# Patient Record
Sex: MALE | URBAN - NONMETROPOLITAN AREA
[De-identification: names, ages, dates, MRNs, and addresses within clinical notes are randomized per-mention and may not be internally consistent; named-entity substitution may affect disease eponyms.]

---

## 2018-06-20 ENCOUNTER — HISTORICAL (OUTPATIENT)
Dept: ADMINISTRATIVE | Facility: HOSPITAL | Age: 63
End: 2018-06-20

## 2018-08-20 ENCOUNTER — HISTORICAL (OUTPATIENT)
Dept: RADIOLOGY | Facility: HOSPITAL | Age: 63
End: 2018-08-20

## 2020-07-27 ENCOUNTER — HISTORICAL (OUTPATIENT)
Dept: ADMINISTRATIVE | Facility: HOSPITAL | Age: 65
End: 2020-07-27

## 2021-09-16 ENCOUNTER — HISTORICAL (OUTPATIENT)
Dept: ADMINISTRATIVE | Facility: HOSPITAL | Age: 66
End: 2021-09-16

## 2021-09-26 ENCOUNTER — HISTORICAL (OUTPATIENT)
Dept: ADMINISTRATIVE | Facility: HOSPITAL | Age: 66
End: 2021-09-26

## 2023-03-26 ENCOUNTER — HOSPITAL ENCOUNTER (EMERGENCY)
Facility: HOSPITAL | Age: 68
Discharge: HOME | End: 2023-03-26
Payer: MEDICARE

## 2023-03-26 VITALS
HEIGHT: 68 IN | DIASTOLIC BLOOD PRESSURE: 91 MMHG | BODY MASS INDEX: 24.4 KG/M2 | SYSTOLIC BLOOD PRESSURE: 155 MMHG | OXYGEN SATURATION: 94 % | WEIGHT: 161 LBS | RESPIRATION RATE: 19 BRPM | HEART RATE: 77 BPM | TEMPERATURE: 99 F

## 2023-03-26 DIAGNOSIS — S51.812A SKIN TEAR OF FOREARM WITHOUT COMPLICATION, LEFT, INITIAL ENCOUNTER: ICD-10-CM

## 2023-03-26 DIAGNOSIS — S51.012A SKIN TEAR OF ELBOW WITHOUT COMPLICATION, LEFT, INITIAL ENCOUNTER: Primary | ICD-10-CM

## 2023-03-26 PROCEDURE — 99282 EMERGENCY DEPT VISIT SF MDM: CPT

## 2023-03-26 PROCEDURE — 25000003 PHARM REV CODE 250: Performed by: NURSE PRACTITIONER

## 2023-03-26 RX ADMIN — BACITRACIN ZINC, NEOMYCIN SULFATE, POLYMYXIN B SULFATE 1 EACH: 3.5; 5000; 4 OINTMENT TOPICAL at 06:03

## 2023-03-26 RX ADMIN — BACITRACIN ZINC, NEOMYCIN SULFATE, POLYMYXIN B SULFATE 2 EACH: 3.5; 5000; 4 OINTMENT TOPICAL at 06:03
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
## 2023-11-15 DIAGNOSIS — R06.02 SHORTNESS OF BREATH: Primary | ICD-10-CM

## 2023-11-21 PROBLEM — R06.02 SHORTNESS OF BREATH: Status: ACTIVE | Noted: 2023-11-21

## 2024-02-12 ENCOUNTER — HOSPITAL ENCOUNTER (OUTPATIENT)
Dept: RADIOLOGY | Facility: HOSPITAL | Age: 69
Discharge: HOME | End: 2024-02-12
Attending: INTERNAL MEDICINE
Payer: MEDICARE

## 2024-02-12 DIAGNOSIS — S00.83XA CONTUSION OF CHEEK: ICD-10-CM

## 2024-02-12 PROCEDURE — 70450 CT HEAD/BRAIN W/O DYE: CPT | Mod: TC

## 2024-04-13 ENCOUNTER — HOSPITAL ENCOUNTER (EMERGENCY)
Facility: HOSPITAL | Age: 69
Discharge: LEFT BEFORE DISCHARGE | End: 2024-04-13
Attending: FAMILY MEDICINE | Admitting: INTERNAL MEDICINE
Payer: COMMERCIAL

## 2024-04-13 VITALS
HEART RATE: 61 BPM | WEIGHT: 155 LBS | HEIGHT: 68 IN | BODY MASS INDEX: 23.49 KG/M2 | TEMPERATURE: 98 F | OXYGEN SATURATION: 98 % | RESPIRATION RATE: 20 BRPM | DIASTOLIC BLOOD PRESSURE: 58 MMHG | SYSTOLIC BLOOD PRESSURE: 162 MMHG
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
## 2024-04-13 DIAGNOSIS — S51.012A SKIN TEAR OF LEFT ELBOW WITHOUT COMPLICATION, INITIAL ENCOUNTER: ICD-10-CM

## 2024-04-13 DIAGNOSIS — S61.511A TEAR OF SKIN OF RIGHT WRIST, INITIAL ENCOUNTER: ICD-10-CM
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
## 2024-04-13 PROCEDURE — 99283 EMERGENCY DEPT VISIT LOW MDM: CPT

## 2024-04-13 RX ORDER — MUPIROCIN 20 MG/G
OINTMENT TOPICAL 3 TIMES DAILY
Qty: 22 G | Refills: 0 | Status: SHIPPED | OUTPATIENT
Start: 2024-04-13 | End: 2024-04-23
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
## 2024-04-26 ENCOUNTER — HOSPITAL ENCOUNTER (INPATIENT)
Facility: HOSPITAL | Age: 69
LOS: 1 days | Discharge: LEFT BEFORE DISCHARGE | DRG: 641 | End: 2024-04-27
Attending: SURGERY | Admitting: FAMILY MEDICINE
Payer: MEDICARE

## 2024-04-26 DIAGNOSIS — N17.9 ACUTE RENAL FAILURE, UNSPECIFIED ACUTE RENAL FAILURE TYPE: Primary | ICD-10-CM

## 2024-04-26 DIAGNOSIS — R53.81 MALAISE: ICD-10-CM

## 2024-04-26 DIAGNOSIS — E87.5 HYPERKALEMIA: ICD-10-CM

## 2024-04-26 DIAGNOSIS — S41.111A LACERATION OF RIGHT UPPER ARM WITHOUT COMPLICATION, INITIAL ENCOUNTER: ICD-10-CM

## 2024-04-26 PROBLEM — N18.9 ACUTE ON CHRONIC RENAL FAILURE: Status: ACTIVE | Noted: 2024-04-26

## 2024-04-26 PROBLEM — D62 ACUTE BLOOD LOSS ANEMIA: Status: ACTIVE | Noted: 2024-04-26

## 2024-04-26 LAB
ALBUMIN SERPL-MCNC: 3.4 G/DL (ref 3.4–5)
ALBUMIN SERPL-MCNC: 4.4 G/DL (ref 3.4–5)
ALBUMIN/GLOB SERPL: 1.4 RATIO
ALBUMIN/GLOB SERPL: 1.4 RATIO
ALP SERPL-CCNC: 67 UNIT/L (ref 50–144)
ALP SERPL-CCNC: 83 UNIT/L (ref 50–144)
ALT SERPL-CCNC: 21 UNIT/L (ref 1–45)
ALT SERPL-CCNC: 48 UNIT/L (ref 1–45)
ANION GAP SERPL CALC-SCNC: 12 MEQ/L (ref 2–13)
ANION GAP SERPL CALC-SCNC: 4 MEQ/L (ref 2–13)
APPEARANCE UR: CLEAR
AST SERPL-CCNC: 27 UNIT/L (ref 17–59)
AST SERPL-CCNC: 45 UNIT/L (ref 17–59)
BACTERIA #/AREA URNS AUTO: ABNORMAL /HPF
BASOPHILS # BLD AUTO: 0.18 X10(3)/MCL (ref 0.01–0.08)
BASOPHILS NFR BLD AUTO: 1.3 % (ref 0.1–1.2)
BILIRUB SERPL-MCNC: 1 MG/DL (ref 0–1)
BILIRUB SERPL-MCNC: 1.5 MG/DL (ref 0–1)
BILIRUB UR QL STRIP.AUTO: NEGATIVE
BUN SERPL-MCNC: 32 MG/DL (ref 7–20)
BUN SERPL-MCNC: 35 MG/DL (ref 7–20)
CALCIUM SERPL-MCNC: 8.3 MG/DL (ref 8.4–10.2)
CALCIUM SERPL-MCNC: 8.8 MG/DL (ref 8.4–10.2)
CHLORIDE SERPL-SCNC: 105 MMOL/L (ref 98–110)
CHLORIDE SERPL-SCNC: 109 MMOL/L (ref 98–110)
CO2 SERPL-SCNC: 18 MMOL/L (ref 21–32)
CO2 SERPL-SCNC: 23 MMOL/L (ref 21–32)
COLOR UR AUTO: YELLOW
CREAT SERPL-MCNC: 2.69 MG/DL (ref 0.66–1.25)
CREAT SERPL-MCNC: 3.54 MG/DL (ref 0.66–1.25)
CREAT/UREA NIT SERPL: 10 (ref 12–20)
CREAT/UREA NIT SERPL: 12 (ref 12–20)
EOSINOPHIL # BLD AUTO: 0.02 X10(3)/MCL (ref 0.04–0.54)
EOSINOPHIL NFR BLD AUTO: 0.1 % (ref 0.7–7)
ERYTHROCYTE [DISTWIDTH] IN BLOOD BY AUTOMATED COUNT: 12.9 %
ETHANOL BLD-MCNC: <0.01 G/DL
ETHANOL SERPL-MCNC: <10 MG/DL
GFR SERPLBLD CREATININE-BSD FMLA CKD-EPI: 18 MLS/MIN/1.73/M2
GFR SERPLBLD CREATININE-BSD FMLA CKD-EPI: 25 MLS/MIN/1.73/M2
GLOBULIN SER-MCNC: 2.4 GM/DL (ref 2–3.9)
GLOBULIN SER-MCNC: 3.1 GM/DL (ref 2–3.9)
GLUCOSE SERPL-MCNC: 112 MG/DL (ref 70–115)
GLUCOSE SERPL-MCNC: 172 MG/DL (ref 70–115)
GLUCOSE UR QL STRIP.AUTO: 250
HCT VFR BLD AUTO: 32.3 % (ref 36–52)
HGB BLD-MCNC: 10.9 G/DL (ref 13–18)
IMM GRANULOCYTES # BLD AUTO: 0.11 X10(3)/MCL (ref 0–0.03)
IMM GRANULOCYTES NFR BLD AUTO: 0.8 % (ref 0–0.5)
KETONES UR QL STRIP.AUTO: NEGATIVE
LEUKOCYTE ESTERASE UR QL STRIP.AUTO: NEGATIVE
LYMPHOCYTES # BLD AUTO: 1.54 X10(3)/MCL (ref 1.32–3.57)
LYMPHOCYTES NFR BLD AUTO: 11 % (ref 20–55)
MAGNESIUM SERPL-MCNC: 2.3 MG/DL (ref 1.8–2.4)
MCH RBC QN AUTO: 31.2 PG (ref 27–34)
MCHC RBC AUTO-ENTMCNC: 33.7 G/DL (ref 31–37)
MCV RBC AUTO: 92.6 FL (ref 79–99)
MONOCYTES # BLD AUTO: 0.56 X10(3)/MCL (ref 0.3–0.82)
MONOCYTES NFR BLD AUTO: 4 % (ref 4.7–12.5)
NEUTROPHILS # BLD AUTO: 11.6 X10(3)/MCL (ref 1.78–5.38)
NEUTROPHILS NFR BLD AUTO: 82.8 % (ref 37–73)
NITRITE UR QL STRIP.AUTO: NEGATIVE
NRBC BLD AUTO-RTO: 0 %
OHS QRS DURATION: 108 MS
OHS QTC CALCULATION: 435 MS
PH UR STRIP.AUTO: 5.5 [PH]
PHOSPHATE SERPL-MCNC: 6.5 MG/DL (ref 2.5–4.9)
PLATELET # BLD AUTO: 174 X10(3)/MCL (ref 140–371)
PMV BLD AUTO: 10 FL (ref 9.4–12.4)
POCT GLUCOSE: 104 MG/DL (ref 70–110)
POCT GLUCOSE: 119 MG/DL (ref 70–110)
POTASSIUM SERPL-SCNC: 4.3 MMOL/L (ref 3.5–5.1)
POTASSIUM SERPL-SCNC: 4.9 MMOL/L (ref 3.5–5.1)
POTASSIUM SERPL-SCNC: 5.8 MMOL/L (ref 3.5–5.1)
POTASSIUM SERPL-SCNC: 7.2 MMOL/L (ref 3.5–5.1)
POTASSIUM SERPL-SCNC: 7.5 MMOL/L (ref 3.5–5.1)
PROT SERPL-MCNC: 5.8 GM/DL (ref 6.3–8.2)
PROT SERPL-MCNC: 7.5 GM/DL (ref 6.3–8.2)
PROT UR QL STRIP.AUTO: 30
RBC # BLD AUTO: 3.49 X10(6)/MCL (ref 4–6)
RBC #/AREA URNS AUTO: ABNORMAL /HPF
RBC UR QL AUTO: ABNORMAL
SODIUM SERPL-SCNC: 135 MMOL/L (ref 135–145)
SODIUM SERPL-SCNC: 136 MMOL/L (ref 135–145)
SP GR UR STRIP.AUTO: >=1.03 (ref 1–1.03)
SPERM URNS QL MICRO: ABNORMAL /HPF
SQUAMOUS #/AREA URNS AUTO: ABNORMAL /HPF
TROPONIN I SERPL-MCNC: 0.05 NG/ML (ref 0–0.03)
UROBILINOGEN UR STRIP-ACNC: 0.2
WBC # SPEC AUTO: 14.01 X10(3)/MCL (ref 4–11.5)
WBC #/AREA URNS AUTO: ABNORMAL /HPF

## 2024-04-26 PROCEDURE — 99285 EMERGENCY DEPT VISIT HI MDM: CPT | Mod: 25

## 2024-04-26 PROCEDURE — 51702 INSERT TEMP BLADDER CATH: CPT

## 2024-04-26 PROCEDURE — 84100 ASSAY OF PHOSPHORUS: CPT | Performed by: SURGERY

## 2024-04-26 PROCEDURE — 3E0234Z INTRODUCTION OF SERUM, TOXOID AND VACCINE INTO MUSCLE, PERCUTANEOUS APPROACH: ICD-10-PCS | Performed by: SURGERY

## 2024-04-26 PROCEDURE — 96361 HYDRATE IV INFUSION ADD-ON: CPT

## 2024-04-26 PROCEDURE — 0HQBXZZ REPAIR RIGHT UPPER ARM SKIN, EXTERNAL APPROACH: ICD-10-PCS | Performed by: SURGERY

## 2024-04-26 PROCEDURE — 94761 N-INVAS EAR/PLS OXIMETRY MLT: CPT

## 2024-04-26 PROCEDURE — 90471 IMMUNIZATION ADMIN: CPT | Performed by: SURGERY

## 2024-04-26 PROCEDURE — 93010 ELECTROCARDIOGRAM REPORT: CPT | Mod: ,,, | Performed by: INTERNAL MEDICINE

## 2024-04-26 PROCEDURE — 63600175 PHARM REV CODE 636 W HCPCS: Performed by: FAMILY MEDICINE

## 2024-04-26 PROCEDURE — 80053 COMPREHEN METABOLIC PANEL: CPT | Performed by: INTERNAL MEDICINE

## 2024-04-26 PROCEDURE — 90714 TD VACC NO PRESV 7 YRS+ IM: CPT | Performed by: SURGERY

## 2024-04-26 PROCEDURE — 84484 ASSAY OF TROPONIN QUANT: CPT | Performed by: SURGERY

## 2024-04-26 PROCEDURE — 82077 ASSAY SPEC XCP UR&BREATH IA: CPT | Performed by: SURGERY

## 2024-04-26 PROCEDURE — 25000003 PHARM REV CODE 250: Performed by: FAMILY MEDICINE

## 2024-04-26 PROCEDURE — 80053 COMPREHEN METABOLIC PANEL: CPT | Performed by: SURGERY

## 2024-04-26 PROCEDURE — 84132 ASSAY OF SERUM POTASSIUM: CPT | Performed by: SURGERY

## 2024-04-26 PROCEDURE — 96374 THER/PROPH/DIAG INJ IV PUSH: CPT

## 2024-04-26 PROCEDURE — 82962 GLUCOSE BLOOD TEST: CPT

## 2024-04-26 PROCEDURE — 12002 RPR S/N/AX/GEN/TRNK2.6-7.5CM: CPT

## 2024-04-26 PROCEDURE — 83735 ASSAY OF MAGNESIUM: CPT | Performed by: SURGERY

## 2024-04-26 PROCEDURE — 85025 COMPLETE CBC W/AUTO DIFF WBC: CPT | Performed by: SURGERY

## 2024-04-26 PROCEDURE — 87040 BLOOD CULTURE FOR BACTERIA: CPT | Performed by: FAMILY MEDICINE

## 2024-04-26 PROCEDURE — 20000000 HC ICU ROOM

## 2024-04-26 PROCEDURE — 25000003 PHARM REV CODE 250: Performed by: INTERNAL MEDICINE

## 2024-04-26 PROCEDURE — 87086 URINE CULTURE/COLONY COUNT: CPT | Performed by: SURGERY

## 2024-04-26 PROCEDURE — 25000003 PHARM REV CODE 250: Performed by: SURGERY

## 2024-04-26 PROCEDURE — 96375 TX/PRO/DX INJ NEW DRUG ADDON: CPT

## 2024-04-26 PROCEDURE — 63600175 PHARM REV CODE 636 W HCPCS: Performed by: SURGERY

## 2024-04-26 PROCEDURE — 93005 ELECTROCARDIOGRAM TRACING: CPT

## 2024-04-26 PROCEDURE — 81001 URINALYSIS AUTO W/SCOPE: CPT | Performed by: SURGERY

## 2024-04-26 RX ORDER — OXYCODONE AND ACETAMINOPHEN 10; 325 MG/1; MG/1
1 TABLET ORAL EVERY 4 HOURS PRN
COMMUNITY
End: 2024-04-26 | Stop reason: CLARIF

## 2024-04-26 RX ORDER — POTASSIUM CHLORIDE 750 MG/1
10 TABLET, EXTENDED RELEASE ORAL
COMMUNITY
End: 2024-04-26 | Stop reason: CLARIF

## 2024-04-26 RX ORDER — BUPROPION HYDROCHLORIDE 100 MG/1
100 TABLET, EXTENDED RELEASE ORAL 2 TIMES DAILY
COMMUNITY
End: 2024-04-26 | Stop reason: CLARIF

## 2024-04-26 RX ORDER — LIDOCAINE HYDROCHLORIDE 20 MG/ML
20 INJECTION, SOLUTION INFILTRATION; PERINEURAL ONCE
Status: COMPLETED | OUTPATIENT
Start: 2024-04-26 | End: 2024-04-26

## 2024-04-26 RX ORDER — SODIUM CHLORIDE 450 MG/100ML
INJECTION, SOLUTION INTRAVENOUS CONTINUOUS
Status: DISCONTINUED | OUTPATIENT
Start: 2024-04-26 | End: 2024-04-26

## 2024-04-26 RX ORDER — ZOLPIDEM TARTRATE 10 MG/1
5 TABLET ORAL NIGHTLY PRN
COMMUNITY
End: 2024-04-26 | Stop reason: CLARIF

## 2024-04-26 RX ORDER — CALCIUM GLUCONATE IN 0.9% NACL 1 G/100 ML
1 PLASTIC BAG, INJECTION (ML) INTRAVENOUS ONCE
Status: COMPLETED | OUTPATIENT
Start: 2024-04-26 | End: 2024-04-26

## 2024-04-26 RX ORDER — FUROSEMIDE 20 MG/1
40 TABLET ORAL DAILY
COMMUNITY
Start: 2024-04-10 | End: 2024-04-26 | Stop reason: CLARIF

## 2024-04-26 RX ORDER — DONEPEZIL HYDROCHLORIDE 5 MG/1
5 TABLET, FILM COATED ORAL NIGHTLY
COMMUNITY
End: 2024-04-26 | Stop reason: CLARIF

## 2024-04-26 RX ORDER — SODIUM CHLORIDE 9 MG/ML
INJECTION, SOLUTION INTRAVENOUS CONTINUOUS
Status: DISCONTINUED | OUTPATIENT
Start: 2024-04-26 | End: 2024-04-27

## 2024-04-26 RX ORDER — PAROXETINE HYDROCHLORIDE 40 MG/1
40 TABLET, FILM COATED ORAL EVERY MORNING
COMMUNITY
End: 2024-04-26 | Stop reason: CLARIF

## 2024-04-26 RX ORDER — ASPIRIN 81 MG/1
81 TABLET ORAL DAILY
COMMUNITY
End: 2024-04-26 | Stop reason: CLARIF

## 2024-04-26 RX ORDER — SIMVASTATIN 80 MG/1
80 TABLET, FILM COATED ORAL NIGHTLY
COMMUNITY
End: 2024-04-26 | Stop reason: CLARIF

## 2024-04-26 RX ORDER — AMIODARONE HYDROCHLORIDE 200 MG/1
200 TABLET ORAL 2 TIMES DAILY
COMMUNITY
End: 2024-04-26 | Stop reason: CLARIF

## 2024-04-26 RX ORDER — METOPROLOL TARTRATE 25 MG/1
25 TABLET, FILM COATED ORAL 2 TIMES DAILY
COMMUNITY
End: 2024-04-26 | Stop reason: CLARIF

## 2024-04-26 RX ORDER — PANTOPRAZOLE SODIUM 40 MG/1
40 TABLET, DELAYED RELEASE ORAL DAILY
COMMUNITY
End: 2024-04-26 | Stop reason: CLARIF

## 2024-04-26 RX ORDER — FERROUS GLUCONATE 324(38)MG
324 TABLET ORAL
COMMUNITY
End: 2024-04-26 | Stop reason: CLARIF

## 2024-04-26 RX ORDER — TRAMADOL HYDROCHLORIDE 50 MG/1
50 TABLET ORAL EVERY 6 HOURS PRN
COMMUNITY
End: 2024-04-26 | Stop reason: CLARIF

## 2024-04-26 RX ORDER — MUPIROCIN 20 MG/G
OINTMENT TOPICAL 2 TIMES DAILY
Status: DISCONTINUED | OUTPATIENT
Start: 2024-04-26 | End: 2024-04-27 | Stop reason: HOSPADM

## 2024-04-26 RX ADMIN — MUPIROCIN: 20 OINTMENT TOPICAL at 08:04

## 2024-04-26 RX ADMIN — SODIUM CHLORIDE: 4.5 INJECTION, SOLUTION INTRAVENOUS at 02:04

## 2024-04-26 RX ADMIN — INSULIN HUMAN 5 UNITS: 100 INJECTION, SOLUTION PARENTERAL at 07:04

## 2024-04-26 RX ADMIN — CEFTRIAXONE SODIUM 1 G: 1 INJECTION, POWDER, FOR SOLUTION INTRAMUSCULAR; INTRAVENOUS at 05:04

## 2024-04-26 RX ADMIN — SODIUM CHLORIDE 1000 ML: 9 INJECTION, SOLUTION INTRAVENOUS at 08:04

## 2024-04-26 RX ADMIN — LIDOCAINE HYDROCHLORIDE 20 ML: 20 INJECTION, SOLUTION INFILTRATION; PERINEURAL at 07:04

## 2024-04-26 RX ADMIN — SODIUM CHLORIDE: 9 INJECTION, SOLUTION INTRAVENOUS at 06:04

## 2024-04-26 RX ADMIN — TETANUS AND DIPHTHERIA TOXOIDS ADSORBED 0.5 ML: 2; 2 INJECTION INTRAMUSCULAR at 06:04

## 2024-04-26 RX ADMIN — CALCIUM GLUCONATE 1 G: 10 INJECTION, SOLUTION INTRAVENOUS at 07:04

## 2024-04-26 RX ADMIN — SODIUM BICARBONATE: 84 INJECTION, SOLUTION INTRAVENOUS at 05:04

## 2024-04-26 NOTE — ASSESSMENT & PLAN NOTE
Patient with acute kidney injury/acute renal failure likely due to pre-renal azotemia due to dehydration ALEXANDRA is currently improving. Baseline creatinine  1.9  - Labs reviewed- Renal function/electrolytes with Estimated Creatinine Clearance: 19.9 mL/min (A) (based on SCr of 3.54 mg/dL (H)). according to latest data. Monitor urine output and serial BMP and adjust therapy as needed. Avoid nephrotoxins and renally dose meds for GFR listed above.

## 2024-04-26 NOTE — PLAN OF CARE
REVIEWED.     Problem: Infection  Goal: Absence of Infection Signs and Symptoms  Outcome: Progressing     Problem: Adult Inpatient Plan of Care  Goal: Plan of Care Review  Outcome: Progressing  Goal: Patient-Specific Goal (Individualized)  Outcome: Progressing  Goal: Absence of Hospital-Acquired Illness or Injury  Outcome: Progressing  Goal: Optimal Comfort and Wellbeing  Outcome: Progressing  Goal: Readiness for Transition of Care  Outcome: Progressing     Problem: Acute Kidney Injury/Impairment  Goal: Fluid and Electrolyte Balance  Outcome: Progressing  Goal: Improved Oral Intake  Outcome: Progressing  Goal: Effective Renal Function  Outcome: Progressing     Problem: Skin Injury Risk Increased  Goal: Skin Health and Integrity  Outcome: Progressing     Problem: Fall Injury Risk  Goal: Absence of Fall and Fall-Related Injury  Outcome: Progressing     Problem: Wound  Goal: Optimal Coping  Outcome: Progressing  Goal: Optimal Functional Ability  Outcome: Progressing  Goal: Absence of Infection Signs and Symptoms  Outcome: Progressing  Goal: Improved Oral Intake  Outcome: Progressing  Goal: Optimal Pain Control and Function  Outcome: Progressing  Goal: Skin Health and Integrity  Outcome: Progressing  Goal: Optimal Wound Healing  Outcome: Progressing

## 2024-04-26 NOTE — Clinical Note
Diagnosis: Acute renal failure, unspecified acute renal failure type [[id number]]   Future Attending Provider: JASIEL BUCKNER [[id number]]   Reason for IP Medical Treatment  (Clinical interventions that can only be accomplished in the IP setting? ) :: Treatment for inpatient acute life threatening illness   Estimated Length of Stay:: 3-4 midnights   I certify that Inpatient services for greater than or equal to 2 midnights are medically necessary:: Yes   Plans for Post-Acute care--if anticipated (pick the single best option):: A. No post acute care anticipated at this time   Special Needs:: No Special Needs [1]

## 2024-04-26 NOTE — CONSULTS
[facility]  Consult Note    Patient Name: Leno Thompson Jr.  MRN: [id number]  Admission Date: 4/26/2024  Hospital Length of Stay: 0 days  Attending Provider: Fiorella Madison MD   Primary Care Physician: David Carter MD  Principal Problem:<principal problem not specified>    Consults  Subjective:     HPI: Patient is a 68-year-old male who presented to the emergency department after calling EMS when he was too weak to get up off the toilet this morning.  Patient does have significant baseline dysarthria and is an extremely difficult historian.  Apparently there was evidence for significant bleeding in the house, which may have been as a result of lacerations from falls and he is apparently on Plavix although we do not have any other medication history available at this time.  In the emergency department he was noted to be severely hyperkalemic at 7.5 with an elevated creatinine of 3.54 as compared to 1.81 back in mid February.  He was treated medically for his hyperkalemia including dextrose/insulin/calcium gluconate, and given 1 L of normal saline as a bolus presumably for evidence of volume depletion.      Patient's chest x-ray was clear, and renal ultrasound did show kidneys that were slightly diminutive with some cortical thinning and increased echogenicity but no evidence for obstruction.  His urine specific gravity was greater than 1.030 suggesting significant prerenal physiology.  He has been hemodynamically stable and afebrile.  Repeat potassium level 3 hours after institution of medical therapy showed level down to 4.8, and repeat several hours later shows potassium level trending upward at 5.8.  Patient does have a Richardson catheter in place with 300 cc which presumably represent an 8 hour collection.  The    Patient's past medical history is somewhat unclear although appears to include hypertension, coronary artery disease and dysarthria.  At time of my evaluation  he is resting comfortably in ICU and is easily arousable but minimally interactive.  He does have markedly delayed speech, and does not offer any significant complaints on review.  He is awaiting an ICU bed.  Reportedly surgery had been contacted about placing a dialysis catheter earlier today.      Past Medical History:   Diagnosis Date    Arthritis     Heart attack     Hypertension     Muscle spasms of both lower extremities        Past Surgical History:   Procedure Laterality Date    LEG AMPUTATION Left        Review of patient's allergies indicates:  No Known Allergies  Current Facility-Administered Medications   Medication Dose Route Frequency Provider Last Rate Last Admin    0.9%  NaCl infusion   Intravenous Continuous Perry Aparicio MD   Stopped at 04/26/24 0759    dextrose 10% bolus 125 mL 125 mL  12.5 g Intravenous PRN Perry Aparicio MD        dextrose 10% bolus 250 mL 250 mL  25 g Intravenous PRN Perry Aparicio MD         No current outpatient medications on file.     Family History    None       Tobacco Use    Smoking status: Every Day     Current packs/day: 1.00     Types: Cigarettes    Smokeless tobacco: Never   Substance and Sexual Activity    Alcohol use: Never    Drug use: Never    Sexual activity: Not on file     Review of Systems   Constitutional:         As per HPI, patient is minimally interactive and does not really offer any complaints.  On questioning he describes some shortness of breath but appears to be breathing with a normal respiratory rate and effort with oxygen saturation at 100% on 3 L. he denies any other focal pain complaints.     Objective:     Vital Signs (Most Recent):  Temp: 98.1 °F (36.7 °C) (04/26/24 1245)  Pulse: (!) 54 (04/26/24 1245)  Resp: 12 (04/26/24 1245)  BP: (!) 187/66 (04/26/24 1245)  SpO2: 100 % (04/26/24 1245) Vital Signs (24h Range):  Temp:  [97.8 °F (36.6 °C)-98.1 °F (36.7 °C)] 98.1 °F (36.7 °C)  Pulse:  [49-66] 54  Resp:  [11-22] 12  SpO2:   [93 %-100 %] 100 %  BP: (144-190)/(50-91) 187/66     Weight: 68.5 kg (151 lb) (04/26/24 0559)  Body mass index is 22.96 kg/m².  Body surface area is 1.81 meters squared.    No intake/output data recorded.    Physical Exam  Vitals reviewed. Nursing note reviewed: Patient was seen on telemedicine rounds in ICU, he gave consent for evaluation and management.  Patient is easily arousable but minimally interactive, in no acute distress.  He is a very poor historian.    Significant Labs:  CBC:   Recent Labs   Lab 04/26/24  0629   WBC 14.01*   RBC 3.49*   HGB 10.9*   HCT 32.3*      MCV 92.6   MCH 31.2   MCHC 33.7     CMP:   Recent Labs   Lab 04/26/24  0629 04/26/24  0702 04/26/24  1301   CALCIUM 8.8  --   --    ALBUMIN 4.4  --   --      --   --    K 7.2*   < > 5.8*   CO2 18*  --   --    BUN 35.0*  --   --    CREATININE 3.54*  --   --    ALKPHOS 83  --   --    ALT 48*  --   --    AST 45  --   --    BILITOT 1.5*  --   --     < > = values in this interval not displayed.     Assessment/Plan:     1. Hyperkalemia:  Patient presented to the emergency department with severe hyperkalemia and a potassium of 7.5, although it is not clear that he had particularly concerning EKG changes.  He was given 1 round of medical therapy and repeat potassium 3 hours later it dropped to 4.8, but has subsequently trended upward modestly to 5.8.  Patient does appear to be volume depleted, although this is a more difficult assessment by telemedicine evaluation.  I will start him on some maintenance fluids including sodium bicarbonate and plan repeat chemistry panel in 4 hours.  I would hold off on placement of dialysis catheter at this time as I suspect we can manage this medically.  He will need to be on a low potassium diet    2. Renal:  This is a gentleman who appears to have at least some element of LAEXANDRA and may have an underlying component of CKD.  We will need to get a better history on him, but his ultrasound is suggestive of  chronic renal impairment.  He appears to have a modest degree of albumin in his urine and only a small amount of blood.  We will review his medications when able and monitor response to hydration with further laboratory evaluation and urine studies in the morning.      3. Anemia:  Modest anemia with hemoglobin at 10.9, normal cell indices.  I will check iron stores.      4. Hypertension:  Blood pressure has been intermittently elevated with a pattern of isolated systolic hypertension.  We will attempt to get a list of his chronic outpatient medications and can restart therapy as appropriate, we will defer present management to on-site medical team.    Edwin Dunaway MD  Nephrology  [facility]

## 2024-04-26 NOTE — H&P
[facility]  [facility] & Physical    Patient Name: Leno Thompson Jr.  MRN: [id number]  Patient Class: IP- Inpatient  Admission Date: 4/26/2024  Attending Physician: Fiorella Madison MD   Primary Care Provider: David Carter MD         Patient information was obtained from patient and ER records.     Subjective:     Principal Problem:Hyperkalemia    Chief Complaint:   Chief Complaint   Patient presents with    Weakness     Pt to er via [facility], medic states pt called due to not being able to get off toilet, medic states pt was covered in blood, blood all over walls in house and on floor in house/bedroom. Pt denies pain and does not know where blood is coming from.         HPI: Patient is a 68-year-old male who presented to the emergency department after calling EMS when he was too weak to get up off the toilet this morning.  Patient does have significant baseline dysarthria and is an extremely difficult historian.  Apparently there was evidence for significant bleeding in the house, which may have been as a result of lacerations from falls and he is apparently on Plavix although we do not have any other medication history available at this time.  In the emergency department he was noted to be severely hyperkalemic at 7.5 with an elevated creatinine of 3.54 as compared to 1.81 back in mid February.  He was treated medically for his hyperkalemia including dextrose/insulin/calcium gluconate, and given 1 L of normal saline as a bolus presumably for evidence of volume depletion.       Patient's chest x-ray was clear, and renal ultrasound did show kidneys that were slightly diminutive with some cortical thinning and increased echogenicity but no evidence for obstruction.  His urine specific gravity was greater than 1.030 suggesting significant prerenal physiology.  He has been hemodynamically stable and afebrile.     Repeat potassium level 3 hours after institution of medical  therapy showed level down to 4.8, and repeat several hours later shows potassium level trending upward at 5.8.  Richardson was placed per ERMD.Patient does have a Richardson catheter in place with 300 cc which presumably represent an 8 hour collection.        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     He has markedly delayed speech, and does not offer any significant complaints on review.   Reportedly surgery had been contacted about placing a dialysis catheter earlier today.         Past Medical History:   Diagnosis Date    Arthritis     Heart attack     Hypertension     Muscle spasms of both lower extremities        Past Surgical History:   Procedure Laterality Date    LEG AMPUTATION Left        Review of patient's allergies indicates:  No Known Allergies    Current Facility-Administered Medications   Medication Dose Route Frequency Provider Last Rate Last Admin    0.45% NaCl infusion   Intravenous Continuous Edwin Dunaway  mL/hr at 04/26/24 1443 New Bag at 04/26/24 1443    0.9%  NaCl infusion   Intravenous Continuous Perry Aparicio MD   Stopped at 04/26/24 0759    dextrose 10% bolus 125 mL 125 mL  12.5 g Intravenous PRN Perry Aparicio MD        dextrose 10% bolus 250 mL 250 mL  25 g Intravenous PRN Perry Aparicio MD         No current outpatient medications on file.     Family History    None       Tobacco Use    Smoking status: Every Day     Current packs/day: 1.00     Types: Cigarettes    Smokeless tobacco: Never   Substance and Sexual Activity    Alcohol use: Never    Drug use: Never    Sexual activity: Not on file     Review of Systems   Constitutional:  Negative for appetite change, fatigue and fever.   Respiratory:  Negative for cough, shortness of breath and wheezing.    Cardiovascular:  Negative for chest pain and leg swelling.   Gastrointestinal:  Negative  for abdominal distention, abdominal pain, constipation, diarrhea, nausea and vomiting.   Skin:  Negative for color change, pallor, rash and wound.   Neurological:  Negative for tremors, syncope and headaches.   Psychiatric/Behavioral:  Negative for agitation and behavioral problems.      Objective:     Vital Signs (Most Recent):  Temp: 98 °F (36.7 °C) (04/26/24 1445)  Pulse: (!) 53 (04/26/24 1445)  Resp: 15 (04/26/24 1445)  BP: (!) 171/58 (04/26/24 1445)  SpO2: 100 % (04/26/24 1445) Vital Signs (24h Range):  Temp:  [97.8 °F (36.6 °C)-98.1 °F (36.7 °C)] 98 °F (36.7 °C)  Pulse:  [49-66] 53  Resp:  [11-22] 15  SpO2:  [93 %-100 %] 100 %  BP: (144-190)/(50-91) 171/58     Weight: 68.5 kg (151 lb)  Body mass index is 22.96 kg/m².     Physical Exam  Constitutional:       General: He is not in acute distress.     Appearance: Normal appearance. He is normal weight. He is not ill-appearing.   HENT:      Head: Normocephalic and atraumatic.      Nose: Nose normal.   Eyes:      General: No scleral icterus.     Conjunctiva/sclera: Conjunctivae normal.   Cardiovascular:      Rate and Rhythm: Normal rate and regular rhythm.      Pulses: Normal pulses.      Heart sounds: Normal heart sounds.   Pulmonary:      Effort: Pulmonary effort is normal.      Breath sounds: Normal breath sounds.   Abdominal:      General: Abdomen is flat. Bowel sounds are normal.      Palpations: Abdomen is soft.   Musculoskeletal:      Right lower leg: No edema.      Left lower leg: No edema.   Skin:     General: Skin is warm and dry.      Findings: No erythema or rash.   Neurological:      General: No focal deficit present.      Mental Status: He is alert and oriented to person, place, and time.   Psychiatric:         Mood and Affect: Mood normal.         Behavior: Behavior normal.         Thought Content: Thought content normal.                Significant Labs: All pertinent labs within the past 24 hours have been reviewed.  CBC:   Recent Labs   Lab  04/26/24  0629   WBC 14.01*   HGB 10.9*   HCT 32.3*        CMP:   Recent Labs   Lab 04/26/24  0629 04/26/24  0702 04/26/24  1002 04/26/24  1301     --   --   --    K 7.2* 7.5* 4.3 5.8*   CO2 18*  --   --   --    BUN 35.0*  --   --   --    CREATININE 3.54*  --   --   --    CALCIUM 8.8  --   --   --    ALBUMIN 4.4  --   --   --    BILITOT 1.5*  --   --   --    ALKPHOS 83  --   --   --    AST 45  --   --   --    ALT 48*  --   --   --        Significant Imaging: I have reviewed all pertinent imaging results/findings within the past 24 hours.  Assessment/Plan:     * Hyperkalemia  This patient has hyperkalemia which is uncontrolled. We will monitor for arrhythmias with EKG or continuous telemetry. We will treat the hyperkalemia with Potassium Binders, Calcium gluconate, and IV insulin and dextrose. The likely etiology of the hyperkalemia is ALEXANDRA.  The patients latest potassium has been reviewed and the results are listed below  Recent Labs   Lab 04/26/24  1301   K 5.8*     CR was down to 4.8 now back up, repeat is pending, admit to ICU for monitoring.  Nephrology consulted        Acute on chronic renal failure  Patient with acute kidney injury/acute renal failure likely due to pre-renal azotemia due to dehydration ALEXANDRA is currently improving. Baseline creatinine  1.9  - Labs reviewed- Renal function/electrolytes with Estimated Creatinine Clearance: 19.9 mL/min (A) (based on SCr of 3.54 mg/dL (H)). according to latest data. Monitor urine output and serial BMP and adjust therapy as needed. Avoid nephrotoxins and renally dose meds for GFR listed above.      VTE Risk Mitigation (From admission, onward)      None                 Pt to be placed in inpatient status anticipate more than 2 midnights care  Admit to ICU due to hyperkalemia  Critical care time spent on the evaluation and treatment of severe organ dysfunction, review of pertinent labs and imaging studies, discussions with consulting providers and  discussions with patient/family 37 minutes             Fiorella Madison MD  Department of Hospital Medicine  [facility]

## 2024-04-26 NOTE — ASSESSMENT & PLAN NOTE
This patient has hyperkalemia which is uncontrolled. We will monitor for arrhythmias with EKG or continuous telemetry. We will treat the hyperkalemia with Potassium Binders, Calcium gluconate, and IV insulin and dextrose. The likely etiology of the hyperkalemia is ALEXANDRA.  The patients latest potassium has been reviewed and the results are listed below  Recent Labs   Lab 04/26/24  1301   K 5.8*     CR was down to 4.8 now back up, repeat is pending, admit to ICU for monitoring.  Nephrology consulted

## 2024-04-26 NOTE — ED PROVIDER NOTES
Encounter Date: 4/26/2024       History     Chief Complaint   Patient presents with    Weakness     Pt to er via [facility], medic states pt called due to not being able to get off toilet, medic states pt was covered in blood, blood all over walls in house and on floor in house/bedroom. Pt denies pain and does not know where blood is coming from.      67 YO WM W/ KNOWN Hx/o MI, HTN, LEFT BKA PRESENTING VIA EMS W/ PRIMARY C/O INABILITY TO RISE FROM COMMODE DUE TO GENERALIZED WEAKNESS.  EMS REPORTS SIGNS OF HEMORRHAGE IN RESIDENCE.  +LACERATIONS RIGHT BRACHIUM/ANTEBRACHIUM.  LAST TETANUS UNKNOWN.  +BLEEDING CONTROLLED PTA.  +DAILY PLAVIX.  +CONTINUING TOB ABUSE.  +DEEP BASELINE DYSARTHRIA.  HISTORY LIMITED BY CONDITION.      Review of patient's allergies indicates:  No Known Allergies  Past Medical History:   Diagnosis Date    Arthritis     Heart attack     Hypertension     Muscle spasms of both lower extremities      Past Surgical History:   Procedure Laterality Date    LEG AMPUTATION Left      No family history on file.  Social History     Tobacco Use    Smoking status: Every Day     Current packs/day: 1.00     Types: Cigarettes    Smokeless tobacco: Never   Substance Use Topics    Alcohol use: Never    Drug use: Never     Review of Systems   Reason unable to perform ROS: CLINICAL CONDITION.   All other systems reviewed and are negative.      Physical Exam     Initial Vitals [04/26/24 0559]   BP Pulse Resp Temp SpO2   (!) 161/65 66 (!) 22 97.9 °F (36.6 °C) (!) 93 %      MAP       --         Physical Exam    Constitutional:   DISHEVELED     HENT:   Head: Normocephalic and atraumatic.   Right Ear: External ear normal.   Left Ear: External ear normal.   Nose: Nose normal.   Mouth/Throat: Oropharynx is clear and moist.   Eyes: Conjunctivae and EOM are normal. Pupils are equal, round, and reactive to light.   Neck: Neck supple.   Normal range of motion.  Cardiovascular:  Regular rhythm and normal heart sounds.     Exam reveals  no gallop.       No murmur heard.  BRADYCARDIA     Pulmonary/Chest: Breath sounds normal. No respiratory distress. He has no wheezes. He has no rhonchi. He has no rales.   Abdominal: Abdomen is soft. Bowel sounds are normal.   Musculoskeletal:         General: Normal range of motion.      Cervical back: Normal range of motion and neck supple.      Comments: S/P LEFT BKA       Neurological: He is alert and oriented to person, place, and time. No cranial nerve deficit or sensory deficit.   STRENGTH 4/5 GLOBALLY  DEEP DYSARTHRIA WHICH PATIENT & NURSING STAFF REPORT AS BASELINE  NO TREMOR/NO NYSTAGMUS  HEATHER     Psychiatric:   DEPRESSED MOOD  BLUNTED AFFECT       LACERATION REPAIR RIGHT UPPER EXTREMITY - 2 LACERATIONS  ONE @ 2.5 cm  ONE @ 2.0 cm  WOUND EXPLORED W/OUT EVIDENCE OF FOREIGN BODIES  NO JOINT INVOLVEMENT  NO HEMORRHAGE/BLEEDING CONTROLLED  FULL THICKNESS SKIN  10 cc 2% XYLOCAINE LOCAL ANESTHESIA  CLOSURE W/ STAPLES  WOUND DRESSED W/ NEOSPORIN/GAUZE  TOLERATED PROCEDURE WELL    ED Course   Procedures  Labs Reviewed   COMPREHENSIVE METABOLIC PANEL - Abnormal; Notable for the following components:       Result Value    Potassium Level 7.2 (*)     Carbon Dioxide 18 (*)     Glucose Level 172 (*)     Blood Urea Nitrogen 35.0 (*)     Creatinine 3.54 (*)     Bilirubin Total 1.5 (*)     Alanine Aminotransferase 48 (*)     BUN/Creatinine Ratio 10 (*)     All other components within normal limits   URINALYSIS, REFLEX TO URINE CULTURE - Abnormal; Notable for the following components:    Protein, UA 30 (*)     Glucose,  (*)     Blood, UA Small (*)     All other components within normal limits    Narrative:      URINE STABILITY IS 2 HOURS AT ROOM TEMP OR    SIX HOURS REFRIGERATED. PERFORMING TESTING ON    SPECIMENS GREATER THAN THIS AGE MAY AFFECT THE    FOLLOWING TESTS:    PH          SPECIFIC GRAVITY           BLOOD    CLARITY     BILIRUBIN               UROBILINOGEN   PHOSPHORUS - Abnormal; Notable for the  following components:    Phosphorus Level 6.5 (*)     All other components within normal limits   CBC WITH DIFFERENTIAL - Abnormal; Notable for the following components:    WBC 14.01 (*)     RBC 3.49 (*)     Hgb 10.9 (*)     Hct 32.3 (*)     Neut % 82.8 (*)     Lymph % 11.0 (*)     Mono % 4.0 (*)     Eos % 0.1 (*)     Basophil % 1.3 (*)     Neut # 11.60 (*)     Eos # 0.02 (*)     Baso # 0.18 (*)     IG# 0.11 (*)     IG% 0.8 (*)     All other components within normal limits   URINALYSIS, MICROSCOPIC - Abnormal; Notable for the following components:    Bacteria, UA Moderate (*)     Sperm, UA Few (*)     All other components within normal limits   POTASSIUM - Abnormal; Notable for the following components:    Potassium Level 7.5 (*)     All other components within normal limits   TROPONIN I - Abnormal; Notable for the following components:    Troponin-I 0.049 (*)     All other components within normal limits   POTASSIUM - Abnormal; Notable for the following components:    Potassium Level 5.8 (*)     All other components within normal limits   POCT GLUCOSE - Abnormal; Notable for the following components:    POCT Glucose 119 (*)     All other components within normal limits   MAGNESIUM - Normal   ALCOHOL,MEDICAL (ETHANOL) - Normal   POTASSIUM - Normal   CULTURE, URINE   BLOOD CULTURE [facility]   BLOOD CULTURE [facility]   CBC W/ AUTO DIFFERENTIAL    Narrative:     The following orders were created for panel order CBC Auto Differential.  Procedure                               Abnormality         Status                     ---------                               -----------         ------                     CBC with Differential[[id number]]       Abnormal            Final result                 Please view results for these tests on the individual orders.   POCT GLUCOSE MONITORING CONTINUOUS     EKG Readings: (Independently Interpreted)   Initial Reading: No STEMI. Rhythm: Sinus Bradycardia. Heart Rate: 52. Ectopy: No Ectopy.  Conduction: Normal. ST Segments: Non-Specific ST Segment Depression. Axis: Normal.   SINUS FOCUS/NO ECTOPY     ECG Results              EKG 12-lead (Final result)        Collection Time Result Time QRS Duration [facility] QTC Calculation    04/26/24 06:09:36 04/26/24 09:33:56 108 435                     Final result by Interface, Lab [facility] (04/26/24 09:34:02)                   Narrative:    Test Reason : R53.81,    Vent. Rate : 052 BPM     Atrial Rate : 052 BPM     P-R Int : 146 ms          QRS Dur : 108 ms      QT Int : 468 ms       P-R-T Axes : 082 058 006 degrees     QTc Int : 435 ms    Sinus bradycardia  Minimal voltage criteria for LVH, may be normal variant ( Sokolow-Castelan )  Nonspecific ST abnormality  Abnormal ECG  No previous ECGs available  Confirmed by Mau Khan MD ([id number]) on 4/26/2024 9:33:52 AM    Referred By: [email or web address]   SELF           Confirmed By:Mau Khan MD                      Wet Read by Perry Aparicio MD (04/26/24 06:37:40, [facility], Administration)    SEE CHART                                    Imaging Results              US Retroperitoneal Complete (Final result)  Result time 04/26/24 10:09:31      Final result by Rigoberto Brandon III, MD (04/26/24 10:09:31)                   Impression:      1. The kidneys appear mildly to moderately atrophic with increased parenchymal echogenicity and elevated arterial resistive indices.  These findings are nonspecific but commonly seen with chronic renal parenchymal disease/chronic renal failure and correlation with the patient's BUN and creatinine is recommended.  2. A 2-2.5 cm, benign-appearing cortical cyst is noted originating from the right kidney.      Electronically signed by: Rigoberto Brandon  Date:    04/26/2024  Time:    10:09               Narrative:    EXAMINATION:  STUDY: US RETROPERITONEAL COMPLETE    CLINICAL HISTORY AND TECHNIQUE:    * Leslie Mccauley RT on 4/26/2024 10:01 AM CDT: ER  CLINICAL HX:ALEXANDRA, HX OF  HTN      COMPARISON:  None    FINDINGS:  The right kidney measures 8.5 cm in length and the left kidney measures 8.2 cm in length. Both kidneys demonstrate mild cortical thinning/atrophy and what appears to be at least mildly increased parenchymal echogenicity with a 2-2.5 cm, benign-appearing, simple cortical cyst noted originating from the right kidney.  No worrisome, renal parenchymal masses, intrarenal calcifications or significant hydronephrosis are appreciated.  Duplex imaging demonstrates increased resistive arterial indices within both renal arteries (0.85 on the left and 0.81 on the right).  The urinary bladder is collapsed around a Richardson catheter and unable to be adequately evaluated.                                       X-Ray Forearm Right (Final result)  Result time 04/26/24 07:35:28      Final result by Rigoberto Brandon III, MD (04/26/24 07:35:28)                   Impression:      1. Chronic appearing changes are present as described above.  See above comments.      Electronically signed by: Rigoberto Brandon  Date:    04/26/2024  Time:    07:35               Narrative:    EXAMINATION:  STUDY: XR FOREARM RIGHT    CLINICAL HISTORY AND TECHNIQUE:  Trauma    COMPARISON:  None    FINDINGS:  Moderate degenerative changes are noted involving the right elbow, right radiocarpal joint and 1st carpometacarpal joint.  A 2-3 mm, a curvilinear, metallic foreign body is noted within the shallow subcutaneous soft tissue of the dorsal aspect of the right wrist and is likely related to old trauma.  I see no acute fractures, dislocations, or other significant abnormalities.                                       X-Ray Chest AP Portable (Final result)  Result time 04/26/24 07:34:24      Final result by Rigoberto Brandon III, MD (04/26/24 07:34:24)                   Impression:      1. Final lateral detail is obscured by overlying cardiac leads.  There appears to be at least mild parenchymal scarring peripherally within the right lower  lung field.  This may be better delineated with repeat imaging with repositioning of the patient's cardiac leads if felt be clinically necessary.  2. I see no lobar or segmental infiltrates or other significant abnormalities.      Electronically signed by: Rigoberto Brandon  Date:    04/26/2024  Time:    07:34               Narrative:    EXAMINATION:  STUDY: XR CHEST AP PORTABLE    CLINICAL HISTORY AND TECHNIQUE:  Malaise    COMPARISON:  07/03/2018    FINDINGS:  Cardiac leads overlie the right lateral chest wall and hemithorax obscuring fine detail.  There appears to be at least mild parenchymal scarring peripherally within the right lower lung field.The cardiac, hilar, and mediastinal contours appear unremarkable.I see no lobar or segmental infiltrates.No significant pleural effusions are noted.There is moderate demineralization of the skeletal structures with mild to moderate degenerative changes noted throughout the thoracic spine.                                       Medications   0.9%  NaCl infusion (0 mL/hr Intravenous Stopped 4/26/24 0759)   dextrose 10% bolus 125 mL 125 mL (has no administration in time range)   dextrose 10% bolus 250 mL 250 mL (has no administration in time range)   cefTRIAXone (Rocephin) 1 g in dextrose 5 % in water (D5W) 100 mL IVPB (MB+) (0 g Intravenous Stopped 4/26/24 1821)   sodium bicarbonate 50 mEq in sodium chloride 0.45% 1,000 mL infusion ( Intravenous New Bag 4/27/24 0505)   mupirocin 2 % ointment ( Nasal Given 4/26/24 2053)   diptheria-tetanus toxoids 2-2 Lf unit/0.5 mL injection 0.5 mL (0.5 mLs Intramuscular Given 4/26/24 0631)   sodium chloride 0.9% bolus 1,000 mL 1,000 mL (0 mLs Intravenous Stopped 4/26/24 0837)   LIDOcaine HCL 20 mg/ml (2%) injection 20 mL (20 mLs Other Given 4/26/24 0738)   insulin regular injection 5 Units 0.05 mL (5 Units Intravenous Given 4/26/24 0754)   calcium gluconate in NS 1 G IVPB (premixed) (1 g Intravenous Given 4/26/24 0754)     Medical Decision  Making             ED Course as of 04/27/24 0522   Fri Apr 26, 2024   0906  DR BUCKNER AGREES WITH ADMISSION - ASKS FOR CONSULT DR MATHUR, NEPHROLOGY, AND DR CAMPBELL, GEN SURG, FOR DIALYSIS ACCESS [[place]]   0919 SPOKE W/ DR PRUITT WHO WILL CONSULT FOR ACCESS [WV]   1400 Creatinine(!): 3.54 [CJ]      ED Course User Index  [CJ] Adela Berger, PharmD  [WV] Perry Aparicio MD                         TOTAL CRITICAL CARE TIME:  105 MINUTES    Clinical Impression:  Final diagnoses:  [R53.81] Malaise  [N17.9] Acute renal failure, unspecified acute renal failure type (Primary)  [E87.5] Hyperkalemia  [S41.111A] Laceration of right upper arm without complication, initial encounter          ED Disposition Condition    Admit Stable                Perry Aparicio MD  04/26/24 0932       Perry Aparicio MD  04/27/24 0522

## 2024-04-26 NOTE — SUBJECTIVE & OBJECTIVE
Past Medical History:   Diagnosis Date    Arthritis     Heart attack     Hypertension     Muscle spasms of both lower extremities        Past Surgical History:   Procedure Laterality Date    LEG AMPUTATION Left        Review of patient's allergies indicates:  No Known Allergies    Current Facility-Administered Medications   Medication Dose Route Frequency Provider Last Rate Last Admin    0.45% NaCl infusion   Intravenous Continuous Edwin Dunaway  mL/hr at 04/26/24 1443 New Bag at 04/26/24 1443    0.9%  NaCl infusion   Intravenous Continuous Perry Aparicio MD   Stopped at 04/26/24 0759    dextrose 10% bolus 125 mL 125 mL  12.5 g Intravenous PRN Perry Aparicio MD        dextrose 10% bolus 250 mL 250 mL  25 g Intravenous PRN Perry Aparicio MD         No current outpatient medications on file.     Family History    None       Tobacco Use    Smoking status: Every Day     Current packs/day: 1.00     Types: Cigarettes    Smokeless tobacco: Never   Substance and Sexual Activity    Alcohol use: Never    Drug use: Never    Sexual activity: Not on file     Review of Systems   Constitutional:  Negative for appetite change, fatigue and fever.   Respiratory:  Negative for cough, shortness of breath and wheezing.    Cardiovascular:  Negative for chest pain and leg swelling.   Gastrointestinal:  Negative for abdominal distention, abdominal pain, constipation, diarrhea, nausea and vomiting.   Skin:  Negative for color change, pallor, rash and wound.   Neurological:  Negative for tremors, syncope and headaches.   Psychiatric/Behavioral:  Negative for agitation and behavioral problems.      Objective:     Vital Signs (Most Recent):  Temp: 98 °F (36.7 °C) (04/26/24 1445)  Pulse: (!) 53 (04/26/24 1445)  Resp: 15 (04/26/24 1445)  BP: (!) 171/58 (04/26/24 1445)  SpO2: 100 % (04/26/24 1445) Vital Signs (24h Range):  Temp:  [97.8 °F (36.6 °C)-98.1 °F (36.7 °C)] 98 °F (36.7 °C)  Pulse:  [49-66] 53  Resp:  [11-22]  15  SpO2:  [93 %-100 %] 100 %  BP: (144-190)/(50-91) 171/58     Weight: 68.5 kg (151 lb)  Body mass index is 22.96 kg/m².     Physical Exam  Constitutional:       General: He is not in acute distress.     Appearance: Normal appearance. He is normal weight. He is not ill-appearing.   HENT:      Head: Normocephalic and atraumatic.      Nose: Nose normal.   Eyes:      General: No scleral icterus.     Conjunctiva/sclera: Conjunctivae normal.   Cardiovascular:      Rate and Rhythm: Normal rate and regular rhythm.      Pulses: Normal pulses.      Heart sounds: Normal heart sounds.   Pulmonary:      Effort: Pulmonary effort is normal.      Breath sounds: Normal breath sounds.   Abdominal:      General: Abdomen is flat. Bowel sounds are normal.      Palpations: Abdomen is soft.   Musculoskeletal:      Right lower leg: No edema.      Left lower leg: No edema.   Skin:     General: Skin is warm and dry.      Findings: No erythema or rash.   Neurological:      General: No focal deficit present.      Mental Status: He is alert and oriented to person, place, and time.   Psychiatric:         Mood and Affect: Mood normal.         Behavior: Behavior normal.         Thought Content: Thought content normal.                Significant Labs: All pertinent labs within the past 24 hours have been reviewed.  CBC:   Recent Labs   Lab 04/26/24  0629   WBC 14.01*   HGB 10.9*   HCT 32.3*        CMP:   Recent Labs   Lab 04/26/24  0629 04/26/24  0702 04/26/24  1002 04/26/24  1301     --   --   --    K 7.2* 7.5* 4.3 5.8*   CO2 18*  --   --   --    BUN 35.0*  --   --   --    CREATININE 3.54*  --   --   --    CALCIUM 8.8  --   --   --    ALBUMIN 4.4  --   --   --    BILITOT 1.5*  --   --   --    ALKPHOS 83  --   --   --    AST 45  --   --   --    ALT 48*  --   --   --        Significant Imaging: I have reviewed all pertinent imaging results/findings within the past 24 hours.

## 2024-04-26 NOTE — HPI
Patient is a 68-year-old male who presented to the emergency department after calling EMS when he was too weak to get up off the toilet this morning.  Patient does have significant baseline dysarthria and is an extremely difficult historian.  Apparently there was evidence for significant bleeding in the house, which may have been as a result of lacerations from falls and he is apparently on Plavix although we do not have any other medication history available at this time.  In the emergency department he was noted to be severely hyperkalemic at 7.5 with an elevated creatinine of 3.54 as compared to 1.81 back in mid February.  He was treated medically for his hyperkalemia including dextrose/insulin/calcium gluconate, and given 1 L of normal saline as a bolus presumably for evidence of volume depletion.       Patient's chest x-ray was clear, and renal ultrasound did show kidneys that were slightly diminutive with some cortical thinning and increased echogenicity but no evidence for obstruction.  His urine specific gravity was greater than 1.030 suggesting significant prerenal physiology.  He has been hemodynamically stable and afebrile.     Repeat potassium level 3 hours after institution of medical therapy showed level down to 4.8, and repeat several hours later shows potassium level trending upward at 5.8.  Richardson was placed per ERMD.Patient does have a Richardson catheter in place with 300 cc which presumably represent an 8 hour collection.        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     He has markedly delayed speech, and does not offer any significant complaints on review.   Reportedly surgery had been contacted about placing a dialysis catheter earlier today.

## 2024-04-27 VITALS
HEART RATE: 61 BPM | BODY MASS INDEX: 23.57 KG/M2 | WEIGHT: 155.5 LBS | RESPIRATION RATE: 16 BRPM | OXYGEN SATURATION: 94 % | HEIGHT: 68 IN | DIASTOLIC BLOOD PRESSURE: 67 MMHG | TEMPERATURE: 98 F | SYSTOLIC BLOOD PRESSURE: 179 MMHG

## 2024-04-27 PROBLEM — D69.6 THROMBOCYTOPENIA: Status: ACTIVE | Noted: 2024-04-27

## 2024-04-27 PROBLEM — R53.1 WEAKNESS: Status: ACTIVE | Noted: 2024-04-27

## 2024-04-27 LAB
ABO + RH BLD: NORMAL
ABO AND RH: NORMAL
ABORH RETYPE: NORMAL
ALBUMIN SERPL-MCNC: 3 G/DL (ref 3.4–5)
ALBUMIN/GLOB SERPL: 1.4 RATIO
ALP SERPL-CCNC: 56 UNIT/L (ref 50–144)
ALT SERPL-CCNC: 17 UNIT/L (ref 1–45)
ANION GAP SERPL CALC-SCNC: 5 MEQ/L (ref 2–13)
ANTIBODY SCREEN: NORMAL
AST SERPL-CCNC: 25 UNIT/L (ref 17–59)
BASOPHILS # BLD AUTO: 0.08 X10(3)/MCL (ref 0.01–0.08)
BASOPHILS NFR BLD AUTO: 1.3 % (ref 0.1–1.2)
BILIRUB SERPL-MCNC: 0.7 MG/DL (ref 0–1)
BLD PROD TYP BPU: NORMAL
BLOOD UNIT EXPIRATION DATE: NORMAL
BLOOD UNIT TYPE CODE: 5100
BUN SERPL-MCNC: 30 MG/DL (ref 7–20)
CALCIUM SERPL-MCNC: 7.9 MG/DL (ref 8.4–10.2)
CHLORIDE SERPL-SCNC: 107 MMOL/L (ref 98–110)
CO2 SERPL-SCNC: 22 MMOL/L (ref 21–32)
CREAT SERPL-MCNC: 2.24 MG/DL (ref 0.66–1.25)
CREAT/UREA NIT SERPL: 13 (ref 12–20)
CROSSMATCH INTERPRETATION: NORMAL
DISPENSE STATUS: NORMAL
EOSINOPHIL # BLD AUTO: 0.08 X10(3)/MCL (ref 0.04–0.54)
EOSINOPHIL NFR BLD AUTO: 1.3 % (ref 0.7–7)
ERYTHROCYTE [DISTWIDTH] IN BLOOD BY AUTOMATED COUNT: 12.8 %
FERRITIN SERPL-MCNC: 39.1 NG/ML (ref 17.9–464)
FOLATE SERPL-MCNC: 3 NG/ML (ref 2.8–20)
GFR SERPLBLD CREATININE-BSD FMLA CKD-EPI: 32 MLS/MIN/1.73/M2
GLOBULIN SER-MCNC: 2.2 GM/DL (ref 2–3.9)
GLUCOSE SERPL-MCNC: 91 MG/DL (ref 70–115)
HCT VFR BLD AUTO: 22 % (ref 36–52)
HGB BLD-MCNC: 7.6 G/DL (ref 13–18)
IMM GRANULOCYTES # BLD AUTO: 0.05 X10(3)/MCL (ref 0–0.03)
IMM GRANULOCYTES NFR BLD AUTO: 0.8 % (ref 0–0.5)
LYMPHOCYTES # BLD AUTO: 1.6 X10(3)/MCL (ref 1.32–3.57)
LYMPHOCYTES NFR BLD AUTO: 25.5 % (ref 20–55)
MAGNESIUM SERPL-MCNC: 2.1 MG/DL (ref 1.8–2.4)
MCH RBC QN AUTO: 31 PG (ref 27–34)
MCHC RBC AUTO-ENTMCNC: 34.5 G/DL (ref 31–37)
MCV RBC AUTO: 89.8 FL (ref 79–99)
MONOCYTES # BLD AUTO: 0.47 X10(3)/MCL (ref 0.3–0.82)
MONOCYTES NFR BLD AUTO: 7.5 % (ref 4.7–12.5)
NEUTROPHILS # BLD AUTO: 4 X10(3)/MCL (ref 1.78–5.38)
NEUTROPHILS NFR BLD AUTO: 63.6 % (ref 37–73)
NRBC BLD AUTO-RTO: 0 %
PHOSPHATE SERPL-MCNC: 2.8 MG/DL (ref 2.5–4.9)
PLATELET # BLD AUTO: 106 X10(3)/MCL (ref 140–371)
PMV BLD AUTO: 9.9 FL (ref 9.4–12.4)
POTASSIUM SERPL-SCNC: 4.1 MMOL/L (ref 3.5–5.1)
PROT SERPL-MCNC: 5.2 GM/DL (ref 6.3–8.2)
RBC # BLD AUTO: 2.45 X10(6)/MCL (ref 4–6)
SODIUM SERPL-SCNC: 134 MMOL/L (ref 135–145)
SPECIMEN OUTDATE: NORMAL
UNIT NUMBER: NORMAL
VIT B12 SERPL-MCNC: 330 PG/ML (ref 211–946)
WBC # SPEC AUTO: 6.28 X10(3)/MCL (ref 4–11.5)

## 2024-04-27 PROCEDURE — 97161 PT EVAL LOW COMPLEX 20 MIN: CPT

## 2024-04-27 PROCEDURE — 25000003 PHARM REV CODE 250: Performed by: INTERNAL MEDICINE

## 2024-04-27 PROCEDURE — P9016 RBC LEUKOCYTES REDUCED: HCPCS | Performed by: FAMILY MEDICINE

## 2024-04-27 PROCEDURE — 84100 ASSAY OF PHOSPHORUS: CPT | Performed by: FAMILY MEDICINE

## 2024-04-27 PROCEDURE — 86850 RBC ANTIBODY SCREEN: CPT | Performed by: FAMILY MEDICINE

## 2024-04-27 PROCEDURE — 30233N1 TRANSFUSION OF NONAUTOLOGOUS RED BLOOD CELLS INTO PERIPHERAL VEIN, PERCUTANEOUS APPROACH: ICD-10-PCS | Performed by: FAMILY MEDICINE

## 2024-04-27 PROCEDURE — 86923 COMPATIBILITY TEST ELECTRIC: CPT | Performed by: FAMILY MEDICINE

## 2024-04-27 PROCEDURE — 82728 ASSAY OF FERRITIN: CPT | Performed by: FAMILY MEDICINE

## 2024-04-27 PROCEDURE — 97530 THERAPEUTIC ACTIVITIES: CPT

## 2024-04-27 PROCEDURE — 25000003 PHARM REV CODE 250: Performed by: FAMILY MEDICINE

## 2024-04-27 PROCEDURE — 82746 ASSAY OF FOLIC ACID SERUM: CPT | Performed by: FAMILY MEDICINE

## 2024-04-27 PROCEDURE — 82607 VITAMIN B-12: CPT | Performed by: FAMILY MEDICINE

## 2024-04-27 PROCEDURE — 85025 COMPLETE CBC W/AUTO DIFF WBC: CPT | Performed by: FAMILY MEDICINE

## 2024-04-27 PROCEDURE — 36415 COLL VENOUS BLD VENIPUNCTURE: CPT | Performed by: FAMILY MEDICINE

## 2024-04-27 PROCEDURE — 80053 COMPREHEN METABOLIC PANEL: CPT | Performed by: FAMILY MEDICINE

## 2024-04-27 PROCEDURE — 94761 N-INVAS EAR/PLS OXIMETRY MLT: CPT

## 2024-04-27 PROCEDURE — 83540 ASSAY OF IRON: CPT | Performed by: INTERNAL MEDICINE

## 2024-04-27 PROCEDURE — 36430 TRANSFUSION BLD/BLD COMPNT: CPT

## 2024-04-27 PROCEDURE — 83735 ASSAY OF MAGNESIUM: CPT | Performed by: FAMILY MEDICINE

## 2024-04-27 PROCEDURE — 63600175 PHARM REV CODE 636 W HCPCS: Performed by: FAMILY MEDICINE

## 2024-04-27 RX ORDER — LISINOPRIL 10 MG/1
10 TABLET ORAL DAILY
COMMUNITY

## 2024-04-27 RX ORDER — CITALOPRAM 10 MG/1
20 TABLET ORAL DAILY
COMMUNITY
End: 2024-06-12 | Stop reason: SDUPTHER

## 2024-04-27 RX ORDER — BACLOFEN 10 MG/1
10 TABLET ORAL 3 TIMES DAILY
COMMUNITY

## 2024-04-27 RX ORDER — DIAZEPAM 2 MG/1
2 TABLET ORAL 3 TIMES DAILY
COMMUNITY

## 2024-04-27 RX ORDER — ATORVASTATIN CALCIUM 20 MG/1
20 TABLET, FILM COATED ORAL DAILY
COMMUNITY

## 2024-04-27 RX ORDER — CLOPIDOGREL BISULFATE 75 MG/1
75 TABLET ORAL DAILY
COMMUNITY

## 2024-04-27 RX ORDER — HYDROCODONE BITARTRATE AND ACETAMINOPHEN 500; 5 MG/1; MG/1
TABLET ORAL
Status: DISCONTINUED | OUTPATIENT
Start: 2024-04-27 | End: 2024-04-27 | Stop reason: HOSPADM

## 2024-04-27 RX ORDER — FUROSEMIDE 20 MG/1
20 TABLET ORAL DAILY
COMMUNITY

## 2024-04-27 RX ORDER — ISOSORBIDE MONONITRATE 30 MG/1
30 TABLET, EXTENDED RELEASE ORAL DAILY
COMMUNITY
End: 2024-06-12 | Stop reason: SDUPTHER

## 2024-04-27 RX ORDER — EZETIMIBE 10 MG/1
10 TABLET ORAL DAILY
COMMUNITY

## 2024-04-27 RX ORDER — MELOXICAM 15 MG/1
15 TABLET ORAL DAILY
COMMUNITY

## 2024-04-27 RX ORDER — METOPROLOL SUCCINATE 50 MG/1
25 TABLET, EXTENDED RELEASE ORAL DAILY
COMMUNITY

## 2024-04-27 RX ADMIN — CEFTRIAXONE SODIUM 1 G: 1 INJECTION, POWDER, FOR SOLUTION INTRAMUSCULAR; INTRAVENOUS at 03:04

## 2024-04-27 RX ADMIN — MUPIROCIN: 20 OINTMENT TOPICAL at 09:04

## 2024-04-27 RX ADMIN — SODIUM BICARBONATE: 84 INJECTION, SOLUTION INTRAVENOUS at 05:04

## 2024-04-27 NOTE — PLAN OF CARE
Problem: Infection  Goal: Absence of Infection Signs and Symptoms  4/27/2024 1809 by Lejeune, Alicia, RN  Outcome: Unable to Meet  4/27/2024 1620 by Lejeune, Alicia, RN  Outcome: Progressing     Problem: Adult Inpatient Plan of Care  Goal: Plan of Care Review  4/27/2024 1809 by Lejeune, Alicia, RN  Outcome: Unable to Meet  4/27/2024 1620 by Lejeune, Alicia, RN  Outcome: Progressing  Goal: Patient-Specific Goal (Individualized)  4/27/2024 1809 by Lejeune, Alicia, RN  Outcome: Unable to Meet  4/27/2024 1620 by Lejeune, Alicia, RN  Outcome: Progressing  Goal: Absence of Hospital-Acquired Illness or Injury  4/27/2024 1809 by Lejeune, Alicia, RN  Outcome: Unable to Meet  4/27/2024 1620 by Lejeune, Alicia, RN  Outcome: Progressing  Goal: Optimal Comfort and Wellbeing  4/27/2024 1809 by Lejeune, Alicia, RN  Outcome: Unable to Meet  4/27/2024 1620 by Lejeune, Alicia, RN  Outcome: Progressing  Goal: Readiness for Transition of Care  4/27/2024 1809 by Lejeune, Alicia, RN  Outcome: Unable to Meet  4/27/2024 1620 by Lejeune, Alicia, RN  Outcome: Progressing     Problem: Acute Kidney Injury/Impairment  Goal: Fluid and Electrolyte Balance  4/27/2024 1809 by Lejeune, Alicia, RN  Outcome: Unable to Meet  4/27/2024 1620 by Lejeune, Alicia, RN  Outcome: Progressing  Goal: Improved Oral Intake  4/27/2024 1809 by Lejeune, Alicia, RN  Outcome: Unable to Meet  4/27/2024 1620 by Lejeune, Alicia, RN  Outcome: Progressing  Goal: Effective Renal Function  4/27/2024 1809 by Lejeune, Alicia, RN  Outcome: Unable to Meet  4/27/2024 1620 by Lejeune, Alicia, RN  Outcome: Progressing     Problem: Skin Injury Risk Increased  Goal: Skin Health and Integrity  4/27/2024 1809 by Lejeune, Alicia, RN  Outcome: Unable to Meet  4/27/2024 1620 by Lejeune, Alicia, RN  Outcome: Progressing     Problem: Fall Injury Risk  Goal: Absence of Fall and Fall-Related Injury  4/27/2024 1809 by Lejeune, Alicia, RN  Outcome: Unable to Meet  4/27/2024 1620 by Lejeune,  LATANYA Medina  Outcome: Progressing     Problem: Wound  Goal: Optimal Coping  4/27/2024 1809 by Lejeune, Alicia, RN  Outcome: Unable to Meet  4/27/2024 1620 by Lejeune, Alicia, RN  Outcome: Progressing  Goal: Optimal Functional Ability  4/27/2024 1809 by Lejeune, Alicia, RN  Outcome: Unable to Meet  4/27/2024 1620 by Lejeune, Alicia, RN  Outcome: Progressing  Goal: Absence of Infection Signs and Symptoms  4/27/2024 1809 by Lejeune, Alicia, RN  Outcome: Unable to Meet  4/27/2024 1620 by Lejeune, Alicia, RN  Outcome: Progressing  Goal: Improved Oral Intake  4/27/2024 1809 by Lejeune, Alicia, RN  Outcome: Unable to Meet  4/27/2024 1620 by Lejeune, Alicia, RN  Outcome: Progressing  Goal: Optimal Pain Control and Function  4/27/2024 1809 by Lejeune, Alicia, RN  Outcome: Unable to Meet  4/27/2024 1620 by Lejeune, Alicia, RN  Outcome: Progressing  Goal: Skin Health and Integrity  4/27/2024 1809 by Lejeune, Alicia, RN  Outcome: Unable to Meet  4/27/2024 1620 by Lejeune, Alicia, RN  Outcome: Progressing  Goal: Optimal Wound Healing  4/27/2024 1809 by Lejeune, Alicia, RN  Outcome: Unable to Meet  4/27/2024 1620 by Lejeune, Alicia, RN  Outcome: Progressing

## 2024-04-27 NOTE — NURSING
Pt's 'roommate' arrived and pt signed AMA form.  PT's roommate wheeled him down in personal wheelchair.   and supervisor aware.

## 2024-04-27 NOTE — NURSING
1901  V/S STABLE, PT. MILDLY BRADYCARDIC AT THIS TIME, NO DISTRESS NOTED, ASYMPTOMATIC, IVF INFUSING, MONITORING CLOSELY      2301  V/S STABLE, NO DISTRESS NOTED, SLEEPING SOUNDLY, MONITORING CLOSELY      0301  V/s stable, pt. Sleeping soundly, monitoring closely

## 2024-04-27 NOTE — ASSESSMENT & PLAN NOTE
Patient's anemia is currently uncontrolled. Has not received any PRBCs to date. Etiology likely d/t chronic blood loss, Iron deficiency, and chronic disease due to Chronic Kidney Disease  Current CBC reviewed-   Lab Results   Component Value Date    HGB 7.6 (L) 04/27/2024    HCT 22.0 (L) 04/27/2024     Monitor serial CBC and transfuse if patient becomes hemodynamically unstable, symptomatic or H/H drops below 7/21.    Pt drop in H&H will give 1 U of PRBCs due to underlying CAD and h/o MI  Eduardo multifactorial, check iron and anemia studies and occult blood of stool

## 2024-04-27 NOTE — PT/OT/SLP EVAL
"Physical Therapy Evaluation    Patient Name:  Leno Thompson Jr.   MRN:  [id number]    Recommendations:     Discharge Recommendations: Moderate Intensity Therapy   Discharge Equipment Recommendations: to be determined by next level of care   Barriers to discharge:  current medical status    Assessment:     Leno Thompson Jr. is a 68 y.o. male admitted with a medical diagnosis of Hyperkalemia.  He presents with the following impairments/functional limitations: weakness, impaired endurance, impaired functional mobility, gait instability, impaired balance, decreased lower extremity function, decreased safety awareness     Physical therapy evaluation completed. Patient agreeable to therapy today. Patient performed supine to sit with mod A to sit EOB. 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 Patient able to sit EOB x 15 mins, but required min/mod A at times due to poor balance and unable to hold self up for long periods of time. Patient states "I have bad balance". Patient states he gets around house in w/c and has no one to take care of him. Patient requested to lay back down due to fatigue. .    Rehab Prognosis: Fair; patient would benefit from acute skilled PT services to address these deficits and reach maximum level of function.    Recent Surgery: * No surgery found *      Plan:     During this hospitalization, patient to be seen 5 x/week (5-6x weekly/1-2x daily) to address the identified rehab impairments via therapeutic activities, therapeutic exercises and progress toward the following goals:    Plan of Care Expires:  05/27/24    Subjective     Chief Complaint: weakness, balance  Patient/Family Comments/goals: to get stronger  Pain/Comfort:       Patients cultural, spiritual, Jewish conflicts given the current situation:      Living Environment:  Lives home alone, landlord lives near?  Prior to admission, patients level of function was "I got around in my wheelchair".  Equipment used at home: " wheelchair, walker, rolling.  DME owned (not currently used):  none .  Upon discharge, patient will have assistance from unknown at this time.    Objective:     Communicated with nursing prior to session.  Patient found HOB elevated with peripheral IV, telemetry, blood pressure cuff, pulse ox (continuous)  upon PT entry to room.    General Precautions: Standard, fall  Orthopedic Precautions:N/A   Braces: N/A  Respiratory Status: Room air    Exams:  RUE Strength: grossly 3-/5  LUE Strength: grossly 3-/5  RLE Strength: grossly 2+/5  LLE Strength: L BKA, but grossly 2+/5    Functional Mobility:  Bed Mobility:     Supine to Sit: moderate assistance  Sit to Supine: moderate assistance  Balance: sat EOB x 15 mins but required min/mod A for LOB balance backwards      AM-PAC 6 CLICK MOBILITY  Total Score:        Treatment & Education:  See above    Patient left HOB elevated with all lines intact, call button in reach, and nurse notified.    GOALS:   Multidisciplinary Problems       Physical Therapy Goals          Problem: Physical Therapy    Goal Priority Disciplines Outcome Goal Variances Interventions   Physical Therapy Goal     PT, PT/OT Progressing     Description: Goals to be met by: discharge     Patient will increase functional independence with mobility by performin. Supine to sit with Stand-by Assistance  2. Sit to stand transfer with Contact Guard Assistance  3. Bed to chair transfer with Contact Guard Assistance using Rolling Walker                         History:     Past Medical History:   Diagnosis Date    Arthritis     Heart attack     Hypertension     Muscle spasms of both lower extremities        Past Surgical History:   Procedure Laterality Date    LEG AMPUTATION Left        Time Tracking:     PT Received On: 24  PT Start Time: 1105     PT Stop Time: 1130  PT Total Time (min): 25 min     Billable Minutes: Evaluation 15 and Therapeutic Activity 10      2024

## 2024-04-27 NOTE — ASSESSMENT & PLAN NOTE
Patient with acute kidney injury/acute renal failure likely due to pre-renal azotemia due to dehydration ALEXANDRA is currently improving. Baseline creatinine  1.9  - Labs reviewed- Renal function/electrolytes with Estimated Creatinine Clearance: 30.7 mL/min (A) (based on SCr of 2.24 mg/dL (H)). according to latest data. Monitor urine output and serial BMP and adjust therapy as needed. Avoid nephrotoxins and renally dose meds for GFR listed above.    improved

## 2024-04-27 NOTE — HOSPITAL COURSE
04/27/2024 pt admitted with ALEXANDRA and hyperkalemia, K is down this am, drop in H&H and CR down also.  Much improved.  He is very frail.  C/o right rib pain on lower chostral edge, he does not remember if he fell yesterday while stuck in his bathroom  DISCHARGE SUMMARY: pt left ama, had a family come and pick him up and bring him home despite mutliple discussions and informing him of need for continue care.

## 2024-04-27 NOTE — ASSESSMENT & PLAN NOTE
This patient has hyperkalemia which is uncontrolled. We will monitor for arrhythmias with EKG or continuous telemetry. We will treat the hyperkalemia with Potassium Binders, Calcium gluconate, and IV insulin and dextrose. The likely etiology of the hyperkalemia is ALEXANDRA.  The patients latest potassium has been reviewed and the results are listed below  Recent Labs   Lab 04/27/24  0343   K 4.1       CR was down to 4.8 now back up, repeat is pending, admit to ICU for monitoring.  Nephrology consulted  improved

## 2024-04-27 NOTE — ASSESSMENT & PLAN NOTE
Patient was found to have thrombocytopenia, the likely etiology is spurious due to hemodiltion, post-transfusion, will monitor the platelets Daily. Will transfuse if platelet count is <50k (if undergoing surgical procedure or have active bleeding). Hold DVT prophylaxis if platelets are <50k. The patient's platelet results have been reviewed and are listed below.  Recent Labs   Lab 04/27/24  0343   *     Platelets were 176 yesterday, no active bleeding, no lovenox  Get iron and anemia studies

## 2024-04-27 NOTE — PROGRESS NOTES
[facility]  Nephrology  Progress Note    Patient Name: Leno Thompson Jr.  MRN: [id number]  Admission Date: 4/26/2024  Hospital Length of Stay: 1 days  Attending Provider: Fiorella Madison MD   Primary Care Physician: David Carter MD  Principal Problem:Hyperkalemia    Consults  Subjective:     Interval History: Patient is more alert and interactive this morning, resting comfortably supine in ICU bed.  He is taking oral fluids well, but has otherwise been NPO.  He was admitted yesterday with severe hyperkalemia, but that rapidly corrected with medical management and serial measurements have demonstrated good control.  With rehydration patient's urine output has increased significantly and function continues to improve.  He denies issues with chest pain, palpitations, or significant shortness of breath.  Patient has been hemodynamically stable and afebrile, no other acute complaints.      Review of patient's allergies indicates:  No Known Allergies  Current Facility-Administered Medications   Medication Dose Route Frequency Provider Last Rate Last Admin    0.9%  NaCl infusion (for blood administration)   Intravenous Q24H PRN Fiorella Madison MD        0.9%  NaCl infusion   Intravenous Continuous Perry Aparicio MD   Stopped at 04/26/24 0759    cefTRIAXone (Rocephin) 1 g in dextrose 5 % in water (D5W) 100 mL IVPB (MB+)  1 g Intravenous Q24H Fiorella Madison MD   Stopped at 04/26/24 1821    dextrose 10% bolus 125 mL 125 mL  12.5 g Intravenous PRN Perry Aparicio MD        dextrose 10% bolus 250 mL 250 mL  25 g Intravenous PRN Perry Aparicio MD        mupirocin 2 % ointment   Nasal BID Fiorella Madison MD   Given at 04/27/24 0908    sodium bicarbonate 50 mEq in sodium chloride 0.45% 1,000 mL infusion   Intravenous Continuous Edwin Dunaway  mL/hr at 04/27/24 0505 New Bag at 04/27/24 0505       Objective:     Vital Signs (Most Recent):  Temp: 97.6 °F (36.4 °C) (04/27/24  0701)  Pulse: (!) 47 (04/27/24 0810)  Resp: (!) 0 (04/27/24 0810)  BP: (!) 145/52 (04/27/24 0800)  SpO2: 100 % (04/27/24 0810) Vital Signs (24h Range):  Temp:  [97.6 °F (36.4 °C)-98.6 °F (37 °C)] 97.6 °F (36.4 °C)  Pulse:  [45-56] 47  Resp:  [0-21] 0  SpO2:  [95 %-100 %] 100 %  BP: (120-190)/(39-76) 145/52     Weight: 67 kg (147 lb 11.3 oz) (04/26/24 1700)  Body mass index is 22.46 kg/m².  Body surface area is 1.79 meters squared.    I/O last 3 completed shifts:  In: 1531.7 [P.O.:500; I.V.:1031.7]  Out: 1000 [Urine:1000]    Physical Exam  Vitals reviewed. Nursing note reviewed: Patient was seen on telemedicine rounds and gave consent for evaluation and management.  He is resting comfortably and in no acute distress.    Significant Labs:sureCBC:   Recent Labs   Lab 04/27/24  0343   WBC 6.28   RBC 2.45*   HGB 7.6*   HCT 22.0*   *   MCV 89.8   MCH 31.0   MCHC 34.5     CMP:   Recent Labs   Lab 04/27/24  0343   CALCIUM 7.9*   ALBUMIN 3.0*   *   K 4.1   CO2 22   BUN 30.0*   CREATININE 2.24*   ALKPHOS 56   ALT 17   AST 25   BILITOT 0.7     Assessment/Plan:     Active Diagnoses:    Diagnosis Date Noted POA    PRINCIPAL PROBLEM:  Hyperkalemia [E87.5] 04/26/2024 Yes    Acute on chronic renal failure [N17.9, N18.9] 04/26/2024 Yes    Acute blood loss anemia [D62] 04/26/2024 Yes      Problems Resolved During this Admission:      1. Hyperkalemia:  Patient's potassium level has rapidly corrected with rehydration and was likely secondary to ALEXANDRA and acidosis.  It is also possible that patient was taking outpatient medications and/or potassium supplements that may have aggravated hyperkalemia as well.  With supportive management his potassium is down to 4.1 this morning.  I would maintain renal diet initially, but suspect patient's overall nutritional status is compromised and with recovery of renal function he may be at risk for hypokalemia over the next few days.  He is presently on low-dose IV bicarbonate replacement,  but that can be discontinued later today if oral fluid intake is adequate.      2. Renal:  This is a gentleman who had significant ALEXANDRA on admit presumably secondary to prerenal azotemia from volume depletion, and with rehydration and supportive management creatinine has dropped from 3.54 down to 2.24 overnight.  I will quantify urinary protein excretion, and based on renal ultrasound appearance it appears that he likely has some elements of CKD perhaps related to history of hypertension and coronary artery disease.  Patient would likely benefit from outpatient follow-up.    3. Hypertension:  Blood pressure appears to be under borderline but adequate control at present.  Again we need to inquire about outpatient medication regimen.      4. Genitourinary:  Would consider removal of Richardson catheter in next 24 hours.    5. Anemia:  Hemoglobin down significantly to 7.6, likely multifactorial.  I have sent iron stores and that should be replaced if low.  ALEXANDRA and perhaps CKD may be playing a contributory role in addition to chronic inflammation.    Patient appears to be doing well with supportive management.  I will sign off case today but please contact us if we can be of further assistance.  As noted patient would likely benefit from outpatient nephrology follow-up with probable significant CKD.    Edwin Dunaway MD  Nephrology  [facility]   attending Bina: discussed with sending MD Castellon. Given prior episode of confusion will obtain CTH. Also discussed possibly changing abx from macrobid to cephalosporin given pt is elderly with poor renal function. Pt feeling well. No acute findings on CTH. Called [facility] they know to expect her and know that she needs to continue a course of Cefpodoxime.

## 2024-04-27 NOTE — NURSING TRANSFER
Nursing Transfer Note      4/27/2024   1:16 PM    Nurse giving handoff:BARON ESTRELLA RN  Nurse receiving handoff:ZBIGNIEW BRICEÑO RN    Reason patient is being transferred: CHANGE IN STATUS    Transfer To: 204    Transfer via bed    Transfer with cardiac monitoring    Transported by BARON ESTRELLA RN    Transfer Vital Signs:  Blood Pressure:156/55  Heart Rate:50  O2:99%  Temperature:97.8  Respirations:17    Telemetry: Box Number 4  Order for Tele Monitor? Yes    Additional Lines: Richardson Catheter    4eyes on Skin: yes    Medicines sent: YES    Any special needs or follow-up needed: CASE MANAGEMENT FOR LIVING SITUATION    Patient belongings transferred with patient: NA    Chart send with patient: Yes    Notified: friend    Patient reassessed at: UPON UNIT ARRIVAL     Upon arrival to floor: cardiac monitor applied, patient oriented to room, call bell in reach, and bed in lowest position.

## 2024-04-27 NOTE — PLAN OF CARE
Problem: Physical Therapy  Goal: Physical Therapy Goal  Description: Goals to be met by: discharge     Patient will increase functional independence with mobility by performin. Supine to sit with Stand-by Assistance  2. Sit to stand transfer with Contact Guard Assistance  3. Bed to chair transfer with Contact Guard Assistance using Rolling Walker    Outcome: Progressing

## 2024-04-27 NOTE — PLAN OF CARE
Problem: Infection  Goal: Absence of Infection Signs and Symptoms  Outcome: Progressing     Problem: Adult Inpatient Plan of Care  Goal: Plan of Care Review  Outcome: Progressing  Goal: Patient-Specific Goal (Individualized)  Outcome: Progressing  Goal: Absence of Hospital-Acquired Illness or Injury  Outcome: Progressing  Goal: Optimal Comfort and Wellbeing  Outcome: Progressing  Goal: Readiness for Transition of Care  Outcome: Progressing     Problem: Acute Kidney Injury/Impairment  Goal: Fluid and Electrolyte Balance  Outcome: Progressing  Goal: Improved Oral Intake  Outcome: Progressing  Goal: Effective Renal Function  Outcome: Progressing     Problem: Skin Injury Risk Increased  Goal: Skin Health and Integrity  Outcome: Progressing     Problem: Fall Injury Risk  Goal: Absence of Fall and Fall-Related Injury  Outcome: Progressing     Problem: Wound  Goal: Optimal Coping  Outcome: Progressing  Goal: Optimal Functional Ability  Outcome: Progressing  Goal: Absence of Infection Signs and Symptoms  Outcome: Progressing  Goal: Improved Oral Intake  Outcome: Progressing  Goal: Optimal Pain Control and Function  Outcome: Progressing  Goal: Skin Health and Integrity  Outcome: Progressing  Goal: Optimal Wound Healing  Outcome: Progressing

## 2024-04-27 NOTE — PROGRESS NOTES
[facility] Medicine  Progress Note    Patient Name: Leno Thompson Jr.  MRN: [id number]  Patient Class: IP- Inpatient   Admission Date: 4/26/2024  Length of Stay: 1 days  Attending Physician: Fiorella Madison MD  Primary Care Provider: David Carter MD        Subjective:     Principal Problem:Hyperkalemia        HPI:  Patient is a 68-year-old male who presented to the emergency department after calling EMS when he was too weak to get up off the toilet this morning.  Patient does have significant baseline dysarthria and is an extremely difficult historian.  Apparently there was evidence for significant bleeding in the house, which may have been as a result of lacerations from falls and he is apparently on Plavix although we do not have any other medication history available at this time.  In the emergency department he was noted to be severely hyperkalemic at 7.5 with an elevated creatinine of 3.54 as compared to 1.81 back in mid February.  He was treated medically for his hyperkalemia including dextrose/insulin/calcium gluconate, and given 1 L of normal saline as a bolus presumably for evidence of volume depletion.       Patient's chest x-ray was clear, and renal ultrasound did show kidneys that were slightly diminutive with some cortical thinning and increased echogenicity but no evidence for obstruction.  His urine specific gravity was greater than 1.030 suggesting significant prerenal physiology.  He has been hemodynamically stable and afebrile.     Repeat potassium level 3 hours after institution of medical therapy showed level down to 4.8, and repeat several hours later shows potassium level trending upward at 5.8.  Richardson was placed per ERMD.Patient does have a Richardson catheter in place with 300 cc which presumably represent an 8 hour collection.        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     He has markedly delayed speech, and does not offer any significant complaints on review.   Reportedly surgery had been contacted about placing a dialysis catheter earlier today.         Overview/Hospital Course:  04/27/2024 pt admitted with ALEXANDRA and hyperkalemia, K is down this am, drop in H&H and CR down also.  Much improved.  He is very frail.  C/o right rib pain on lower chostral edge, he does not remember if he fell yesterday while stuck in his bathroom    Past Medical History:   Diagnosis Date    Arthritis     Heart attack     Hypertension     Muscle spasms of both lower extremities        Past Surgical History:   Procedure Laterality Date    LEG AMPUTATION Left        Review of patient's allergies indicates:  No Known Allergies    Current Facility-Administered Medications   Medication Dose Route Frequency Provider Last Rate Last Admin    0.9%  NaCl infusion (for blood administration)   Intravenous Q24H PRN Fiorella Madison MD        0.9%  NaCl infusion   Intravenous Continuous Perry Aparicio MD   Stopped at 04/26/24 0759    cefTRIAXone (Rocephin) 1 g in dextrose 5 % in water (D5W) 100 mL IVPB (MB+)  1 g Intravenous Q24H Fiorella Madison MD   Stopped at 04/26/24 1821    dextrose 10% bolus 125 mL 125 mL  12.5 g Intravenous PRN Perry Aparicio MD        dextrose 10% bolus 250 mL 250 mL  25 g Intravenous PRN Prery Aparicio MD        mupirocin 2 % ointment   Nasal BID Fiorella Madison MD   Given at 04/27/24 0908    sodium bicarbonate 50 mEq in sodium chloride 0.45% 1,000 mL infusion   Intravenous Continuous Edwin Dunaway  mL/hr at 04/27/24 0505 New Bag at 04/27/24 0505     Family History    None       Tobacco Use    Smoking status: Every Day     Current packs/day: 1.00     Types: Cigarettes    Smokeless tobacco: Never   Substance and Sexual Activity    Alcohol use: Never    Drug use: Never    Sexual activity: Not on file     Review of  Systems   Constitutional:  Negative for appetite change, fatigue and fever.   Respiratory:  Negative for cough, shortness of breath and wheezing.    Cardiovascular:  Negative for chest pain and leg swelling.   Gastrointestinal:  Negative for abdominal distention, abdominal pain, constipation, diarrhea, nausea and vomiting.   Musculoskeletal:  Positive for arthralgias (right lower chostral margin), gait problem (right leg chornic weakness) and myalgias.   Skin:  Negative for color change, pallor, rash and wound.   Neurological:  Negative for tremors, syncope and headaches.   Psychiatric/Behavioral:  Negative for agitation and behavioral problems.      Objective:     Vital Signs (Most Recent):  Temp: 97.6 °F (36.4 °C) (04/27/24 0701)  Pulse: (!) 47 (04/27/24 0810)  Resp: (!) 0 (04/27/24 0810)  BP: (!) 145/52 (04/27/24 0800)  SpO2: 100 % (04/27/24 0810) Vital Signs (24h Range):  Temp:  [97.6 °F (36.4 °C)-98.6 °F (37 °C)] 97.6 °F (36.4 °C)  Pulse:  [45-56] 47  Resp:  [0-21] 0  SpO2:  [95 %-100 %] 100 %  BP: (120-190)/(39-76) 145/52     Weight: 67 kg (147 lb 11.3 oz)  Body mass index is 22.46 kg/m².     Physical Exam  Constitutional:       General: He is not in acute distress.     Appearance: Normal appearance. He is normal weight. He is not ill-appearing.   HENT:      Head: Normocephalic and atraumatic.      Nose: Nose normal.   Eyes:      General: No scleral icterus.     Conjunctiva/sclera: Conjunctivae normal.   Cardiovascular:      Rate and Rhythm: Normal rate and regular rhythm.      Pulses: Normal pulses.      Heart sounds: Normal heart sounds.   Pulmonary:      Effort: Pulmonary effort is normal.      Breath sounds: Normal breath sounds.   Abdominal:      General: Abdomen is flat. Bowel sounds are normal.      Palpations: Abdomen is soft.   Musculoskeletal:      Right lower leg: No edema.      Left lower leg: No edema.   Skin:     General: Skin is warm and dry.      Findings: No erythema or rash.   Neurological:       General: No focal deficit present.      Mental Status: He is alert and oriented to person, place, and time.   Psychiatric:         Mood and Affect: Mood normal.         Behavior: Behavior normal.         Thought Content: Thought content normal.                Significant Labs: All pertinent labs within the past 24 hours have been reviewed.  CBC:   Recent Labs   Lab 04/26/24  0629 04/27/24  0343   WBC 14.01* 6.28   HGB 10.9* 7.6*   HCT 32.3* 22.0*    106*     CMP:   Recent Labs   Lab 04/26/24  0629 04/26/24  0702 04/26/24  1301 04/26/24  1753 04/27/24  0343     --   --  136 134*   K 7.2*   < > 5.8* 4.9 4.1   CO2 18*  --   --  23 22   BUN 35.0*  --   --  32.0* 30.0*   CREATININE 3.54*  --   --  2.69* 2.24*   CALCIUM 8.8  --   --  8.3* 7.9*   ALBUMIN 4.4  --   --  3.4 3.0*   BILITOT 1.5*  --   --  1.0 0.7   ALKPHOS 83  --   --  67 56   AST 45  --   --  27 25   ALT 48*  --   --  21 17    < > = values in this interval not displayed.       Significant Imaging: I have reviewed all pertinent imaging results/findings within the past 24 hours.    Assessment/Plan:      * Hyperkalemia  This patient has hyperkalemia which is uncontrolled. We will monitor for arrhythmias with EKG or continuous telemetry. We will treat the hyperkalemia with Potassium Binders, Calcium gluconate, and IV insulin and dextrose. The likely etiology of the hyperkalemia is ALEXANDRA.  The patients latest potassium has been reviewed and the results are listed below  Recent Labs   Lab 04/27/24  0343   K 4.1       CR was down to 4.8 now back up, repeat is pending, admit to ICU for monitoring.  Nephrology consulted  improved        Acute on chronic renal failure  Patient with acute kidney injury/acute renal failure likely due to pre-renal azotemia due to dehydration ALEXANDRA is currently improving. Baseline creatinine  1.9  - Labs reviewed- Renal function/electrolytes with Estimated Creatinine Clearance: 30.7 mL/min (A) (based on SCr of 2.24 mg/dL  (H)). according to latest data. Monitor urine output and serial BMP and adjust therapy as needed. Avoid nephrotoxins and renally dose meds for GFR listed above.    improved    Thrombocytopenia  Patient was found to have thrombocytopenia, the likely etiology is spurious due to hemodiltion, post-transfusion, will monitor the platelets Daily. Will transfuse if platelet count is <50k (if undergoing surgical procedure or have active bleeding). Hold DVT prophylaxis if platelets are <50k. The patient's platelet results have been reviewed and are listed below.  Recent Labs   Lab 04/27/24  0343   *     Platelets were 176 yesterday, no active bleeding, no lovenox  Get iron and anemia studies      Weakness  Consult PT      Acute blood loss anemia  Patient's anemia is currently uncontrolled. Has not received any PRBCs to date. Etiology likely d/t chronic blood loss, Iron deficiency, and chronic disease due to Chronic Kidney Disease  Current CBC reviewed-   Lab Results   Component Value Date    HGB 7.6 (L) 04/27/2024    HCT 22.0 (L) 04/27/2024     Monitor serial CBC and transfuse if patient becomes hemodynamically unstable, symptomatic or H/H drops below 7/21.    Pt drop in H&H will give 1 U of PRBCs due to underlying CAD and h/o MI  Eduardo multifactorial, check iron and anemia studies and occult blood of stool      VTE Risk Mitigation (From admission, onward)      None            Discharge Planning   LELIA:      Code Status: Full Code   Is the patient medically ready for discharge?:     Reason for patient still in hospital (select all that apply): Patient trending condition, Laboratory test, and Treatment           Transfer to floor when bed avaialble          Fiorella Madison MD  Department of Hospital Medicine   [facility]

## 2024-04-27 NOTE — SUBJECTIVE & OBJECTIVE
Past Medical History:   Diagnosis Date    Arthritis     Heart attack     Hypertension     Muscle spasms of both lower extremities        Past Surgical History:   Procedure Laterality Date    LEG AMPUTATION Left        Review of patient's allergies indicates:  No Known Allergies    Current Facility-Administered Medications   Medication Dose Route Frequency Provider Last Rate Last Admin    0.9%  NaCl infusion (for blood administration)   Intravenous Q24H PRN Fiorella Madison MD        0.9%  NaCl infusion   Intravenous Continuous Perry Aparicio MD   Stopped at 04/26/24 0759    cefTRIAXone (Rocephin) 1 g in dextrose 5 % in water (D5W) 100 mL IVPB (MB+)  1 g Intravenous Q24H Fiorella Madison MD   Stopped at 04/26/24 1821    dextrose 10% bolus 125 mL 125 mL  12.5 g Intravenous PRN Perry Aparicio MD        dextrose 10% bolus 250 mL 250 mL  25 g Intravenous PRN Perry Aparicio MD        mupirocin 2 % ointment   Nasal BID Fiorella Madison MD   Given at 04/27/24 0908    sodium bicarbonate 50 mEq in sodium chloride 0.45% 1,000 mL infusion   Intravenous Continuous Edwin Dunaway  mL/hr at 04/27/24 0505 New Bag at 04/27/24 0505     Family History    None       Tobacco Use    Smoking status: Every Day     Current packs/day: 1.00     Types: Cigarettes    Smokeless tobacco: Never   Substance and Sexual Activity    Alcohol use: Never    Drug use: Never    Sexual activity: Not on file     Review of Systems   Constitutional:  Negative for appetite change, fatigue and fever.   Respiratory:  Negative for cough, shortness of breath and wheezing.    Cardiovascular:  Negative for chest pain and leg swelling.   Gastrointestinal:  Negative for abdominal distention, abdominal pain, constipation, diarrhea, nausea and vomiting.   Musculoskeletal:  Positive for arthralgias (right lower chostral margin), gait problem (right leg chornic weakness) and myalgias.   Skin:  Negative for color change, pallor, rash and wound.    Neurological:  Negative for tremors, syncope and headaches.   Psychiatric/Behavioral:  Negative for agitation and behavioral problems.      Objective:     Vital Signs (Most Recent):  Temp: 97.6 °F (36.4 °C) (04/27/24 0701)  Pulse: (!) 47 (04/27/24 0810)  Resp: (!) 0 (04/27/24 0810)  BP: (!) 145/52 (04/27/24 0800)  SpO2: 100 % (04/27/24 0810) Vital Signs (24h Range):  Temp:  [97.6 °F (36.4 °C)-98.6 °F (37 °C)] 97.6 °F (36.4 °C)  Pulse:  [45-56] 47  Resp:  [0-21] 0  SpO2:  [95 %-100 %] 100 %  BP: (120-190)/(39-76) 145/52     Weight: 67 kg (147 lb 11.3 oz)  Body mass index is 22.46 kg/m².     Physical Exam  Constitutional:       General: He is not in acute distress.     Appearance: Normal appearance. He is normal weight. He is not ill-appearing.   HENT:      Head: Normocephalic and atraumatic.      Nose: Nose normal.   Eyes:      General: No scleral icterus.     Conjunctiva/sclera: Conjunctivae normal.   Cardiovascular:      Rate and Rhythm: Normal rate and regular rhythm.      Pulses: Normal pulses.      Heart sounds: Normal heart sounds.   Pulmonary:      Effort: Pulmonary effort is normal.      Breath sounds: Normal breath sounds.   Abdominal:      General: Abdomen is flat. Bowel sounds are normal.      Palpations: Abdomen is soft.   Musculoskeletal:      Right lower leg: No edema.      Left lower leg: No edema.   Skin:     General: Skin is warm and dry.      Findings: No erythema or rash.   Neurological:      General: No focal deficit present.      Mental Status: He is alert and oriented to person, place, and time.   Psychiatric:         Mood and Affect: Mood normal.         Behavior: Behavior normal.         Thought Content: Thought content normal.                Significant Labs: All pertinent labs within the past 24 hours have been reviewed.  CBC:   Recent Labs   Lab 04/26/24  0629 04/27/24  0343   WBC 14.01* 6.28   HGB 10.9* 7.6*   HCT 32.3* 22.0*    106*     CMP:   Recent Labs   Lab 04/26/24  0629  04/26/24  0702 04/26/24  1301 04/26/24  1753 04/27/24  0343     --   --  136 134*   K 7.2*   < > 5.8* 4.9 4.1   CO2 18*  --   --  23 22   BUN 35.0*  --   --  32.0* 30.0*   CREATININE 3.54*  --   --  2.69* 2.24*   CALCIUM 8.8  --   --  8.3* 7.9*   ALBUMIN 4.4  --   --  3.4 3.0*   BILITOT 1.5*  --   --  1.0 0.7   ALKPHOS 83  --   --  67 56   AST 45  --   --  27 25   ALT 48*  --   --  21 17    < > = values in this interval not displayed.       Significant Imaging: I have reviewed all pertinent imaging results/findings within the past 24 hours.

## 2024-04-28 LAB
BACTERIA UR CULT: NO GROWTH
IRON SATN MFR SERPL: 15 % (ref 20–50)
IRON SERPL-MCNC: 32 UG/DL (ref 65–175)
TIBC SERPL-MCNC: 187 UG/DL (ref 69–240)
TIBC SERPL-MCNC: 219 UG/DL (ref 250–450)
TRANSFERRIN SERPL-MCNC: 197 MG/DL (ref 163–344)

## 2024-05-01 LAB
BACTERIA BLD CULT: NORMAL
BACTERIA BLD CULT: NORMAL

## 2024-05-02 ENCOUNTER — HOSPITAL ENCOUNTER (EMERGENCY)
Facility: HOSPITAL | Age: 69
Discharge: HOME | End: 2024-05-02
Payer: MEDICARE

## 2024-05-02 VITALS
BODY MASS INDEX: 29.86 KG/M2 | WEIGHT: 152.13 LBS | HEART RATE: 51 BPM | HEIGHT: 60 IN | DIASTOLIC BLOOD PRESSURE: 55 MMHG | RESPIRATION RATE: 18 BRPM | SYSTOLIC BLOOD PRESSURE: 135 MMHG | TEMPERATURE: 98 F | OXYGEN SATURATION: 96 %

## 2024-05-02 DIAGNOSIS — S09.90XA CLOSED HEAD INJURY, INITIAL ENCOUNTER: Primary | ICD-10-CM

## 2024-05-02 DIAGNOSIS — T42.4X1A BENZODIAZEPINE OVERDOSE, ACCIDENTAL OR UNINTENTIONAL, INITIAL ENCOUNTER: ICD-10-CM

## 2024-05-02 LAB
ALBUMIN SERPL-MCNC: 3.9 G/DL (ref 3.4–5)
ALBUMIN/GLOB SERPL: 1.4 RATIO
ALP SERPL-CCNC: 72 UNIT/L (ref 50–144)
ALT SERPL-CCNC: 17 UNIT/L (ref 1–45)
AMPHET UR QL SCN: NEGATIVE
ANION GAP SERPL CALC-SCNC: 7 MEQ/L (ref 2–13)
APPEARANCE UR: CLEAR
AST SERPL-CCNC: 28 UNIT/L (ref 17–59)
BARBITURATE SCN PRESENT UR: NEGATIVE
BASOPHILS # BLD AUTO: 0.09 X10(3)/MCL (ref 0.01–0.08)
BASOPHILS NFR BLD AUTO: 1.7 % (ref 0.1–1.2)
BENZODIAZ UR QL SCN: POSITIVE
BILIRUB SERPL-MCNC: 0.8 MG/DL (ref 0–1)
BILIRUB UR QL STRIP.AUTO: ABNORMAL
BUN SERPL-MCNC: 19 MG/DL (ref 7–20)
CALCIUM SERPL-MCNC: 8.6 MG/DL (ref 8.4–10.2)
CANNABINOIDS UR QL SCN: POSITIVE
CHLORIDE SERPL-SCNC: 106 MMOL/L (ref 98–110)
CO2 SERPL-SCNC: 24 MMOL/L (ref 21–32)
COCAINE UR QL SCN: NEGATIVE
COLOR UR AUTO: YELLOW
CREAT SERPL-MCNC: 2.81 MG/DL (ref 0.66–1.25)
CREAT/UREA NIT SERPL: 7 (ref 12–20)
EOSINOPHIL # BLD AUTO: 0.19 X10(3)/MCL (ref 0.04–0.54)
EOSINOPHIL NFR BLD AUTO: 3.6 % (ref 0.7–7)
ERYTHROCYTE [DISTWIDTH] IN BLOOD BY AUTOMATED COUNT: 13.5 %
GFR SERPLBLD CREATININE-BSD FMLA CKD-EPI: 24 MLS/MIN/1.73/M2
GLOBULIN SER-MCNC: 2.8 GM/DL (ref 2–3.9)
GLUCOSE SERPL-MCNC: 109 MG/DL (ref 70–115)
GLUCOSE UR QL STRIP.AUTO: 500
HCT VFR BLD AUTO: 27.3 % (ref 36–52)
HGB BLD-MCNC: 9.3 G/DL (ref 13–18)
IMM GRANULOCYTES # BLD AUTO: 0.01 X10(3)/MCL (ref 0–0.03)
IMM GRANULOCYTES NFR BLD AUTO: 0.2 % (ref 0–0.5)
KETONES UR QL STRIP.AUTO: NEGATIVE
LEUKOCYTE ESTERASE UR QL STRIP.AUTO: NEGATIVE
LYMPHOCYTES # BLD AUTO: 1.35 X10(3)/MCL (ref 1.32–3.57)
LYMPHOCYTES NFR BLD AUTO: 25.9 % (ref 20–55)
MCH RBC QN AUTO: 30.9 PG (ref 27–34)
MCHC RBC AUTO-ENTMCNC: 34.1 G/DL (ref 31–37)
MCV RBC AUTO: 90.7 FL (ref 79–99)
METHADONE UR QL SCN: NEGATIVE
MONOCYTES # BLD AUTO: 0.5 X10(3)/MCL (ref 0.3–0.82)
MONOCYTES NFR BLD AUTO: 9.6 % (ref 4.7–12.5)
NEUTROPHILS # BLD AUTO: 3.07 X10(3)/MCL (ref 1.78–5.38)
NEUTROPHILS NFR BLD AUTO: 59 % (ref 37–73)
NITRITE UR QL STRIP.AUTO: NEGATIVE
NRBC BLD AUTO-RTO: 0 %
OPIATES UR QL SCN: NEGATIVE
PCP UR QL: NEGATIVE
PH UR STRIP.AUTO: 5.5 [PH]
PH UR: 5.5 [PH] (ref 3–11)
PLATELET # BLD AUTO: 195 X10(3)/MCL (ref 140–371)
PMV BLD AUTO: 9.6 FL (ref 9.4–12.4)
POTASSIUM SERPL-SCNC: 3.6 MMOL/L (ref 3.5–5.1)
PROT SERPL-MCNC: 6.7 GM/DL (ref 6.3–8.2)
PROT UR QL STRIP.AUTO: ABNORMAL
RBC # BLD AUTO: 3.01 X10(6)/MCL (ref 4–6)
RBC UR QL AUTO: NEGATIVE
SODIUM SERPL-SCNC: 137 MMOL/L (ref 135–145)
SP GR UR STRIP.AUTO: >=1.03 (ref 1–1.03)
UROBILINOGEN UR STRIP-ACNC: 0.2
WBC # SPEC AUTO: 5.21 X10(3)/MCL (ref 4–11.5)

## 2024-05-02 PROCEDURE — 80307 DRUG TEST PRSMV CHEM ANLYZR: CPT

## 2024-05-02 PROCEDURE — 80053 COMPREHEN METABOLIC PANEL: CPT

## 2024-05-02 PROCEDURE — 81003 URINALYSIS AUTO W/O SCOPE: CPT | Mod: 59

## 2024-05-02 PROCEDURE — 85025 COMPLETE CBC W/AUTO DIFF WBC: CPT

## 2024-05-02 PROCEDURE — 99284 EMERGENCY DEPT VISIT MOD MDM: CPT | Mod: 25

## 2024-05-02 NOTE — ED PROVIDER NOTES
"Encounter Date: 5/2/2024       History     Chief Complaint   Patient presents with    Fall     Arrived via EMS [facility] with c/o unwitnessed fall at home while attempting to get out of w/c and bumped (R) side of head on wooden desk, denies LOC, takes Plavix.     68-year-old male presents via EMS with complaints of minor head injury.  He accidentally rolled out of bed, striking his head on the nightstand.  He is on Plavix.  He denies neck pain or any other injury.  He has a previous history of "brain injury".        Review of patient's allergies indicates:  No Known Allergies  No past medical history on file.  No past surgical history on file.  No family history on file.     Review of Systems   Constitutional:  Negative for fever.   HENT:  Negative for sore throat.    Respiratory:  Negative for shortness of breath.    Cardiovascular:  Negative for chest pain.   Gastrointestinal:  Negative for nausea.   Genitourinary:  Negative for dysuria.   Musculoskeletal:  Negative for back pain.   Skin:  Negative for rash.   Neurological:  Positive for headaches. Negative for weakness.   Hematological:  Does not bruise/bleed easily.   All other systems reviewed and are negative.      Physical Exam     Initial Vitals [05/02/24 0327]   BP Pulse Resp Temp SpO2   133/61 (!) 49 18 98 °F (36.7 °C) 99 %      MAP       --         Physical Exam    Nursing note and vitals reviewed.  Constitutional: Vital signs are normal. He appears well-developed and well-nourished. He is cooperative.   HENT:   Head: Normocephalic and atraumatic.   Mouth/Throat: Oropharynx is clear and moist.   I can not palpate any scalp injury.   Eyes: Conjunctivae, EOM and lids are normal. Pupils are equal, round, and reactive to light.   Neck: Trachea normal. Neck supple.   Normal range of motion.  Cardiovascular:  Normal rate, regular rhythm, normal heart sounds and intact distal pulses.           Pulmonary/Chest: Breath sounds normal.   Abdominal: Abdomen is soft. Bowel " sounds are normal.   Musculoskeletal:         General: Normal range of motion.      Cervical back: Normal, normal range of motion and neck supple.      Lumbar back: Normal.      Comments: Left BKA noted     Neurological: He is alert and oriented to person, place, and time. He has normal strength. Coordination normal. GCS score is 15. GCS eye subscore is 4. GCS verbal subscore is 5. GCS motor subscore is 6.   Skin: Skin is warm, dry and intact. Capillary refill takes less than 2 seconds.   Psychiatric: His speech is normal. Judgment and thought content normal. Cognition and memory are normal.   He seems a bit sleepy, but answers questions appropriately.  Speech is a bit slurred.         ED Course   Procedures  Labs Reviewed   DRUG SCREEN, URINE (BEAKER) - Abnormal; Notable for the following components:       Result Value    Benzodiazepine, Urine Positive (*)     Cannabinoids, Urine Positive (*)     All other components within normal limits    Narrative:     Cut off concentrations:    Amphetamines - 1000 ng/ml  Barbiturates - 200 ng/ml  Benzodiazepine - 200 ng/ml  Cannabinoids (THC) - 50 ng/ml  Cocaine - 300 ng/ml  Fentanyl - 1.0 ng/ml  MDMA - 500 ng/ml  Opiates - 300 ng/ml   Phencyclidine (PCP) - 25 ng/ml  Methadone - 300 ng/ml      False negatives may result form substances such as bleach added to urine.  False positives may result for the presence of a substance with similar chemical structure to the drug or its metabolite.    This test provides only a PRELIMINARY analytical test result. A more specific alternate chemical method must be used in order to obtain a confirmed analytical result. Gas chromatography/mass spectrometry (GC/MS) is the preferred confirmatory method. Other chemical confirmation methods are available. Clinical consideration and professional judgement should be applied to any drug of abuse test result, particularly when preliminary positive results are used.    Positive results will be confirmed  only at the physicians request. Unconfirmed screening results are to be used only for medical purposes (treatment).          URINALYSIS, REFLEX TO URINE CULTURE - Abnormal; Notable for the following components:    Protein, UA Trace (*)     Glucose,  (*)     Bilirubin, UA Small (*)     All other components within normal limits    Narrative:      URINE STABILITY IS 2 HOURS AT ROOM TEMP OR    SIX HOURS REFRIGERATED. PERFORMING TESTING ON    SPECIMENS GREATER THAN THIS AGE MAY AFFECT THE    FOLLOWING TESTS:    PH          SPECIFIC GRAVITY           BLOOD    CLARITY     BILIRUBIN               UROBILINOGEN   CBC WITH DIFFERENTIAL - Abnormal; Notable for the following components:    RBC 3.01 (*)     Hgb 9.3 (*)     Hct 27.3 (*)     Basophil % 1.7 (*)     Baso # 0.09 (*)     All other components within normal limits   CBC W/ AUTO DIFFERENTIAL    Narrative:     The following orders were created for panel order CBC auto differential.  Procedure                               Abnormality         Status                     ---------                               -----------         ------                     CBC with Differential[[id number]]       Abnormal            Final result                 Please view results for these tests on the individual orders.   COMPREHENSIVE METABOLIC PANEL          Imaging Results              CT Head Without Contrast (Preliminary result)  Result time 05/02/24 03:56:44      Preliminary result by Aristeo Daley Jr., MD (05/02/24 03:56:44)                   Narrative:    START OF REPORT:  Technique: CT of the head was performed without intravenous contrast with axial as well as coronal and sagittal images.    Comparison: None.    Dosage Information: Automated exposure control was utilized.    Clinical history: X PTA- unwitnessed fall at home while attempting to get out of w/c and bumped (R) side of head on wooden desk.    Findings:  Hemorrhage: No acute intracranial hemorrhage is  seen.  CSF spaces: The ventricles, sulci and basal cisterns all appear moderately prominent consistent with global cerebral atrophy.  Brain parenchyma: There is preservation of the grey white junction throughout. No acute infarct. Mild to moderate microvascular change is seen in portions of the periventricular and deep white matter tracts.  Cerebellum: Unremarkable.  Sella and skull base: The sella appears to be within normal limits for age.  Cerebellopontine angles: Within normal limits.  Herniation: None.  Intracranial calcifications: Incidental note is made of bilateral choroid plexus calcification. Incidental note is made of some pineal region calcification. Incidental note is made of bilateral basal ganglia calcifcation.  Calvarium: No acute linear or depressed skull fracture is seen.  Scalp: No significant scalp soft tissue swelling is seen.    Maxillofacial Structures:  Paranasal sinuses: The visualized paranasal sinuses appear clear with no significant mucoperiosteal thickening or air fluid levels identified.  Orbits: The orbits appear unremarkable.  Zygomatic arches: The zygomatic arches are intact and unremarkable.  Temporal bones and mastoids: The temporal bones and mastoids appear unremarkable.  TMJ: The mandibular condyles appear normally placed with respect to the mandibular fossa.      Impression:  1. No acute intracranial traumatic injury identified. Details and other findings as noted above.                                         Medications - No data to display  Medical Decision Making  Minor head injury, no palpable trauma, history of brain injury, sleepy/altered  Differential diagnosis:  Intracranial hemorrhage, concussion, contusion, substance abuse/intoxication  CT head, basic labs, UDS    Amount and/or Complexity of Data Reviewed  Labs: ordered.  Radiology: ordered.                                      Clinical Impression:  Final diagnoses:  [S09.90XA] Closed head injury, initial encounter  (Primary)  [T42.4X1A] Benzodiazepine overdose, accidental or unintentional, initial encounter                     Elia Tony MD  05/02/24 0410       Elia Tony MD  05/02/24 0518

## 2024-05-08 NOTE — DISCHARGE SUMMARY
[facility]/Surg  [facility] Medicine  Discharge Summary      Patient Name: Leno Thompson Jr.  MRN: [id number]  [facility]: [id number]  Patient Class: IP- Inpatient  Admission Date: 4/26/2024  Hospital Length of Stay: 1 days  Discharge Date and Time: 4/27/2024  6:06 PM  Attending Physician: No att. providers found   Discharging Provider: Fiorella [place] MD  Primary Care Provider: David Carter MD    Primary Care Team: Networked reference to record PCT     HPI:   Patient is a 68-year-old male who presented to the emergency department after calling EMS when he was too weak to get up off the toilet this morning.  Patient does have significant baseline dysarthria and is an extremely difficult historian.  Apparently there was evidence for significant bleeding in the house, which may have been as a result of lacerations from falls and he is apparently on Plavix although we do not have any other medication history available at this time.  In the emergency department he was noted to be severely hyperkalemic at 7.5 with an elevated creatinine of 3.54 as compared to 1.81 back in mid February.  He was treated medically for his hyperkalemia including dextrose/insulin/calcium gluconate, and given 1 L of normal saline as a bolus presumably for evidence of volume depletion.       Patient's chest x-ray was clear, and renal ultrasound did show kidneys that were slightly diminutive with some cortical thinning and increased echogenicity but no evidence for obstruction.  His urine specific gravity was greater than 1.030 suggesting significant prerenal physiology.  He has been hemodynamically stable and afebrile.     Repeat potassium level 3 hours after institution of medical therapy showed level down to 4.8, and repeat several hours later shows potassium level trending upward at 5.8.  Richardson was placed per ERMD.Patient does have a Richardson catheter in place with 300 cc which presumably represent an 8 hour collection.       "  Patient's past medical history  hypertension, coronary artery disease MI 2018  and dysarthria, he has dense right sided paresis which is old, but pt denies having recent CVA.     He has markedly delayed speech, and does not offer any significant complaints on review.   Reportedly surgery had been contacted about placing a dialysis catheter earlier today.         * No surgery found *      Hospital Course:   04/27/2024 pt admitted with ALEXANDRA and hyperkalemia, K is down this am, drop in H&H and CR down also.  Much improved.  He is very frail.  C/o right rib pain on lower chostral edge, he does not remember if he fell yesterday while stuck in his bathroom  DISCHARGE SUMMARY: pt left ama, had a family come and pick him up and bring him home despite mutliple discussions and informing him of need for continue care.     Goals of Care Treatment Preferences:  Code Status: Full Code      Consults:     No new Assessment & Plan notes have been filed under this hospital service since the last note was generated.  Service: Hospital Medicine    Final Active Diagnoses:    Diagnosis Date Noted POA    PRINCIPAL PROBLEM:  Hyperkalemia [E87.5] 04/26/2024 Yes    Acute on chronic renal failure [N17.9, N18.9] 04/26/2024 Yes    Weakness [R53.1] 04/27/2024 Yes    Thrombocytopenia [D69.6] 04/27/2024 Yes    Acute blood loss anemia [D62] 04/26/2024 Yes      Problems Resolved During this Admission:       Discharged Condition: against medical advice    Disposition: Left Against Medical Adv*    Follow Up:    Patient Instructions:   No discharge procedures on file.    Significant Diagnostic Studies: Labs: CMP No results for input(s): "NA", "K", "CL", "CO2", "GLU", "BUN", "CREATININE", "CALCIUM", "PROT", "ALBUMIN", "BILITOT", "ALKPHOS", "AST", "ALT", "ANIONGAP", "ESTGFRAFRICA", "EGFRNONAA" in the last 48 hours. and CBC No results for input(s): "WBC", "HGB", "HCT", "PLT" in the last 48 hours.    Pending Diagnostic Studies:       None         "   Medications:  None    Indwelling Lines/Drains at time of discharge:   Lines/Drains/Airways       None                   Time spent on the discharge of patient: 0 minutes     Pt left ama prior to medically preparred for d/c  Patient Screened for food insecurity, housing instability, transportation needs, utility difficulties, and interpersonal safety.  Social Consult for: pt is not clean and does not take care of himself does have a caregiver and is content with his current living situation  has met with him.      Fiorella [place] MD  Department of Hospital Medicine  [facility]/Surg

## 2024-05-24 ENCOUNTER — HOSPITAL ENCOUNTER (EMERGENCY)
Facility: HOSPITAL | Age: 69
Discharge: HOME | End: 2024-05-24
Attending: FAMILY MEDICINE
Payer: MEDICARE

## 2024-05-24 VITALS
DIASTOLIC BLOOD PRESSURE: 65 MMHG | WEIGHT: 155 LBS | HEART RATE: 43 BPM | RESPIRATION RATE: 18 BRPM | HEIGHT: 60 IN | SYSTOLIC BLOOD PRESSURE: 153 MMHG | BODY MASS INDEX: 30.43 KG/M2 | TEMPERATURE: 98 F | OXYGEN SATURATION: 98 %

## 2024-05-24 DIAGNOSIS — R53.1 WEAKNESS: ICD-10-CM

## 2024-05-24 DIAGNOSIS — R05.9 COUGH IN ADULT: ICD-10-CM

## 2024-05-24 LAB
ALBUMIN SERPL-MCNC: 4.1 G/DL (ref 3.4–5)
ALBUMIN/GLOB SERPL: 1.3 RATIO
ALP SERPL-CCNC: 97 UNIT/L (ref 50–144)
ALT SERPL-CCNC: 19 UNIT/L (ref 1–45)
ANION GAP SERPL CALC-SCNC: 8 MEQ/L (ref 2–13)
AST SERPL-CCNC: 26 UNIT/L (ref 17–59)
BASOPHILS # BLD AUTO: 0.13 X10(3)/MCL (ref 0.01–0.08)
BASOPHILS NFR BLD AUTO: 2.9 % (ref 0.1–1.2)
BILIRUB SERPL-MCNC: 0.6 MG/DL (ref 0–1)
BILIRUB UR QL STRIP.AUTO: NEGATIVE
BUN SERPL-MCNC: 17 MG/DL (ref 7–20)
CALCIUM SERPL-MCNC: 9.2 MG/DL (ref 8.4–10.2)
CHLORIDE SERPL-SCNC: 105 MMOL/L (ref 98–110)
CK SERPL-CCNC: 67 U/L (ref 55–170)
CLARITY UR: CLEAR
CO2 SERPL-SCNC: 25 MMOL/L (ref 21–32)
COLOR UR AUTO: YELLOW
CREAT SERPL-MCNC: 1.83 MG/DL (ref 0.66–1.25)
CREAT/UREA NIT SERPL: 9 (ref 12–20)
EOSINOPHIL # BLD AUTO: 0.16 X10(3)/MCL (ref 0.04–0.54)
EOSINOPHIL NFR BLD AUTO: 3.6 % (ref 0.7–7)
ERYTHROCYTE [DISTWIDTH] IN BLOOD BY AUTOMATED COUNT: 13.1 %
GFR SERPLBLD CREATININE-BSD FMLA CKD-EPI: 40 ML/MIN/1.73/M2
GLOBULIN SER-MCNC: 3.1 GM/DL (ref 2–3.9)
GLUCOSE SERPL-MCNC: 79 MG/DL (ref 70–115)
GLUCOSE UR QL STRIP: >=1000
HCT VFR BLD AUTO: 32.2 % (ref 36–52)
HGB BLD-MCNC: 10.9 G/DL (ref 13–18)
HGB UR QL STRIP: NEGATIVE
IMM GRANULOCYTES # BLD AUTO: 0.01 X10(3)/MCL (ref 0–0.03)
IMM GRANULOCYTES NFR BLD AUTO: 0.2 % (ref 0–0.5)
KETONES UR QL STRIP: NEGATIVE
LEUKOCYTE ESTERASE UR QL STRIP: NEGATIVE
LYMPHOCYTES # BLD AUTO: 1.55 X10(3)/MCL (ref 1.32–3.57)
LYMPHOCYTES NFR BLD AUTO: 34.9 % (ref 20–55)
MAGNESIUM SERPL-MCNC: 2.5 MG/DL (ref 1.8–2.4)
MCH RBC QN AUTO: 29.9 PG (ref 27–34)
MCHC RBC AUTO-ENTMCNC: 33.9 G/DL (ref 31–37)
MCV RBC AUTO: 88.5 FL (ref 79–99)
MONOCYTES # BLD AUTO: 0.48 X10(3)/MCL (ref 0.3–0.82)
MONOCYTES NFR BLD AUTO: 10.8 % (ref 4.7–12.5)
NEUTROPHILS # BLD AUTO: 2.11 X10(3)/MCL (ref 1.78–5.38)
NEUTROPHILS NFR BLD AUTO: 47.6 % (ref 37–73)
NITRITE UR QL STRIP: NEGATIVE
NRBC BLD AUTO-RTO: 0 %
PH UR STRIP: 6 [PH]
PLATELET # BLD AUTO: 175 X10(3)/MCL (ref 140–371)
PMV BLD AUTO: 9.4 FL (ref 9.4–12.4)
POTASSIUM SERPL-SCNC: 4 MMOL/L (ref 3.5–5.1)
PROT SERPL-MCNC: 7.2 GM/DL (ref 6.3–8.2)
PROT UR QL STRIP: NEGATIVE
RBC # BLD AUTO: 3.64 X10(6)/MCL (ref 4–6)
SODIUM SERPL-SCNC: 138 MMOL/L (ref 136–145)
SP GR UR STRIP.AUTO: 1.02 (ref 1–1.03)
UROBILINOGEN UR STRIP-ACNC: 0.2
WBC # SPEC AUTO: 4.44 X10(3)/MCL (ref 4–11.5)

## 2024-05-24 PROCEDURE — 93005 ELECTROCARDIOGRAM TRACING: CPT

## 2024-05-24 PROCEDURE — 99285 EMERGENCY DEPT VISIT HI MDM: CPT | Mod: 25

## 2024-05-24 PROCEDURE — 93010 ELECTROCARDIOGRAM REPORT: CPT | Mod: ,,, | Performed by: INTERNAL MEDICINE

## 2024-05-24 PROCEDURE — 85025 COMPLETE CBC W/AUTO DIFF WBC: CPT | Performed by: FAMILY MEDICINE

## 2024-05-24 PROCEDURE — 81003 URINALYSIS AUTO W/O SCOPE: CPT | Performed by: FAMILY MEDICINE

## 2024-05-24 PROCEDURE — 83735 ASSAY OF MAGNESIUM: CPT | Performed by: FAMILY MEDICINE

## 2024-05-24 PROCEDURE — 80053 COMPREHEN METABOLIC PANEL: CPT | Performed by: FAMILY MEDICINE

## 2024-05-24 PROCEDURE — 82550 ASSAY OF CK (CPK): CPT | Performed by: FAMILY MEDICINE

## 2024-05-24 RX ORDER — AMOXICILLIN 250 MG
1 CAPSULE ORAL DAILY
COMMUNITY

## 2024-05-24 NOTE — ED PROVIDER NOTES
"Encounter Date: 5/24/2024       History     Chief Complaint   Patient presents with    Weakness     Pt reports that he has been weak for a couple weeks and is "low on blood" pt reprints he has not had bloodwork done but he just feels like he needs blood. Pt is very weak in triage.     Patient presents with a general weakness over the past couple of weeks.  It is worsening.  He denies fevers chills sweats nausea or vomiting.  He denies chest or abdominal.  He denies shortness breath.  His history significant for hypertension, active smoker, dyslipidemia.  He does take Valium multiple times during the day.  He is history of anoxic brain injury after having sustained myocardial infarction.        Review of patient's allergies indicates:  No Known Allergies  Past Medical History:   Diagnosis Date    Anoxic brain injury     Arthritis     CAD (coronary artery disease)     Cardiac arrest     Heart attack     HLD (hyperlipidemia)     Hypertension     Mitral valve regurgitation     Muscle spasms of both lower extremities     Muscle spasms of both lower extremities     PEG (percutaneous endoscopic gastrostomy) adjustment/replacement/removal     Status post placement of stent in right coronary artery      Past Surgical History:   Procedure Laterality Date    LEG AMPUTATION Left      No family history on file.  Social History     Tobacco Use    Smoking status: Every Day     Current packs/day: 1.00     Types: Cigarettes    Smokeless tobacco: Never   Substance Use Topics    Alcohol use: Never    Drug use: Never     Review of Systems   Constitutional:  Positive for fatigue. Negative for fever.   HENT: Negative.     Respiratory:  Positive for cough.    Cardiovascular: Negative.    Gastrointestinal: Negative.    Neurological:  Positive for weakness.   Hematological: Negative.    Psychiatric/Behavioral: Negative.         Physical Exam     Initial Vitals [05/24/24 1508]   BP Pulse Resp Temp SpO2   (!) 149/71 (!) 52 18 97.8 °F (36.6 " °C) 97 %      MAP       --         Physical Exam    Constitutional:   Disheveled   HENT:   Head: Normocephalic and atraumatic.   Eyes: EOM are normal. Pupils are equal, round, and reactive to light.   Cardiovascular:            Bradycardic without murmurs gallops or rubs   Pulmonary/Chest: Breath sounds normal.   Abdominal: Abdomen is soft. Bowel sounds are normal.   Musculoskeletal:      Comments: Left lower extremity amputation     Neurological: He is alert and oriented to person, place, and time.   Dysarthric   Skin: Skin is warm.         ED Course   Procedures  Labs Reviewed   COMPREHENSIVE METABOLIC PANEL - Abnormal; Notable for the following components:       Result Value    Creatinine 1.83 (*)     BUN/Creatinine Ratio 9 (*)     All other components within normal limits   MAGNESIUM - Abnormal; Notable for the following components:    Magnesium Level 2.50 (*)     All other components within normal limits   URINALYSIS - Abnormal; Notable for the following components:    Glucose, UA >=1000 (*)     All other components within normal limits    Narrative:      URINE STABILITY IS 2 HOURS AT ROOM TEMP OR    SIX HOURS REFRIGERATED. PERFORMING TESTING ON    SPECIMENS GREATER THAN THIS AGE MAY AFFECT THE    FOLLOWING TESTS:    PH          SPECIFIC GRAVITY           BLOOD    CLARITY     BILIRUBIN               UROBILINOGEN   CBC WITH DIFFERENTIAL - Abnormal; Notable for the following components:    RBC 3.64 (*)     Hgb 10.9 (*)     Hct 32.2 (*)     Basophil % 2.9 (*)     Baso # 0.13 (*)     All other components within normal limits   CK - Normal   CBC W/ AUTO DIFFERENTIAL    Narrative:     The following orders were created for panel order CBC auto differential.  Procedure                               Abnormality         Status                     ---------                               -----------         ------                     CBC with Differential[[id number]]       Abnormal            Final result                  Please view results for these tests on the individual orders.          Imaging Results              X-Ray Chest AP Portable (Final result)  Result time 05/24/24 16:36:29      Final result by Lito Medina MD (05/24/24 16:36:29)                   Impression:      No consolidation or evidence of acute cardiac decompensation.      Electronically signed by: Lito Medina  Date:    05/24/2024  Time:    16:36               Narrative:    EXAMINATION:  XR CHEST AP PORTABLE    CLINICAL HISTORY:  Cough, unspecified    TECHNIQUE:  Single frontal view of the chest was performed.    COMPARISON:  Radiograph 04/26/2024, 09/26/2021    FINDINGS:  The cardiomediastinal silhouette and pulmonary vasculature within normal limits.  Right lower lung zone opacity is stable compared to radiograph 09/26/2021.  No consolidation, pneumothorax or significant pleural effusion.  Small metallic focus in the right thoracic soft tissues is unchanged a no acute osseous abnormality identified.                                       Medications - No data to display  Medical Decision Making  Amount and/or Complexity of Data Reviewed  Labs: ordered.  Radiology: ordered.  Discussion of management or test interpretation with external provider(s): Patient is refusing admission or nursing home placement.  He is in his right mind.  He will be discharged.                                      Clinical Impression:  Final diagnoses:  [R53.1] Weakness  [R05.9] Cough in adult          ED Disposition Condition    Discharge Stable          ED Prescriptions    None       Follow-up Information       Follow up With Specialties Details Why Contact Info    PCP  Call in 3 days               Elia Tony MD  05/24/24 [id number]

## 2024-05-27 LAB
OHS QRS DURATION: 114 MS
OHS QTC CALCULATION: 435 MS

## 2024-06-12 ENCOUNTER — OFFICE VISIT (OUTPATIENT)
Dept: FAMILY MEDICINE | Facility: CLINIC | Age: 69
End: 2024-06-12
Payer: MEDICARE

## 2024-06-12 VITALS
OXYGEN SATURATION: 96 % | DIASTOLIC BLOOD PRESSURE: 72 MMHG | HEART RATE: 72 BPM | HEIGHT: 60 IN | SYSTOLIC BLOOD PRESSURE: 138 MMHG | BODY MASS INDEX: 30.27 KG/M2 | TEMPERATURE: 98 F

## 2024-06-12 DIAGNOSIS — N18.32 STAGE 3B CHRONIC KIDNEY DISEASE: ICD-10-CM

## 2024-06-12 DIAGNOSIS — E11.22 TYPE 2 DIABETES MELLITUS WITH STAGE 3 CHRONIC KIDNEY DISEASE, WITHOUT LONG-TERM CURRENT USE OF INSULIN, UNSPECIFIED WHETHER STAGE 3A OR 3B CKD: ICD-10-CM

## 2024-06-12 DIAGNOSIS — N18.30 TYPE 2 DIABETES MELLITUS WITH STAGE 3 CHRONIC KIDNEY DISEASE, WITHOUT LONG-TERM CURRENT USE OF INSULIN, UNSPECIFIED WHETHER STAGE 3A OR 3B CKD: ICD-10-CM

## 2024-06-12 DIAGNOSIS — F12.90 MARIJUANA USE: ICD-10-CM

## 2024-06-12 DIAGNOSIS — I10 PRIMARY HYPERTENSION: ICD-10-CM

## 2024-06-12 DIAGNOSIS — F41.1 GAD (GENERALIZED ANXIETY DISORDER): ICD-10-CM

## 2024-06-12 DIAGNOSIS — F33.42 RECURRENT MAJOR DEPRESSIVE DISORDER, IN FULL REMISSION: ICD-10-CM

## 2024-06-12 DIAGNOSIS — I71.40 ABDOMINAL AORTIC ANEURYSM (AAA) WITHOUT RUPTURE, UNSPECIFIED PART: ICD-10-CM

## 2024-06-12 DIAGNOSIS — Z76.89 ENCOUNTER TO ESTABLISH CARE: Primary | ICD-10-CM

## 2024-06-12 DIAGNOSIS — Z79.899 MEDICATION MANAGEMENT: ICD-10-CM

## 2024-06-12 DIAGNOSIS — E78.2 MIXED HYPERLIPIDEMIA: ICD-10-CM

## 2024-06-12 PROBLEM — R05.9 COUGH IN ADULT: Status: RESOLVED | Noted: 2024-05-24 | Resolved: 2024-06-12

## 2024-06-12 PROBLEM — Z98.890 HISTORY OF ESOPHAGOGASTRODUODENOSCOPY (EGD): Status: ACTIVE | Noted: 2018-09-27

## 2024-06-12 PROBLEM — I34.0 NONRHEUMATIC MITRAL VALVE REGURGITATION: Status: ACTIVE | Noted: 2021-12-16

## 2024-06-12 PROBLEM — I21.3 ST ELEVATION (STEMI) MYOCARDIAL INFARCTION OF UNSPECIFIED SITE: Status: ACTIVE | Noted: 2018-06-20

## 2024-06-12 PROBLEM — S09.90XA CLOSED HEAD INJURY: Status: RESOLVED | Noted: 2024-05-02 | Resolved: 2024-06-12

## 2024-06-12 LAB
ALBUMIN SERPL-MCNC: 4.2 G/DL (ref 3.4–5)
ALBUMIN/GLOB SERPL: 1.4 RATIO
ALP SERPL-CCNC: 120 UNIT/L (ref 50–144)
ALT SERPL-CCNC: 19 UNIT/L (ref 1–45)
ANION GAP SERPL CALC-SCNC: 7 MEQ/L (ref 2–13)
AST SERPL-CCNC: 20 UNIT/L (ref 17–59)
BASOPHILS # BLD AUTO: 0.12 X10(3)/MCL (ref 0.01–0.08)
BASOPHILS NFR BLD AUTO: 1.8 % (ref 0.1–1.2)
BILIRUB SERPL-MCNC: 0.5 MG/DL (ref 0–1)
BUN SERPL-MCNC: 16 MG/DL (ref 7–20)
CALCIUM SERPL-MCNC: 8.3 MG/DL (ref 8.4–10.2)
CHLORIDE SERPL-SCNC: 104 MMOL/L (ref 98–110)
CHOLEST SERPL-MCNC: 119 MG/DL (ref 0–200)
CO2 SERPL-SCNC: 26 MMOL/L (ref 21–32)
CREAT SERPL-MCNC: 1.73 MG/DL (ref 0.66–1.25)
CREAT/UREA NIT SERPL: 9 (ref 12–20)
EOSINOPHIL # BLD AUTO: 0.2 X10(3)/MCL (ref 0.04–0.54)
EOSINOPHIL NFR BLD AUTO: 3.1 % (ref 0.7–7)
ERYTHROCYTE [DISTWIDTH] IN BLOOD BY AUTOMATED COUNT: 13.1 %
EST. AVERAGE GLUCOSE BLD GHB EST-MCNC: 102.5 MG/DL (ref 70–115)
GFR SERPLBLD CREATININE-BSD FMLA CKD-EPI: 43 ML/MIN/1.73/M2
GLOBULIN SER-MCNC: 3 GM/DL (ref 2–3.9)
GLUCOSE SERPL-MCNC: 96 MG/DL (ref 70–115)
HBA1C MFR BLD: 5.2 % (ref 4–6)
HCT VFR BLD AUTO: 36.1 % (ref 36–52)
HDLC SERPL-MCNC: 36 MG/DL (ref 40–60)
HGB BLD-MCNC: 12.1 G/DL (ref 13–18)
IMM GRANULOCYTES # BLD AUTO: 0.02 X10(3)/MCL (ref 0–0.03)
IMM GRANULOCYTES NFR BLD AUTO: 0.3 % (ref 0–0.5)
LDLC SERPL DIRECT ASSAY-SCNC: 74.8 MG/DL (ref 30–100)
LYMPHOCYTES # BLD AUTO: 1.45 X10(3)/MCL (ref 1.32–3.57)
LYMPHOCYTES NFR BLD AUTO: 22.3 % (ref 20–55)
MCH RBC QN AUTO: 28.5 PG (ref 27–34)
MCHC RBC AUTO-ENTMCNC: 33.5 G/DL (ref 31–37)
MCV RBC AUTO: 84.9 FL (ref 79–99)
MONOCYTES # BLD AUTO: 0.54 X10(3)/MCL (ref 0.3–0.82)
MONOCYTES NFR BLD AUTO: 8.3 % (ref 4.7–12.5)
NEUTROPHILS # BLD AUTO: 4.18 X10(3)/MCL (ref 1.78–5.38)
NEUTROPHILS NFR BLD AUTO: 64.2 % (ref 37–73)
NRBC BLD AUTO-RTO: 0 %
PLATELET # BLD AUTO: 250 X10(3)/MCL (ref 140–371)
PMV BLD AUTO: 9.5 FL (ref 9.4–12.4)
POTASSIUM SERPL-SCNC: 4.2 MMOL/L (ref 3.5–5.1)
PROT SERPL-MCNC: 7.2 GM/DL (ref 6.3–8.2)
RBC # BLD AUTO: 4.25 X10(6)/MCL (ref 4–6)
SODIUM SERPL-SCNC: 137 MMOL/L (ref 136–145)
TRIGL SERPL-MCNC: 105 MG/DL (ref 30–200)
TSH SERPL-ACNC: 2.39 UIU/ML (ref 0.36–3.74)
WBC # SPEC AUTO: 6.51 X10(3)/MCL (ref 4–11.5)

## 2024-06-12 PROCEDURE — 4010F ACE/ARB THERAPY RXD/TAKEN: CPT | Mod: ,,, | Performed by: NURSE PRACTITIONER

## 2024-06-12 PROCEDURE — 3044F HG A1C LEVEL LT 7.0%: CPT | Mod: ,,, | Performed by: NURSE PRACTITIONER

## 2024-06-12 PROCEDURE — 99204 OFFICE O/P NEW MOD 45 MIN: CPT | Mod: ,,, | Performed by: NURSE PRACTITIONER

## 2024-06-12 PROCEDURE — 80053 COMPREHEN METABOLIC PANEL: CPT | Performed by: NURSE PRACTITIONER

## 2024-06-12 PROCEDURE — 84443 ASSAY THYROID STIM HORMONE: CPT | Performed by: NURSE PRACTITIONER

## 2024-06-12 PROCEDURE — 3078F DIAST BP <80 MM HG: CPT | Mod: ,,, | Performed by: NURSE PRACTITIONER

## 2024-06-12 PROCEDURE — 3075F SYST BP GE 130 - 139MM HG: CPT | Mod: ,,, | Performed by: NURSE PRACTITIONER

## 2024-06-12 PROCEDURE — 83036 HEMOGLOBIN GLYCOSYLATED A1C: CPT | Performed by: NURSE PRACTITIONER

## 2024-06-12 PROCEDURE — 3008F BODY MASS INDEX DOCD: CPT | Mod: ,,, | Performed by: NURSE PRACTITIONER

## 2024-06-12 PROCEDURE — 80061 LIPID PANEL: CPT | Performed by: NURSE PRACTITIONER

## 2024-06-12 PROCEDURE — 85025 COMPLETE CBC W/AUTO DIFF WBC: CPT | Performed by: NURSE PRACTITIONER

## 2024-06-12 RX ORDER — MELOXICAM 15 MG/1
15 TABLET ORAL DAILY
Qty: 30 TABLET | Refills: 3 | Status: CANCELLED | OUTPATIENT
Start: 2024-06-12

## 2024-06-12 RX ORDER — ISOSORBIDE MONONITRATE 30 MG/1
30 TABLET, EXTENDED RELEASE ORAL DAILY
Qty: 30 TABLET | Refills: 2 | Status: SHIPPED | OUTPATIENT
Start: 2024-06-12

## 2024-06-12 RX ORDER — CITALOPRAM 10 MG/1
20 TABLET ORAL DAILY
Qty: 30 TABLET | Refills: 2 | Status: SHIPPED | OUTPATIENT
Start: 2024-06-12
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
## 2024-06-19 ENCOUNTER — HOSPITAL ENCOUNTER (EMERGENCY)
Facility: HOSPITAL | Age: 69
Discharge: ANOTHER HOSPITAL | End: 2024-06-19
Attending: SURGERY
Payer: MEDICARE

## 2024-06-19 ENCOUNTER — HOSPITAL ENCOUNTER (EMERGENCY)
Facility: HOSPITAL | Age: 69
Discharge: HOME | End: 2024-06-20
Attending: EMERGENCY MEDICINE
Payer: MEDICARE

## 2024-06-19 VITALS
HEIGHT: 68 IN | BODY MASS INDEX: 21.67 KG/M2 | TEMPERATURE: 98 F | HEART RATE: 58 BPM | WEIGHT: 143 LBS | SYSTOLIC BLOOD PRESSURE: 155 MMHG | OXYGEN SATURATION: 98 % | DIASTOLIC BLOOD PRESSURE: 68 MMHG | RESPIRATION RATE: 18 BRPM

## 2024-06-19 DIAGNOSIS — M54.2 NECK PAIN: ICD-10-CM

## 2024-06-19 DIAGNOSIS — S14.101A CERVICAL SPINAL CORD INJURY AT C1-4 LEVEL WITHOUT VERTEBRAL INJURY, INITIAL ENCOUNTER: ICD-10-CM

## 2024-06-19 DIAGNOSIS — M48.02 NEUROFORAMINAL STENOSIS OF CERVICAL SPINE: ICD-10-CM
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
## 2024-06-19 DIAGNOSIS — M48.9 CERVICAL SPINE DISEASE: ICD-10-CM

## 2024-06-19 DIAGNOSIS — M48.02 CERVICAL STENOSIS OF SPINAL CANAL: ICD-10-CM

## 2024-06-19 DIAGNOSIS — G95.20 CERVICAL SPINAL CORD COMPRESSION: ICD-10-CM

## 2024-06-19 PROCEDURE — 99283 EMERGENCY DEPT VISIT LOW MDM: CPT | Mod: 25

## 2024-06-19 PROCEDURE — 63600175 PHARM REV CODE 636 W HCPCS: Performed by: SURGERY

## 2024-06-19 PROCEDURE — 99285 EMERGENCY DEPT VISIT HI MDM: CPT | Mod: 25,27

## 2024-06-19 PROCEDURE — 96374 THER/PROPH/DIAG INJ IV PUSH: CPT

## 2024-06-19 RX ORDER — NAPROXEN 500 MG/1
500 TABLET ORAL 2 TIMES DAILY PRN
Qty: 30 TABLET | Refills: 0 | Status: SHIPPED | OUTPATIENT
Start: 2024-06-19

## 2024-06-19 RX ORDER — KETOROLAC TROMETHAMINE 30 MG/ML
15 INJECTION, SOLUTION INTRAMUSCULAR; INTRAVENOUS ONCE
Status: COMPLETED | OUTPATIENT
Start: 2024-06-19 | End: 2024-06-19

## 2024-06-19 RX ADMIN — KETOROLAC TROMETHAMINE 15 MG: 30 INJECTION, SOLUTION INTRAMUSCULAR at 02:06

## 2024-06-19 NOTE — ED PROVIDER NOTES
Encounter Date: 6/19/2024    SCRIBE #1 NOTE: I, Dustin Louise, am scribing for, and in the presence of,  Lucero Ladd MD. I have scribed the following portions of the note - Other sections scribed: HPI, ROS, PE.       History     Chief Complaint   Patient presents with    transfer      Tx from [facility] for neurosx. See scanned tx form in EMR.      Pt is a 69 y.o. male presenting to the Ed as a trasnfer from [facility] for neuro services. Pt reports a fall this morning. Says he was getting out of the tub and slipped getting into his wheelchair, hit his neck. He complains of a headache and neck pain. Denies any pain radiating down his arms or any out of the ordinary weakness. 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     The history is provided by the patient. No  was used.     Review of patient's allergies indicates:  No Known Allergies  Past Medical History:   Diagnosis Date    Anoxic brain injury     Arthritis     CAD (coronary artery disease)     Cardiac arrest     Heart attack     HLD (hyperlipidemia)     Hypertension     Mitral valve regurgitation     Muscle spasms of both lower extremities     Muscle spasms of both lower extremities     PEG (percutaneous endoscopic gastrostomy) adjustment/replacement/removal     Status post placement of stent in right coronary artery      Past Surgical History:   Procedure Laterality Date    LEG AMPUTATION Left      No family history on file.  Social History     Tobacco Use    Smoking status: Every Day     Current packs/day: 1.00     Average packs/day: 1 pack/day for 52.5 years (52.5 ttl pk-yrs)     Types: Cigarettes     Start date: 1972     Passive exposure: Current    Smokeless tobacco: Never   Substance Use Topics    Alcohol use: Never    Drug use: Never     Review of Systems   Musculoskeletal:  Positive for neck pain.        Denies any pain radiating down his arms.   Neurological:  Positive for headaches. Negative for weakness.        Physical Exam     Initial Vitals [06/19/24 1715]   BP Pulse Resp Temp SpO2   126/61 (!) 52 20 97.3 °F (36.3 °C) 100 %      MAP       --         Physical Exam    Nursing note and vitals reviewed.  Constitutional: He appears well-developed and well-nourished. He is not diaphoretic. No distress. Cervical collar in place.   HENT:   Head: Normocephalic and atraumatic.   Nose: Nose normal.   Mouth/Throat: Oropharynx is clear and moist.   No gross head trauma.   Eyes: Conjunctivae and EOM are normal. Pupils are equal, round, and reactive to light.   Neck: Trachea normal. Neck supple.   Normal range of motion.  Cardiovascular:  Normal rate, regular rhythm, normal heart sounds and intact distal pulses.     Exam reveals no gallop and no friction rub.       No murmur heard.  Pulmonary/Chest: Breath sounds normal. No respiratory distress. He has no wheezes. He has no rhonchi. He has no rales. He exhibits no tenderness.   Abdominal: Abdomen is soft. Bowel sounds are normal. He exhibits no distension and no mass. There is no abdominal tenderness. There is no rebound.   Musculoskeletal:         General: No tenderness or edema. Normal range of motion.      Cervical back: Normal range of motion and neck supple.      Lumbar back: Normal. No tenderness. Normal range of motion.      Comments: KAYLA BRAGG.     Neurological: He is alert and oriented to person, place, and time. He has normal strength. No cranial nerve deficit or sensory deficit. GCS score is 15. GCS eye subscore is 4. GCS verbal subscore is 5. GCS motor subscore is 6.   Intact deltoid strength, good shoulder shrug.  Intact strength and sensation to upper and lower extremities bilaterally.   Slow speech.   Skin: Skin is warm and dry. Capillary refill takes less than 2 seconds. No rash and no abscess noted. No erythema. No pallor.   Psychiatric: He has a normal mood and affect. His behavior is normal. Judgment and thought content normal.         ED Course    Procedures  Labs Reviewed - No data to display       Imaging Results    None          Medications - No data to display  Medical Decision Making  The differential diagnosis includes, but is not limited to, cervical stenosis, radiculopathy, musculoskeletal neck pain.  Reviewed workup from sending facility  Unclear why patient was sent. Previous mri was nearly identical to reading here. Has neck pain but no acute weakness, no contusion of cord on mri, no significant swelling or edema noted either. Reviewed with neurosurgery and pt, dc with outpatient management, f/u    Problems Addressed:  Cervical stenosis of spinal canal: chronic illness or injury with exacerbation, progression, or side effects of treatment  Fall, initial encounter: acute illness or injury that poses a threat to life or bodily functions  Neck pain: acute illness or injury that poses a threat to life or bodily functions    Amount and/or Complexity of Data Reviewed  External Data Reviewed: radiology and notes.    Risk  OTC drugs.  Prescription drug management.            Scribe Attestation:   Scribe #1: I performed the above scribed service and the documentation accurately describes the services I performed. I attest to the accuracy of the note.  Comments: Attending:   Physician Attestation Statement for Scribe #1: Lucero SILVER MD, personally performed the services described in this documentation. All medical record entries made by the scribe were at my direction and in my presence.  I have reviewed the chart and agree that the record reflects my personal performance and is accurate and complete.        Attending Attestation:           Physician Attestation for Scribe:  Physician Attestation Statement for Scribe #1: Julee SILVER Brooke R, MD, reviewed documentation, as scribed by Dustin Louise in my presence, and it is both accurate and complete.             ED Course as of 06/19/24 1811 Wed Jun 19, 2024   1731 Neurosurgery consulted, pt is  wheelchair bound from anoxic brain injury and has a previous left leg amputation, he has no acute weakness or sensory deficit per my examination [BS]   1801 Discussed with Dr. Mays who reviewed images, does not warrant admission especially given lack of acute weakness or neuro deficit, and on chart review had previous mri that was identical--2020. Can wear soft collar if desired [BS]      ED Course User Index  [BS] Lucero Ladd MD                           Clinical Impression:  Final diagnoses:  [W19.XXXA] Fall, initial encounter (Primary)  [M54.2] Neck pain  [M48.02] Cervical stenosis of spinal canal                 Lucero Ladd MD  06/19/24 1811

## 2024-06-19 NOTE — DISCHARGE INSTRUCTIONS
You can wear the soft collar as needed.  Your MRI in [place] today is the same as it was in 2020.  You can follow up with Dr. Mays as needed or the doctor of your choice
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
## 2024-06-20 VITALS
SYSTOLIC BLOOD PRESSURE: 159 MMHG | DIASTOLIC BLOOD PRESSURE: 65 MMHG | HEART RATE: 54 BPM | OXYGEN SATURATION: 96 % | TEMPERATURE: 97 F | RESPIRATION RATE: 15 BRPM | BODY MASS INDEX: 18.25 KG/M2 | WEIGHT: 120 LBS

## 2024-06-20 NOTE — PROGRESS NOTES
Received a call from RN reporting that the [facility] dept completed a welfare check on pt's residence and pt roommate, Mae, would be present to assist pt out of the vehicle and into the home. Called  , Purnima Mack, who reported that we are unable to provide transportation at this time and that an [employer] would need to be called. Pt will need assistance getting into [employer]. Report given to RN who verbalized understanding.

## 2024-06-20 NOTE — ED NOTES
"Spoke with Ward Villegas (son) at this time about Mr. Thompson getting home. Ward reports roommate will be picking pt. Up at approx. 2000 tonight. Ward reports that roommate is his ex-wife and that Mr. Thompson is not living in "the best situation." Ward reports that he is the last in the family that speaks to Mr. Thompson at this time. Ward asked, "What would happen if no one showed up to get him." RN informed Ward that the pt. Would have to remain here until someone could pick him up. RN informed Ward that after discharge, he could speak with Mr. Thompson's PCP about options for alternative housing. Ward verbalized understanding and confirmed that the roommate was on her way to  Mr. Thompson.  "

## 2024-07-02 ENCOUNTER — OFFICE VISIT (OUTPATIENT)
Dept: BEHAVIORAL HEALTH | Facility: CLINIC | Age: 69
End: 2024-07-02
Payer: MEDICARE

## 2024-07-02 VITALS
OXYGEN SATURATION: 98 % | TEMPERATURE: 98 F | BODY MASS INDEX: 18.18 KG/M2 | HEART RATE: 71 BPM | DIASTOLIC BLOOD PRESSURE: 78 MMHG | HEIGHT: 68 IN | WEIGHT: 119.94 LBS | SYSTOLIC BLOOD PRESSURE: 130 MMHG

## 2024-07-02 DIAGNOSIS — F12.90 MARIJUANA USE: ICD-10-CM

## 2024-07-02 DIAGNOSIS — F33.42 RECURRENT MAJOR DEPRESSIVE DISORDER, IN FULL REMISSION: ICD-10-CM

## 2024-07-02 DIAGNOSIS — F41.1 GENERALIZED ANXIETY DISORDER: Primary | ICD-10-CM

## 2024-07-02 PROBLEM — N17.9 ACUTE ON CHRONIC RENAL FAILURE: Status: RESOLVED | Noted: 2024-04-26 | Resolved: 2024-07-02

## 2024-07-02 PROBLEM — R53.1 WEAKNESS: Status: RESOLVED | Noted: 2024-04-27 | Resolved: 2024-07-02

## 2024-07-02 PROBLEM — D62 ACUTE BLOOD LOSS ANEMIA: Status: RESOLVED | Noted: 2024-04-26 | Resolved: 2024-07-02

## 2024-07-02 PROBLEM — R06.02 SHORTNESS OF BREATH: Status: RESOLVED | Noted: 2023-11-21 | Resolved: 2024-07-02

## 2024-07-02 PROBLEM — S51.812A SKIN TEAR OF FOREARM WITHOUT COMPLICATION, LEFT, INITIAL ENCOUNTER: Status: RESOLVED | Noted: 2023-03-26 | Resolved: 2024-07-02

## 2024-07-02 PROBLEM — E87.5 HYPERKALEMIA: Status: RESOLVED | Noted: 2024-04-26 | Resolved: 2024-07-02

## 2024-07-02 PROBLEM — D69.6 THROMBOCYTOPENIA: Status: RESOLVED | Noted: 2024-04-27 | Resolved: 2024-07-02

## 2024-07-02 PROBLEM — F32.2 MAJOR DEPRESSIVE DISORDER, SINGLE EPISODE, SEVERE: Status: RESOLVED | Noted: 2024-07-02 | Resolved: 2024-07-02

## 2024-07-02 PROBLEM — N18.32 STAGE 3B CHRONIC KIDNEY DISEASE: Status: ACTIVE | Noted: 2024-07-02

## 2024-07-02 PROBLEM — S51.012A SKIN TEAR OF ELBOW WITHOUT COMPLICATION, LEFT, INITIAL ENCOUNTER: Status: RESOLVED | Noted: 2023-03-26 | Resolved: 2024-07-02

## 2024-07-02 PROBLEM — I71.40 ABDOMINAL AORTIC ANEURYSM (AAA) WITHOUT RUPTURE, UNSPECIFIED PART: Status: ACTIVE | Noted: 2024-07-02

## 2024-07-02 PROBLEM — F32.2 MAJOR DEPRESSIVE DISORDER, SINGLE EPISODE, SEVERE: Status: ACTIVE | Noted: 2024-07-02

## 2024-07-02 PROBLEM — N18.9 ACUTE ON CHRONIC RENAL FAILURE: Status: RESOLVED | Noted: 2024-04-26 | Resolved: 2024-07-02

## 2024-07-02 PROCEDURE — 3008F BODY MASS INDEX DOCD: CPT | Mod: ,,, | Performed by: NURSE PRACTITIONER

## 2024-07-02 PROCEDURE — 1125F AMNT PAIN NOTED PAIN PRSNT: CPT | Mod: ,,, | Performed by: NURSE PRACTITIONER

## 2024-07-02 PROCEDURE — 1160F RVW MEDS BY RX/DR IN RCRD: CPT | Mod: ,,, | Performed by: NURSE PRACTITIONER

## 2024-07-02 PROCEDURE — 3075F SYST BP GE 130 - 139MM HG: CPT | Mod: ,,, | Performed by: NURSE PRACTITIONER

## 2024-07-02 PROCEDURE — 3044F HG A1C LEVEL LT 7.0%: CPT | Mod: ,,, | Performed by: NURSE PRACTITIONER

## 2024-07-02 PROCEDURE — 4010F ACE/ARB THERAPY RXD/TAKEN: CPT | Mod: ,,, | Performed by: NURSE PRACTITIONER

## 2024-07-02 PROCEDURE — 3078F DIAST BP <80 MM HG: CPT | Mod: ,,, | Performed by: NURSE PRACTITIONER

## 2024-07-02 PROCEDURE — 1159F MED LIST DOCD IN RCRD: CPT | Mod: ,,, | Performed by: NURSE PRACTITIONER

## 2024-07-02 PROCEDURE — 99204 OFFICE O/P NEW MOD 45 MIN: CPT | Mod: ,,, | Performed by: NURSE PRACTITIONER

## 2024-07-02 NOTE — ASSESSMENT & PLAN NOTE
Lab Results   Component Value Date    CHOL 119 06/12/2024    HDL 36 (L) 06/12/2024    LDLDIRECT 74.8 06/12/2024    TRIG 105 06/12/2024     Continue current therapy.  Cholesterol is at goal  LDL Goal: 70  Educated on dietary modifications. Follow a low cholesterol, low saturated fat diet with less that 200mg of cholesterol a day.  Avoid fried foods and high saturated fats.  Increase dietary fiber.  Regular exercise can reduce LDL (bad cholesterol) and raise HDL (good cholesterol). Encourage physical activity 5 times per week for 30 minutes per day.

## 2024-07-02 NOTE — ASSESSMENT & PLAN NOTE
Needs refill today.   Also requesting refill on Valium; will not refill this, will refer to Psych provider for further anxiety management.
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
## 2024-07-02 NOTE — ASSESSMENT & PLAN NOTE
Diabetes labs:   Lab Results   Component Value Date    HGBA1C 5.2 06/12/2024      Continue current medications.   Take all medications as directed; compliance is critical for managing your diabetes.   Follow American Diabetic Association (ADA) dietary guidelines for eating and implement exercise into your daily regimen.   Check your glucose levels each morning and record for review at follow up.

## 2024-07-03 RX ORDER — BUSPIRONE HYDROCHLORIDE 7.5 MG/1
7.5 TABLET ORAL 2 TIMES DAILY
Qty: 60 TABLET | Refills: 1 | Status: SHIPPED | OUTPATIENT
Start: 2024-07-03

## 2024-07-14 ENCOUNTER — HOSPITAL ENCOUNTER (INPATIENT)
Facility: HOSPITAL | Age: 69
LOS: 9 days | Discharge: HOME | DRG: 291 | End: 2024-07-23
Attending: FAMILY MEDICINE | Admitting: INTERNAL MEDICINE
Payer: MEDICARE

## 2024-07-14 DIAGNOSIS — R53.1 WEAKNESS: ICD-10-CM

## 2024-07-14 DIAGNOSIS — I51.7 CARDIOMEGALY: ICD-10-CM

## 2024-07-14 DIAGNOSIS — F17.200 TOBACCO DEPENDENCY: Primary | ICD-10-CM

## 2024-07-14 DIAGNOSIS — M54.2 NECK PAIN: ICD-10-CM

## 2024-07-14 PROBLEM — I50.9 CHF (CONGESTIVE HEART FAILURE): Status: ACTIVE | Noted: 2024-07-14

## 2024-07-14 LAB
ALBUMIN SERPL-MCNC: 4.3 G/DL (ref 3.4–5)
ALBUMIN/GLOB SERPL: 1.4 RATIO
ALP SERPL-CCNC: 103 UNIT/L (ref 50–144)
ALT SERPL-CCNC: 12 UNIT/L (ref 1–45)
ANION GAP SERPL CALC-SCNC: 7 MEQ/L (ref 2–13)
AST SERPL-CCNC: 18 UNIT/L (ref 17–59)
BACTERIA #/AREA URNS AUTO: NORMAL /HPF
BASOPHILS # BLD AUTO: 0.11 X10(3)/MCL (ref 0.01–0.08)
BASOPHILS NFR BLD AUTO: 2 % (ref 0.1–1.2)
BILIRUB SERPL-MCNC: 0.7 MG/DL (ref 0–1)
BILIRUB UR QL STRIP.AUTO: NEGATIVE
BNP BLD-MCNC: 3500 PG/ML (ref 0–124.9)
BUN SERPL-MCNC: 19 MG/DL (ref 7–20)
CALCIUM SERPL-MCNC: 9.3 MG/DL (ref 8.4–10.2)
CHLORIDE SERPL-SCNC: 106 MMOL/L (ref 98–110)
CK SERPL-CCNC: 75 U/L (ref 55–170)
CLARITY UR: CLEAR
CO2 SERPL-SCNC: 26 MMOL/L (ref 21–32)
COLOR UR AUTO: YELLOW
CREAT SERPL-MCNC: 1.85 MG/DL (ref 0.66–1.25)
CREAT/UREA NIT SERPL: 10 (ref 12–20)
D DIMER PPP IA.FEU-MCNC: 1.88 MG/L (ref 0.19–0.5)
EOSINOPHIL # BLD AUTO: 0.21 X10(3)/MCL (ref 0.04–0.54)
EOSINOPHIL NFR BLD AUTO: 3.8 % (ref 0.7–7)
ERYTHROCYTE [DISTWIDTH] IN BLOOD BY AUTOMATED COUNT: 13.9 %
GFR SERPLBLD CREATININE-BSD FMLA CKD-EPI: 39 ML/MIN/1.73/M2
GLOBULIN SER-MCNC: 3 GM/DL (ref 2–3.9)
GLUCOSE SERPL-MCNC: 108 MG/DL (ref 70–115)
GLUCOSE UR QL STRIP: >=1000
HCT VFR BLD AUTO: 36.5 % (ref 36–52)
HGB BLD-MCNC: 12.5 G/DL (ref 13–18)
HGB UR QL STRIP: ABNORMAL
IMM GRANULOCYTES # BLD AUTO: 0.01 X10(3)/MCL (ref 0–0.03)
IMM GRANULOCYTES NFR BLD AUTO: 0.2 % (ref 0–0.5)
KETONES UR QL STRIP: NEGATIVE
LEUKOCYTE ESTERASE UR QL STRIP: NEGATIVE
LYMPHOCYTES # BLD AUTO: 1.31 X10(3)/MCL (ref 1.32–3.57)
LYMPHOCYTES NFR BLD AUTO: 23.6 % (ref 20–55)
MCH RBC QN AUTO: 28.2 PG (ref 27–34)
MCHC RBC AUTO-ENTMCNC: 34.2 G/DL (ref 31–37)
MCV RBC AUTO: 82.2 FL (ref 79–99)
MONOCYTES # BLD AUTO: 0.47 X10(3)/MCL (ref 0.3–0.82)
MONOCYTES NFR BLD AUTO: 8.5 % (ref 4.7–12.5)
NEUTROPHILS # BLD AUTO: 3.43 X10(3)/MCL (ref 1.78–5.38)
NEUTROPHILS NFR BLD AUTO: 61.9 % (ref 37–73)
NITRITE UR QL STRIP: NEGATIVE
NRBC BLD AUTO-RTO: 0 %
PH UR STRIP: 6 [PH]
PLATELET # BLD AUTO: 191 X10(3)/MCL (ref 140–371)
PMV BLD AUTO: 9.2 FL (ref 9.4–12.4)
POTASSIUM SERPL-SCNC: 3.5 MMOL/L (ref 3.5–5.1)
PROT SERPL-MCNC: 7.3 GM/DL (ref 6.3–8.2)
PROT UR QL STRIP: ABNORMAL
RBC # BLD AUTO: 4.44 X10(6)/MCL (ref 4–6)
RBC #/AREA URNS AUTO: NORMAL /HPF
SODIUM SERPL-SCNC: 139 MMOL/L (ref 136–145)
SP GR UR STRIP.AUTO: >=1.03 (ref 1–1.03)
SQUAMOUS #/AREA URNS AUTO: NORMAL /HPF
TROPONIN I SERPL-MCNC: 0.03 NG/ML (ref 0–0.03)
UROBILINOGEN UR STRIP-ACNC: 0.2
WBC # BLD AUTO: 5.54 X10(3)/MCL (ref 4–11.5)
WBC #/AREA URNS AUTO: NORMAL /HPF

## 2024-07-14 PROCEDURE — 82550 ASSAY OF CK (CPK): CPT | Performed by: FAMILY MEDICINE

## 2024-07-14 PROCEDURE — 99285 EMERGENCY DEPT VISIT HI MDM: CPT | Mod: 25

## 2024-07-14 PROCEDURE — 99900035 HC TECH TIME PER 15 MIN (STAT)

## 2024-07-14 PROCEDURE — 85379 FIBRIN DEGRADATION QUANT: CPT | Performed by: FAMILY MEDICINE

## 2024-07-14 PROCEDURE — 85025 COMPLETE CBC W/AUTO DIFF WBC: CPT | Performed by: FAMILY MEDICINE

## 2024-07-14 PROCEDURE — 63600175 PHARM REV CODE 636 W HCPCS: Performed by: INTERNAL MEDICINE

## 2024-07-14 PROCEDURE — 83880 ASSAY OF NATRIURETIC PEPTIDE: CPT | Performed by: FAMILY MEDICINE

## 2024-07-14 PROCEDURE — 11000001 HC ACUTE MED/SURG PRIVATE ROOM

## 2024-07-14 PROCEDURE — 93005 ELECTROCARDIOGRAM TRACING: CPT

## 2024-07-14 PROCEDURE — 25500020 PHARM REV CODE 255: Performed by: FAMILY MEDICINE

## 2024-07-14 PROCEDURE — 80053 COMPREHEN METABOLIC PANEL: CPT | Performed by: FAMILY MEDICINE

## 2024-07-14 PROCEDURE — 25000003 PHARM REV CODE 250: Performed by: INTERNAL MEDICINE

## 2024-07-14 PROCEDURE — 93010 ELECTROCARDIOGRAM REPORT: CPT | Mod: ,,, | Performed by: INTERNAL MEDICINE

## 2024-07-14 PROCEDURE — 21400001 HC TELEMETRY ROOM

## 2024-07-14 PROCEDURE — 81015 MICROSCOPIC EXAM OF URINE: CPT | Performed by: FAMILY MEDICINE

## 2024-07-14 PROCEDURE — 81003 URINALYSIS AUTO W/O SCOPE: CPT | Performed by: FAMILY MEDICINE

## 2024-07-14 PROCEDURE — 94761 N-INVAS EAR/PLS OXIMETRY MLT: CPT

## 2024-07-14 PROCEDURE — 36415 COLL VENOUS BLD VENIPUNCTURE: CPT | Performed by: FAMILY MEDICINE

## 2024-07-14 PROCEDURE — 84484 ASSAY OF TROPONIN QUANT: CPT | Performed by: FAMILY MEDICINE

## 2024-07-14 RX ORDER — BUSPIRONE HYDROCHLORIDE 7.5 MG/1
7.5 TABLET ORAL 2 TIMES DAILY
Status: DISCONTINUED | OUTPATIENT
Start: 2024-07-14 | End: 2024-07-23 | Stop reason: HOSPADM

## 2024-07-14 RX ORDER — SODIUM CHLORIDE 0.9 % (FLUSH) 0.9 %
10 SYRINGE (ML) INJECTION
Status: DISCONTINUED | OUTPATIENT
Start: 2024-07-14 | End: 2024-07-23 | Stop reason: HOSPADM

## 2024-07-14 RX ORDER — CLOPIDOGREL BISULFATE 75 MG/1
75 TABLET ORAL DAILY
Status: DISCONTINUED | OUTPATIENT
Start: 2024-07-15 | End: 2024-07-23 | Stop reason: HOSPADM

## 2024-07-14 RX ORDER — POLYETHYLENE GLYCOL 3350 17 G/17G
17 POWDER, FOR SOLUTION ORAL DAILY
Status: DISCONTINUED | OUTPATIENT
Start: 2024-07-15 | End: 2024-07-23 | Stop reason: HOSPADM

## 2024-07-14 RX ORDER — FUROSEMIDE 10 MG/ML
40 INJECTION INTRAMUSCULAR; INTRAVENOUS DAILY
Status: DISCONTINUED | OUTPATIENT
Start: 2024-07-14 | End: 2024-07-16

## 2024-07-14 RX ORDER — HEPARIN SODIUM 5000 [USP'U]/ML
5000 INJECTION, SOLUTION INTRAVENOUS; SUBCUTANEOUS EVERY 8 HOURS
Status: DISCONTINUED | OUTPATIENT
Start: 2024-07-14 | End: 2024-07-16

## 2024-07-14 RX ORDER — CITALOPRAM 20 MG/1
20 TABLET, FILM COATED ORAL DAILY
Status: DISCONTINUED | OUTPATIENT
Start: 2024-07-15 | End: 2024-07-23 | Stop reason: HOSPADM

## 2024-07-14 RX ORDER — FUROSEMIDE 10 MG/ML
40 INJECTION INTRAMUSCULAR; INTRAVENOUS DAILY
Status: DISCONTINUED | OUTPATIENT
Start: 2024-07-15 | End: 2024-07-14

## 2024-07-14 RX ORDER — ATORVASTATIN CALCIUM 20 MG/1
20 TABLET, FILM COATED ORAL DAILY
Status: DISCONTINUED | OUTPATIENT
Start: 2024-07-15 | End: 2024-07-23 | Stop reason: HOSPADM

## 2024-07-14 RX ORDER — EZETIMIBE 10 MG/1
10 TABLET ORAL DAILY
Status: DISCONTINUED | OUTPATIENT
Start: 2024-07-15 | End: 2024-07-23 | Stop reason: HOSPADM

## 2024-07-14 RX ORDER — TALC
6 POWDER (GRAM) TOPICAL NIGHTLY PRN
Status: DISCONTINUED | OUTPATIENT
Start: 2024-07-14 | End: 2024-07-23 | Stop reason: HOSPADM

## 2024-07-14 RX ORDER — LISINOPRIL 10 MG/1
10 TABLET ORAL DAILY
Status: DISCONTINUED | OUTPATIENT
Start: 2024-07-15 | End: 2024-07-16

## 2024-07-14 RX ORDER — ACETAMINOPHEN 325 MG/1
650 TABLET ORAL EVERY 8 HOURS PRN
Status: DISCONTINUED | OUTPATIENT
Start: 2024-07-14 | End: 2024-07-23 | Stop reason: HOSPADM

## 2024-07-14 RX ORDER — ISOSORBIDE MONONITRATE 30 MG/1
30 TABLET, EXTENDED RELEASE ORAL DAILY
Status: DISCONTINUED | OUTPATIENT
Start: 2024-07-15 | End: 2024-07-23 | Stop reason: HOSPADM

## 2024-07-14 RX ORDER — ONDANSETRON HYDROCHLORIDE 2 MG/ML
4 INJECTION, SOLUTION INTRAVENOUS EVERY 8 HOURS PRN
Status: DISCONTINUED | OUTPATIENT
Start: 2024-07-14 | End: 2024-07-23 | Stop reason: HOSPADM

## 2024-07-14 RX ADMIN — BUSPIRONE HYDROCHLORIDE 7.5 MG: 7.5 TABLET ORAL at 09:07

## 2024-07-14 RX ADMIN — IOHEXOL 70 ML: 350 INJECTION, SOLUTION INTRAVENOUS at 02:07

## 2024-07-14 RX ADMIN — FUROSEMIDE 40 MG: 10 INJECTION, SOLUTION INTRAMUSCULAR; INTRAVENOUS at 05:07

## 2024-07-14 NOTE — ED PROVIDER NOTES
"Encounter Date: 7/14/2024       History       Chief Complaint  Patient presents with  · Fall  · Weakness    Pt presented to ED per EMS with c/o FALL  and laceration to right lower leg. Pt denies hitting head. Pt c/o  chronic shoulder pain and neck pain. Pt reports he is unable to care for himself and currently living in a hotel. Pt was extremely disheveled on arrival. Pt stated "I dont have anyone to care for me, I've been staying at a hotel and constantly falling".  Patient said that he was sitting on the wheelchair and he fell forwards he hit his shin there is no bone tenderness the patient wanted to know why he is sleeping constantly I explained to the patient that he is on Valium twice a day which is a very long-acting drug and he should not be on longer acting benzodiazepines patient says that he you was living with his ex daughter-in-law and she kicked him out the patient is mentally very alert and says that his son is in [place] works as a hydro plaster and he is going to stay in [place] for 2 weeks he does not have any ride to go back to the hotel the patient does look a little dehydrated          Review of patient's allergies indicates:  No Known Allergies  Past Medical History:   Diagnosis Date    Anoxic brain injury     Arthritis     CAD (coronary artery disease)     Cardiac arrest     Heart attack     HLD (hyperlipidemia)     Hypertension     Mitral valve regurgitation     Muscle spasms of both lower extremities     Muscle spasms of both lower extremities     PEG (percutaneous endoscopic gastrostomy) adjustment/replacement/removal     Status post placement of stent in right coronary artery      Past Surgical History:   Procedure Laterality Date    LEG AMPUTATION Left      No family history on file.  Social History     Tobacco Use    Smoking status: Every Day     Current packs/day: 1.00     Average packs/day: 1 pack/day for 52.5 years (52.5 ttl pk-yrs)     Types: Cigarettes     Start date: 1972     " Passive exposure: Current    Smokeless tobacco: Never   Substance Use Topics    Alcohol use: Never    Drug use: Never     Review of Systems   Constitutional:  Negative for activity change, chills and fatigue.   HENT:  Negative for congestion, ear discharge, hearing loss, nosebleeds and rhinorrhea.    Eyes:  Negative for pain.   Respiratory:  Negative for choking and chest tightness.    Cardiovascular:  Negative for chest pain.   Gastrointestinal:  Negative for abdominal distention, abdominal pain and constipation.   Endocrine: Negative for cold intolerance.   Genitourinary:  Negative for dysuria, frequency, hematuria and testicular pain.   Musculoskeletal:  Positive for arthralgias.   Neurological:  Negative for seizures, syncope, facial asymmetry and numbness.   Psychiatric/Behavioral:  Negative for agitation, confusion and hallucinations. The patient is not nervous/anxious.        Physical Exam     Initial Vitals [07/14/24 1249]   BP Pulse Resp Temp SpO2   (!) 176/79 (!) 56 18 97.9 °F (36.6 °C) 97 %      MAP       --         Physical Exam    Nursing note and vitals reviewed.  Constitutional: He appears well-developed.   HENT:   Head: Normocephalic.   Eyes: Pupils are equal, round, and reactive to light.   Neck:   Normal range of motion.  Cardiovascular:  Normal rate, regular rhythm, normal heart sounds and intact distal pulses.           Pulmonary/Chest: No respiratory distress. He has no wheezes. He has no rales.   Abdominal: He exhibits no mass. There is no abdominal tenderness. There is no rebound and no guarding.   Musculoskeletal:         General: Normal range of motion.      Cervical back: Normal range of motion.     Neurological: He is alert.   Clearly stated his name clearly knows what is happening around him mentally alert communicates slowly but answers appropriately no motor weakness no facial droop no tongue deviation no dysarthria   Skin: Skin is warm.   Psychiatric: He has a normal mood and affect.  Thought content normal.         ED Course   Procedures  Labs Reviewed   COMPREHENSIVE METABOLIC PANEL - Abnormal; Notable for the following components:       Result Value    Creatinine 1.85 (*)     BUN/Creatinine Ratio 10 (*)     All other components within normal limits   NT-PRO NATRIURETIC PEPTIDE - Abnormal; Notable for the following components:    ProBNP 3,500.0 (*)     All other components within normal limits   D DIMER, QUANTITATIVE - Abnormal; Notable for the following components:    D-Dimer 1.88 (*)     All other components within normal limits   CBC WITH DIFFERENTIAL - Abnormal; Notable for the following components:    Hgb 12.5 (*)     MPV 9.2 (*)     Basophil % 2.0 (*)     Lymph # 1.31 (*)     Baso # 0.11 (*)     All other components within normal limits   URINALYSIS, REFLEX TO URINE CULTURE - Abnormal; Notable for the following components:    Protein, UA Trace (*)     Glucose, UA >=1000 (*)     Blood, UA Small (*)     All other components within normal limits    Narrative:      URINE STABILITY IS 2 HOURS AT ROOM TEMP OR    SIX HOURS REFRIGERATED. PERFORMING TESTING ON    SPECIMENS GREATER THAN THIS AGE MAY AFFECT THE    FOLLOWING TESTS:    PH          SPECIFIC GRAVITY           BLOOD    CLARITY     BILIRUBIN               UROBILINOGEN   CK - Normal   TROPONIN I - Normal   URINALYSIS, MICROSCOPIC - Normal   CBC W/ AUTO DIFFERENTIAL    Narrative:     The following orders were created for panel order CBC Auto Differential.  Procedure                               Abnormality         Status                     ---------                               -----------         ------                     CBC with Differential[[id number]]       Abnormal            Final result                 Please view results for these tests on the individual orders.     EKG Readings: (Independently Interpreted)   Initial Reading: No STEMI. Rhythm: Normal Sinus Rhythm. Heart Rate: 50. Ectopy: No Ectopy.   LVH BY VOLTAGE         Imaging Results              CTA Chest Non-Coronary (PE Studies) (Final result)  Result time 07/14/24 15:00:44      Final result by Donavon Galdamez MD (07/14/24 15:00:44)                   Impression:      1. No evidence of pulmonary artery thromboembolic disease.  2. Calcified pleural plaque is seen along the lateral right lower lobe (5/79), correlate for spaces exposure.      Electronically signed by: Donavon Galdamez  Date:    07/14/2024  Time:    15:00               Narrative:    MILD EMPHYSEMATOUS EXAMINATION:  CTA CHEST NON CORONARY (PE STUDIES)    CLINICAL HISTORY:  Pulmonary embolism (PE) suspected, positive D-dimer;    TECHNIQUE:  Axial CT images were obtained through the chest after IV administration of 80 cc Omnipaque 350 contrast.  MIP, coronal and sagittal reconstructions submitted and interpreted. Automated exposure control utilized.    DLP: 116 mGy-cm    COMPARISON:  None    FINDINGS:  The visualized thyroid is unremarkable.    Pulmonary artery is normal in diameter. No filling defects are in the main pulmonary artery or segmental branches.    Heart is normal size.  Thoracic aorta is normal in caliber.    Central airways are clear.    Emphysematous changes of the lungs are seen.  Calcified pleural plaque is seen along the lateral right lower lobe (5/79), correlate for spaces exposure.  There are no focal consolidation, pneumothorax or pleural effusion.    No pathologically enlarged mediastinal or axillary lymph nodes.    No acute osseous findings.    Visualized portions of the upper abdomen show no acute findings.                                       X-Ray Chest AP Portable (Final result)  Result time 07/14/24 14:13:59      Final result by Donavon Galdamez MD (07/14/24 14:13:59)                   Impression:      Possible small right-sided pleural effusion.      Electronically signed by: Donavon Galdamez  Date:    07/14/2024  Time:    14:13               Narrative:    EXAMINATION:  XR CHEST AP  PORTABLE    CLINICAL HISTORY:  weakness;    COMPARISON:  05/24/2024    FINDINGS:  Single AP chest radiograph is provided for evaluation.    Cardiac silhouette is normal in size.    Blunting of the right costophrenic angle may reflect a small right-sided pleural effusion.  Otherwise no evidence of pneumothorax or focal consolidation.    No acute osseous findings.                                       X-Ray Cervical Spine AP And Lateral (Final result)  Result time 07/14/24 14:14:10      Final result by Dusty Moctezuma MD (07/14/24 14:14:10)                   Impression:      No fracture or static subluxation of the cervical spine.      Electronically signed by: Dusty Moctezuma MD  Date:    07/14/2024  Time:    14:14               Narrative:    EXAMINATION:  Three radiographic views of the CERVICAL SPINE.    CLINICAL HISTORY:  Cervicalgia    TECHNIQUE:  Three radiographic views of the CERVICAL SPINE.    COMPARISON:  MRI of the cervical spine 06/19/2024.    FINDINGS:  There is normal sagittal alignment.  There is no spondylolisthesis.  There is diffuse degenerative disc space narrowing.  There is no loss of vertebral body height.  The dens is intact.  The anterior atlantoaxial articulation is normal.                                       Medications   iohexoL (OMNIPAQUE 350) injection 100 mL (70 mLs Intravenous Given 7/14/24 1450)     Medical Decision Making  Patient has multiple problems he says that he has been falling asleep and the patient is on Valium 2 mg which is a very long-acting medication I advised that the Valium should be weaned down the person has also elevated BNP consistent with congestive heart failure the patient is on diuretics beta blockers and Ace inhibitors we have maximized on the congestive heart failure therapy and the patient's heart rate is very low ranges from 45 to 51 I am going to stop the beta blocker and see if the heart rate comes up if not then the patient might be a candidate for ICD  placement /pacemaker I talked to Dr. Pires he might look into putting a Cardiology consult all once the patient is in the on the floor the hospitalist said that he is going to write the admit orders further management per hospitalist    Amount and/or Complexity of Data Reviewed  Labs: ordered.  Radiology: ordered.    Risk  Prescription drug management.                                      Clinical Impression:  Final diagnoses:  [R53.1] Weakness  [M54.2] Neck pain                 Mansi White MD  07/14/24 6039

## 2024-07-14 NOTE — PLAN OF CARE
Problem: Adult Inpatient Plan of Care  Goal: Plan of Care Review  Outcome: Progressing  Goal: Patient-Specific Goal (Individualized)  Outcome: Progressing  Goal: Absence of Hospital-Acquired Illness or Injury  Outcome: Progressing  Goal: Optimal Comfort and Wellbeing  Outcome: Progressing  Goal: Readiness for Transition of Care  Outcome: Progressing     Problem: Diabetes Comorbidity  Goal: Blood Glucose Level Within Targeted Range  Outcome: Progressing     Problem: Activity Intolerance  Goal: Enhanced Capacity and Energy  Outcome: Progressing

## 2024-07-14 NOTE — H&P
"[facility]    History & Physical      Patient Name: Leno Thompson Jr.  MRN: [id number]  Admission Date: 7/14/2024  Attending Physician: Ezio Bacon MD  Primary Care Provider: Nancy Gutierrez FNP-C         Patient information was obtained from patient and ER records.     Subjective:     Principal Problem:<principal problem not specified>    Chief Complaint:   Chief Complaint   Patient presents with    Fall    Weakness     Pt presented to ED per EMS with c/o FALL  and laceration to right lower leg. Pt denies hitting head. Pt c/o  chronic shoulder pain and neck pain. Pt reports he is unable to care for himself and currently living in a hotel. Pt was extremely disheveled on arrival. Pt stated "I dont have anyone to care for me, I've been staying at a hotel and constantly falling".        HPI:   69 year old man with chronic complex medical history that involves previous anoxic brain injury that has left him with difficulty speaking, HTN, HLD, previous left amputation, CAD presented with complaints of a fall. Denies hitting head. Patient is wheelchair bound. Patient has complicated social situation as currently living in a hotel. Patient has no one to take care of him. Currently has no ride home    On arrival to ED BNP found to be elevated. Ddimer elevated, subsequent CTA revealed no PE. CXR showed minor pleural effusion. Admitted for CHF    Past Medical History:   Diagnosis Date    Anoxic brain injury     Arthritis     CAD (coronary artery disease)     Cardiac arrest     Heart attack     HLD (hyperlipidemia)     Hypertension     Mitral valve regurgitation     Muscle spasms of both lower extremities     Muscle spasms of both lower extremities     PEG (percutaneous endoscopic gastrostomy) adjustment/replacement/removal     Status post placement of stent in right coronary artery        Past Surgical History:   Procedure Laterality Date    LEG AMPUTATION Left        Review of patient's " allergies indicates:  No Known Allergies    No current facility-administered medications on file prior to encounter.     Current Outpatient Medications on File Prior to Encounter   Medication Sig    atorvastatin (LIPITOR) 20 MG tablet Take 20 mg by mouth once daily. Bottle empty    baclofen (LIORESAL) 10 MG tablet Take 10 mg by mouth 3 (three) times daily.    busPIRone (BUSPAR) 7.5 MG tablet Take 1 tablet (7.5 mg total) by mouth 2 (two) times a day.    citalopram (CELEXA) 10 MG tablet Take 2 tablets (20 mg total) by mouth once daily.    clopidogreL (PLAVIX) 75 mg tablet Take 75 mg by mouth once daily.    diazePAM (VALIUM) 2 MG tablet Take 2 mg by mouth 3 (three) times daily.    empagliflozin (JARDIANCE) 10 mg tablet Take 10 mg by mouth once daily.    ezetimibe (ZETIA) 10 mg tablet Take 10 mg by mouth once daily.    furosemide (LASIX) 20 MG tablet Take 20 mg by mouth once daily.    isosorbide mononitrate (IMDUR) 30 MG 24 hr tablet Take 1 tablet (30 mg total) by mouth once daily.    lisinopriL 10 MG tablet Take 10 mg by mouth once daily.    meloxicam (MOBIC) 15 MG tablet Take 15 mg by mouth once daily.    metoprolol succinate (TOPROL-XL) 50 MG 24 hr tablet Take 25 mg by mouth once daily. (Patient not taking: Reported on 6/12/2024)    naproxen (NAPROSYN) 500 MG tablet Take 1 tablet (500 mg total) by mouth 2 (two) times daily as needed (take with food or milk as needed for pain).    senna-docusate 8.6-50 mg (SENNA WITH DOCUSATE SODIUM) 8.6-50 mg per tablet Take 1 tablet by mouth once daily.     Family History    None       Tobacco Use    Smoking status: Every Day     Current packs/day: 1.00     Average packs/day: 1 pack/day for 52.5 years (52.5 ttl pk-yrs)     Types: Cigarettes     Start date: 1972     Passive exposure: Current    Smokeless tobacco: Never   Substance and Sexual Activity    Alcohol use: Never    Drug use: Never    Sexual activity: Not Currently     Review of Systems   All other systems reviewed and are  negative.    Objective:     Vital Signs (Most Recent):  Temp: 98.1 °F (36.7 °C) (07/14/24 1520)  Pulse: (!) 42 (07/14/24 1520)  Resp: 10 (07/14/24 1520)  BP: (!) 159/60 (07/14/24 1520)  SpO2: 98 % (07/14/24 1520) Vital Signs (24h Range):  Temp:  [97.9 °F (36.6 °C)-98.5 °F (36.9 °C)] 98.1 °F (36.7 °C)  Pulse:  [42-56] 42  Resp:  [10-18] 10  SpO2:  [97 %-99 %] 98 %  BP: (159-185)/(60-79) 159/60     Weight: 62.7 kg (138 lb 4.8 oz)  Body mass index is 21.03 kg/m².    Physical Exam  Vitals reviewed. Exam conducted with a chaperone present.   Constitutional:       Appearance: Normal appearance. He is normal weight.   HENT:      Head: Normocephalic and atraumatic.      Nose: Nose normal.      Mouth/Throat:      Mouth: Mucous membranes are moist.      Pharynx: Oropharynx is clear.   Eyes:      Extraocular Movements: Extraocular movements intact.      Conjunctiva/sclera: Conjunctivae normal.      Pupils: Pupils are equal, round, and reactive to light.   Cardiovascular:      Rate and Rhythm: Normal rate and regular rhythm.      Pulses: Normal pulses.      Heart sounds: Normal heart sounds.   Pulmonary:      Effort: Pulmonary effort is normal.      Breath sounds: Normal breath sounds.   Abdominal:      General: Abdomen is flat. Bowel sounds are normal.      Palpations: Abdomen is soft.   Musculoskeletal:      Cervical back: Normal range of motion.   Skin:     General: Skin is warm.   Neurological:      General: No focal deficit present.      Mental Status: He is alert. Mental status is at baseline.     Left lower extremity amputation       CRANIAL NERVES     CN III, IV, VI   Pupils are equal, round, and reactive to light.      Significant Labs: All pertinent labs within the past 24 hours have been reviewed.  Recent Lab Results         07/14/24  1413   07/14/24  1345   07/14/24  1339        Albumin/Globulin Ratio     1.4       Albumin     4.3       ALP     103       ALT     12       Anion Gap     7.0       Appearance, UA  Clear           AST     18       Bacteria, UA None Seen           Baso #     0.11       Basophil %     2.0       Bilirubin (UA) Negative           BILIRUBIN TOTAL     0.7       BUN     19       BUN/CREAT RATIO     10       Calcium     9.3       Chloride     106       CO2     26       Color, UA Yellow           CPK     75       Creatinine     1.85       D-Dimer   1.88         eGFR     39  Comment:                      EGFR INTERPRETATION    Beginning 8/15/22 we are reporting the eGFRcr calculation as recommended by the National Kid[employer] Foundation. The eGFRcr equation has similar overall performance characteristics to the older equation, but the values may differ by more than 10% particularly at higher values of eGFRcr and younger adult ages.    NKF stages of chronic kidney disease (CKD)  Stage 1: Kidney damage with normal or increased eGFR (>90 mL/min/1.73 m^2)  Stage 2: Mild reduction in GFR (60-89 mL/min/1.73 m^2)  Stage 3a: Moderate reduction in GFR (45-59 mL/min/1.73 m^2)  Stage 3b: Moderate reduction in GFR (30-44 mL/min/1.73 m^2)  Stage 4: Severe reduction in GFR (15-29 mL/min/1.73 m^2)  Stage 5: Kidney failure (GFR <15 mL/min/1.73 m^2)           Eos #     0.21       Eos %     3.8       Globulin, Total     3.0       Glucose     108       Glucose, UA >=1000           Hematocrit     36.5       Hemoglobin     12.5       Immature Grans (Abs)     0.01       Immature Granulocytes     0.2       Ketones, UA Negative           Leukocyte Esterase, UA Negative           Lymph #     1.31       LYMPH %     23.6       MCH     28.2       MCHC     34.2       MCV     82.2       Mono #     0.47       Mono %     8.5       MPV     9.2       Neut #     3.43       Neut %     61.9       NITRITE UA Negative           nRBC     0.0       Blood, UA Small           pH, UA 6.0           Platelet Count     191       Potassium     3.5       ProBNP     3,500.0  Comment:   For ambulatory patients presenting to outpatient facilities with  "clinical suspicion of HF not previously diagnosed and at least one sign, symptom or risk factor for HF, NT-proBNP II test results should be interpreted as indicated below:    <125(pg/mL):"Negative: Heart Failure Unlikely"    >=125(pg/mL):"Consider Heart Failure as well as other causes of NT-proBNP elevation"             PROTEIN TOTAL     7.3       Protein, UA Trace           RBC     4.44       RBC, UA 0-5           RDW     13.9       Sodium     139       Specific Gravity,UA >=1.030           Squam Epithel, UA None Seen           Troponin I     0.033       Urobilinogen, UA 0.2           WBC, UA 0-2           WBC     5.54               Significant Imaging: I have reviewed all pertinent imaging results/findings within the past 24 hours.  I have reviewed and interpreted all pertinent imaging results/findings within the past 24 hours.    Assessment/Plan:     Active Diagnoses:    Diagnosis Date Noted POA    CHF (congestive heart failure) [I50.9] 07/14/2024 Unknown      Problems Resolved During this Admission:     VTE Risk Mitigation (From admission, onward)      None          *CHF  - Chest imaging showed small pleural effusion  - BNP >3000  - Cardiac monitoring  - Daily weights, I/Os  - Lasix 40mg IV daily   - Echocardiogram ordered   - resumed lisinopril     *Abnormal ddimer  - CTA chest showed no PE     *CKD  - Baseline Cr appears to be 1., currently at baseline   - Avoid nephrotoxic agents   - Daily BMPs     *CAD  - Resumed plavix daily    *HLD  - Resumed statin    *Depression  *Anxiety  - Resumed home medicaitons     Code Status: DNR  Diet: Cardiac  DVT PPX: Heparin SubQ    Services provided via two way audio/visual telecommunication  Provider location: [place], [place]  Patient location: CATHY Lagunas MD  Department of Hospital Medicine   [facility]    "

## 2024-07-15 PROBLEM — R53.81 DEBILITY: Status: ACTIVE | Noted: 2024-04-27

## 2024-07-15 PROBLEM — E87.6 HYPOKALEMIA: Status: ACTIVE | Noted: 2024-07-15

## 2024-07-15 PROBLEM — F17.200 TOBACCO DEPENDENCY: Status: ACTIVE | Noted: 2024-07-15

## 2024-07-15 PROBLEM — N18.31 STAGE 3A CHRONIC KIDNEY DISEASE: Status: ACTIVE | Noted: 2024-07-15

## 2024-07-15 LAB
ALBUMIN SERPL-MCNC: 4 G/DL (ref 3.4–5)
ALBUMIN/GLOB SERPL: 1.5 RATIO
ALP SERPL-CCNC: 104 UNIT/L (ref 50–144)
ALT SERPL-CCNC: 11 UNIT/L (ref 1–45)
ANION GAP SERPL CALC-SCNC: 8 MEQ/L (ref 2–13)
AORTIC VALVE CUSP SEPERATION: 1.51 CM
APICAL FOUR CHAMBER EJECTION FRACTION: 59 %
AST SERPL-CCNC: 19 UNIT/L (ref 17–59)
AV INDEX (PROSTH): 0.84
AV MEAN GRADIENT: 6 MMHG
AV PEAK GRADIENT: 15 MMHG
AV REGURGITATION PRESSURE HALF TIME: 778 MS
AV VALVE AREA BY VELOCITY RATIO: 1.67 CM²
AV VALVE AREA: 2.44 CM²
AV VELOCITY RATIO: 0.57
BASOPHILS # BLD AUTO: 0.11 X10(3)/MCL (ref 0.01–0.08)
BASOPHILS NFR BLD AUTO: 1.9 % (ref 0.1–1.2)
BILIRUB SERPL-MCNC: 0.6 MG/DL (ref 0–1)
BSA FOR ECHO PROCEDURE: 1.71 M2
BUN SERPL-MCNC: 19 MG/DL (ref 7–20)
CALCIUM SERPL-MCNC: 9.4 MG/DL (ref 8.4–10.2)
CHLORIDE SERPL-SCNC: 103 MMOL/L (ref 98–110)
CO2 SERPL-SCNC: 26 MMOL/L (ref 21–32)
CREAT SERPL-MCNC: 1.7 MG/DL (ref 0.66–1.25)
CREAT/UREA NIT SERPL: 11 (ref 12–20)
CV ECHO LV RWT: 0.44 CM
DOP CALC AO PEAK VEL: 1.93 M/S
DOP CALC AO VTI: 32.3 CM
DOP CALC LVOT AREA: 2.9 CM2
DOP CALC LVOT DIAMETER: 1.93 CM
DOP CALC LVOT PEAK VEL: 1.1 M/S
DOP CALC LVOT STROKE VOLUME: 78.95 CM3
DOP CALCLVOT PEAK VEL VTI: 27 CM
E WAVE DECELERATION TIME: 187 MSEC
ECHO LV POSTERIOR WALL: 1.1 CM (ref 0.6–1.1)
EOSINOPHIL # BLD AUTO: 0.3 X10(3)/MCL (ref 0.04–0.54)
EOSINOPHIL NFR BLD AUTO: 5.2 % (ref 0.7–7)
ERYTHROCYTE [DISTWIDTH] IN BLOOD BY AUTOMATED COUNT: 14 %
FRACTIONAL SHORTENING: 26 % (ref 28–44)
GFR SERPLBLD CREATININE-BSD FMLA CKD-EPI: 44 ML/MIN/1.73/M2
GLOBULIN SER-MCNC: 2.6 GM/DL (ref 2–3.9)
GLUCOSE SERPL-MCNC: 117 MG/DL (ref 70–115)
HCT VFR BLD AUTO: 36.9 % (ref 36–52)
HGB BLD-MCNC: 12.4 G/DL (ref 13–18)
IMM GRANULOCYTES # BLD AUTO: 0.01 X10(3)/MCL (ref 0–0.03)
IMM GRANULOCYTES NFR BLD AUTO: 0.2 % (ref 0–0.5)
INFERIOR VENA CAVA SIZE SNIFF: 0.3 CM
INTERVENTRICULAR SEPTUM: 1.11 CM (ref 0.6–1.1)
IVC DIAMETER: 1.4 CM
LEFT ATRIUM SIZE: 3.67 CM
LEFT ATRIUM VOLUME INDEX MOD: 22.9 ML/M2
LEFT ATRIUM VOLUME MOD: 39.6 CM3
LEFT INTERNAL DIMENSION IN SYSTOLE: 3.67 CM (ref 2.1–4)
LEFT VENTRICLE DIASTOLIC VOLUME INDEX: 67.11 ML/M2
LEFT VENTRICLE DIASTOLIC VOLUME: 116.1 ML
LEFT VENTRICLE END DIASTOLIC VOLUME APICAL 4 CHAMBER: 130.3 ML
LEFT VENTRICLE END SYSTOLIC VOLUME APICAL 2 CHAMBER: 38.6 ML
LEFT VENTRICLE END SYSTOLIC VOLUME APICAL 4 CHAMBER: 35.4 ML
LEFT VENTRICLE MASS INDEX: 119 G/M2
LEFT VENTRICLE SYSTOLIC VOLUME INDEX: 32.9 ML/M2
LEFT VENTRICLE SYSTOLIC VOLUME: 57 ML
LEFT VENTRICULAR INTERNAL DIMENSION IN DIASTOLE: 4.96 CM (ref 3.5–6)
LEFT VENTRICULAR MASS: 205.75 G
LVED V (TEICH): 116.1 ML
LVES V (TEICH): 57 ML
LVOT MG: 1.8 MMHG
LVOT MV: 0.57 CM/S
LYMPHOCYTES # BLD AUTO: 1.4 X10(3)/MCL (ref 1.32–3.57)
LYMPHOCYTES NFR BLD AUTO: 24.4 % (ref 20–55)
MCH RBC QN AUTO: 27.6 PG (ref 27–34)
MCHC RBC AUTO-ENTMCNC: 33.6 G/DL (ref 31–37)
MCV RBC AUTO: 82.2 FL (ref 79–99)
MONOCYTES # BLD AUTO: 0.48 X10(3)/MCL (ref 0.3–0.82)
MONOCYTES NFR BLD AUTO: 8.4 % (ref 4.7–12.5)
MV PEAK A VEL: 0.68 M/S
NEUTROPHILS # BLD AUTO: 3.43 X10(3)/MCL (ref 1.78–5.38)
NEUTROPHILS NFR BLD AUTO: 59.9 % (ref 37–73)
NRBC BLD AUTO-RTO: 0 %
OHS QRS DURATION: 114 MS
OHS QTC CALCULATION: 424 MS
PISA AR MAX VEL: 4.14 M/S
PISA TR MAX VEL: 1.25 M/S
PLATELET # BLD AUTO: 192 X10(3)/MCL (ref 140–371)
PMV BLD AUTO: 9.7 FL (ref 9.4–12.4)
POTASSIUM SERPL-SCNC: 3 MMOL/L (ref 3.5–5.1)
PROT SERPL-MCNC: 6.6 GM/DL (ref 6.3–8.2)
RA MAJOR: 5.3 CM
RA PRESSURE ESTIMATED: 3 MMHG
RBC # BLD AUTO: 4.49 X10(6)/MCL (ref 4–6)
RIGHT ATRIAL AREA: 13.5 CM2
RIGHT ATRIUM VOLUME AREA LENGTH APICAL 4 CHAMBER: 29.3 ML
RIGHT VENTRICLE DIASTOLIC BASEL DIMENSION: 3.5 CM
RV TB RVSP: 4 MMHG
SODIUM SERPL-SCNC: 137 MMOL/L (ref 136–145)
TDI LATERAL: 0.09 M/S
TDI SEPTAL: 0.07 M/S
TDI: 0.08 M/S
TR MAX PG: 6 MMHG
TRICUSPID ANNULAR PLANE SYSTOLIC EXCURSION: 1.78 CM
TV REST PULMONARY ARTERY PRESSURE: 9 MMHG
WBC # BLD AUTO: 5.73 X10(3)/MCL (ref 4–11.5)
Z-SCORE OF LEFT VENTRICULAR DIMENSION IN END DIASTOLE: 0.34
Z-SCORE OF LEFT VENTRICULAR DIMENSION IN END SYSTOLE: 1.68

## 2024-07-15 PROCEDURE — 25000003 PHARM REV CODE 250: Performed by: INTERNAL MEDICINE

## 2024-07-15 PROCEDURE — 94761 N-INVAS EAR/PLS OXIMETRY MLT: CPT

## 2024-07-15 PROCEDURE — 85025 COMPLETE CBC W/AUTO DIFF WBC: CPT | Performed by: INTERNAL MEDICINE

## 2024-07-15 PROCEDURE — 21400001 HC TELEMETRY ROOM

## 2024-07-15 PROCEDURE — 25000003 PHARM REV CODE 250: Performed by: FAMILY MEDICINE

## 2024-07-15 PROCEDURE — 97530 THERAPEUTIC ACTIVITIES: CPT

## 2024-07-15 PROCEDURE — 36415 COLL VENOUS BLD VENIPUNCTURE: CPT | Performed by: INTERNAL MEDICINE

## 2024-07-15 PROCEDURE — 80053 COMPREHEN METABOLIC PANEL: CPT | Performed by: INTERNAL MEDICINE

## 2024-07-15 PROCEDURE — 97161 PT EVAL LOW COMPLEX 20 MIN: CPT

## 2024-07-15 PROCEDURE — 63600175 PHARM REV CODE 636 W HCPCS: Performed by: INTERNAL MEDICINE

## 2024-07-15 RX ORDER — METOPROLOL SUCCINATE 100 MG/1
100 TABLET, EXTENDED RELEASE ORAL DAILY
Status: ON HOLD | COMMUNITY
End: 2024-07-23 | Stop reason: HOSPADM

## 2024-07-15 RX ORDER — POTASSIUM CHLORIDE 20 MEQ/1
40 TABLET, EXTENDED RELEASE ORAL DAILY
Status: DISCONTINUED | OUTPATIENT
Start: 2024-07-15 | End: 2024-07-20

## 2024-07-15 RX ORDER — IBUPROFEN 200 MG
1 TABLET ORAL DAILY
Status: DISCONTINUED | OUTPATIENT
Start: 2024-07-16 | End: 2024-07-23 | Stop reason: HOSPADM

## 2024-07-15 RX ADMIN — ISOSORBIDE MONONITRATE 30 MG: 30 TABLET, EXTENDED RELEASE ORAL at 08:07

## 2024-07-15 RX ADMIN — FUROSEMIDE 40 MG: 10 INJECTION, SOLUTION INTRAMUSCULAR; INTRAVENOUS at 08:07

## 2024-07-15 RX ADMIN — LISINOPRIL 10 MG: 10 TABLET ORAL at 08:07

## 2024-07-15 RX ADMIN — POTASSIUM CHLORIDE 40 MEQ: 1500 TABLET, EXTENDED RELEASE ORAL at 03:07

## 2024-07-15 RX ADMIN — POLYETHYLENE GLYCOL 3350 17 G: 17 POWDER, FOR SOLUTION ORAL at 08:07

## 2024-07-15 RX ADMIN — EZETIMIBE 10 MG: 10 TABLET ORAL at 08:07

## 2024-07-15 RX ADMIN — ATORVASTATIN CALCIUM 20 MG: 20 TABLET, FILM COATED ORAL at 08:07

## 2024-07-15 RX ADMIN — CITALOPRAM HYDROBROMIDE 20 MG: 20 TABLET ORAL at 08:07

## 2024-07-15 RX ADMIN — BUSPIRONE HYDROCHLORIDE 7.5 MG: 7.5 TABLET ORAL at 09:07

## 2024-07-15 RX ADMIN — CLOPIDOGREL BISULFATE 75 MG: 75 TABLET ORAL at 08:07

## 2024-07-15 RX ADMIN — BUSPIRONE HYDROCHLORIDE 7.5 MG: 7.5 TABLET ORAL at 08:07

## 2024-07-15 NOTE — PT/OT/SLP EVAL
"Physical Therapy Evaluation    Patient Name:  Leno Thompson Jr.   MRN:  [id number]    Recommendations:     Discharge Recommendations: Moderate Intensity Therapy   Discharge Equipment Recommendations:     Barriers to discharge:  current medical status    Assessment:     Leno Thompson Jr. is a 69 y.o. male admitted with a medical diagnosis of <principal problem not specified>.  He presents with the following impairments/functional limitations: weakness, impaired self care skills, impaired functional mobility, gait instability, impaired balance     Pt found with HOB elevated. He is agreeable to PT evaluation. Pt completed supine to sit, mod A with hand held assistance. He required min/mod A to maintain seated balance on EOB. Pt s/p L BKA in 2015. He does have a prosthesis but is primarily WC bound. Pt also states he had an MI approximately 5 years ago and suffered a hypoxic brain injury, which impairs his sense of balance and speech. He also states that he feels sleepy almost constantly and fell out of his WC yesterday d/t falling asleep. ROM WFL in BLE. Pt required min A to complete sit to supine, and scoot to HOB. Pt left with HOB elevated and all needs met. Nurse notified.     Rehab Prognosis: Fair; patient would benefit from acute skilled PT services to address these deficits and reach maximum level of function.    Recent Surgery: * No surgery found *      Plan:     During this hospitalization, patient to be seen 5 x/week (5-6x/week, 1-2x/day) to address the identified rehab impairments via gait training, therapeutic activities, therapeutic exercises and progress toward the following goals:    Plan of Care Expires:  08/15/24    Subjective     Chief Complaint: "I have bad balance"  Patient/Family Comments/goals: to get better  Pain/Comfort:       Patients cultural, spiritual, Moravian conflicts given the current situation:      Living Environment:  Currently living in a hotel 6  Prior to admission, patients " level of function was WC bound.  Equipment used at home: wheelchair, prosthesis, rollator.  DME owned (not currently used): none.  Upon discharge, patient will have assistance from unknown.    Objective:     Communicated with nursing prior to session.  Patient found HOB elevated with bed alarm, telemetry, pulse ox (continuous)  upon PT entry to room.    General Precautions: Standard, fall  Orthopedic Precautions:    Braces:    Respiratory Status: Room air    Exams:  RLE ROM: WFL  RLE Strength: grossly, 3+/5  LLE ROM: WFL  LLE Strength: grossly, 3+/5    Functional Mobility:  Bed Mobility:     Scooting: minimum assistance  Supine to Sit: minimum assistance and moderate assistance  Sit to Supine: minimum assistance  Balance: min/mod A to maintain seated balance on EOB      AM-PAC 6 CLICK MOBILITY  Total Score:        Treatment & Education:  See above    Patient left HOB elevated with all lines intact, call button in reach, bed alarm on, and nurse notified.    GOALS:   Multidisciplinary Problems       Physical Therapy Goals          Problem: Physical Therapy    Goal Priority Disciplines Outcome Goal Variances Interventions   Physical Therapy Goal     PT, PT/OT Progressing     Description: Goals to be met by: discharge     Patient will increase functional independence with mobility by performin. Supine to sit with Contact Guard Assistance  2. Sit to stand transfer with Moderate Assistance and Maximum Assistance  3. Bed to chair transfer with Moderate Assistance using Rolling Walker                         History:     Past Medical History:   Diagnosis Date    Anoxic brain injury     Arthritis     CAD (coronary artery disease)     Cardiac arrest     Heart attack     HLD (hyperlipidemia)     Hypertension     Mitral valve regurgitation     Muscle spasms of both lower extremities     Muscle spasms of both lower extremities     PEG (percutaneous endoscopic gastrostomy) adjustment/replacement/removal     Status post  placement of stent in right coronary artery        Past Surgical History:   Procedure Laterality Date    LEG AMPUTATION Left        Time Tracking:     PT Received On: 07/15/24  PT Start Time: 1328     PT Stop Time: 1351  PT Total Time (min): 23 min     Billable Minutes: Evaluation 15 and Therapeutic Activity 8      07/15/2024

## 2024-07-15 NOTE — PLAN OF CARE
07/15/24 1253   Discharge Assessment   Assessment Type Discharge Planning Assessment   Confirmed/corrected address, phone number and insurance   (currently living in a hotel)   When was your last doctors appointment?   (does not remember)   Communicated LELIA with patient/caregiver Date not available/Unable to determine   Reason For Admission weakness   People in Home alone   Facility Arrived From: hotel   Do you expect to return to your current living situation? No   Do you have help at home or someone to help you manage your care at home? No   Prior to hospitilization cognitive status: Alert/Oriented   Current cognitive status: Alert/Oriented   Walking or Climbing Stairs Difficulty yes   Walking or Climbing Stairs ambulation difficulty, requires equipment;transferring difficulty, requires equipment   Mobility Management wheelchair   Dressing/Bathing Difficulty yes   Dressing/Bathing   (states he has trouble caring for himself)   Do you have any problems with: Needs other help   Home Accessibility wheelchair accessible   Home Layout Able to live on 1st floor   Equipment Currently Used at Home wheelchair;prosthesis   Readmission within 30 days? No   Patient currently being followed by outpatient case management? No   Do you currently have service(s) that help you manage your care at home? No   Do you take prescription medications? Yes   Do you have prescription coverage? Yes   Coverage peoples [employer]   Do you have any problems affording any of your prescribed medications? No   Is the patient taking medications as prescribed? yes   Who is going to help you get home at discharge? has no transportation   How do you get to doctors appointments? other (see comments)  (has no transportation)   Are you on dialysis? No   Do you take coumadin? No   Discharge Plan A Skilled Nursing Facility   Discharge Plan B New Nursing Home placement - halfway care facility   DME Needed Upon Discharge  none   Discharge Plan discussed  with: Patient   Transition of Care Barriers Does not adhere to care plan;Homeless;Mobility;No family/friends to help;Transportation   Physical Activity   On average, how many days per week do you engage in moderate to strenuous exercise (like a brisk walk)? 0 days   On average, how many minutes do you engage in exercise at this level? 0 min   Financial Resource Strain   How hard is it for you to pay for the very basics like food, housing, medical care, and heating? Hard   Housing Stability   In the last 12 months, was there a time when you were not able to pay the mortgage or rent on time? Y   At any time in the past 12 months, were you homeless or living in a shelter (including now)? Y   Transportation Needs   Has the lack of transportation kept you from medical appointments, meetings, work or from getting things needed for daily living? Yes, it has kept me from non-medical meetings, appointments, work or from getting things that I need.   Food Insecurity   Within the past 12 months, you worried that your food would run out before you got the money to buy more. Sometimes   Within the past 12 months, the food you bought just didn't last and you didn't have money to get more. Sometimes   Stress   Do you feel stress - tense, restless, nervous, or anxious, or unable to sleep at night because your mind is troubled all the time - these days? To some exte   Social Isolation   How often do you feel lonely or isolated from those around you?  Often   Alcohol Use   Q1: How often do you have a drink containing alcohol? Never   Q2: How many drinks containing alcohol do you have on a typical day when you are drinking? None   Q3: How often do you have six or more drinks on one occasion? Never   Utilities   In the past 12 months has the electric, gas, oil, or water company threatened to shut off services in your home? No   Health Literacy   How often do you need to have someone help you when you read instructions, pamphlets, or  other written material from your doctor or pharmacy? Often     Patient states family he was staying with kicked him out and left him at a hotel. He states he requires 24hr care and has no one at this time. He cannot afford hotel stay any longer. He has agreed to NH placement SNF and possible long term snf care. No preference for NH facility

## 2024-07-15 NOTE — PROGRESS NOTES
"[facility] Medicine  Progress Note    Patient Name: Leno Thompson Jr.  MRN: [id number]  Patient Class: IP- Inpatient   Admission Date: 7/14/2024  Length of Stay: 1 days  Attending Physician: Franklin Najera MD  Primary Care Provider: Nancy Gutierrez FNP-C        Subjective:     Principal Problem:<principal problem not specified>        HPI:  Fall     Weakness       Pt presented to ED per EMS with c/o FALL  and laceration to right lower leg. Pt denies hitting head. Pt c/o  chronic shoulder pain and neck pain. Pt reports he is unable to care for himself and currently living in a hotel. Pt was extremely disheveled on arrival. Pt stated "I dont have anyone to care for me, I've been staying at a hotel and constantly falling".         HPI:   69 year old man with chronic complex medical history that involves previous anoxic brain injury that has left him with difficulty speaking, HTN, HLD, previous left amputation, CAD presented with complaints of a fall. Denies hitting head. Patient is wheelchair bound. Patient has complicated social situation as currently living in a hotel. Patient has no one to take care of him. Currently has no ride home     On arrival to ED BNP found to be elevated. Ddimer elevated, subsequent CTA revealed no PE. CXR showed minor pleural effusion. Admitted for CHF    Overview/Hospital Course:  07/15/2024 pt admitted with CHF, h/o anoxic brain injury, falling at home apparently fell out of his wheelchair and could not get up.  He has been living in a hotel and cannot take care of himself.    Interval History:      Review of Systems   Constitutional:  Negative for appetite change, fatigue and fever.   Respiratory:  Negative for cough, shortness of breath and wheezing.    Cardiovascular:  Negative for chest pain and leg swelling.   Gastrointestinal:  Negative for abdominal distention, abdominal pain, constipation, diarrhea, nausea and vomiting.   Skin:  Negative for " color change, pallor, rash and wound.   Neurological:  Negative for tremors, syncope and headaches.   Psychiatric/Behavioral:  Negative for agitation and behavioral problems.      Objective:     Vital Signs (Most Recent):  Temp: 97.6 °F (36.4 °C) (07/15/24 1100)  Pulse: (!) 55 (07/15/24 1100)  Resp: 16 (07/15/24 1100)  BP: (!) 133/57 (07/15/24 1100)  SpO2: 96 % (07/15/24 1100) Vital Signs (24h Range):  Temp:  [97.5 °F (36.4 °C)-98.5 °F (36.9 °C)] 97.6 °F (36.4 °C)  Pulse:  [42-61] 55  Resp:  [10-18] 16  SpO2:  [94 %-99 %] 96 %  BP: (133-190)/(57-77) 133/57     Weight: 61.3 kg (135 lb 2.3 oz)  Body mass index is 20.55 kg/m².    Intake/Output Summary (Last 24 hours) at 7/15/2024 1515  Last data filed at 7/15/2024 1200  Gross per 24 hour   Intake 2394 ml   Output 2080 ml   Net 314 ml         Physical Exam  Vitals and nursing note reviewed. Exam conducted with a chaperone present.   Constitutional:       General: He is not in acute distress.     Appearance: Normal appearance. He is not ill-appearing.   Cardiovascular:      Rate and Rhythm: Normal rate and regular rhythm.      Pulses: Normal pulses.      Heart sounds: Normal heart sounds. No murmur heard.  Pulmonary:      Effort: Pulmonary effort is normal. No respiratory distress.      Breath sounds: Normal breath sounds. No wheezing, rhonchi or rales.   Abdominal:      General: Abdomen is flat.      Palpations: Abdomen is soft.      Tenderness: There is no guarding.   Musculoskeletal:         General: No swelling or tenderness. Normal range of motion.      Right lower leg: No edema.      Left lower leg: No edema.      Comments: Amputee left bka   Skin:     General: Skin is warm and dry.      Coloration: Skin is not jaundiced or pale.   Neurological:      General: No focal deficit present.      Mental Status: He is alert and oriented to person, place, and time. Mental status is at baseline.      Cranial Nerves: No cranial nerve deficit.      Motor: No weakness.       Coordination: Coordination normal.      Gait: Gait normal.   Psychiatric:         Mood and Affect: Mood normal.         Behavior: Behavior normal.             Significant Labs: All pertinent labs within the past 24 hours have been reviewed.  CBC:   Recent Labs   Lab 07/14/24  1339 07/15/24  0524   WBC 5.54 5.73   HGB 12.5* 12.4*   HCT 36.5 36.9    192     CMP:   Recent Labs   Lab 07/14/24  1339 07/15/24  0524    137   K 3.5 3.0*    103   CO2 26 26   BUN 19 19   CREATININE 1.85* 1.70*   CALCIUM 9.3 9.4   ALBUMIN 4.3 4.0   BILITOT 0.7 0.6   ALKPHOS 103 104   AST 18 19   ALT 12 11       Significant Imaging: I have reviewed all pertinent imaging results/findings within the past 24 hours.    Assessment/Plan:      Hypokalemia  .Patient has hypokalemia which is Acute and currently improving. Most recent potassium levels reviewed-   Lab Results   Component Value Date    K 3.0 (L) 07/15/2024   . Will continue potassium replacement per protocol and recheck repeat levels after replacement completed.     Stage 3a chronic kidney disease  Creatine stable for now. BMP reviewed- noted Estimated Creatinine Clearance: 36.6 mL/min (A) (based on SCr of 1.7 mg/dL (H)). according to latest data. Based on current GFR, CKD stage is stage 3 - GFR 30-59.  Monitor UOP and serial BMP and adjust therapy as needed. Renally dose meds. Avoid nephrotoxic medications and procedures.    CHF (congestive heart failure)  Patient is identified as having Diastolic (HFpEF) heart failure that is Acute on chronic. CHF is currently uncontrolled due to Dyspnea not returned to baseline after 3 doses of IV diuretic and >3 pillow orthopnea. Latest ECHO performed and demonstrates- Results for orders placed during the hospital encounter of 07/14/24    Echo    Interpretation Summary    Left Ventricle: The left ventricle is normal in size. There is normal systolic function with a visually estimated ejection fraction of 55%. There is inferobasal  "hypokinesis noted. Grade I diastolic dysfunction.    Right Ventricle: Normal right ventricular cavity size. Systolic function is normal.    Aortic Valve: There is mild aortic valve sclerosis. There is mild to moderate (1-2+) aortic regurgitation.    Mitral Valve: There is mild (1+) regurgitation.    Unable to determine PASP.  . Continue Beta Blocker and ACE/ARB and monitor clinical status closely. Monitor on telemetry. Patient is on CHF pathway.  Monitor strict Is&Os and daily weights.  Place on fluid restriction of 1.5 L. Cardiology has been consulted. Continue to stress to patient importance of self efficacy and  on diet for CHF. Last BNP reviewed- and noted below No results for input(s): "BNP", "BNPTRIAGEBLO" in the last 168 hours.    Debility  Patient with Acute on chronic debility due to chronic unspecified fatigue and other reduced mobility. Latest AMPAC and GEMS scores have been reviewed. Evaluation for etiology is underway. Plan includes PT/OT consulted.      VTE Risk Mitigation (From admission, onward)           Ordered     heparin (porcine) injection 5,000 Units  Every 8 hours         07/14/24 1802     IP VTE HIGH RISK PATIENT  Once         07/14/24 1802                    Discharge Planning   LELIA: 7/19/2024     Code Status: DNR   Is the patient medically ready for discharge?:     Reason for patient still in hospital (select all that apply): Patient trending condition, Laboratory test, and Treatment  Discharge Plan A: Skilled Nursing Facility                  Fiorella Madison MD  Department of Hospital Medicine   [facility]    "

## 2024-07-15 NOTE — ASSESSMENT & PLAN NOTE
"Patient is identified as having Diastolic (HFpEF) heart failure that is Acute on chronic. CHF is currently uncontrolled due to Dyspnea not returned to baseline after 3 doses of IV diuretic and >3 pillow orthopnea. Latest ECHO performed and demonstrates- Results for orders placed during the hospital encounter of 07/14/24    Echo    Interpretation Summary    Left Ventricle: The left ventricle is normal in size. There is normal systolic function with a visually estimated ejection fraction of 55%. There is inferobasal hypokinesis noted. Grade I diastolic dysfunction.    Right Ventricle: Normal right ventricular cavity size. Systolic function is normal.    Aortic Valve: There is mild aortic valve sclerosis. There is mild to moderate (1-2+) aortic regurgitation.    Mitral Valve: There is mild (1+) regurgitation.    Unable to determine PASP.  . Continue Beta Blocker and ACE/ARB and monitor clinical status closely. Monitor on telemetry. Patient is on CHF pathway.  Monitor strict Is&Os and daily weights.  Place on fluid restriction of 1.5 L. Cardiology has been consulted. Continue to stress to patient importance of self efficacy and  on diet for CHF. Last BNP reviewed- and noted below No results for input(s): "BNP", "BNPTRIAGEBLO" in the last 168 hours.  "

## 2024-07-15 NOTE — PLAN OF CARE
Problem: Adult Inpatient Plan of Care  Goal: Plan of Care Review  Outcome: Progressing  Goal: Patient-Specific Goal (Individualized)  Outcome: Progressing  Goal: Absence of Hospital-Acquired Illness or Injury  Outcome: Progressing  Goal: Optimal Comfort and Wellbeing  Outcome: Progressing  Goal: Readiness for Transition of Care  Outcome: Progressing     Problem: Diabetes Comorbidity  Goal: Blood Glucose Level Within Targeted Range  Outcome: Progressing     Problem: Activity Intolerance  Goal: Enhanced Capacity and Energy  Outcome: Progressing     Problem: Skin Injury Risk Increased  Goal: Skin Health and Integrity  Outcome: Progressing     Problem: Fall Injury Risk  Goal: Absence of Fall and Fall-Related Injury  Outcome: Progressing     Problem: Wound  Goal: Optimal Coping  Outcome: Progressing  Goal: Optimal Functional Ability  Outcome: Progressing  Goal: Absence of Infection Signs and Symptoms  Outcome: Progressing  Goal: Improved Oral Intake  Outcome: Progressing  Goal: Optimal Pain Control and Function  Outcome: Progressing  Goal: Skin Health and Integrity  Outcome: Progressing  Goal: Optimal Wound Healing  Outcome: Progressing

## 2024-07-15 NOTE — PLAN OF CARE
Rec'd  call from Janna @ [facility] stating they unable to meet pt's needs. Referral made to Alex.

## 2024-07-15 NOTE — PLAN OF CARE
Physician ordered to consult for SNF placement. Referral made to [facility] per phone/fax, spoke with Janna Buchanan.

## 2024-07-15 NOTE — PLAN OF CARE
Problem: Physical Therapy  Goal: Physical Therapy Goal  Description: Goals to be met by: discharge     Patient will increase functional independence with mobility by performin. Supine to sit with Contact Guard Assistance  2. Sit to stand transfer with Moderate Assistance and Maximum Assistance  3. Bed to chair transfer with Moderate Assistance using Rolling Walker    Outcome: Progressing

## 2024-07-15 NOTE — ASSESSMENT & PLAN NOTE
Patient with Acute on chronic debility due to chronic unspecified fatigue and other reduced mobility. Latest AMPAC and GEMS scores have been reviewed. Evaluation for etiology is underway. Plan includes PT/OT consulted.

## 2024-07-15 NOTE — PROGRESS NOTES
Inpatient Nutrition Assessment    Admit Date: 7/14/2024   Total duration of encounter: 1 day   Patient Age: 69 y.o.    Nutrition Recommendation/Prescription     Continue Heart Healthy Diet. RD to consider Renal modifier pending PO intake due to calories provided via Renal Diet.     Add Boost Glucose Control BID (190 kcal, 16 gm pro each) to aid in nutrition intake.     Continue to encourage PO intake and honor patient preferences.    Dietitian will monitor Electrolytes, Energy Intake, Food and Beverage Intake, Mental Status, Renal Function, Weight, and Weight Change and adjust MNT as needed.     Monitoring & Evaluation     Communication of Recommendations: reviewed with nurse and reviewed with patient    Reason Seen: continuous nutrition monitoring    Nutrition Risk/Follow-Up: moderate (follow-up in 3-5 days)     Next Date to be Seen by RD: 07/19/24  Please consult if re-assessment needed sooner.      Nutrition Assessment     Malnutrition Assessment/Nutrition-Focused Physical Exam       Malnutrition Level:  (Unable to determine at this time. NFPE not appropriate.) (07/15/24 9677)  Energy Intake (Malnutrition):  (Unable to obtain.) (07/15/24 7446)  Weight Loss (Malnutrition): greater than 7.5% in 3 months (Per EMR patient lost 12.5# (8.5%) in 3 months.) (07/15/24 9070)                                                  A minimum of two characteristics is recommended for diagnosis of either severe or non-severe malnutrition.    Chart Review    Malnutrition Screening Tool Results   Have you recently lost weight without trying?: No  Have you been eating poorly because of a decreased appetite?: No   MST Score: 0     Diagnosis:  CHF  Abnormal d dimer  CKD  CAD  HLD  Depression  Anxiety    Past Medical History:   Diagnosis Date    Anoxic brain injury     Arthritis     CAD (coronary artery disease)     Cardiac arrest     Heart attack     HLD (hyperlipidemia)     Hypertension     Mitral valve regurgitation     Muscle spasms  of both lower extremities     Muscle spasms of both lower extremities     PEG (percutaneous endoscopic gastrostomy) adjustment/replacement/removal     Status post placement of stent in right coronary artery      Past Surgical History:   Procedure Laterality Date    LEG AMPUTATION Left        Scheduled Medications:  atorvastatin, 20 mg, Daily  busPIRone, 7.5 mg, BID  citalopram, 20 mg, Daily  clopidogreL, 75 mg, Daily  ezetimibe, 10 mg, Daily  furosemide (LASIX) injection, 40 mg, Daily  heparin (porcine), 5,000 Units, Q8H  isosorbide mononitrate, 30 mg, Daily  lisinopriL, 10 mg, Daily  polyethylene glycol, 17 g, Daily    Continuous Infusions:   PRN Medications:   Current Facility-Administered Medications:     acetaminophen, 650 mg, Oral, Q8H PRN    melatonin, 6 mg, Oral, Nightly PRN    ondansetron, 4 mg, Intravenous, Q8H PRN    sodium chloride 0.9%, 10 mL, Intravenous, PRN    Calorie Containing IV Medications: no significant kcals from medications at this time    Nutrition-Related Labs:   Recent Labs   Lab 07/14/24  1339 07/15/24  0524    137   K 3.5 3.0*   CALCIUM 9.3 9.4   CO2 26 26   BUN 19 19   CREATININE 1.85* 1.70*   EGFRNORACEVR 39 44   GLUCOSE 108 117*   BILITOT 0.7 0.6   ALKPHOS 103 104   ALT 12 11   AST 18 19   ALBUMIN 4.3 4.0   WBC 5.54 5.73   HGB 12.5* 12.4*   HCT 36.5 36.9     CrCl:    Lab Value: 36.6    Date: 7/15    Nutrition Orders:  Diet Heart Healthy      Appetite/Oral Intake: fair/25-50% of meals  Factors Affecting Nutritional Intake: decreased appetite  Social Needs Impacting Access to Food:  Case management working on [facility] SNF placement due to living in hotel. Patient denied SDOH questions with RD.  Food Insecurity: Food Insecurity Present (7/15/2024)    Hunger Vital Sign     Worried About Running Out of Food in the Last Year: Sometimes true     Ran Out of Food in the Last Year: Sometimes true     Food/Hinduism/Cultural Preferences: Food Preferences: Dislike: Cauliflower, Broccoli, Squash,  "Zucchini and Eggplant. /    Food Allergies: Review of patient's allergies indicates:  No Known Allergies      Skin Integrity: bruised (ecchymotic), abrasion  Wound(s):       Overview/Hospital Course:    (7/15): Patient with history of anoxic brain injury and PEG reports okay appetite but does not typically eat much. Patient primarily answered in yes or no with a few short answers. RD got the impression of confusion. Patient has averaged 50%- 2 meals and was brought a boost glucose control. Patient asked if he could get 4/day, but due to current renal function RD to hold off on giving more than 2/day and using Boost G.C. instead of Novasource due to lower protein. Per EMR patient lost 12.5# (8.5#) in 3 months but unable to determine PO intake history and NFPE to be attempted on follow up pending patients mental status.  GI: WDL/LBM-, : WDL, FUNCTIONAL SCREEN:Eatin - independent, Nutrition: 3-->adequate,Connor Score: 17, NO EDEMA NOTED, 24 HR I/O: 1314/1380.     Anthropometrics    Height: 5' 8" (172.7 cm) Height Method: Stated  Last Weight: 61.3 kg (135 lb 2.3 oz) (07/15/24 0537) Weight Method: Bed Scale  BMI (Calculated): 20.6  BMI Classification: underweight (BMI less than 22 if >65 years of age)        Ideal Body Weight (IBW), Male: 154 lb     % Ideal Body Weight, Male (lb): 87.73 %  Amputation %: 5.9  Total Amputation %: 5.9                    Usual Weight Provided By: EMR weight history    Wt Readings from Last 5 Encounters:   07/15/24 61.3 kg (135 lb 2.3 oz)   24 54.4 kg (119 lb 14.9 oz)   24 54.4 kg (120 lb)   24 64.9 kg (143 lb)   24 70.3 kg (155 lb)     Weight Change(s) Since Admission:  Admit Weight: 62.7 kg (138 lb 4.8 oz) (24 1249)      Estimated Needs    Weight Used For Calorie Calculations: 61.3 kg (135 lb 2.3 oz)  Energy Calorie Requirements (kcal): 1234-1223 KCAL (25-30 KCAL/KG CBW)  Energy Need Method: Kcal/kg  Weight Used For Protein Calculations: 61.3 kg (135 " lb 2.3 oz)  Protein Requirements: 61-74 GM PRO (0.8-1.0 GM/KG CBW)  Fluid Requirements (mL): 1000 ML H2O + DAILY OUTPUT        Enteral Nutrition    Patient not receiving enteral nutrition at this time.    Parenteral Nutrition    Patient not receiving parenteral nutrition support at this time.    Evaluation of Received Nutrient Intake    Calories: not meeting estimated needs  Protein: not meeting estimated needs    Patient Education    Not applicable.         Nutrition Diagnosis     PES: Inadequate energy intake related to inability to consume sufficient nutrients as evidenced by underweight (BMI <22, > 65 years old). (new)    Nutrition Interventions     Intervention(s): general/healthful diet, modified composition of meals/snacks, commercial beverage, multivitamin/mineral supplement therapy, and collaboration with other providers    Goal: Meet Greater than 75% of nutritional needs by discharge. (new)    Goal: Maintain weight throughout hospitalization. (new)    Nutrition Goals & Monitoring     Dietitian will monitor: food and beverage intake, energy intake, weight, weight change, and electrolyte/renal panel  Discharge planning:    Continue Heart Healthy Diet and offer Boost as able. Chocolate Glucose control given due to flavor preference.   Next Date to be Seen by RD: 07/19/24  Please consult if re-assessment needed sooner.

## 2024-07-15 NOTE — HOSPITAL COURSE
07/15/2024 pt admitted with CHF, h/o anoxic brain injury, falling at home apparently fell out of his wheelchair and could not get up.  He has been living in a hotel and cannot take care of himself.  07/16/2024 Cr up significantly overnight on iv lasix 1.7 to 2.30  Echocardiogram EF 55%  Overall feels better today  07/17/2024  Lab improving   Awaiting placement  07/18/2024  No new c/o  Awaiting placement   07/19/2024  No new c/o  BP better controlled   Cardio add amlodipine  07/20/2024  Awaiting placement   07/21/2024  No new c/o  07/22/2024  BP better controlled  No new c/o  Awaiting SNF placement

## 2024-07-15 NOTE — ASSESSMENT & PLAN NOTE
Creatine stable for now. BMP reviewed- noted Estimated Creatinine Clearance: 36.6 mL/min (A) (based on SCr of 1.7 mg/dL (H)). according to latest data. Based on current GFR, CKD stage is stage 3 - GFR 30-59.  Monitor UOP and serial BMP and adjust therapy as needed. Renally dose meds. Avoid nephrotoxic medications and procedures.

## 2024-07-15 NOTE — HPI
"Fall     Weakness       Pt presented to ED per EMS with c/o FALL  and laceration to right lower leg. Pt denies hitting head. Pt c/o  chronic shoulder pain and neck pain. Pt reports he is unable to care for himself and currently living in a hotel. Pt was extremely disheveled on arrival. Pt stated "I dont have anyone to care for me, I've been staying at a hotel and constantly falling".         HPI:   69 year old man with chronic complex medical history that involves previous anoxic brain injury that has left him with difficulty speaking, HTN, HLD, previous left amputation, CAD presented with complaints of a fall. Denies hitting head. Patient is wheelchair bound. Patient has complicated social situation as currently living in a hotel. Patient has no one to take care of him. Currently has no ride home     On arrival to ED BNP found to be elevated. Ddimer elevated, subsequent CTA revealed no PE. CXR showed minor pleural effusion. Admitted for CHF  "

## 2024-07-15 NOTE — ASSESSMENT & PLAN NOTE
.Patient has hypokalemia which is Acute and currently improving. Most recent potassium levels reviewed-   Lab Results   Component Value Date    K 3.0 (L) 07/15/2024   . Will continue potassium replacement per protocol and recheck repeat levels after replacement completed.

## 2024-07-15 NOTE — SUBJECTIVE & OBJECTIVE
Interval History:      Review of Systems   Constitutional:  Negative for appetite change, fatigue and fever.   Respiratory:  Negative for cough, shortness of breath and wheezing.    Cardiovascular:  Negative for chest pain and leg swelling.   Gastrointestinal:  Negative for abdominal distention, abdominal pain, constipation, diarrhea, nausea and vomiting.   Skin:  Negative for color change, pallor, rash and wound.   Neurological:  Negative for tremors, syncope and headaches.   Psychiatric/Behavioral:  Negative for agitation and behavioral problems.      Objective:     Vital Signs (Most Recent):  Temp: 97.6 °F (36.4 °C) (07/15/24 1100)  Pulse: (!) 55 (07/15/24 1100)  Resp: 16 (07/15/24 1100)  BP: (!) 133/57 (07/15/24 1100)  SpO2: 96 % (07/15/24 1100) Vital Signs (24h Range):  Temp:  [97.5 °F (36.4 °C)-98.5 °F (36.9 °C)] 97.6 °F (36.4 °C)  Pulse:  [42-61] 55  Resp:  [10-18] 16  SpO2:  [94 %-99 %] 96 %  BP: (133-190)/(57-77) 133/57     Weight: 61.3 kg (135 lb 2.3 oz)  Body mass index is 20.55 kg/m².    Intake/Output Summary (Last 24 hours) at 7/15/2024 1515  Last data filed at 7/15/2024 1200  Gross per 24 hour   Intake 2394 ml   Output 2080 ml   Net 314 ml         Physical Exam  Vitals and nursing note reviewed. Exam conducted with a chaperone present.   Constitutional:       General: He is not in acute distress.     Appearance: Normal appearance. He is not ill-appearing.   Cardiovascular:      Rate and Rhythm: Normal rate and regular rhythm.      Pulses: Normal pulses.      Heart sounds: Normal heart sounds. No murmur heard.  Pulmonary:      Effort: Pulmonary effort is normal. No respiratory distress.      Breath sounds: Normal breath sounds. No wheezing, rhonchi or rales.   Abdominal:      General: Abdomen is flat.      Palpations: Abdomen is soft.      Tenderness: There is no guarding.   Musculoskeletal:         General: No swelling or tenderness. Normal range of motion.      Right lower leg: No edema.      Left  lower leg: No edema.      Comments: Amputee left bka   Skin:     General: Skin is warm and dry.      Coloration: Skin is not jaundiced or pale.   Neurological:      General: No focal deficit present.      Mental Status: He is alert and oriented to person, place, and time. Mental status is at baseline.      Cranial Nerves: No cranial nerve deficit.      Motor: No weakness.      Coordination: Coordination normal.      Gait: Gait normal.   Psychiatric:         Mood and Affect: Mood normal.         Behavior: Behavior normal.             Significant Labs: All pertinent labs within the past 24 hours have been reviewed.  CBC:   Recent Labs   Lab 07/14/24  1339 07/15/24  0524   WBC 5.54 5.73   HGB 12.5* 12.4*   HCT 36.5 36.9    192     CMP:   Recent Labs   Lab 07/14/24  1339 07/15/24  0524    137   K 3.5 3.0*    103   CO2 26 26   BUN 19 19   CREATININE 1.85* 1.70*   CALCIUM 9.3 9.4   ALBUMIN 4.3 4.0   BILITOT 0.7 0.6   ALKPHOS 103 104   AST 18 19   ALT 12 11       Significant Imaging: I have reviewed all pertinent imaging results/findings within the past 24 hours.

## 2024-07-16 LAB
ALBUMIN SERPL-MCNC: 4 G/DL (ref 3.4–5)
ALBUMIN/GLOB SERPL: 1.6 RATIO
ALP SERPL-CCNC: 98 UNIT/L (ref 50–144)
ALT SERPL-CCNC: 10 UNIT/L (ref 1–45)
ANION GAP SERPL CALC-SCNC: 8 MEQ/L (ref 2–13)
AST SERPL-CCNC: 16 UNIT/L (ref 17–59)
BASOPHILS # BLD AUTO: 0.1 X10(3)/MCL (ref 0.01–0.08)
BASOPHILS NFR BLD AUTO: 1.4 % (ref 0.1–1.2)
BILIRUB SERPL-MCNC: 0.4 MG/DL (ref 0–1)
BUN SERPL-MCNC: 34 MG/DL (ref 7–20)
CALCIUM SERPL-MCNC: 9.4 MG/DL (ref 8.4–10.2)
CHLORIDE SERPL-SCNC: 101 MMOL/L (ref 98–110)
CO2 SERPL-SCNC: 28 MMOL/L (ref 21–32)
CREAT SERPL-MCNC: 2.31 MG/DL (ref 0.66–1.25)
CREAT/UREA NIT SERPL: 15 (ref 12–20)
EOSINOPHIL # BLD AUTO: 0.26 X10(3)/MCL (ref 0.04–0.54)
EOSINOPHIL NFR BLD AUTO: 3.7 % (ref 0.7–7)
ERYTHROCYTE [DISTWIDTH] IN BLOOD BY AUTOMATED COUNT: 14 %
GFR SERPLBLD CREATININE-BSD FMLA CKD-EPI: 30 ML/MIN/1.73/M2
GLOBULIN SER-MCNC: 2.5 GM/DL (ref 2–3.9)
GLUCOSE SERPL-MCNC: 100 MG/DL (ref 70–115)
HCT VFR BLD AUTO: 36 % (ref 36–52)
HGB BLD-MCNC: 12.2 G/DL (ref 13–18)
IMM GRANULOCYTES # BLD AUTO: 0.01 X10(3)/MCL (ref 0–0.03)
IMM GRANULOCYTES NFR BLD AUTO: 0.1 % (ref 0–0.5)
LYMPHOCYTES # BLD AUTO: 2.17 X10(3)/MCL (ref 1.32–3.57)
LYMPHOCYTES NFR BLD AUTO: 30.5 % (ref 20–55)
MAGNESIUM SERPL-MCNC: 2.4 MG/DL (ref 1.8–2.4)
MCH RBC QN AUTO: 27.8 PG (ref 27–34)
MCHC RBC AUTO-ENTMCNC: 33.9 G/DL (ref 31–37)
MCV RBC AUTO: 82 FL (ref 79–99)
MONOCYTES # BLD AUTO: 0.7 X10(3)/MCL (ref 0.3–0.82)
MONOCYTES NFR BLD AUTO: 9.8 % (ref 4.7–12.5)
NEUTROPHILS # BLD AUTO: 3.88 X10(3)/MCL (ref 1.78–5.38)
NEUTROPHILS NFR BLD AUTO: 54.5 % (ref 37–73)
NRBC BLD AUTO-RTO: 0 %
PLATELET # BLD AUTO: 186 X10(3)/MCL (ref 140–371)
PMV BLD AUTO: 9.5 FL (ref 9.4–12.4)
POTASSIUM SERPL-SCNC: 3.7 MMOL/L (ref 3.5–5.1)
PROT SERPL-MCNC: 6.5 GM/DL (ref 6.3–8.2)
RBC # BLD AUTO: 4.39 X10(6)/MCL (ref 4–6)
SODIUM SERPL-SCNC: 137 MMOL/L (ref 136–145)
WBC # BLD AUTO: 7.12 X10(3)/MCL (ref 4–11.5)

## 2024-07-16 PROCEDURE — 97110 THERAPEUTIC EXERCISES: CPT

## 2024-07-16 PROCEDURE — 83735 ASSAY OF MAGNESIUM: CPT | Performed by: FAMILY MEDICINE

## 2024-07-16 PROCEDURE — 97116 GAIT TRAINING THERAPY: CPT

## 2024-07-16 PROCEDURE — 63600175 PHARM REV CODE 636 W HCPCS: Performed by: INTERNAL MEDICINE

## 2024-07-16 PROCEDURE — 21400001 HC TELEMETRY ROOM

## 2024-07-16 PROCEDURE — 94761 N-INVAS EAR/PLS OXIMETRY MLT: CPT

## 2024-07-16 PROCEDURE — 85025 COMPLETE CBC W/AUTO DIFF WBC: CPT | Performed by: FAMILY MEDICINE

## 2024-07-16 PROCEDURE — 25000003 PHARM REV CODE 250: Performed by: INTERNAL MEDICINE

## 2024-07-16 PROCEDURE — 36415 COLL VENOUS BLD VENIPUNCTURE: CPT | Performed by: FAMILY MEDICINE

## 2024-07-16 PROCEDURE — 25000003 PHARM REV CODE 250: Performed by: FAMILY MEDICINE

## 2024-07-16 PROCEDURE — 80053 COMPREHEN METABOLIC PANEL: CPT | Performed by: FAMILY MEDICINE

## 2024-07-16 RX ORDER — ENOXAPARIN SODIUM 100 MG/ML
40 INJECTION SUBCUTANEOUS EVERY 24 HOURS
Status: DISCONTINUED | OUTPATIENT
Start: 2024-07-17 | End: 2024-07-23 | Stop reason: HOSPADM

## 2024-07-16 RX ADMIN — EZETIMIBE 10 MG: 10 TABLET ORAL at 08:07

## 2024-07-16 RX ADMIN — CITALOPRAM HYDROBROMIDE 20 MG: 20 TABLET ORAL at 08:07

## 2024-07-16 RX ADMIN — CLOPIDOGREL BISULFATE 75 MG: 75 TABLET ORAL at 08:07

## 2024-07-16 RX ADMIN — POTASSIUM CHLORIDE 40 MEQ: 1500 TABLET, EXTENDED RELEASE ORAL at 08:07

## 2024-07-16 RX ADMIN — FUROSEMIDE 40 MG: 10 INJECTION, SOLUTION INTRAMUSCULAR; INTRAVENOUS at 08:07

## 2024-07-16 RX ADMIN — BUSPIRONE HYDROCHLORIDE 7.5 MG: 7.5 TABLET ORAL at 09:07

## 2024-07-16 RX ADMIN — ISOSORBIDE MONONITRATE 30 MG: 30 TABLET, EXTENDED RELEASE ORAL at 08:07

## 2024-07-16 RX ADMIN — POLYETHYLENE GLYCOL 3350 17 G: 17 POWDER, FOR SOLUTION ORAL at 08:07

## 2024-07-16 RX ADMIN — LISINOPRIL 10 MG: 10 TABLET ORAL at 08:07

## 2024-07-16 RX ADMIN — BUSPIRONE HYDROCHLORIDE 7.5 MG: 7.5 TABLET ORAL at 08:07

## 2024-07-16 RX ADMIN — ATORVASTATIN CALCIUM 20 MG: 20 TABLET, FILM COATED ORAL at 08:07

## 2024-07-16 NOTE — PROGRESS NOTES
"[facility]  Progress Note    Patient Name: Leno Thompson Jr.  MRN: [id number]  Patient Class: IP- Inpatient   Admission Date: 7/14/2024  Length of Stay: 2 days  Attending Physician: Franklin Najera MD  Primary Care Provider: Nancy Gutierrez FNP-C        Subjective:     Principal Problem:Hypokalemia        HPI:  Fall     Weakness       Pt presented to ED per EMS with c/o FALL  and laceration to right lower leg. Pt denies hitting head. Pt c/o  chronic shoulder pain and neck pain. Pt reports he is unable to care for himself and currently living in a hotel. Pt was extremely disheveled on arrival. Pt stated "I dont have anyone to care for me, I've been staying at a hotel and constantly falling".         HPI:   69 year old man with chronic complex medical history that involves previous anoxic brain injury that has left him with difficulty speaking, HTN, HLD, previous left amputation, CAD presented with complaints of a fall. Denies hitting head. Patient is wheelchair bound. Patient has complicated social situation as currently living in a hotel. Patient has no one to take care of him. Currently has no ride home     On arrival to ED BNP found to be elevated. Ddimer elevated, subsequent CTA revealed no PE. CXR showed minor pleural effusion. Admitted for CHF    Overview/Hospital Course:  07/15/2024 pt admitted with CHF, h/o anoxic brain injury, falling at home apparently fell out of his wheelchair and could not get up.  He has been living in a hotel and cannot take care of himself.  07/16/2024 Cr up significantly overnight on iv lasix 1.7 to 2.30  Echocardiogram EF 55%  Overall feels better today    Interval History:      Review of Systems   Constitutional:  Negative for appetite change, fatigue and fever.   Respiratory:  Negative for cough, shortness of breath and wheezing.    Cardiovascular:  Negative for chest pain and leg swelling.   Gastrointestinal:  Negative for abdominal " distention, abdominal pain, constipation, diarrhea, nausea and vomiting.   Skin:  Negative for color change, pallor, rash and wound.   Neurological:  Negative for tremors, syncope and headaches.   Psychiatric/Behavioral:  Negative for agitation and behavioral problems.      Objective:     Vital Signs (Most Recent):  Temp: 97.4 °F (36.3 °C) (07/16/24 0720)  Pulse: (!) 51 (07/16/24 0735)  Resp: 20 (07/16/24 0735)  BP: (!) 140/55 (07/16/24 0720)  SpO2: 96 % (07/16/24 0735) Vital Signs (24h Range):  Temp:  [97.4 °F (36.3 °C)-98.5 °F (36.9 °C)] 97.4 °F (36.3 °C)  Pulse:  [48-61] 51  Resp:  [16-20] 20  SpO2:  [94 %-97 %] 96 %  BP: (121-140)/(55-59) 140/55     Weight: 62.3 kg (137 lb 4.8 oz)  Body mass index is 20.88 kg/m².    Intake/Output Summary (Last 24 hours) at 7/16/2024 1121  Last data filed at 7/16/2024 0423  Gross per 24 hour   Intake 1797 ml   Output 1350 ml   Net 447 ml         Physical Exam  Vitals and nursing note reviewed. Exam conducted with a chaperone present.   Constitutional:       General: He is not in acute distress.     Appearance: Normal appearance. He is not ill-appearing.   Cardiovascular:      Rate and Rhythm: Normal rate and regular rhythm.      Pulses: Normal pulses.      Heart sounds: Normal heart sounds. No murmur heard.  Pulmonary:      Effort: Pulmonary effort is normal. No respiratory distress.      Breath sounds: Normal breath sounds. No wheezing, rhonchi or rales.   Abdominal:      General: Abdomen is flat.      Palpations: Abdomen is soft.      Tenderness: There is no guarding.   Musculoskeletal:         General: No swelling or tenderness. Normal range of motion.      Right lower leg: No edema.      Left lower leg: No edema.      Comments: Amputee left bka   Skin:     General: Skin is warm and dry.      Coloration: Skin is not jaundiced or pale.   Neurological:      General: No focal deficit present.      Mental Status: He is alert and oriented to person, place, and time. Mental status  is at baseline.      Cranial Nerves: No cranial nerve deficit.      Motor: No weakness.      Coordination: Coordination normal.      Gait: Gait normal.   Psychiatric:         Mood and Affect: Mood normal.         Behavior: Behavior normal.             Significant Labs: All pertinent labs within the past 24 hours have been reviewed.  CBC:   Recent Labs   Lab 07/14/24  1339 07/15/24  0524 07/16/24  0453   WBC 5.54 5.73 7.12   HGB 12.5* 12.4* 12.2*   HCT 36.5 36.9 36.0    192 186     CMP:   Recent Labs   Lab 07/14/24  1339 07/15/24  0524 07/16/24  0453    137 137   K 3.5 3.0* 3.7    103 101   CO2 26 26 28   BUN 19 19 34*   CREATININE 1.85* 1.70* 2.31*   CALCIUM 9.3 9.4 9.4   ALBUMIN 4.3 4.0 4.0   BILITOT 0.7 0.6 0.4   ALKPHOS 103 104 98   AST 18 19 16*   ALT 12 11 10       Significant Imaging: I have reviewed all pertinent imaging results/findings within the past 24 hours.    Assessment/Plan:      * Hypokalemia  .Patient has hypokalemia which is Acute and currently improving. Most recent potassium levels reviewed-   Lab Results   Component Value Date    K 3.0 (L) 07/15/2024   . Will continue potassium replacement per protocol and recheck repeat levels after replacement completed.     Stage 3a chronic kidney disease  Creatine stable for now. BMP reviewed- noted Estimated Creatinine Clearance: 36.6 mL/min (A) (based on SCr of 1.7 mg/dL (H)). according to latest data. Based on current GFR, CKD stage is stage 3 - GFR 30-59.  Monitor UOP and serial BMP and adjust therapy as needed. Renally dose meds. Avoid nephrotoxic medications and procedures.    CHF (congestive heart failure)  Patient is identified as having Diastolic (HFpEF) heart failure that is Acute on chronic. CHF is currently uncontrolled due to Dyspnea not returned to baseline after 3 doses of IV diuretic and >3 pillow orthopnea. Latest ECHO performed and demonstrates- Results for orders placed during the hospital encounter of  "07/14/24    Echo    Interpretation Summary    Left Ventricle: The left ventricle is normal in size. There is normal systolic function with a visually estimated ejection fraction of 55%. There is inferobasal hypokinesis noted. Grade I diastolic dysfunction.    Right Ventricle: Normal right ventricular cavity size. Systolic function is normal.    Aortic Valve: There is mild aortic valve sclerosis. There is mild to moderate (1-2+) aortic regurgitation.    Mitral Valve: There is mild (1+) regurgitation.    Unable to determine PASP.  . Continue Beta Blocker and ACE/ARB and monitor clinical status closely. Monitor on telemetry. Patient is on CHF pathway.  Monitor strict Is&Os and daily weights.  Place on fluid restriction of 1.5 L. Cardiology has been consulted. Continue to stress to patient importance of self efficacy and  on diet for CHF. Last BNP reviewed- and noted below No results for input(s): "BNP", "BNPTRIAGEBLO" in the last 168 hours.  Consult cardiology  Hold iv lasix    Debility  Patient with Acute on chronic debility due to chronic unspecified fatigue and other reduced mobility. Latest AMPAC and GEMS scores have been reviewed. Evaluation for etiology is underway. Plan includes PT/OT consulted.      VTE Risk Mitigation (From admission, onward)           Ordered     heparin (porcine) injection 5,000 Units  Every 8 hours         07/14/24 1802     IP VTE HIGH RISK PATIENT  Once         07/14/24 1802                    Discharge Planning   LELIA: 7/19/2024     Code Status: DNR   Is the patient medically ready for discharge?:     Reason for patient still in hospital (select all that apply): Patient trending condition, Laboratory test, Treatment, and Consult recommendations  Discharge Plan A: Skilled Nursing Facility                  Franklin Najera MD  Department of Hospital Medicine   [facility]    "

## 2024-07-16 NOTE — SUBJECTIVE & OBJECTIVE
Review of Systems   Constitutional: Positive for malaise/fatigue.   HENT: Negative.     Eyes: Negative.    Cardiovascular:  Negative for chest pain, orthopnea, palpitations and paroxysmal nocturnal dyspnea.   Respiratory:  Negative for shortness of breath.    Endocrine: Negative.    Hematologic/Lymphatic: Negative.    Skin:  Positive for dry skin.   Musculoskeletal:  Positive for arthritis.   Gastrointestinal: Negative.    Genitourinary: Negative.    Neurological:  Positive for weakness.   Psychiatric/Behavioral:  The patient has insomnia and is nervous/anxious.    Allergic/Immunologic: Negative.      Objective:     Vital Signs (Most Recent):  Temp: 97.4 °F (36.3 °C) (07/16/24 0720)  Pulse: (!) 51 (07/16/24 0735)  Resp: 20 (07/16/24 0735)  BP: (!) 140/55 (07/16/24 0720)  SpO2: 96 % (07/16/24 0735) Vital Signs (24h Range):  Temp:  [97.4 °F (36.3 °C)-98.5 °F (36.9 °C)] 97.4 °F (36.3 °C)  Pulse:  [48-61] 51  Resp:  [16-20] 20  SpO2:  [94 %-97 %] 96 %  BP: (121-140)/(55-59) 140/55     Weight: 62.3 kg (137 lb 4.8 oz)  Body mass index is 20.88 kg/m².     SpO2: 96 %         Intake/Output Summary (Last 24 hours) at 7/16/2024 1359  Last data filed at 7/16/2024 1211  Gross per 24 hour   Intake 1917 ml   Output 1275 ml   Net 642 ml       Lines/Drains/Airways       Peripheral Intravenous Line  Duration                  Peripheral IV - Single Lumen 07/14/24 1326 20 G Anterior;Distal;Left Upper Arm 2 days                       Physical Exam  Constitutional:       General: He is not in acute distress.  HENT:      Head: Normocephalic.      Nose: No rhinorrhea.      Mouth/Throat:      Mouth: Mucous membranes are moist.   Eyes:      Pupils: Pupils are equal, round, and reactive to light.   Cardiovascular:      Rate and Rhythm: Normal rate.   Pulmonary:      Effort: Pulmonary effort is normal.      Breath sounds: Normal breath sounds.   Abdominal:      Palpations: Abdomen is soft.      Tenderness: There is no abdominal tenderness.    Musculoskeletal:      Cervical back: Normal range of motion and neck supple.      Right lower leg: No edema.   Skin:     General: Skin is warm and dry.   Neurological:      Motor: Weakness present.   Psychiatric:      Comments: Flat affect            Significant Labs: CMP   Recent Labs   Lab 07/15/24  0524 07/16/24  0453    137   K 3.0* 3.7    101   CO2 26 28   BUN 19 34*   CREATININE 1.70* 2.31*   CALCIUM 9.4 9.4   ALBUMIN 4.0 4.0   BILITOT 0.6 0.4   ALKPHOS 104 98   AST 19 16*   ALT 11 10    and CBC   Recent Labs   Lab 07/15/24  0524 07/16/24  0453   WBC 5.73 7.12   HGB 12.4* 12.2*   HCT 36.9 36.0    186

## 2024-07-16 NOTE — SUBJECTIVE & OBJECTIVE
Interval History:      Review of Systems   Constitutional:  Negative for appetite change, fatigue and fever.   Respiratory:  Negative for cough, shortness of breath and wheezing.    Cardiovascular:  Negative for chest pain and leg swelling.   Gastrointestinal:  Negative for abdominal distention, abdominal pain, constipation, diarrhea, nausea and vomiting.   Skin:  Negative for color change, pallor, rash and wound.   Neurological:  Negative for tremors, syncope and headaches.   Psychiatric/Behavioral:  Negative for agitation and behavioral problems.      Objective:     Vital Signs (Most Recent):  Temp: 97.4 °F (36.3 °C) (07/16/24 0720)  Pulse: (!) 51 (07/16/24 0735)  Resp: 20 (07/16/24 0735)  BP: (!) 140/55 (07/16/24 0720)  SpO2: 96 % (07/16/24 0735) Vital Signs (24h Range):  Temp:  [97.4 °F (36.3 °C)-98.5 °F (36.9 °C)] 97.4 °F (36.3 °C)  Pulse:  [48-61] 51  Resp:  [16-20] 20  SpO2:  [94 %-97 %] 96 %  BP: (121-140)/(55-59) 140/55     Weight: 62.3 kg (137 lb 4.8 oz)  Body mass index is 20.88 kg/m².    Intake/Output Summary (Last 24 hours) at 7/16/2024 1121  Last data filed at 7/16/2024 0423  Gross per 24 hour   Intake 1797 ml   Output 1350 ml   Net 447 ml         Physical Exam  Vitals and nursing note reviewed. Exam conducted with a chaperone present.   Constitutional:       General: He is not in acute distress.     Appearance: Normal appearance. He is not ill-appearing.   Cardiovascular:      Rate and Rhythm: Normal rate and regular rhythm.      Pulses: Normal pulses.      Heart sounds: Normal heart sounds. No murmur heard.  Pulmonary:      Effort: Pulmonary effort is normal. No respiratory distress.      Breath sounds: Normal breath sounds. No wheezing, rhonchi or rales.   Abdominal:      General: Abdomen is flat.      Palpations: Abdomen is soft.      Tenderness: There is no guarding.   Musculoskeletal:         General: No swelling or tenderness. Normal range of motion.      Right lower leg: No edema.      Left  lower leg: No edema.      Comments: Amputee left bka   Skin:     General: Skin is warm and dry.      Coloration: Skin is not jaundiced or pale.   Neurological:      General: No focal deficit present.      Mental Status: He is alert and oriented to person, place, and time. Mental status is at baseline.      Cranial Nerves: No cranial nerve deficit.      Motor: No weakness.      Coordination: Coordination normal.      Gait: Gait normal.   Psychiatric:         Mood and Affect: Mood normal.         Behavior: Behavior normal.             Significant Labs: All pertinent labs within the past 24 hours have been reviewed.  CBC:   Recent Labs   Lab 07/14/24  1339 07/15/24  0524 07/16/24  0453   WBC 5.54 5.73 7.12   HGB 12.5* 12.4* 12.2*   HCT 36.5 36.9 36.0    192 186     CMP:   Recent Labs   Lab 07/14/24  1339 07/15/24  0524 07/16/24  0453    137 137   K 3.5 3.0* 3.7    103 101   CO2 26 26 28   BUN 19 19 34*   CREATININE 1.85* 1.70* 2.31*   CALCIUM 9.3 9.4 9.4   ALBUMIN 4.3 4.0 4.0   BILITOT 0.7 0.6 0.4   ALKPHOS 103 104 98   AST 18 19 16*   ALT 12 11 10       Significant Imaging: I have reviewed all pertinent imaging results/findings within the past 24 hours.

## 2024-07-16 NOTE — PT/OT/SLP PROGRESS
"Physical Therapy Treatment    Patient Name:  Leno Thompson Jr.   MRN:  [id number]    Recommendations:     Discharge Recommendations: Moderate Intensity Therapy  Discharge Equipment Recommendations:    Barriers to discharge:  current medical status    Assessment:     Leno Thompson Jr. is a 69 y.o. male admitted with a medical diagnosis of Hypokalemia.  He presents with the following impairments/functional limitations: weakness, impaired self care skills, impaired functional mobility, gait instability, impaired balance     Pt found with HOB elevated. He is agreeable to PT tx this afternoon. Pt completed supine to sit, min A. Once sitting EOB, pt able to don LLE prosthesis independently. Sit to stand completed, min A with RW. Gait training completed x20 feet with RW, min A for safety. Pt returned to EOB and completed seated BLE therex: LAQ, hip abd/add, and seated marches. While performing therex, pt experienced three episodes of "spasms", in which he could not control his posture or movement for a period of approximately 10-15 seconds each. Pt then completed sit to supine, min A, and scooting to HOB, min A. Pt left with HOB elevated and all needs met.    Rehab Prognosis: Fair; patient would benefit from acute skilled PT services to address these deficits and reach maximum level of function.    Recent Surgery: * No surgery found *      Plan:     During this hospitalization, patient to be seen 5 x/week (5-6x/week, 1-2x/day) to address the identified rehab impairments via gait training, therapeutic activities, therapeutic exercises and progress toward the following goals:    Plan of Care Expires:  08/15/24    Subjective     Chief Complaint: none currently verbalized  Patient/Family Comments/goals: to get stronger  Pain/Comfort:         Objective:     Communicated with nursing prior to session.  Patient found HOB elevated with bed alarm, telemetry, pulse ox (continuous) upon PT entry to room.     General Precautions: " Standard, fall  Orthopedic Precautions:    Braces:    Respiratory Status: Room air     Functional Mobility:  Bed Mobility:     Scooting: minimum assistance  Supine to Sit: minimum assistance  Sit to Supine: minimum assistance  Transfers:     Sit to Stand:  minimum assistance with rolling walker  Gait: 20 feet with RW, min A for safety      AM-PAC 6 CLICK MOBILITY          Treatment & Education:  See above    Patient left HOB elevated with all lines intact, call button in reach, bed alarm on, and nurse notified..    GOALS:   Multidisciplinary Problems       Physical Therapy Goals          Problem: Physical Therapy    Goal Priority Disciplines Outcome Goal Variances Interventions   Physical Therapy Goal     PT, PT/OT Progressing     Description: Goals to be met by: discharge     Patient will increase functional independence with mobility by performin. Supine to sit with Contact Guard Assistance  2. Sit to stand transfer with Moderate Assistance and Maximum Assistance  3. Bed to chair transfer with Moderate Assistance using Rolling Walker                         Time Tracking:     PT Received On: 24  PT Start Time: 1418     PT Stop Time: 1441  PT Total Time (min): 23 min     Billable Minutes: Gait Training 8 and Therapeutic Exercise 15    Treatment Type: Treatment  PT/PTA: PT           2024

## 2024-07-16 NOTE — ASSESSMENT & PLAN NOTE
"Patient is identified as having Diastolic (HFpEF) heart failure that is Acute on chronic. CHF is currently uncontrolled due to Dyspnea not returned to baseline after 3 doses of IV diuretic and >3 pillow orthopnea. Latest ECHO performed and demonstrates- Results for orders placed during the hospital encounter of 07/14/24    Echo    Interpretation Summary    Left Ventricle: The left ventricle is normal in size. There is normal systolic function with a visually estimated ejection fraction of 55%. There is inferobasal hypokinesis noted. Grade I diastolic dysfunction.    Right Ventricle: Normal right ventricular cavity size. Systolic function is normal.    Aortic Valve: There is mild aortic valve sclerosis. There is mild to moderate (1-2+) aortic regurgitation.    Mitral Valve: There is mild (1+) regurgitation.    Unable to determine PASP.  . Continue Beta Blocker and ACE/ARB and monitor clinical status closely. Monitor on telemetry. Patient is on CHF pathway.  Monitor strict Is&Os and daily weights.  Place on fluid restriction of 1.5 L. Cardiology has been consulted. Continue to stress to patient importance of self efficacy and  on diet for CHF. Last BNP reviewed- and noted below No results for input(s): "BNP", "BNPTRIAGEBLO" in the last 168 hours.  Consult cardiology  Hold iv lasix  "

## 2024-07-16 NOTE — CONSULTS
[facility]  Cardiology  Consult Note    Patient Name: Leno Thompson Jr.  MRN: [id number]  Admission Date: 7/14/2024  Hospital Length of Stay: 2 days  Code Status: DNR   Attending Provider: Dr Najera  Consulting Provider: Quinn Bowden MD  Primary Care Physician: Nancy Gutierrez FNP-C  Principal Problem:Hypokalemia    Patient information was obtained from patient , MD notes.     Consults  Subjective:     69-year-old gentleman, personal history of anoxic brain injury, CAD, prior history of RCA vessel PCI, total occlusion, LV dysfunction, hypertension, diabetes mellitus, chronic renal disease, left lower extremity amputee, fell out of his wheelchair, brought to emergency room for evaluation.  Workup reveals a pro BNP level 3000 range.  Cardiology consulted for elevated pro BNP. He received x doses IV lasix. Renal panel worsen creatinine - lasix was d/c.  D-dimer is elevated, chest CT no evidence of pulmonary emboli.  EKG sinus rhythm with LVH.      Elevated pro BNP level-multifactorial  Echo report per [facility]- 07/14/24  normal function, Grade I diastology, mild to moderate AI, mild MR, trace TR  Personal history of anoxic brain injury   CAD-prior history of RCA PCI, 100% chronic total occlusion cath 11/21  Personal history of LV dysfunction  Hypertension   Diabetes mellitus   Chronic renal disease   Prerenal azotemia  Left lower extremity amputee  Elevated D-dimer-chest CT no evidence of pulmonary emboli  Tobacco consumption                 Review of Systems   Constitutional: Positive for malaise/fatigue.   HENT: Negative.     Eyes: Negative.    Cardiovascular:  Negative for chest pain, orthopnea, palpitations and paroxysmal nocturnal dyspnea.   Respiratory:  Negative for shortness of breath.    Endocrine: Negative.    Hematologic/Lymphatic: Negative.    Skin:  Positive for dry skin.   Musculoskeletal:  Positive for arthritis.   Gastrointestinal: Negative.    Genitourinary: Negative.     Neurological:  Positive for weakness.   Psychiatric/Behavioral:  The patient has insomnia and is nervous/anxious.    Allergic/Immunologic: Negative.      Objective:     Vital Signs (Most Recent):  Temp: 97.4 °F (36.3 °C) (07/16/24 0720)  Pulse: (!) 51 (07/16/24 0735)  Resp: 20 (07/16/24 0735)  BP: (!) 140/55 (07/16/24 0720)  SpO2: 96 % (07/16/24 0735) Vital Signs (24h Range):  Temp:  [97.4 °F (36.3 °C)-98.5 °F (36.9 °C)] 97.4 °F (36.3 °C)  Pulse:  [48-61] 51  Resp:  [16-20] 20  SpO2:  [94 %-97 %] 96 %  BP: (121-140)/(55-59) 140/55     Weight: 62.3 kg (137 lb 4.8 oz)  Body mass index is 20.88 kg/m².     SpO2: 96 %         Intake/Output Summary (Last 24 hours) at 7/16/2024 1359  Last data filed at 7/16/2024 1211  Gross per 24 hour   Intake 1917 ml   Output 1275 ml   Net 642 ml       Lines/Drains/Airways       Peripheral Intravenous Line  Duration                  Peripheral IV - Single Lumen 07/14/24 1326 20 G Anterior;Distal;Left Upper Arm 2 days                       Physical Exam  Constitutional:       General: He is not in acute distress.  HENT:      Head: Normocephalic.      Nose: No rhinorrhea.      Mouth/Throat:      Mouth: Mucous membranes are moist.   Eyes:      Pupils: Pupils are equal, round, and reactive to light.   Cardiovascular:      Rate and Rhythm: Normal rate.+ soft MIRANDA lsb + S4  Pulmonary:      Effort: Pulmonary effort is normal.      Breath sounds: Normal breath sounds.   Abdominal:      Palpations: Abdomen is soft.      Tenderness: There is no abdominal tenderness.   Musculoskeletal:      Cervical back: Normal range of motion and neck supple.      Right lower leg: No edema.   Skin:     General: Skin is warm and dry.   Neurological:      Motor: Weakness present.   Psychiatric:      Comments: Flat affect            Significant Labs: CMP   Recent Labs   Lab 07/15/24  0524 07/16/24  0453    137   K 3.0* 3.7    101   CO2 26 28   BUN 19 34*   CREATININE 1.70* 2.31*   CALCIUM 9.4 9.4    ALBUMIN 4.0 4.0   BILITOT 0.6 0.4   ALKPHOS 104 98   AST 19 16*   ALT 11 10    and CBC   Recent Labs   Lab 07/15/24  0524 07/16/24  0453   WBC 5.73 7.12   HGB 12.4* 12.2*   HCT 36.9 36.0    186       EKG sinus LVH   Chest CT no evidence of pulmonary emboli   Echo-per [facility] normal LV function  Portable chest x-ray no active lobar infiltrate    Assessment and Plan:       Elevated pro BNP level-multifactorial  Echo report per [facility]- 07/14/24  normal function, Grade I diastology, mild to moderate AI, mild MR, trace TR  Personal history of anoxic brain injury   CAD-prior history of RCA PCI, 100% chronic total occlusion cath 11/21  Personal history of LV dysfunction  Hypertension   Diabetes mellitus   Chronic renal disease   Prerenal azotemia  Left lower extremity amputee  Elevated D-dimer-chest CT no evidence of pulmonary emboli  Tobacco consumption       High-dose statin, aspirin, nitrates, beta blockers, ACE inhibitor-should be patient  medication regimen   - ACEI held due worsen pre renal , BB held due borderline HR  Discontinue Lasix due to worsening prerenal component  Refrain from NSAID   Judicious use of diuretic   Medical therapy  Long-term facility placement  Cessation tobacco    Quinn Bowden MD  Cardiology   [facility]

## 2024-07-17 LAB
ANION GAP SERPL CALC-SCNC: 7 MEQ/L (ref 2–13)
BASOPHILS # BLD AUTO: 0.1 X10(3)/MCL (ref 0.01–0.08)
BASOPHILS NFR BLD AUTO: 1.6 % (ref 0.1–1.2)
BUN SERPL-MCNC: 38 MG/DL (ref 7–20)
CALCIUM SERPL-MCNC: 9.3 MG/DL (ref 8.4–10.2)
CHLORIDE SERPL-SCNC: 103 MMOL/L (ref 98–110)
CO2 SERPL-SCNC: 24 MMOL/L (ref 21–32)
CREAT SERPL-MCNC: 1.97 MG/DL (ref 0.66–1.25)
CREAT/UREA NIT SERPL: 19 (ref 12–20)
EOSINOPHIL # BLD AUTO: 0.34 X10(3)/MCL (ref 0.04–0.54)
EOSINOPHIL NFR BLD AUTO: 5.5 % (ref 0.7–7)
ERYTHROCYTE [DISTWIDTH] IN BLOOD BY AUTOMATED COUNT: 14.1 %
GFR SERPLBLD CREATININE-BSD FMLA CKD-EPI: 37 ML/MIN/1.73/M2
GLUCOSE SERPL-MCNC: 114 MG/DL (ref 70–115)
HCT VFR BLD AUTO: 36.3 % (ref 36–52)
HGB BLD-MCNC: 12 G/DL (ref 13–18)
IMM GRANULOCYTES # BLD AUTO: 0.01 X10(3)/MCL (ref 0–0.03)
IMM GRANULOCYTES NFR BLD AUTO: 0.2 % (ref 0–0.5)
LYMPHOCYTES # BLD AUTO: 1.47 X10(3)/MCL (ref 1.32–3.57)
LYMPHOCYTES NFR BLD AUTO: 23.7 % (ref 20–55)
MAGNESIUM SERPL-MCNC: 2.6 MG/DL (ref 1.8–2.4)
MCH RBC QN AUTO: 26.8 PG (ref 27–34)
MCHC RBC AUTO-ENTMCNC: 33.1 G/DL (ref 31–37)
MCV RBC AUTO: 81.2 FL (ref 79–99)
MONOCYTES # BLD AUTO: 0.55 X10(3)/MCL (ref 0.3–0.82)
MONOCYTES NFR BLD AUTO: 8.9 % (ref 4.7–12.5)
NEUTROPHILS # BLD AUTO: 3.74 X10(3)/MCL (ref 1.78–5.38)
NEUTROPHILS NFR BLD AUTO: 60.1 % (ref 37–73)
NRBC BLD AUTO-RTO: 0 %
PLATELET # BLD AUTO: 176 X10(3)/MCL (ref 140–371)
PMV BLD AUTO: 9.5 FL (ref 9.4–12.4)
POTASSIUM SERPL-SCNC: 3.9 MMOL/L (ref 3.5–5.1)
RBC # BLD AUTO: 4.47 X10(6)/MCL (ref 4–6)
SODIUM SERPL-SCNC: 134 MMOL/L (ref 136–145)
WBC # BLD AUTO: 6.21 X10(3)/MCL (ref 4–11.5)

## 2024-07-17 PROCEDURE — 21400001 HC TELEMETRY ROOM

## 2024-07-17 PROCEDURE — 83735 ASSAY OF MAGNESIUM: CPT | Performed by: FAMILY MEDICINE

## 2024-07-17 PROCEDURE — 97110 THERAPEUTIC EXERCISES: CPT

## 2024-07-17 PROCEDURE — 63600175 PHARM REV CODE 636 W HCPCS: Performed by: INTERNAL MEDICINE

## 2024-07-17 PROCEDURE — 94761 N-INVAS EAR/PLS OXIMETRY MLT: CPT

## 2024-07-17 PROCEDURE — 99900035 HC TECH TIME PER 15 MIN (STAT)

## 2024-07-17 PROCEDURE — 36415 COLL VENOUS BLD VENIPUNCTURE: CPT | Performed by: FAMILY MEDICINE

## 2024-07-17 PROCEDURE — 25000003 PHARM REV CODE 250: Performed by: INTERNAL MEDICINE

## 2024-07-17 PROCEDURE — 85025 COMPLETE CBC W/AUTO DIFF WBC: CPT | Performed by: FAMILY MEDICINE

## 2024-07-17 PROCEDURE — 25000003 PHARM REV CODE 250: Performed by: FAMILY MEDICINE

## 2024-07-17 PROCEDURE — 97116 GAIT TRAINING THERAPY: CPT

## 2024-07-17 PROCEDURE — 80048 BASIC METABOLIC PNL TOTAL CA: CPT | Performed by: FAMILY MEDICINE

## 2024-07-17 RX ADMIN — CITALOPRAM HYDROBROMIDE 20 MG: 20 TABLET ORAL at 08:07

## 2024-07-17 RX ADMIN — POLYETHYLENE GLYCOL 3350 17 G: 17 POWDER, FOR SOLUTION ORAL at 08:07

## 2024-07-17 RX ADMIN — BUSPIRONE HYDROCHLORIDE 7.5 MG: 7.5 TABLET ORAL at 08:07

## 2024-07-17 RX ADMIN — ENOXAPARIN SODIUM 40 MG: 40 INJECTION SUBCUTANEOUS at 04:07

## 2024-07-17 RX ADMIN — ATORVASTATIN CALCIUM 20 MG: 20 TABLET, FILM COATED ORAL at 08:07

## 2024-07-17 RX ADMIN — POTASSIUM CHLORIDE 40 MEQ: 1500 TABLET, EXTENDED RELEASE ORAL at 08:07

## 2024-07-17 RX ADMIN — EZETIMIBE 10 MG: 10 TABLET ORAL at 08:07

## 2024-07-17 RX ADMIN — ISOSORBIDE MONONITRATE 30 MG: 30 TABLET, EXTENDED RELEASE ORAL at 08:07

## 2024-07-17 RX ADMIN — CLOPIDOGREL BISULFATE 75 MG: 75 TABLET ORAL at 08:07

## 2024-07-17 NOTE — PT/OT/SLP PROGRESS
"Physical Therapy Treatment    Patient Name:  Leno Thompson Jr.   MRN:  [id number]    Recommendations:     Discharge Recommendations: Moderate Intensity Therapy  Discharge Equipment Recommendations:    Barriers to discharge:  current medical status    Assessment:     Leno Thompson Jr. is a 69 y.o. male admitted with a medical diagnosis of Hypokalemia.  He presents with the following impairments/functional limitations: weakness, impaired self care skills, impaired functional mobility, gait instability, impaired balance     Pt found with HOB elevated. He is agreeable to PT tx this morning. Pt completed supine to sit, min A/CGA. Once sitting EOB, pt able to don LLE prosthesis independently. Sit to stand completed, min A with RW. Gait training completed x25 feet with RW, min A for safety. Pt returned to EOB and completed seated BLE therex: LAQ, hip abd/add, and seated marches. While performing therex, pt experienced one "spasm", in which he could not control his posture or movement for approximately 10-15 seconds. Pt then completed sit to supine, min A, and scooting to HOB, min A. Pt left with HOB elevated and all needs met.    Rehab Prognosis: Fair; patient would benefit from acute skilled PT services to address these deficits and reach maximum level of function.    Recent Surgery: * No surgery found *      Plan:     During this hospitalization, patient to be seen 5 x/week (5-6x/week, 1-2x/day) to address the identified rehab impairments via gait training, therapeutic activities, therapeutic exercises and progress toward the following goals:    Plan of Care Expires:  08/15/24    Subjective     Chief Complaint: none currently verbalized  Patient/Family Comments/goals: to get stronger  Pain/Comfort:         Objective:     Communicated with nursing prior to session.  Patient found HOB elevated with bed alarm, telemetry, pulse ox (continuous) upon PT entry to room.     General Precautions: Standard, fall  Orthopedic " Precautions:    Braces:    Respiratory Status: Room air     Functional Mobility:  Bed Mobility:     Scooting: minimum assistance  Supine to Sit: minimum assistance  Sit to Supine: minimum assistance  Transfers:     Sit to Stand:  minimum assistance with rolling walker  Gait: 25 feet with RW, min A for safety      AM-PAC 6 CLICK MOBILITY          Treatment & Education:  See above    Patient left HOB elevated with all lines intact, call button in reach, bed alarm on, and nurse notified..    GOALS:   Multidisciplinary Problems       Physical Therapy Goals          Problem: Physical Therapy    Goal Priority Disciplines Outcome Goal Variances Interventions   Physical Therapy Goal     PT, PT/OT Progressing     Description: Goals to be met by: discharge     Patient will increase functional independence with mobility by performin. Supine to sit with Contact Guard Assistance  2. Sit to stand transfer with Moderate Assistance and Maximum Assistance  3. Bed to chair transfer with Moderate Assistance using Rolling Walker                         Time Tracking:     PT Received On: 24  PT Start Time: 916     PT Stop Time: 939  PT Total Time (min): 23 min     Billable Minutes: Gait Training 8 and Therapeutic Exercise 15    Treatment Type: Treatment  PT/PTA: PT           2024

## 2024-07-17 NOTE — PROGRESS NOTES
"[facility] Medicine  Progress Note    Patient Name: Leno Thompson Jr.  MRN: [id number]  Patient Class: IP- Inpatient   Admission Date: 7/14/2024  Length of Stay: 3 days  Attending Physician: Franklin Najera MD  Primary Care Provider: Nancy Gutierrez FNP-C        Subjective:     Principal Problem:Hypokalemia        HPI:  Fall     Weakness       Pt presented to ED per EMS with c/o FALL  and laceration to right lower leg. Pt denies hitting head. Pt c/o  chronic shoulder pain and neck pain. Pt reports he is unable to care for himself and currently living in a hotel. Pt was extremely disheveled on arrival. Pt stated "I dont have anyone to care for me, I've been staying at a hotel and constantly falling".         HPI:   69 year old man with chronic complex medical history that involves previous anoxic brain injury that has left him with difficulty speaking, HTN, HLD, previous left amputation, CAD presented with complaints of a fall. Denies hitting head. Patient is wheelchair bound. Patient has complicated social situation as currently living in a hotel. Patient has no one to take care of him. Currently has no ride home     On arrival to ED BNP found to be elevated. Ddimer elevated, subsequent CTA revealed no PE. CXR showed minor pleural effusion. Admitted for CHF    Overview/Hospital Course:  07/15/2024 pt admitted with CHF, h/o anoxic brain injury, falling at home apparently fell out of his wheelchair and could not get up.  He has been living in a hotel and cannot take care of himself.  07/16/2024 Cr up significantly overnight on iv lasix 1.7 to 2.30  Echocardiogram EF 55%  Overall feels better today  07/17/2024  Lab improving   Awaiting placement    Interval History:      Review of Systems   Constitutional:  Negative for appetite change, fatigue and fever.   Respiratory:  Negative for cough, shortness of breath and wheezing.    Cardiovascular:  Negative for chest pain and leg " swelling.   Gastrointestinal:  Negative for abdominal distention, abdominal pain, constipation, diarrhea, nausea and vomiting.   Skin:  Negative for color change, pallor, rash and wound.   Neurological:  Negative for tremors, syncope and headaches.   Psychiatric/Behavioral:  Negative for agitation and behavioral problems.      Objective:     Vital Signs (Most Recent):  Temp: 98 °F (36.7 °C) (07/17/24 1115)  Pulse: (!) 52 (07/17/24 1115)  Resp: 20 (07/17/24 1115)  BP: (!) 153/77 (07/17/24 1115)  SpO2: 95 % (07/17/24 0813) Vital Signs (24h Range):  Temp:  [97.6 °F (36.4 °C)-98.4 °F (36.9 °C)] 98 °F (36.7 °C)  Pulse:  [49-56] 52  Resp:  [18-20] 20  SpO2:  [95 %-99 %] 95 %  BP: (135-155)/(47-77) 153/77     Weight: 64.5 kg (142 lb 1.6 oz)  Body mass index is 21.61 kg/m².    Intake/Output Summary (Last 24 hours) at 7/17/2024 1200  Last data filed at 7/17/2024 0800  Gross per 24 hour   Intake 1920 ml   Output 825 ml   Net 1095 ml         Physical Exam  Vitals and nursing note reviewed. Exam conducted with a chaperone present.   Constitutional:       General: He is not in acute distress.     Appearance: Normal appearance. He is not ill-appearing.   Cardiovascular:      Rate and Rhythm: Normal rate and regular rhythm.      Pulses: Normal pulses.      Heart sounds: Normal heart sounds. No murmur heard.  Pulmonary:      Effort: Pulmonary effort is normal. No respiratory distress.      Breath sounds: Normal breath sounds. No wheezing, rhonchi or rales.   Abdominal:      General: Abdomen is flat.      Palpations: Abdomen is soft.      Tenderness: There is no guarding.   Musculoskeletal:         General: No swelling or tenderness. Normal range of motion.      Right lower leg: No edema.      Left lower leg: No edema.      Comments: Amputee left bka   Skin:     General: Skin is warm and dry.      Coloration: Skin is not jaundiced or pale.   Neurological:      General: No focal deficit present.      Mental Status: He is alert and  oriented to person, place, and time. Mental status is at baseline.      Cranial Nerves: No cranial nerve deficit.      Motor: No weakness.      Coordination: Coordination normal.      Gait: Gait normal.   Psychiatric:         Mood and Affect: Mood normal.         Behavior: Behavior normal.             Significant Labs: All pertinent labs within the past 24 hours have been reviewed.  CBC:   Recent Labs   Lab 07/16/24 0453 07/17/24  0449   WBC 7.12 6.21   HGB 12.2* 12.0*   HCT 36.0 36.3    176     CMP:   Recent Labs   Lab 07/16/24 0453 07/17/24  0449    134*   K 3.7 3.9    103   CO2 28 24   BUN 34* 38*   CREATININE 2.31* 1.97*   CALCIUM 9.4 9.3   ALBUMIN 4.0  --    BILITOT 0.4  --    ALKPHOS 98  --    AST 16*  --    ALT 10  --        Significant Imaging: I have reviewed all pertinent imaging results/findings within the past 24 hours.    Assessment/Plan:      * Hypokalemia  .Patient has hypokalemia which is Acute and currently improving. Most recent potassium levels reviewed-   Lab Results   Component Value Date    K 3.9 07/17/2024   . Will continue potassium replacement per protocol and recheck repeat levels after replacement completed.     improved    Stage 3a chronic kidney disease  Creatine stable for now. BMP reviewed- noted Estimated Creatinine Clearance: 36.6 mL/min (A) (based on SCr of 1.7 mg/dL (H)). according to latest data. Based on current GFR, CKD stage is stage 3 - GFR 30-59.  Monitor UOP and serial BMP and adjust therapy as needed. Renally dose meds. Avoid nephrotoxic medications and procedures.    CHF (congestive heart failure)  Patient is identified as having Diastolic (HFpEF) heart failure that is Acute on chronic. CHF is currently uncontrolled due to Dyspnea not returned to baseline after 3 doses of IV diuretic and >3 pillow orthopnea. Latest ECHO performed and demonstrates- Results for orders placed during the hospital encounter of 07/14/24    Echo    Interpretation Summary     "Left Ventricle: The left ventricle is normal in size. There is normal systolic function with a visually estimated ejection fraction of 55%. There is inferobasal hypokinesis noted. Grade I diastolic dysfunction.    Right Ventricle: Normal right ventricular cavity size. Systolic function is normal.    Aortic Valve: There is mild aortic valve sclerosis. There is mild to moderate (1-2+) aortic regurgitation.    Mitral Valve: There is mild (1+) regurgitation.    Unable to determine PASP.  . Continue Beta Blocker and ACE/ARB and monitor clinical status closely. Monitor on telemetry. Patient is on CHF pathway.  Monitor strict Is&Os and daily weights.  Place on fluid restriction of 1.5 L. Cardiology has been consulted. Continue to stress to patient importance of self efficacy and  on diet for CHF. Last BNP reviewed- and noted below No results for input(s): "BNP", "BNPTRIAGEBLO" in the last 168 hours.    Cardio following   Resume BB, ACE inh when toelerated        Debility  Patient with Acute on chronic debility due to chronic unspecified fatigue and other reduced mobility. Latest AMPAC and GEMS scores have been reviewed. Evaluation for etiology is underway. Plan includes PT/OT consulted.      VTE Risk Mitigation (From admission, onward)           Ordered     enoxaparin injection 40 mg  Every 24 hours         07/16/24 1620     IP VTE HIGH RISK PATIENT  Once         07/14/24 1802                    Discharge Planning   LELIA: 7/19/2024     Code Status: DNR   Is the patient medically ready for discharge?:     Reason for patient still in hospital (select all that apply): Patient trending condition, Treatment, and Consult recommendations  Discharge Plan A: Skilled Nursing Facility                  Franklin Najera MD  Department of Hospital Medicine   [facility]    "

## 2024-07-17 NOTE — SUBJECTIVE & OBJECTIVE
Interval History:      Review of Systems   Constitutional:  Negative for appetite change, fatigue and fever.   Respiratory:  Negative for cough, shortness of breath and wheezing.    Cardiovascular:  Negative for chest pain and leg swelling.   Gastrointestinal:  Negative for abdominal distention, abdominal pain, constipation, diarrhea, nausea and vomiting.   Skin:  Negative for color change, pallor, rash and wound.   Neurological:  Negative for tremors, syncope and headaches.   Psychiatric/Behavioral:  Negative for agitation and behavioral problems.      Objective:     Vital Signs (Most Recent):  Temp: 98 °F (36.7 °C) (07/17/24 1115)  Pulse: (!) 52 (07/17/24 1115)  Resp: 20 (07/17/24 1115)  BP: (!) 153/77 (07/17/24 1115)  SpO2: 95 % (07/17/24 0813) Vital Signs (24h Range):  Temp:  [97.6 °F (36.4 °C)-98.4 °F (36.9 °C)] 98 °F (36.7 °C)  Pulse:  [49-56] 52  Resp:  [18-20] 20  SpO2:  [95 %-99 %] 95 %  BP: (135-155)/(47-77) 153/77     Weight: 64.5 kg (142 lb 1.6 oz)  Body mass index is 21.61 kg/m².    Intake/Output Summary (Last 24 hours) at 7/17/2024 1200  Last data filed at 7/17/2024 0800  Gross per 24 hour   Intake 1920 ml   Output 825 ml   Net 1095 ml         Physical Exam  Vitals and nursing note reviewed. Exam conducted with a chaperone present.   Constitutional:       General: He is not in acute distress.     Appearance: Normal appearance. He is not ill-appearing.   Cardiovascular:      Rate and Rhythm: Normal rate and regular rhythm.      Pulses: Normal pulses.      Heart sounds: Normal heart sounds. No murmur heard.  Pulmonary:      Effort: Pulmonary effort is normal. No respiratory distress.      Breath sounds: Normal breath sounds. No wheezing, rhonchi or rales.   Abdominal:      General: Abdomen is flat.      Palpations: Abdomen is soft.      Tenderness: There is no guarding.   Musculoskeletal:         General: No swelling or tenderness. Normal range of motion.      Right lower leg: No edema.      Left lower  leg: No edema.      Comments: Amputee left bka   Skin:     General: Skin is warm and dry.      Coloration: Skin is not jaundiced or pale.   Neurological:      General: No focal deficit present.      Mental Status: He is alert and oriented to person, place, and time. Mental status is at baseline.      Cranial Nerves: No cranial nerve deficit.      Motor: No weakness.      Coordination: Coordination normal.      Gait: Gait normal.   Psychiatric:         Mood and Affect: Mood normal.         Behavior: Behavior normal.             Significant Labs: All pertinent labs within the past 24 hours have been reviewed.  CBC:   Recent Labs   Lab 07/16/24 0453 07/17/24 0449   WBC 7.12 6.21   HGB 12.2* 12.0*   HCT 36.0 36.3    176     CMP:   Recent Labs   Lab 07/16/24 0453 07/17/24 0449    134*   K 3.7 3.9    103   CO2 28 24   BUN 34* 38*   CREATININE 2.31* 1.97*   CALCIUM 9.4 9.3   ALBUMIN 4.0  --    BILITOT 0.4  --    ALKPHOS 98  --    AST 16*  --    ALT 10  --        Significant Imaging: I have reviewed all pertinent imaging results/findings within the past 24 hours.

## 2024-07-17 NOTE — ASSESSMENT & PLAN NOTE
.Patient has hypokalemia which is Acute and currently improving. Most recent potassium levels reviewed-   Lab Results   Component Value Date    K 3.9 07/17/2024   . Will continue potassium replacement per protocol and recheck repeat levels after replacement completed.     improved

## 2024-07-17 NOTE — PROGRESS NOTES
[facility]  Cardiology  Progress Note    Patient Name: Leno Thompson Jr.  MRN: [id number]  Admission Date: 7/14/2024  Hospital Length of Stay: 3 days  Code Status: DNR   Attending Physician: Franklin Najera MD   Primary Care Physician: Nancy Gutierrez, FNP-C  Expected Discharge Date: 7/19/2024  Principal Problem:Hypokalemia    Subjective:       69-year-old gentleman, personal history of anoxic brain injury, CAD, prior history of RCA vessel PCI, total occlusion, LV dysfunction, hypertension, diabetes mellitus, chronic renal disease, left lower extremity amputee, fell out of his wheelchair, brought to emergency room for evaluation.  Workup reveals a pro BNP level 3000 range.  Cardiology consulted for elevated pro BNP. He received x doses IV lasix. Renal panel worsen creatinine - lasix was d/c.  D-dimer is elevated, chest CT no evidence of pulmonary emboli.  EKG sinus rhythm with LVH.    Cr downward trend  ACEI held, Loop diuretic stopped  Intake improve  Social service help with long / short term placement        Elevated pro BNP level-multifactorial  Echo report per [facility]- 07/14/24  normal function, Grade I diastology, mild to moderate AI, mild MR, trace TR  Personal history of anoxic brain injury   CAD-prior history of RCA PCI, 100% chronic total occlusion cath 11/21  Personal history of LV dysfunction  Hypertension   Diabetes mellitus   Chronic renal disease   Prerenal azotemia  Left lower extremity amputee  Elevated D-dimer-chest CT no evidence of pulmonary emboli  Tobacco consumption            Objective:     Vital Signs (Most Recent):  Temp: 97.5 °F (36.4 °C) (07/17/24 1437)  Pulse: (!) 53 (07/17/24 1437)  Resp: 20 (07/17/24 1437)  BP: (!) 149/63 (07/17/24 1437)  SpO2: 98 % (07/17/24 1437) Vital Signs (24h Range):  Temp:  [97.5 °F (36.4 °C)-98.4 °F (36.9 °C)] 97.5 °F (36.4 °C)  Pulse:  [49-56] 53  Resp:  [18-20] 20  SpO2:  [95 %-99 %] 98 %  BP: (135-155)/(55-77) 149/63     Weight: 64.5  kg (142 lb 1.6 oz)  Body mass index is 21.61 kg/m².     SpO2: 98 %         Intake/Output Summary (Last 24 hours) at 7/17/2024 1555  Last data filed at 7/17/2024 1437  Gross per 24 hour   Intake 2340 ml   Output 875 ml   Net 1465 ml          Physical Exam  Constitutional:       General: He is not in acute distress.  HENT:      Head: Normocephalic.      Nose: No rhinorrhea.      Mouth/Throat:      Mouth: Mucous membranes are moist.   Eyes:      Pupils: Pupils are equal, round, and reactive to light.   Cardiovascular:      Rate and Rhythm: Normal rate.+ soft MIRANDA lsb + S4  Pulmonary:      Effort: Pulmonary effort is normal.      Breath sounds: Normal breath sounds.   Abdominal:      Palpations: Abdomen is soft.      Tenderness: There is no abdominal tenderness.   Musculoskeletal:      Cervical back: Normal range of motion and neck supple.      Right lower leg: No edema.   Skin:     General: Skin is warm and dry.   Neurological:      Motor: Weakness present.   Psychiatric:      Comments: Flat affect                 Significant Labs: CMP   Recent Labs   Lab 07/16/24  0453 07/17/24  0449    134*   K 3.7 3.9    103   CO2 28 24   BUN 34* 38*   CREATININE 2.31* 1.97*   CALCIUM 9.4 9.3   ALBUMIN 4.0  --    BILITOT 0.4  --    ALKPHOS 98  --    AST 16*  --    ALT 10  --     and CBC   Recent Labs   Lab 07/16/24  0453 07/17/24  0449   WBC 7.12 6.21   HGB 12.2* 12.0*   HCT 36.0 36.3    176       EKG sinus LVH   Chest CT no evidence of pulmonary emboli   Echo-per [facility] normal LV function  Portable chest x-ray no active lobar infiltrate    Assessment and Plan:          Elevated pro BNP level-multifactorial  Echo report per [facility]- 07/14/24  normal function, Grade I diastology, mild to moderate AI, mild MR, trace TR  Personal history of anoxic brain injury   CAD-prior history of RCA PCI, 100% chronic total occlusion cath 11/21  Personal history of LV dysfunction  Hypertension   Diabetes mellitus   Chronic renal disease    Prerenal azotemia  Left lower extremity amputee  Elevated D-dimer-chest CT no evidence of pulmonary emboli  Tobacco consumption         High-dose statin, aspirin, nitrates, beta blockers, ACE inhibitor-should be patient  medication regimen   - ACEI held due worsen pre renal , BB held due borderline HR  Discontinue Lasix due to worsening prerenal component  Prior Echo - reviewed  Refrain from NSAID   Judicious use of diuretic   Medical therapy  Long-term facility placement  Cessation tobacco        Quinn Bowden MD  Cardiology  [facility]

## 2024-07-17 NOTE — SUBJECTIVE & OBJECTIVE
ROS  Objective:     Vital Signs (Most Recent):  Temp: 97.5 °F (36.4 °C) (07/17/24 1437)  Pulse: (!) 53 (07/17/24 1437)  Resp: 20 (07/17/24 1437)  BP: (!) 149/63 (07/17/24 1437)  SpO2: 98 % (07/17/24 1437) Vital Signs (24h Range):  Temp:  [97.5 °F (36.4 °C)-98.4 °F (36.9 °C)] 97.5 °F (36.4 °C)  Pulse:  [49-56] 53  Resp:  [18-20] 20  SpO2:  [95 %-99 %] 98 %  BP: (135-155)/(55-77) 149/63     Weight: 64.5 kg (142 lb 1.6 oz)  Body mass index is 21.61 kg/m².     SpO2: 98 %         Intake/Output Summary (Last 24 hours) at 7/17/2024 1555  Last data filed at 7/17/2024 1437  Gross per 24 hour   Intake 2340 ml   Output 875 ml   Net 1465 ml       Lines/Drains/Airways       Peripheral Intravenous Line  Duration                  Peripheral IV - Single Lumen 07/14/24 1326 20 G Anterior;Distal;Left Upper Arm 3 days                       Physical Exam       Significant Labs: CMP   Recent Labs   Lab 07/16/24  0453 07/17/24  0449    134*   K 3.7 3.9    103   CO2 28 24   BUN 34* 38*   CREATININE 2.31* 1.97*   CALCIUM 9.4 9.3   ALBUMIN 4.0  --    BILITOT 0.4  --    ALKPHOS 98  --    AST 16*  --    ALT 10  --     and CBC   Recent Labs   Lab 07/16/24  0453 07/17/24  0449   WBC 7.12 6.21   HGB 12.2* 12.0*   HCT 36.0 36.3    176

## 2024-07-17 NOTE — ASSESSMENT & PLAN NOTE
"Patient is identified as having Diastolic (HFpEF) heart failure that is Acute on chronic. CHF is currently uncontrolled due to Dyspnea not returned to baseline after 3 doses of IV diuretic and >3 pillow orthopnea. Latest ECHO performed and demonstrates- Results for orders placed during the hospital encounter of 07/14/24    Echo    Interpretation Summary    Left Ventricle: The left ventricle is normal in size. There is normal systolic function with a visually estimated ejection fraction of 55%. There is inferobasal hypokinesis noted. Grade I diastolic dysfunction.    Right Ventricle: Normal right ventricular cavity size. Systolic function is normal.    Aortic Valve: There is mild aortic valve sclerosis. There is mild to moderate (1-2+) aortic regurgitation.    Mitral Valve: There is mild (1+) regurgitation.    Unable to determine PASP.  . Continue Beta Blocker and ACE/ARB and monitor clinical status closely. Monitor on telemetry. Patient is on CHF pathway.  Monitor strict Is&Os and daily weights.  Place on fluid restriction of 1.5 L. Cardiology has been consulted. Continue to stress to patient importance of self efficacy and  on diet for CHF. Last BNP reviewed- and noted below No results for input(s): "BNP", "BNPTRIAGEBLO" in the last 168 hours.    Cardio following   Resume BB, ACE inh when toelerated      "

## 2024-07-17 NOTE — PLAN OF CARE
07/17/24 1212   Discharge Reassessment   Assessment Type Discharge Planning Reassessment   Did the patient's condition or plan change since previous assessment? No   Discharge Plan discussed with: Patient   Communicated LELIA with patient/caregiver Date not available/Unable to determine   Discharge Plan A Skilled Nursing Facility   DME Needed Upon Discharge  none   Transition of Care Barriers None   Why the patient remains in the hospital Placement issues   Post-Acute Status   Post-Acute Authorization Placement  ([facility])   Post-Acute Placement Status Pending payor review/awaiting authorization (if required)   Coverage [employer]   Discharge Delays (!) Post-Acute Set-up

## 2024-07-18 LAB
ANION GAP SERPL CALC-SCNC: 5 MEQ/L (ref 2–13)
BUN SERPL-MCNC: 34 MG/DL (ref 7–20)
CALCIUM SERPL-MCNC: 9.1 MG/DL (ref 8.4–10.2)
CHLORIDE SERPL-SCNC: 104 MMOL/L (ref 98–110)
CO2 SERPL-SCNC: 27 MMOL/L (ref 21–32)
CREAT SERPL-MCNC: 1.95 MG/DL (ref 0.66–1.25)
CREAT/UREA NIT SERPL: 17 (ref 12–20)
GFR SERPLBLD CREATININE-BSD FMLA CKD-EPI: 37 ML/MIN/1.73/M2
GLUCOSE SERPL-MCNC: 92 MG/DL (ref 70–115)
POTASSIUM SERPL-SCNC: 4.7 MMOL/L (ref 3.5–5.1)
SODIUM SERPL-SCNC: 136 MMOL/L (ref 136–145)

## 2024-07-18 PROCEDURE — 97542 WHEELCHAIR MNGMENT TRAINING: CPT

## 2024-07-18 PROCEDURE — 36415 COLL VENOUS BLD VENIPUNCTURE: CPT | Performed by: FAMILY MEDICINE

## 2024-07-18 PROCEDURE — 63600175 PHARM REV CODE 636 W HCPCS: Performed by: INTERNAL MEDICINE

## 2024-07-18 PROCEDURE — 21400001 HC TELEMETRY ROOM

## 2024-07-18 PROCEDURE — 94761 N-INVAS EAR/PLS OXIMETRY MLT: CPT

## 2024-07-18 PROCEDURE — 97530 THERAPEUTIC ACTIVITIES: CPT

## 2024-07-18 PROCEDURE — 25000003 PHARM REV CODE 250: Performed by: FAMILY MEDICINE

## 2024-07-18 PROCEDURE — 25000003 PHARM REV CODE 250: Performed by: INTERNAL MEDICINE

## 2024-07-18 PROCEDURE — 80048 BASIC METABOLIC PNL TOTAL CA: CPT | Performed by: FAMILY MEDICINE

## 2024-07-18 RX ORDER — AMLODIPINE BESYLATE 5 MG/1
5 TABLET ORAL DAILY
Status: DISCONTINUED | OUTPATIENT
Start: 2024-07-19 | End: 2024-07-23 | Stop reason: HOSPADM

## 2024-07-18 RX ADMIN — POTASSIUM CHLORIDE 40 MEQ: 1500 TABLET, EXTENDED RELEASE ORAL at 08:07

## 2024-07-18 RX ADMIN — ISOSORBIDE MONONITRATE 30 MG: 30 TABLET, EXTENDED RELEASE ORAL at 08:07

## 2024-07-18 RX ADMIN — CLOPIDOGREL BISULFATE 75 MG: 75 TABLET ORAL at 08:07

## 2024-07-18 RX ADMIN — BUSPIRONE HYDROCHLORIDE 7.5 MG: 7.5 TABLET ORAL at 08:07

## 2024-07-18 RX ADMIN — CITALOPRAM HYDROBROMIDE 20 MG: 20 TABLET ORAL at 08:07

## 2024-07-18 RX ADMIN — ENOXAPARIN SODIUM 40 MG: 40 INJECTION SUBCUTANEOUS at 05:07

## 2024-07-18 RX ADMIN — ATORVASTATIN CALCIUM 20 MG: 20 TABLET, FILM COATED ORAL at 08:07

## 2024-07-18 RX ADMIN — EZETIMIBE 10 MG: 10 TABLET ORAL at 08:07

## 2024-07-18 NOTE — CONSULTS
Chart reviewed.  Photos reviewed.  Will continue with foam dressing treatments every 5 days.  Abrasions noted to right leg and right upper back.

## 2024-07-18 NOTE — SUBJECTIVE & OBJECTIVE
Interval History:      Review of Systems   Constitutional:  Negative for appetite change, fatigue and fever.   Respiratory:  Negative for cough, shortness of breath and wheezing.    Cardiovascular:  Negative for chest pain and leg swelling.   Gastrointestinal:  Negative for abdominal distention, abdominal pain, constipation, diarrhea, nausea and vomiting.   Skin:  Negative for color change, pallor, rash and wound.   Neurological:  Negative for tremors, syncope and headaches.   Psychiatric/Behavioral:  Negative for agitation and behavioral problems.      Objective:     Vital Signs (Most Recent):  Temp: 98 °F (36.7 °C) (07/18/24 0720)  Pulse: 62 (07/18/24 0754)  Resp: 18 (07/18/24 0754)  BP: (!) 148/65 (07/18/24 0720)  SpO2: 97 % (07/18/24 0754) Vital Signs (24h Range):  Temp:  [97.5 °F (36.4 °C)-98.3 °F (36.8 °C)] 98 °F (36.7 °C)  Pulse:  [49-62] 62  Resp:  [16-20] 18  SpO2:  [95 %-98 %] 97 %  BP: (137-155)/(57-65) 148/65     Weight: 64.5 kg (142 lb 1.6 oz)  Body mass index is 21.61 kg/m².    Intake/Output Summary (Last 24 hours) at 7/18/2024 1134  Last data filed at 7/18/2024 0826  Gross per 24 hour   Intake 1860 ml   Output 725 ml   Net 1135 ml         Physical Exam  Vitals and nursing note reviewed. Exam conducted with a chaperone present.   Constitutional:       General: He is not in acute distress.     Appearance: Normal appearance. He is not ill-appearing.   Cardiovascular:      Rate and Rhythm: Normal rate and regular rhythm.      Pulses: Normal pulses.      Heart sounds: Normal heart sounds. No murmur heard.  Pulmonary:      Effort: Pulmonary effort is normal. No respiratory distress.      Breath sounds: Normal breath sounds. No wheezing, rhonchi or rales.   Abdominal:      General: Abdomen is flat.      Palpations: Abdomen is soft.      Tenderness: There is no guarding.   Musculoskeletal:         General: No swelling or tenderness. Normal range of motion.      Right lower leg: No edema.      Left lower leg:  No edema.      Comments: Amputee left bka   Skin:     General: Skin is warm and dry.      Coloration: Skin is not jaundiced or pale.   Neurological:      General: No focal deficit present.      Mental Status: He is alert and oriented to person, place, and time. Mental status is at baseline.      Cranial Nerves: No cranial nerve deficit.      Motor: No weakness.      Coordination: Coordination normal.      Gait: Gait normal.   Psychiatric:         Mood and Affect: Mood normal.         Behavior: Behavior normal.             Significant Labs: All pertinent labs within the past 24 hours have been reviewed.  CBC:   Recent Labs   Lab 07/17/24 0449   WBC 6.21   HGB 12.0*   HCT 36.3        CMP:   Recent Labs   Lab 07/17/24 0449 07/18/24  0425   * 136   K 3.9 4.7    104   CO2 24 27   BUN 38* 34*   CREATININE 1.97* 1.95*   CALCIUM 9.3 9.1       Significant Imaging: I have reviewed all pertinent imaging results/findings within the past 24 hours.

## 2024-07-18 NOTE — PT/OT/SLP PROGRESS
"Physical Therapy Treatment    Patient Name:  Leno Thompson Jr.   MRN:  [id number]    Recommendations:     Discharge Recommendations: Moderate Intensity Therapy  Discharge Equipment Recommendations:    Barriers to discharge:  current medical status    Assessment:     Leno Thompson Jr. is a 69 y.o. male admitted with a medical diagnosis of Hypokalemia.  He presents with the following impairments/functional limitations: weakness, impaired self care skills, impaired functional mobility, gait instability, impaired balance     Pt found with HOB elevated. He is agreeable to PT tx and requesting to go smoke. Pt completed supine to sit, min A. Bed to WC transfer completed, SBA using stand pivot approach. Wheeled pt downstairs to smoking area. After smoking, patient wheeled himself approximately 75 feet on even tile using BUE, SPV/SBA. Pt completed WC to bed transfer, SBA. Sit to supine completed, min A. Scooting to HOB completed, min A. Pt left with HOB elevated and all needs met.     Rehab Prognosis: Fair; patient would benefit from acute skilled PT services to address these deficits and reach maximum level of function.    Recent Surgery: * No surgery found *      Plan:     During this hospitalization, patient to be seen 5 x/week (5-6x/week, 1-2x/day) to address the identified rehab impairments via gait training, therapeutic activities, therapeutic exercises and progress toward the following goals:    Plan of Care Expires:  08/15/24    Subjective     Chief Complaint: "I wish I would hear back from the rehab lady"  Patient/Family Comments/goals: to get into SNF  Pain/Comfort:         Objective:     Communicated with nursing prior to session.  Patient found HOB elevated with bed alarm, telemetry, pulse ox (continuous) upon PT entry to room.     General Precautions: Standard, fall  Orthopedic Precautions:    Braces:    Respiratory Status: Room air     Functional Mobility:  Bed Mobility:     Scooting: minimum " assistance  Supine to Sit: minimum assistance  Sit to Supine: minimum assistance  Transfers:     Bed to Chair: contact guard assistance with  no AD  using  Stand Pivot  Wheelchair Propulsion:  Pt propelled Standard wheelchair x 75 feet on Level tile with  Bilateral upper extremity with Supervision or Set-up Assistance and Stand-by Assistance.       AM-PAC 6 CLICK MOBILITY          Treatment & Education:  See above    Patient left HOB elevated with all lines intact, call button in reach, bed alarm on, and nurse notified..    GOALS:   Multidisciplinary Problems       Physical Therapy Goals          Problem: Physical Therapy    Goal Priority Disciplines Outcome Goal Variances Interventions   Physical Therapy Goal     PT, PT/OT Progressing     Description: Goals to be met by: discharge     Patient will increase functional independence with mobility by performin. Supine to sit with Contact Guard Assistance  2. Sit to stand transfer with Moderate Assistance and Maximum Assistance  3. Bed to chair transfer with Moderate Assistance using Rolling Walker                         Time Tracking:     PT Received On: 24  PT Start Time: 1040     PT Stop Time: 1115  PT Total Time (min): 35 min     Billable Minutes: Therapeutic Activity 20 and Train/Wheelchair Management 15    Treatment Type: Treatment  PT/PTA: PT           2024

## 2024-07-18 NOTE — SUBJECTIVE & OBJECTIVE
ROS  Objective:     Vital Signs (Most Recent):  Temp: 97.2 °F (36.2 °C) (07/18/24 1530)  Pulse: (!) 58 (07/18/24 1530)  Resp: 20 (07/18/24 1530)  BP: (!) 130/55 (07/18/24 1530)  SpO2: 98 % (07/18/24 1530) Vital Signs (24h Range):  Temp:  [97.2 °F (36.2 °C)-98.3 °F (36.8 °C)] 97.2 °F (36.2 °C)  Pulse:  [49-62] 58  Resp:  [16-20] 20  SpO2:  [95 %-98 %] 98 %  BP: (114-155)/(55-65) 130/55     Weight: 64.5 kg (142 lb 1.6 oz)  Body mass index is 21.61 kg/m².     SpO2: 98 %         Intake/Output Summary (Last 24 hours) at 7/18/2024 1718  Last data filed at 7/18/2024 1311  Gross per 24 hour   Intake 1680 ml   Output 450 ml   Net 1230 ml       Lines/Drains/Airways       Peripheral Intravenous Line  Duration                  Peripheral IV - Single Lumen 07/14/24 1326 20 G Anterior;Distal;Left Upper Arm 4 days                       Physical Exam       Significant Labs: CMP   Recent Labs   Lab 07/17/24  0449 07/18/24  0425   * 136   K 3.9 4.7    104   CO2 24 27   BUN 38* 34*   CREATININE 1.97* 1.95*   CALCIUM 9.3 9.1    and CBC   Recent Labs   Lab 07/17/24  0449   WBC 6.21   HGB 12.0*   HCT 36.3

## 2024-07-18 NOTE — PROGRESS NOTES
"[facility] Medicine  Progress Note    Patient Name: Leno Thompson Jr.  MRN: [id number]  Patient Class: IP- Inpatient   Admission Date: 7/14/2024  Length of Stay: 4 days  Attending Physician: Franklin Najera MD  Primary Care Provider: Nancy Gutierrez FNP-C        Subjective:     Principal Problem:Hypokalemia        HPI:  Fall     Weakness       Pt presented to ED per EMS with c/o FALL  and laceration to right lower leg. Pt denies hitting head. Pt c/o  chronic shoulder pain and neck pain. Pt reports he is unable to care for himself and currently living in a hotel. Pt was extremely disheveled on arrival. Pt stated "I dont have anyone to care for me, I've been staying at a hotel and constantly falling".         HPI:   69 year old man with chronic complex medical history that involves previous anoxic brain injury that has left him with difficulty speaking, HTN, HLD, previous left amputation, CAD presented with complaints of a fall. Denies hitting head. Patient is wheelchair bound. Patient has complicated social situation as currently living in a hotel. Patient has no one to take care of him. Currently has no ride home     On arrival to ED BNP found to be elevated. Ddimer elevated, subsequent CTA revealed no PE. CXR showed minor pleural effusion. Admitted for CHF    Overview/Hospital Course:  07/15/2024 pt admitted with CHF, h/o anoxic brain injury, falling at home apparently fell out of his wheelchair and could not get up.  He has been living in a hotel and cannot take care of himself.  07/16/2024 Cr up significantly overnight on iv lasix 1.7 to 2.30  Echocardiogram EF 55%  Overall feels better today  07/17/2024  Lab improving   Awaiting placement  07/18/2024  No new c/o  Awaiting placement     Interval History:      Review of Systems   Constitutional:  Negative for appetite change, fatigue and fever.   Respiratory:  Negative for cough, shortness of breath and wheezing.  "   Cardiovascular:  Negative for chest pain and leg swelling.   Gastrointestinal:  Negative for abdominal distention, abdominal pain, constipation, diarrhea, nausea and vomiting.   Skin:  Negative for color change, pallor, rash and wound.   Neurological:  Negative for tremors, syncope and headaches.   Psychiatric/Behavioral:  Negative for agitation and behavioral problems.      Objective:     Vital Signs (Most Recent):  Temp: 98 °F (36.7 °C) (07/18/24 0720)  Pulse: 62 (07/18/24 0754)  Resp: 18 (07/18/24 0754)  BP: (!) 148/65 (07/18/24 0720)  SpO2: 97 % (07/18/24 0754) Vital Signs (24h Range):  Temp:  [97.5 °F (36.4 °C)-98.3 °F (36.8 °C)] 98 °F (36.7 °C)  Pulse:  [49-62] 62  Resp:  [16-20] 18  SpO2:  [95 %-98 %] 97 %  BP: (137-155)/(57-65) 148/65     Weight: 64.5 kg (142 lb 1.6 oz)  Body mass index is 21.61 kg/m².    Intake/Output Summary (Last 24 hours) at 7/18/2024 1134  Last data filed at 7/18/2024 0826  Gross per 24 hour   Intake 1860 ml   Output 725 ml   Net 1135 ml         Physical Exam  Vitals and nursing note reviewed. Exam conducted with a chaperone present.   Constitutional:       General: He is not in acute distress.     Appearance: Normal appearance. He is not ill-appearing.   Cardiovascular:      Rate and Rhythm: Normal rate and regular rhythm.      Pulses: Normal pulses.      Heart sounds: Normal heart sounds. No murmur heard.  Pulmonary:      Effort: Pulmonary effort is normal. No respiratory distress.      Breath sounds: Normal breath sounds. No wheezing, rhonchi or rales.   Abdominal:      General: Abdomen is flat.      Palpations: Abdomen is soft.      Tenderness: There is no guarding.   Musculoskeletal:         General: No swelling or tenderness. Normal range of motion.      Right lower leg: No edema.      Left lower leg: No edema.      Comments: Amputee left bka   Skin:     General: Skin is warm and dry.      Coloration: Skin is not jaundiced or pale.   Neurological:      General: No focal deficit  present.      Mental Status: He is alert and oriented to person, place, and time. Mental status is at baseline.      Cranial Nerves: No cranial nerve deficit.      Motor: No weakness.      Coordination: Coordination normal.      Gait: Gait normal.   Psychiatric:         Mood and Affect: Mood normal.         Behavior: Behavior normal.             Significant Labs: All pertinent labs within the past 24 hours have been reviewed.  CBC:   Recent Labs   Lab 07/17/24  0449   WBC 6.21   HGB 12.0*   HCT 36.3        CMP:   Recent Labs   Lab 07/17/24 0449 07/18/24  0425   * 136   K 3.9 4.7    104   CO2 24 27   BUN 38* 34*   CREATININE 1.97* 1.95*   CALCIUM 9.3 9.1       Significant Imaging: I have reviewed all pertinent imaging results/findings within the past 24 hours.    Assessment/Plan:      * Hypokalemia  .Patient has hypokalemia which is Acute and currently improving. Most recent potassium levels reviewed-   Lab Results   Component Value Date    K 3.9 07/17/2024   . Will continue potassium replacement per protocol and recheck repeat levels after replacement completed.     improved    Stage 3a chronic kidney disease  Creatine stable for now. BMP reviewed- noted Estimated Creatinine Clearance: 36.6 mL/min (A) (based on SCr of 1.7 mg/dL (H)). according to latest data. Based on current GFR, CKD stage is stage 3 - GFR 30-59.  Monitor UOP and serial BMP and adjust therapy as needed. Renally dose meds. Avoid nephrotoxic medications and procedures.    CHF (congestive heart failure)  Patient is identified as having Diastolic (HFpEF) heart failure that is Acute on chronic. CHF is currently uncontrolled due to Dyspnea not returned to baseline after 3 doses of IV diuretic and >3 pillow orthopnea. Latest ECHO performed and demonstrates- Results for orders placed during the hospital encounter of 07/14/24    Echo    Interpretation Summary    Left Ventricle: The left ventricle is normal in size. There is normal  "systolic function with a visually estimated ejection fraction of 55%. There is inferobasal hypokinesis noted. Grade I diastolic dysfunction.    Right Ventricle: Normal right ventricular cavity size. Systolic function is normal.    Aortic Valve: There is mild aortic valve sclerosis. There is mild to moderate (1-2+) aortic regurgitation.    Mitral Valve: There is mild (1+) regurgitation.    Unable to determine PASP.  . Continue Beta Blocker and ACE/ARB and monitor clinical status closely. Monitor on telemetry. Patient is on CHF pathway.  Monitor strict Is&Os and daily weights.  Place on fluid restriction of 1.5 L. Cardiology has been consulted. Continue to stress to patient importance of self efficacy and  on diet for CHF. Last BNP reviewed- and noted below No results for input(s): "BNP", "BNPTRIAGEBLO" in the last 168 hours.    Cardio following   Resume BB, ACE inh when toelerated        Debility  Patient with Acute on chronic debility due to chronic unspecified fatigue and other reduced mobility. Latest AMPAC and GEMS scores have been reviewed. Evaluation for etiology is underway. Plan includes PT/OT consulted.      VTE Risk Mitigation (From admission, onward)           Ordered     enoxaparin injection 40 mg  Every 24 hours         07/16/24 1620     IP VTE HIGH RISK PATIENT  Once         07/14/24 1802                    Discharge Planning   LELIA: 7/19/2024     Code Status: DNR   Is the patient medically ready for discharge?:     Reason for patient still in hospital (select all that apply): Treatment, PT / OT recommendations, and Pending disposition  Discharge Plan A: Skilled Nursing Facility   Discharge Delays: (!) Post-Acute Set-up              Franklin Najera MD  Department of Hospital Medicine   [facility]    "

## 2024-07-18 NOTE — PLAN OF CARE
Problem: Adult Inpatient Plan of Care  Goal: Plan of Care Review  Outcome: Progressing  Goal: Patient-Specific Goal (Individualized)  Outcome: Progressing  Goal: Absence of Hospital-Acquired Illness or Injury  Outcome: Progressing  Goal: Optimal Comfort and Wellbeing  Outcome: Progressing  Goal: Readiness for Transition of Care  Outcome: Progressing     Problem: Diabetes Comorbidity  Goal: Blood Glucose Level Within Targeted Range  Outcome: Progressing     Problem: Activity Intolerance  Goal: Enhanced Capacity and Energy  Outcome: Progressing     Problem: Skin Injury Risk Increased  Goal: Skin Health and Integrity  Outcome: Progressing     Problem: Fall Injury Risk  Goal: Absence of Fall and Fall-Related Injury  Outcome: Progressing     Problem: Wound  Goal: Optimal Coping  Outcome: Progressing  Goal: Optimal Functional Ability  Outcome: Progressing  Goal: Absence of Infection Signs and Symptoms  Outcome: Progressing  Goal: Improved Oral Intake  Outcome: Progressing  Goal: Optimal Pain Control and Function  Outcome: Progressing  Goal: Skin Health and Integrity  Outcome: Progressing  Goal: Optimal Wound Healing  Outcome: Progressing    Uneventful night;had another bm earlier on this shift;balance is off when sitting unassisted;pivots to get on bsc;awaiting insurance approval for him to go to nursing home snf;meds administered as ordered per md

## 2024-07-18 NOTE — PROGRESS NOTES
[facility]  Cardiology  Progress Note    Patient Name: Leno Thompson Jr.  MRN: [id number]  Admission Date: 7/14/2024  Hospital Length of Stay: 4 days  Code Status: DNR   Attending Physician: Franklin Najera MD   Primary Care Physician: Nancy Gutierrez, FNP-C  Expected Discharge Date: 7/19/2024  Principal Problem:Hypokalemia    Subjective:       69-year-old gentleman, personal history of anoxic brain injury, CAD, prior history of RCA vessel PCI, total occlusion, LV dysfunction, hypertension, diabetes mellitus, chronic renal disease, left lower extremity amputee, fell out of his wheelchair, brought to emergency room for evaluation.  Workup reveals a pro BNP level 3000 range.  Cardiology consulted for elevated pro BNP. He received x doses IV lasix. Renal panel worsen creatinine - lasix was d/c.  D-dimer is elevated, chest CT no evidence of pulmonary emboli.  EKG sinus rhythm with LVH.     Cr stable   ACEI held, Loop diuretic stopped  Intake improve  Social service help with long / short term placement        Elevated pro BNP level-multifactorial  Echo report per [facility]- 07/14/24  normal function, Grade I diastology, mild to moderate AI, mild MR, trace TR  Personal history of anoxic brain injury   CAD-prior history of RCA PCI, 100% chronic total occlusion cath 11/21  Personal history of LV dysfunction  Hypertension   Diabetes mellitus   Chronic renal disease   Prerenal azotemia  Left lower extremity amputee  Elevated D-dimer-chest CT no evidence of pulmonary emboli  Tobacco consumption            Objective:     Vital Signs (Most Recent):  Temp: 97.2 °F (36.2 °C) (07/18/24 1530)  Pulse: (!) 58 (07/18/24 1530)  Resp: 20 (07/18/24 1530)  BP: (!) 130/55 (07/18/24 1530)  SpO2: 98 % (07/18/24 1530) Vital Signs (24h Range):  Temp:  [97.2 °F (36.2 °C)-98.3 °F (36.8 °C)] 97.2 °F (36.2 °C)  Pulse:  [49-62] 58  Resp:  [16-20] 20  SpO2:  [95 %-98 %] 98 %  BP: (114-155)/(55-65) 130/55     Weight: 64.5 kg  (142 lb 1.6 oz)  Body mass index is 21.61 kg/m².     SpO2: 98 %            Physical Exam  Constitutional:       General: He is not in acute distress.  HENT:      Head: Normocephalic.      Nose: No rhinorrhea.      Mouth/Throat:      Mouth: Mucous membranes are moist.   Eyes:      Pupils: Pupils are equal, round, and reactive to light.   Cardiovascular:      Rate and Rhythm: Normal rate.+ soft MIRANDA lsb + S4  Pulmonary:      Effort: Pulmonary effort is normal.      Breath sounds: Normal breath sounds.   Abdominal:      Palpations: Abdomen is soft.      Tenderness: There is no abdominal tenderness.   Musculoskeletal:      Cervical back: Normal range of motion and neck supple.      Right lower leg: No edema.   Skin:     General: Skin is warm and dry.   Neurological:      Motor: Weakness present.   Psychiatric:      Comments: Flat affect        Significant Labs: CMP   Recent Labs   Lab 07/17/24  0449 07/18/24  0425   * 136   K 3.9 4.7    104   CO2 24 27   BUN 38* 34*   CREATININE 1.97* 1.95*   CALCIUM 9.3 9.1    and CBC   Recent Labs   Lab 07/17/24  0449   WBC 6.21   HGB 12.0*   HCT 36.3            EKG sinus LVH   Chest CT no evidence of pulmonary emboli   Echo-per [facility] normal LV function  Portable chest x-ray no active lobar infiltrate  Assessment and Plan:            Elevated pro BNP level-multifactorial  Echo report per [facility]- 07/14/24  normal function, Grade I diastology, mild to moderate AI, mild MR, trace TR  Personal history of anoxic brain injury   CAD-prior history of RCA PCI, 100% chronic total occlusion cath 11/21  Personal history of LV dysfunction  Hypertension   Diabetes mellitus   Chronic renal disease   Prerenal azotemia  Left lower extremity amputee  Elevated D-dimer-chest CT no evidence of pulmonary emboli  Tobacco consumption         High-dose statin, aspirin, nitrates, beta blockers, ACE inhibitor-should be patient  medication regimen   - ACEI held due worsen pre renal , Cr 2.0 range,   BB held due bradycardia  Discontinue Lasix due to worsening prerenal component  Add Amlodipine 5mg daily   Prior Echo - reviewed  Refrain from NSAID   Judicious use of diuretic   Medical therapy  Long-term facility placement  Cessation tobacco           Quinn Bowden MD  Cardiology  [facility]

## 2024-07-19 LAB
ANION GAP SERPL CALC-SCNC: 6 MEQ/L (ref 2–13)
BUN SERPL-MCNC: 30 MG/DL (ref 7–20)
CALCIUM SERPL-MCNC: 9.4 MG/DL (ref 8.4–10.2)
CHLORIDE SERPL-SCNC: 104 MMOL/L (ref 98–110)
CO2 SERPL-SCNC: 22 MMOL/L (ref 21–32)
CREAT SERPL-MCNC: 1.63 MG/DL (ref 0.66–1.25)
CREAT/UREA NIT SERPL: 18 (ref 12–20)
GFR SERPLBLD CREATININE-BSD FMLA CKD-EPI: 46 ML/MIN/1.73/M2
GLUCOSE SERPL-MCNC: 95 MG/DL (ref 70–115)
POTASSIUM SERPL-SCNC: 5 MMOL/L (ref 3.5–5.1)
SODIUM SERPL-SCNC: 132 MMOL/L (ref 136–145)

## 2024-07-19 PROCEDURE — 94761 N-INVAS EAR/PLS OXIMETRY MLT: CPT

## 2024-07-19 PROCEDURE — 25000003 PHARM REV CODE 250: Performed by: INTERNAL MEDICINE

## 2024-07-19 PROCEDURE — 36415 COLL VENOUS BLD VENIPUNCTURE: CPT | Performed by: FAMILY MEDICINE

## 2024-07-19 PROCEDURE — 97116 GAIT TRAINING THERAPY: CPT

## 2024-07-19 PROCEDURE — 21400001 HC TELEMETRY ROOM

## 2024-07-19 PROCEDURE — 25000003 PHARM REV CODE 250: Performed by: FAMILY MEDICINE

## 2024-07-19 PROCEDURE — 63600175 PHARM REV CODE 636 W HCPCS: Performed by: INTERNAL MEDICINE

## 2024-07-19 PROCEDURE — 80048 BASIC METABOLIC PNL TOTAL CA: CPT | Performed by: FAMILY MEDICINE

## 2024-07-19 RX ADMIN — ISOSORBIDE MONONITRATE 30 MG: 30 TABLET, EXTENDED RELEASE ORAL at 08:07

## 2024-07-19 RX ADMIN — ATORVASTATIN CALCIUM 20 MG: 20 TABLET, FILM COATED ORAL at 08:07

## 2024-07-19 RX ADMIN — ENOXAPARIN SODIUM 40 MG: 40 INJECTION SUBCUTANEOUS at 04:07

## 2024-07-19 RX ADMIN — AMLODIPINE BESYLATE 5 MG: 5 TABLET ORAL at 08:07

## 2024-07-19 RX ADMIN — POTASSIUM CHLORIDE 40 MEQ: 1500 TABLET, EXTENDED RELEASE ORAL at 08:07

## 2024-07-19 RX ADMIN — CLOPIDOGREL BISULFATE 75 MG: 75 TABLET ORAL at 08:07

## 2024-07-19 RX ADMIN — BUSPIRONE HYDROCHLORIDE 7.5 MG: 7.5 TABLET ORAL at 08:07

## 2024-07-19 RX ADMIN — EZETIMIBE 10 MG: 10 TABLET ORAL at 08:07

## 2024-07-19 RX ADMIN — BUSPIRONE HYDROCHLORIDE 7.5 MG: 7.5 TABLET ORAL at 09:07

## 2024-07-19 RX ADMIN — CITALOPRAM HYDROBROMIDE 20 MG: 20 TABLET ORAL at 08:07

## 2024-07-19 RX ADMIN — POLYETHYLENE GLYCOL 3350 17 G: 17 POWDER, FOR SOLUTION ORAL at 08:07

## 2024-07-19 NOTE — PT/OT/SLP PROGRESS
Physical Therapy Treatment    Patient Name:  Leno Thompson Jr.   MRN:  [id number]    Recommendations:     Discharge Recommendations: Moderate Intensity Therapy  Discharge Equipment Recommendations:    Barriers to discharge:  current medical status    Assessment:     Leno Thompson Jr. is a 69 y.o. male admitted with a medical diagnosis of Hypokalemia.  He presents with the following impairments/functional limitations: weakness, impaired self care skills, impaired functional mobility, gait instability, impaired balance     Pt found with HOB elevated. He is agreeable to PT tx this morning. Pt completed supine to sit, min A. Pt able to don LLE prosthesis independently. Sit to stand completed, min A with RW. Gait training completed x20 feet with RW, min/mod A for safety. Pt then completed sit to supine, min A, and scooting to HOB, min A. Pt left with HOB elevated and all needs met.    Rehab Prognosis: Fair; patient would benefit from acute skilled PT services to address these deficits and reach maximum level of function.    Recent Surgery: * No surgery found *      Plan:     During this hospitalization, patient to be seen 5 x/week (5-6x/week, 1-2x/day) to address the identified rehab impairments via gait training, therapeutic activities, therapeutic exercises and progress toward the following goals:    Plan of Care Expires:  08/15/24    Subjective     Chief Complaint: none currently verbalized  Patient/Family Comments/goals: to get stronger  Pain/Comfort:         Objective:     Communicated with nursing prior to session.  Patient found HOB elevated with bed alarm, telemetry, pulse ox (continuous) upon PT entry to room.     General Precautions: Standard, fall  Orthopedic Precautions:    Braces:    Respiratory Status: Room air     Functional Mobility:  Bed Mobility:     Scooting: minimum assistance  Supine to Sit: minimum assistance  Sit to Supine: minimum assistance  Transfers:     Sit to Stand:  minimum assistance with  rolling walker  Gait: 20 feet with RW, min/mod A for safety      AM-PAC 6 CLICK MOBILITY          Treatment & Education:  See above    Patient left HOB elevated with all lines intact, call button in reach, bed alarm on, and nurse notified..    GOALS:   Multidisciplinary Problems       Physical Therapy Goals          Problem: Physical Therapy    Goal Priority Disciplines Outcome Goal Variances Interventions   Physical Therapy Goal     PT, PT/OT Progressing     Description: Goals to be met by: discharge     Patient will increase functional independence with mobility by performin. Supine to sit with Contact Guard Assistance  2. Sit to stand transfer with Moderate Assistance and Maximum Assistance  3. Bed to chair transfer with Moderate Assistance using Rolling Walker                         Time Tracking:     PT Received On: 24  PT Start Time: 1045     PT Stop Time: 1100  PT Total Time (min): 15 min     Billable Minutes: Gait Training 15    Treatment Type: Treatment  PT/PTA: PT           2024

## 2024-07-19 NOTE — PLAN OF CARE
Son here to visit patient. NH discharge is pending patient's financial information. Son instructed to obtain banking statement for [facility]

## 2024-07-19 NOTE — PROGRESS NOTES
[facility]  [facility] Note    Patient Name: Leno Thompson Jr.  MRN: [id number]  Admission Date: 7/14/2024  Hospital Length of Stay: 5 days  Code Status: DNR   Attending Physician: Franklin Najera MD   Primary Care Physician: Nancy Gutierrez, FNP-C  Expected Discharge Date: 7/22/2024  Principal Problem:Hypokalemia    Subjective:       69-year-old gentleman, personal history of anoxic brain injury, CAD, prior history of RCA vessel PCI, total occlusion, LV dysfunction, hypertension, diabetes mellitus, chronic renal disease, left lower extremity amputee, fell out of his wheelchair, brought to emergency room for evaluation.  Workup reveals a pro BNP level 3000 range.  Cardiology consulted for elevated pro BNP. He received x doses IV lasix. Renal panel worsen creatinine - lasix was d/c.  D-dimer is elevated, chest CT no evidence of pulmonary emboli.  EKG sinus rhythm with LVH.     Cr stable   ACEI held, Loop diuretic stopped  Intake improve  Social service help with long / short term placement  CCB added- BP better controll        Elevated pro BNP level-multifactorial  Echo report per [facility]- 07/14/24  normal function, Grade I diastology, mild to moderate AI, mild MR, trace TR  Personal history of anoxic brain injury   CAD-prior history of RCA PCI, 100% chronic total occlusion cath 11/21  Personal history of LV dysfunction  Hypertension   Diabetes mellitus   Chronic renal disease   Prerenal azotemia  Left lower extremity amputee  Elevated D-dimer-chest CT no evidence of pulmonary emboli  Tobacco consumption            Objective:     Vital Signs (Most Recent):  Temp: 98.1 °F (36.7 °C) (07/19/24 1101)  Pulse: 60 (07/19/24 1101)  Resp: 18 (07/19/24 1101)  BP: (!) 133/58 (07/19/24 1101)  SpO2: 96 % (07/19/24 1101) Vital Signs (24h Range):  Temp:  [97.2 °F (36.2 °C)-98.6 °F (37 °C)] 98.1 °F (36.7 °C)  Pulse:  [56-68] 60  Resp:  [17-20] 18  SpO2:  [94 %-98 %] 96 %  BP: (130-174)/(55-92)  "133/58     Weight: 62.7 kg (138 lb 3.7 oz)  Body mass index is 21.02 kg/m².     SpO2: 96 %                Physical Exam  Constitutional:       General: He is not in acute distress.  HENT:      Head: Normocephalic.      Nose: No rhinorrhea.      Mouth/Throat:      Mouth: Mucous membranes are moist.   Eyes:      Pupils: Pupils are equal, round, and reactive to light.   Cardiovascular:      Rate and Rhythm: Normal rate.+ soft MIRANDA lsb + S4  Pulmonary:      Effort: Pulmonary effort is normal.      Breath sounds: Normal breath sounds.   Abdominal:      Palpations: Abdomen is soft.      Tenderness: There is no abdominal tenderness.   Musculoskeletal:      Cervical back: Normal range of motion and neck supple.      Right lower leg: No edema.   Skin:     General: Skin is warm and dry.   Neurological:      Motor: Weakness present.   Psychiatric:      Comments: Flat affect              Significant Labs: CMP   Recent Labs   Lab 07/18/24  0425 07/19/24  0412    132*   K 4.7 5.0    104   CO2 27 22   BUN 34* 30*   CREATININE 1.95* 1.63*   CALCIUM 9.1 9.4    and CBC No results for input(s): "WBC", "HGB", "HCT", "PLT" in the last 48 hours.      EKG sinus LVH   Chest CT no evidence of pulmonary emboli   Echo-per [facility] normal LV function  Portable chest x-ray no active lobar infiltrate    Assessment and Plan:          Elevated pro BNP level-multifactorial  Echo report per [facility]- 07/14/24  normal function, Grade I diastology, mild to moderate AI, mild MR, trace TR  Personal history of anoxic brain injury   CAD-prior history of RCA PCI, 100% chronic total occlusion cath 11/21  Personal history of LV dysfunction  Hypertension   Diabetes mellitus   Chronic renal disease   Prerenal azotemia  Left lower extremity amputee  Elevated D-dimer-chest CT no evidence of pulmonary emboli  Tobacco consumption        - ACEI held due worsen pre renal , Cr 2.0 range,  BB held due bradycardia  Discontinue Lasix due to worsening prerenal " component  Added Amlodipine 5mg daily -BP better  Prior Echo - reviewed  Refrain from NSAID   Judicious use of diuretic   Medical therapy  Long-term facility placement  Cessation tobacco            Quinn Bowden MD  Cardiology  [facility]/Surg

## 2024-07-19 NOTE — PLAN OF CARE
Problem: Adult Inpatient Plan of Care  Goal: Plan of Care Review  Outcome: Progressing  Goal: Patient-Specific Goal (Individualized)  Outcome: Progressing  Goal: Absence of Hospital-Acquired Illness or Injury  Outcome: Progressing  Goal: Optimal Comfort and Wellbeing  Outcome: Progressing  Goal: Readiness for Transition of Care  Outcome: Progressing     Problem: Diabetes Comorbidity  Goal: Blood Glucose Level Within Targeted Range  Outcome: Progressing     Problem: Activity Intolerance  Goal: Enhanced Capacity and Energy  Outcome: Progressing     Problem: Skin Injury Risk Increased  Goal: Skin Health and Integrity  Outcome: Progressing     Problem: Fall Injury Risk  Goal: Absence of Fall and Fall-Related Injury  Outcome: Progressing     Problem: Wound  Goal: Optimal Coping  Outcome: Progressing  Goal: Optimal Functional Ability  Outcome: Progressing  Goal: Absence of Infection Signs and Symptoms  Outcome: Progressing  Goal: Improved Oral Intake  Outcome: Progressing  Goal: Optimal Pain Control and Function  Outcome: Progressing  Goal: Skin Health and Integrity  Outcome: Progressing  Goal: Optimal Wound Healing  Outcome: Progressing    Remains sb on telemetry;no complaints;awaiting placement;meds administered as ordered per md

## 2024-07-19 NOTE — PLAN OF CARE
[facility]    Transplant Infectious Diseases Inpatient Progress note      Kecia Blue MRN# [id number]   YOB: 1960 Age: 63 year old   Date of Admission: 6/18/2024  1:47 PM  Transplant: 3/1/2018 (Lung), Postoperative day: 2310            Recommendations:   No ID changes today.   Please check blood cultures x2 if recurrent fever to 100.4F or greater  Continue current antimicrobials:  IV ceftazidime, equivalent to 2 gm q8 hr but adjust per renal function (effective therapy for UTI started 6/21/21). We should be able to discontinue soon.   IV minocycline 100 mg q12 hrs (started 6/20/24).   PO linezolid 600 mg q12 hrs (effective therapy for UTI started 6/21/24 and for pneumonia 6/25/24)  IV micafungin 150 mg daily (started 6/25/24).   PO posaconazole 300 mg daily (PTA)  On azithromycin per transplant pulm. Valcyte and Bactrim ppx.   Await pending cultures. No further respiratory cultures necessary from ID standpoint unless decompensation.        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 Was found to have mucus plugs on bronchoscopy 6/19/24 followed by rigid bronchoscopy with stents placement on the right side with RMB and BI stenosis with purulent secretions. Further repeat bronchoscopies due to persistent septic shock and respiratory failure revealed mucus plugs.  Rec'd call from [facility] stating they unable to meet pt's needs.           Active Problems and Infectious Diseases Issues:   Acute hypercapnic and hypoxic respiratory failure, intubated 6/18/24  Shock, requiring 1-2 pressors - likely septic from respiratory source  Multilobar pneumonia, bilateral  Right mainstem bronchial stenosis requiring dilations (dilated 2/15/24, s/p 6/20/24 dilation with stent placement)  Airways colonized with ASE/VSE faecalis, VRE faecium, S maltophilia, and now filamentous fungus and Candida guilliermondii.   It is difficult to ascertain which of these pathogens is/are responsible for pneumonia vs likely airway colonizers. Will treat as above, covering all organisms at present due to shock.   It is possible that the bronchoscopy with dilation stirred the pathogens colonizing the airways and resulted in septic shock. Stent likely colonized. Respiratory status remains stable - though still on full vent support. Also on stress dose steroids for possible ARDS, further complicated by volume overload with limited ability to pull fluids on CRRT d/t pressor requirements and Afib.           Pyuria and bacteriuria in HD patient.   Urine culture with E. Coli x2, VRE faecium . The significance of these pathogens in this HD patient is not clear. Due to septic shock we are treating.   Daptomycin changed to linezolid on 6/24/24 in order to also cover the newly identified E faecium in the lungs.   Zosyn --> meropenem --> now to ceftazidime since E coli is not ESBL.        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   Now with positive A galactomannan in one of two BALs, with filamentous fungus growing (6/19/24 BAL only) while on therapeutic posaconazole.   Will add micafungin pending identification. 6/21/24 BAL with negative A galactomannan and no fungal growth to date.   6/24/24 Sputum with Candida guilliermondii is airway colonizer, though is covered on current antifungal regimen.    CMV viremia  Low positive 39 on 6/25, not unexpected at  present in setting of acute illness. She is on Valcyte prophylaxis since 6/24/24 with steroid use. Continue to monitor.         Old Problems and Infectious Diseases Issues:   EBV viremia: elevated to 960k in 10/2021 - due to recent hospitalization, health stress at that time. Decreased to 135k 2/2022. Neg 6/27/22. Increased to 1 million in 11/2023 s/p 1 dose of rituximab and decreasing/undetected in 05/2024. Negative EBV 6/19/24.  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 BAL showed septate hyphae. 7/2020 had hypodense mass in left lung with biopsy showing fungal hyphae, cx aspergillus. Started on voriconazole in 2019, changed to posaconazole in 2020, she remains on 300 mg daily.  1/2021 BAL cx with steno: tx ceftazidime for 2 weeks.  1/2021-2/2021 - ARDS due to PJP pneumonia (had stopped TMP-SMX due to side effects). Recovery from this required tracheostomy.  2019 - She had also grown Actinomyces from multiple BAL    Other Infectious Disease issues include:  - QTc interval:  450 msec on 6/18/24  - Bacterial prophylaxis:  azithromycin per Tx pulm  - Pneumocystis prophylaxis:  Bactrim for life (hx PJP)  - Viral serostatus & prophylaxis: CMV D+/R+, EBV D+/R+   - Fungal prophylaxis:  posaconazole  - Immunization status:  Seasonal influenza, RSV, and COVID vaccine UTD  - Gamma globulin status:  979 on 10/27/23  - Isolation status: contact for VRE      Attestation:  Total duration of visit including chart review, reviewing labs and imaging, interviewing and examining the patient, documentation, and sending communication to the patient and to the primary treating team, all at the same day of this encounter, is: 55 minutes.    Adrianna Zamarripa MD  [facility]  Contact information available via [facility] Paging/Directory or [email or web address]    06/27/2024      Interim History and Events:   Still intubated.   Still on pressors.   On CRRT.   Alert, nods head.  Nodded no for pain.     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      History obtained from chart review, patient's , and patient. Reported increased lethargy reported for 2-3 days PTA, poor PO intake, more tired, and weak cough. Unable to bring up sputum. Denied fever, chills, urinary symptoms (on HD), diarrhea, abdominal pain at home. Her and  both reported sinus congestion recently,  improved quickly and Kecia declined. Denies sore throat, headache. Afebrile and without leukocytosis on admission. WBC 4.0, ESR 10, lactic 0.6, and CRP ~8. Put on BIPAP given respiratory acidosis with some improvement, however unable to tolerate off BIPAP and intubated for failure to improve hypercapnia. Started on azithromycin, vancomycin, and zosyn. Negative: RVP, flu/covid/rsv, MRSA nares, Cryptococcus antigen. Pending: EBV, CMV, blood culture, fungitell, Histo Ag, AGM, and sputum culture     CT chest-hi res on 6/18/24 notable for slight decreased confluent consolidative opacities in right lower lobe with more micronodular component of possible infection in the right lower lobe. Other abnormalities throughout the lungs similar to February of the transplanted lungs - pleural effusions, anastomoses intact with narrowing of R mainstem anastomosis. New narrowing of right bronchus intermedius with occlusion of RML bronchus (mucus plug per pulm). Febrile 100.7F following intubation, WBC  3.2, CRP up to 19, procal 0.24 in ESRD, nl lactic. Remains intubated, though on PS. On pressor x1 norepi, low dose 0.05. Nursing reports lots of secretion burden and weak cough. Planning for bronchoscopy today.     Has previously scheduled stent placement on 6/26/24 as outpatient.     ROS:  As mentioned in the interim history otherwise negative by reviewing constitutional symptoms, central and peripheral neurological systems, respiratory system, cardiac system, GI system,  system, musculoskeletal, skin, allergy, and lymphatics. But with limitation due to intubation.                 Physical Examination:  Temp: 100  F (37.8  C) (blankets removed) Temp src: Axillary BP: (!) 84/54 Pulse: 90   Resp: 24 SpO2: 100 % O2 Device: Mechanical Ventilator    Vitals:    06/23/24 0500 06/24/24 0400 06/25/24 0130 06/26/24 0300   Weight: 57.7 kg (127 lb 3.3 oz) 56.7 kg (125 lb) 57.4 kg (126 lb 8.7 oz) 56.6 kg (124 lb 12.5 oz)    06/27/24 0430   Weight: 55.8 kg (123 lb 0.3 oz)     Constitutional: awake, Orally intubated.  Lungs: Lungs coarse bilaterally, wheezing on the left.   CVS: RRR, normal S1/S2  GI: No abdominal tenderness. Normal bowel sounds.  Extremities: 3-4 pitting edema Cool, dusky toes.  Neuro: follows commands, moves all extremities    Medications:  Medications that Require Transfusion:   Current Facility-Administered Medications   Medication Dose Route Frequency Provider Last Rate Last Admin    dexmedeTOMIDine (PRECEDEX) 4 mcg/mL in sodium chloride 0.9 % 100 mL infusion  0.1-1.2 mcg/kg/hr (Dosing Weight) Intravenous Continuous Chio Cortez MD 12.7 mL/hr at 06/27/24 0800 1 mcg/kg/hr at 06/27/24 0800    dextrose 10% infusion   Intravenous Continuous PRN Velez Reyes, German, MD        dextrose 10% infusion   Intravenous Continuous PRN Awa Watt MD        dialysate for CVVHD & CVVHDF (Phoxillum BK4/2.5)  12.5 mL/kg/hr CRRT Continuous Jonna Rodas  mL/hr at 06/27/24 0213 12.5 mL/kg/hr at 06/27/24  0213    fentaNYL (SUBLIMAZE) infusion   mcg/hr Intravenous Continuous Velez Reyes, German, MD 2 mL/hr at 06/27/24 0800 100 mcg/hr at 06/27/24 0800    heparin (porcine) 20,000 units in 20 mL ANTICOAGULANT infusion (syringe from pharmacy)  500 Units/hr CRRT Continuous Hardy Tran APRN CNS 0.5 mL/hr at 06/27/24 0808 500 Units/hr at 06/27/24 0808    insulin regular (MYXREDLIN) 1 unit/mL infusion  0-24 Units/hr Intravenous Continuous Velez Reyes, German, MD 0.5 mL/hr at 06/27/24 0817 0.5 Units/hr at 06/27/24 0817    phenylephrine (DANDY-SYNEPHRINE) 50 mg in NaCl 0.9 % 250 mL infusion  0.1-6 mcg/kg/min (Dosing Weight) Intravenous Continuous DilmairaChio crisostomo MD 7.6 mL/hr at 06/27/24 0824 0.5 mcg/kg/min at 06/27/24 0824    POST-filter replacement solution for CVVHD & CVVHDF (Phoxillum BK4/2.5)   CRRT Continuous Jonna Rodas  mL/hr at 06/26/24 1403 New Bag at 06/26/24 1403    PRE-filter replacement solution for CVVHD & CVVHDF (Phoxillum BK4/2.5)  12.5 mL/kg/hr CRRT Continuous Jonna Rodas  mL/hr at 06/27/24 0213 12.5 mL/kg/hr at 06/27/24 0213    vasopressin 1 unit/mL MAX Conc (PITRESSIN) infusion  1.8 Units/hr Intravenous Continuous Velez Reyes, German, MD 1.8 mL/hr at 06/27/24 0800 1.8 Units/hr at 06/27/24 0800     Scheduled Medications:   Current Facility-Administered Medications   Medication Dose Route Frequency Provider Last Rate Last Admin    acetylcysteine (MUCOMYST) 10 % nebulizer solution 4 mL  4 mL Inhalation BID Velez Reyes, German, MD   4 mL at 06/27/24 0824    azithromycin (ZITHROMAX) tablet 250 mg  250 mg Oral or Feeding Tube Daily Velez Reyes, German, MD   250 mg at 06/27/24 0801    B and C vitamin Complex with folic acid (NEPHRONEX) liquid 5 mL  5 mL Per Feeding Tube Daily Awa Watt MD   5 mL at 06/27/24 0801    calcium carbonate (TUMS) chewable tablet 500 mg  500 mg Oral Once per day on Sunday Tuesday Thursday Saturday Josesito Pratt MD   500 mg at 06/25/24 0910     cefTAZidime (FORTAZ) 2 g vial to attach to  ml bag for ADULTS or NS 50 ml bag for PEDS  2 g Intravenous Q8H Chio Cortez MD   2 g at 06/27/24 0420    chlorhexidine (PERIDEX) 0.12 % solution 15 mL  15 mL Mouth/Throat Q12H Chio Cortez MD   15 mL at 06/27/24 0801    epoetin alisha-epbx (RETACRIT) injection 4,000 Units  4,000 Units Intravenous Once per day on Monday Wednesday Friday Velez Reyes, German, MD   4,000 Units at 06/26/24 1745    fludrocortisone (FLORINEF) tablet 0.1 mg  0.1 mg Oral or Feeding Tube Daily Evelyn Roberts APRN CNP   0.1 mg at 06/27/24 0801    hydrocortisone sodium succinate PF (solu-CORTEF) injection 50 mg  50 mg Intravenous BID Velez Reyes, German, MD   50 mg at 06/27/24 0801    levalbuterol (XOPENEX) neb solution 0.63 mg  0.63 mg Nebulization 4x Daily Gareth Mosquera MD   0.63 mg at 06/27/24 0824    linezolid (ZYVOX) tablet 600 mg  600 mg Oral or Feeding Tube Q12H Velez Reyes, German, MD   600 mg at 06/26/24 2349    [Held by provider] magnesium chloride CR tablet 1,070 mg  1,070 mg Oral Once per day on Sunday Tuesday Thursday Saturday Josesito Pratt MD        melatonin tablet 3 mg  3 mg Oral or Feeding Tube At Bedtime Velez Reyes, German, MD   3 mg at 06/26/24 2018    metoprolol tartrate (LOPRESSOR) tablet 25 mg  25 mg Oral BID Velez Reyes, German, MD   25 mg at 06/27/24 0801    micafungin (MYCAMINE) 150 mg in sodium chloride 0.9 % 100 mL intermittent infusion  150 mg Intravenous Q24H Chio Cortez  mL/hr at 06/27/24 0018 150 mg at 06/27/24 0018    minocycline (MINOCIN) 100 mg in sodium chloride 0.9 % 100 mL intermittent infusion  100 mg Intravenous Q12H Kajal Chong APRN  mL/hr at 06/26/24 2308 100 mg at 06/26/24 2308    montelukast (SINGULAIR) tablet 10 mg  10 mg Oral At Bedtime Velez Reyes, German, MD   10 mg at 06/26/24 2153    pantoprazole (PROTONIX) 2 mg/mL suspension 40 mg  40 mg Per Feeding Tube Daily Josesito Pratt MD    "40 mg at 06/26/24 1138    polyethylene glycol (MIRALAX) Packet 17 g  17 g Oral or Feeding Tube Daily Velez Reyes, German, MD   17 g at 06/25/24 1158    posaconazole (NOXAFIL) DR tablet TBEC 300 mg  300 mg Per Feeding Tube Daily Josesito Pratt MD   300 mg at 06/26/24 1139    [Held by provider] predniSONE (DELTASONE) tablet 5 mg  5 mg Oral Daily Velez Reyes, German, MD   5 mg at 06/21/24 0822    protein modular (PROSOURCE TF20) packet 1 packet  1 packet Per Feeding Tube BID Awa Watt MD   1 packet at 06/27/24 0802    QUEtiapine (SEROquel) tablet 150 mg  150 mg Oral or Feeding Tube At Bedtime Velez Reyes, German, MD   150 mg at 06/26/24 2153    sodium chloride (NEBUSAL) 3 % neb solution 3 mL  3 mL Nebulization BID Velez Reyes, German, MD   3 mL at 06/26/24 2123    sodium chloride (PF) 0.9% PF flush 10 mL  10 mL Intracatheter Q8H Kajal Chong, ADRIÁN CNP   10 mL at 06/26/24 0741    sodium chloride (PF) 0.9% PF flush 10 mL  10 mL Intracatheter Q8H Kajal Chong APRN CNP   10 mL at 06/26/24 0740    sulfamethoxazole-trimethoprim (BACTRIM) 400-80 MG per tablet 1 tablet  1 tablet Oral or Feeding Tube Daily Josesito Pratt MD   1 tablet at 06/26/24 2018    tacrolimus (GENERIC) suspension 0.5 mg  0.5 mg Oral or Feeding Tube BID IS Rufina Huff CNP   0.5 mg at 06/27/24 0802    valGANciclovir (VALCYTE) solution 450 mg  450 mg Per Feeding Tube Daily Rufina Huff CNP   450 mg at 06/27/24 0801    Vitamin D3 (CHOLECALCIFEROL) tablet 50 mcg  50 mcg Oral Once per day on Monday Wednesday Friday Velez Reyes, German, MD   50 mcg at 06/26/24 1009         Laboratory Data:   No results found for: \"ACD4\"    Inflammatory Markers    Recent Labs   Lab Test 06/18/24  1418 10/29/23  0642 10/28/23  0725 10/07/19  1221 10/06/19  1444 09/13/19  0934 09/11/19  2325 08/22/19  0820   SED 10 66* 58* 93*  --  111* 102*  --    CRP  --   --   --   --  25.0* 58.0* 66.0* 47.0*       Immune Globulin Studies     Recent Labs "   Lab Test 06/19/24  0839 10/27/23  0701 10/24/23  1033 09/15/21  0915 01/31/21  0441 01/25/21  0406 10/27/19  0621 03/01/18  0356 02/19/18  0759    979  --  1,198 763  --  936 698 1,790*   IGM  --   --   --   --   --   --   --   --  502*   IGE  --   --  <2  --   --  <2  --   --  <2   IGA  --   --   --   --   --   --   --   --  425*   IGG1  --   --   --   --   --   --   --   --  1,300*   IGG2  --   --   --   --   --   --   --   --  131*   IGG3  --   --   --   --   --   --   --   --  101   IGG4  --   --   --   --   --   --   --   --  1*       Metabolic Studies       Recent Labs   Lab Test 06/27/24  0816 06/27/24  0704 06/27/24  0603 06/27/24  0501 06/27/24  0355 06/27/24  0348 06/26/24  1804 06/26/24  1557 06/26/24  0407 06/26/24  0310 06/25/24  1847 06/25/24  1826 06/25/24  0350 06/25/24  0348 06/24/24  1942 06/24/24  1816 06/24/24  1614 06/24/24  1610 06/24/24  0825 06/24/24  0335 06/24/24  0013 06/23/24 2006   NA  --   --   --   --   --  137  --  139  --  138  --  138  --  136  --   --   --  137  --  135  --   --    POTASSIUM  --   --   --   --   --  3.6  --  3.6  --  4.0  --  3.8  --  4.1  --   --   --  4.3  --  4.1  --   --    CHLORIDE  --   --   --   --   --  106  --  107  --  107  --  105  --  105  --   --   --  106  --  106  --   --    CO2  --   --   --   --   --  22  --  23  --  23  --  22  --  22  --   --   --  22  --  23  --   --    ANIONGAP  --   --   --   --   --  9  --  9  --  8  --  11  --  9  --   --   --  9  --  6*  --   --    BUN  --   --   --   --   --  34.4*  --  36.6*  --  39.1*  --  38.7*  --  40.7*  --   --   --  38.0*  --  34.4*  --   --    CR  --   --   --   --   --  0.90  --  0.90  --  0.96*  --  0.97*  --  1.02*  --   --   --  1.02*  --  1.02*  --   --    GFRESTIMATED  --   --   --   --   --  72  --  72  --  67  --  66  --  62  --   --   --  62  --  62  --   --    * 159* 175* 201* 224* 255*  265*   < > 122*  126*   < > 193*   < > 196*   < > 279*   < >  --    < > 255*   < >  207*   < >  --    A1C  --   --   --   --   --   --   --   --   --   --   --   --   --  5.1  --   --   --   --   --   --   --   --    CHELSEY  --   --   --   --   --  7.8*  --  8.0*  --  7.6*  --  8.1*  --  8.0*  --   --   --  8.0*  --  8.1*  --   --    PHOS  --   --   --   --   --  4.3  --  3.9  --  4.0  --  4.0  --  3.9  --   --   --  4.1  --  3.3  --   --    MAG  --   --   --   --   --  2.3  --  2.4*  --  2.4*  --  2.3  --  2.4*  --   --   --  2.4*  --  2.4*  --   --    LACT  --   --   --   --   --   --   --   --   --   --   --   --   --   --   --  0.9  --   --   --   --   --  0.9   CKT  --   --   --   --   --  18*  --   --   --   --   --   --   --   --   --   --   --   --   --  24*  --   --     < > = values in this interval not displayed.       Hepatic Studies    Recent Labs   Lab Test 06/27/24  0348 06/26/24  1557 06/26/24  0310 06/25/24  1826 06/25/24  0348 06/24/24  1610 06/24/24  0335 06/23/24  1626 06/23/24  0414 06/22/24  1613 06/22/24  0439 06/19/24  0613 06/18/24  1803 09/15/21  0915 05/01/21  0654 01/27/21  0414 01/26/21  0425   BILITOTAL 1.1  --  0.8  --  0.7  --  0.7  --  0.8  --  1.0   < > 0.6   < >  --    < > 0.8   ALKPHOS 135  --  134  --  114  --  79  --  78  --  89   < > 235*   < >  --    < > 184*   ALBUMIN 2.4* 2.7* 2.6* 2.7* 2.4* 2.5* 2.4*   < > 2.4*   < > 2.3*  2.3*   < > 3.6   < >  --    < > 2.0*   AST 20  --  22  --  16  --  12  --  17  --  11   < > 39   < >  --    < > 11   ALT 38  --  38  --  28  --  18  --  20  --  17   < > 39   < >  --    < > 30   LDH  --   --   --   --   --   --   --   --   --   --   --   --   --   --  164  --  187   GGT  --   --   --   --   --   --   --   --   --   --   --   --  147*  --   --   --   --     < > = values in this interval not displayed.       Pancreatitis testing    Recent Labs   Lab Test 06/18/24  1803 10/25/23  1356 05/26/22  0750 09/15/21  0915 01/24/21  0000 02/19/20  0713 12/31/19  1033 10/24/19  2032 10/21/19  1451 10/06/19  1444 09/11/19  4126  "03/04/18  0353 02/19/18  0759   AMYLASE  --   --   --   --   --   --   --   --   --   --   --   --  58   LIPASE 40 24  --   --   --   --   --  212 119 172 127  --   --    TRIG  --   --  155* 68 242* 77 98  --   --   --   --  191* 115       Hematology Studies      Recent Labs   Lab Test 06/27/24  0348 06/26/24  1200 06/26/24  0310 06/25/24  0348 06/24/24  0335 06/23/24  0414 06/22/24  1615 06/22/24  0439 06/22/24  0034 06/21/24 0356   WBC 5.2  --  3.0* 3.0* 3.6* 5.4 6.0 6.0   < > 5.8   ANEU  --   --  1.7 1.4* 2.8 4.6  --  5.2  --  5.1   ALYM  --   --  1.1 1.1 0.3* 0.1*  --  0.0*  --  0.1*   SHERRI  --   --  0.2 0.3 0.4 0.4  --  0.7  --  0.5   AEOS  --   --  0.0 0.0 0.0 0.2  --  0.1  --  0.1   HGB 8.4* 8.8* 6.9* 7.0* 7.5* 7.8* 8.0* 8.1*   < > 9.6*   HCT 27.1*  --  23.1* 23.0* 23.7* 24.9* 25.3* 25.9*   < > 29.8*   *  --  86* 70* 69* 84* 89* 95*   < > 98*    < > = values in this interval not displayed.       Arterial Blood Gas Testing    Recent Labs   Lab Test 06/26/24 0310 06/25/24  1209 06/24/24 2126 06/24/24  1653 06/24/24 0335   PH 7.35 7.29* 7.33* 7.30* 7.37   PCO2 44 51* 45 50* 41   PO2 137* 60* 133* 80 123*   HCO3 24 24 24 25 24   O2PER 50 50 60 60 50        Urine Studies     Recent Labs   Lab Test 06/19/24  1214 01/24/21  1729 10/21/19  2240 09/12/19  0125 08/07/19  1512   URINEPH 6.5 5.0 5.0 7.5* 7.0   NITRITE Negative Negative Negative Negative Negative   LEUKEST Large* Moderate* Large* Negative Trace*   WBCU >182* 34* 115* 3 19*       Vancomycin Levels     Recent Labs   Lab Test 06/19/24  0613 10/26/23  0606 09/21/21  1911 01/28/21  0412 01/26/21  0425 01/25/21  1259   VANCOMYCIN 15.6 18.6 18.8 21.6 31.4* 15.1       Tobramycin levels     No lab results found.    Gentamicin levels    No lab results found.    Tacrolimus levels    Invalid input(s): \"TACROLIMUS\", \"TAC\", \"TACR\"      Latest Ref Rng & Units 6/27/2024     3:48 AM 6/26/2024     3:57 PM 6/26/2024     3:10 AM 6/25/2024     6:26 PM 6/25/2024     " "3:48 AM   Transplant Immunosuppression Labs   Creat 0.51 - 0.95 mg/dL 0.90  0.90  0.96  0.97  1.02    Urea Nitrogen 8.0 - 23.0 mg/dL 34.4  36.6  39.1  38.7  40.7    WBC 4.0 - 11.0 10e3/uL 5.2  P  3.0   3.0    Neutrophil %   57   48    ANEU 1.6 - 8.3 10e3/uL   1.7   1.4       P Preliminary result       Cyclosporine levels    Invalid input(s): \"CYCLOSPORINE\", \"CYC\"    Mycophenolate levels    Invalid input(s): \"MYPA\", \"MYP\"    Sirolimus levels    Invalid input(s): \"SIROLIMUS\", \"SIR\", \"RAPA\"    CSF testing   No lab results found.      Microbiology:   6/19  CrAg - negative  Urine Strep and Legionella antigens - negative  Urine culture - GNB  BAL - negative KOH, pending aerobic + fungal + AFB cultures    6/18  Negative RVP and COVID/flu/RSV  Histoplasma antigen (blood) - pending  Aspergillus galactomannan - negative  Fungitell - negative  Blood cultures x2 - NGTD    Sputum culture - Stenotrophomonas maltophilia      Fungal testing  Recent Labs   Lab Test 06/25/24  1209 06/24/24  0335 06/21/24  1706 06/19/24  1513 06/18/24  1803 10/07/19  1544 09/14/19  1625   FGTL <31  --   --   --  <31   < > 122   FGTLI Negative  --   --   --  Negative   < > Positive*   PJRDFA  --   --   --  Not Detected  --    < >  --    ASPGAI  --  0.04 0.06 1.87 0.03   < > 1.08   ASPAG  --   --  Negative Positive*  --    < >  --    ASPGAA  --  Negative  --   --  Negative   < > Positive*   CIABB  --   --   --   --   --   --  <1:2   COFUNG  --   --   --   --  <1:2  --   --     < > = values in this interval not displayed.       Last Culture results   S Pneumoniae Antigen   Date Value Ref Range Status   01/24/2021   Final    Negative, no Streptococcus pneumoniae antigen detected by immunochromatographic membrane   assay. A negative Streptococcus pneumoniae antigen result does not rule out infection with   Streptococcus pneumoniae.       Streptococcus pneumoniae antigen   Date Value Ref Range Status   06/19/2024 Negative Negative Final     Comment:     A " no negative Streptococcus pneumoniae antigen result does not rule out infection with Streptococcus pneumoniae.     P. jirovecii By PCR   Date Value Ref Range Status   06/19/2024 Not Detected  Final     Comment:       NOT DETECTED - A negative result does not rule out the   presence of PCR inhibitors in the patient specimen or assay   specific nucleic acid in concentrations below the level of   detection by the assay.    This test was developed and its performance characteristics   determined by [facility]. It has not been cleared or   approved by the [place] Food and Drug Administration. This test   was performed in a CLIA certified laboratory and is   intended for clinical purposes.  Performed By: [facility]  [address] [place] [address]  : Rogelio Llanos MD, PhD  CLIA Number: [id number]   10/17/2023 Not Detected  Final     Comment:     NOT DETECTED - A negative result does not rule out the   presence of PCR inhibitors in the patient specimen or   assay specific nucleic acid in concentrations below the   level of detection by the assay.    This test was developed and its performance characteristics   determined by [facility]. It has not been cleared or   approved by the [place] Food and Drug Administration. This test   was performed in a CLIA certified laboratory and is   intended for clinical purposes.  Performed By: [facility]  [address] [place], [place] [id number]  : Rogelio Llanos MD, PhD  CLIA Number: [id number]   08/17/2023 Not Detected  Final     Comment:     NOT DETECTED - A negative result does not rule out the   presence of PCR inhibitors in the patient specimen or   assay specific nucleic acid in concentrations below the   level of detection by the assay.    This test was developed and its performance characteristics   determined by [facility]. It has not been cleared or   approved by the US Food and Drug  Administration. This test   was performed in a CLIA certified laboratory and is   intended for clinical purposes.  Performed By: [facility]  [address] [place] [id number]  : Rogelio Llanos MD, PhD  CLIA Number: [id number]   08/17/2023 Not Detected  Final     Comment:     NOT DETECTED - A negative result does not rule out the   presence of PCR inhibitors in the patient specimen or   assay specific nucleic acid in concentrations below the   level of detection by the assay.    This test was developed and its performance characteristics   determined by [facility]. It has not been cleared or   approved by the US Food and Drug Administration. This test   was performed in a CLIA certified laboratory and is   intended for clinical purposes.  Performed By: [facility]  [address] [place] [id number]  : Rogelio Llanos MD, PhD  CLIA Number: [id number]   01/24/2021 Detected (A)  Final     Comment:     (Note)  DETECTED - P. jirovecii DNA detected in this specimen.  Results should be used to aid in the diagnosis of PCP  pneumonia and must be interpreted in the context of host  risk factors, clinical presentation, and radiographic  imaging.  TEST INFORMATION: Pneumocystis jirovecii Detection by PCR  Test developed and characteristics determined by [facility]. See Compliance Statement B: [facility]/  Performed by [facility],  [address] [place] [address] [phone]  [email or web address], Carri Red MD, Lab. Director       Culture   Date Value Ref Range Status   06/24/2024 1+ Stenotrophomonas maltophilia (A)  Final     Comment:     Susceptibilities done on previous cultures   06/24/2024 1+ Enterococcus faecium VRE (A)  Final     Comment:     Susceptibilities done on previous cultures   06/24/2024 1+ Candida guilliermondii complex (A)  Final     Comment:     Susceptibilities not routinely done, refer to antibiogram to view  typical susceptibility profiles   06/24/2024 No growth after 3 days  Preliminary   06/24/2024 No growth after 3 days  Preliminary   06/21/2024 1+ Stenotrophomonas maltophilia (A)  Final     Comment:     Susceptibilities done on previous cultures   06/21/2024 1+ Enterococcus faecium VRE (A)  Final   06/21/2024 1+ Enterococcus faecalis (A)  Final     Comment:     Susceptibilities done on previous cultures   06/21/2024 No growth after 5 days  Preliminary   06/21/2024 No Growth  Final   06/21/2024 No Growth  Final   06/19/2024 1+ Stenotrophomonas maltophilia (A)  Final   06/19/2024 1+ Enterococcus faecalis (A)  Final   06/19/2024 Candida guilliermondii complex (A)  Preliminary   06/19/2024 Filamentous fungus (A)  Preliminary   06/19/2024 >100,000 CFU/mL Escherichia coli (A)  Final   06/19/2024 >100,000 CFU/mL Enterococcus faecium VRE (A)  Final   06/19/2024 50,000-100,000 CFU/mL Escherichia coli (A)  Final   06/18/2024 3+ Normal alo  Final   06/18/2024 2+ Stenotrophomonas maltophilia (A)  Final   06/18/2024 2+ Stenotrophomonas maltophilia (A)  Final     GS Culture   Date Value Ref Range Status   06/21/2024 See corresponding culture for results  Final   06/19/2024 See corresponding culture for results  Final     Culture Micro   Date Value Ref Range Status   05/01/2021   Final    Quantity not sufficient  Called to Maxim Norman at 1236 5/1/21.    mlb     05/01/2021 Culture negative after 30 days  Final   04/29/2021 No anaerobes isolated  Final   04/29/2021 No growth  Final   02/19/2021 Culture negative for acid fast bacilli  Final   02/19/2021   Final    Assayed at [facility] [address] [place] [address] [phone]   02/19/2021 Culture negative after 4 weeks  Final   02/19/2021 No growth after 4 weeks  Final   02/19/2021 Moderate growth  Staphylococcus epidermidis   (A)  Final   02/09/2021 No growth  Final         Last check of C difficile  C Diff Toxin B PCR   Date Value Ref Range Status   02/13/2021  Negative NEG^Negative Final     Comment:     Negative: C. difficile target DNA sequences NOT detected, presumed negative   for C.difficile toxin B or the number of bacteria present may be below the   limit of detection for the test.  FDA approved assay performed using [employer] real-time PCR.  A negative result does not exclude actual disease due to C. difficile and may   be due to improper collection, handling and storage of the specimen or the   number of organisms in the specimen is below the detection limit of the assay.       C Difficile Toxin B by PCR   Date Value Ref Range Status   06/22/2024 Negative Negative Final     Comment:     A negative result does not exclude actual disease due to C. difficile and may be due to improper collection, handling and storage of the specimen or the number of organisms in the specimen is below the detection limit of the assay.       Virology:  Coronavirus-19 testing    Recent Labs   Lab Test 06/18/24  1729 06/18/24  1433 06/17/24  1337 07/11/23  1132 10/12/22  1528 09/26/22  0956 05/23/22  1013 04/01/22  1257 02/07/22  1301 01/31/22  1309 01/24/22  1324 09/20/21  1859 06/07/21  1310 05/02/21  0715 04/26/21  1151 04/08/21  0723   [id number]  --   --   --   --   --   --   --   --   --   --   --   --   --  Negative  --   --    [id number]  --   --   --   --   --   --   --   --   --   --   --   --   --  Not Applicable  --   --    [id number] Negative Negative  --  Negative  --   --   --   --   --   --   --  Negative   < >  --   --  Test received-See reflex to IDDL test SARS CoV2 (COVID-19) Virus RT-PCR  NEGATIVE   [id number]  --   --   --   --   --   --   --   --   --   --   --   --   --   --   --  Nasopharyngeal   [id number]  --   --   --   --   --   --   --   --   --   --   --   --   --   --   --  Nasopharyngeal   COVIDPCREXT  --   --  Negative  --  Negative Negative Undetected   < > Undetected Undetected   < >  --    < >  --   --   --    SOUREXT  --   --   --   --   --    --  Nasopharynx  --  Nasopharynx Nasopharynx   < >  --    < >  --    < >  --    [id number]  --   --   --   --   --   --  Undetected  --  Undetected Undetected   < >  --    < >  --    < >  --     < > = values in this interval not displayed.       Respiratory virus (non-coronavirus-19) testing    Recent Labs   Lab Test 06/18/24  1729 06/18/24  1433 11/01/23  0920 10/27/23  0449 01/24/21  1822 01/24/21  1629 12/23/20  1102 02/27/18  1016 02/22/18  0900   RVSPEC  --   --   --   --   --  Bronchial Bronchial   < >  --    AFLU  --   --   --   --   --   --   --   --  Duplicate request*   IFLUA Not Detected  --  Not Detected Not Detected   < > Negative Negative   < >  --    INFZA Negative   < >  --   --   --   --   --   --  Negative   FLUAH1 Not Detected  --  Not Detected Not Detected   < > Negative Negative   < >  --    FI4224 Not Detected  --  Not Detected Not Detected   < > Negative Negative   < >  --    FLUAH3 Not Detected  --  Not Detected Not Detected   < > Negative Negative   < >  --    BFLU  --   --   --   --   --   --   --   --  Duplicate request*   IFLUB Not Detected  --  Not Detected Not Detected   < > Negative Negative   < >  --    INFZB Negative   < >  --   --   --   --   --   --  Negative   PIV1 Not Detected  --  Not Detected Not Detected   < > Negative Negative   < >  --    PIV2 Not Detected  --  Not Detected Not Detected   < > Negative Negative   < >  --    PIV3 Not Detected  --  Not Detected Not Detected   < > Negative Negative   < >  --    PIV4 Not Detected  --  Not Detected Not Detected   < >  --   --    < >  --    IRSV Negative   < >  --   --   --   --   --   --  Negative   HRVS  --   --   --   --   --  Negative Negative   < >  --    RSVA Not Detected  --  Not Detected Not Detected   < > Negative Negative   < >  --    RSVB Not Detected  --  Not Detected Not Detected   < > Negative Negative   < >  --    HMPV Not Detected  --  Not Detected Not Detected   < > Negative Negative   < >  --    ADVBE  --   --    --   --   --  Negative Negative   < >  --    ADVC  --   --   --   --   --  Negative Negative   < >  --    ADENOV Not Detected  --  Not Detected Not Detected   < >  --   --    < >  --    CORONA Not Detected  --  Not Detected Not Detected   < >  --   --    < >  --     < > = values in this interval not displayed.       CMV viral loads    Recent Labs   Lab Test 06/25/24  0647 06/18/24  1803 06/12/24  0828 03/13/24  0853 03/05/24  0925 02/21/24  0838 02/14/24  1050 12/27/23  0834 12/19/23  1138 11/29/23  0826 11/21/23  0752 11/08/23  0846 11/01/23  0920 10/31/23  0606 10/24/23  1033 10/17/23  1011 10/04/23  0810 09/07/23  0710 08/17/23  1144 08/17/23  1137 08/08/23  0828 08/08/23  0828 07/11/23  0952 05/26/23  0828 04/06/23  0725 03/08/23  0848 02/09/23  1034 11/18/22  0928 09/27/22  1012 03/15/21  0904 02/25/21  0542   CMVQNT  --  <35*  --   --  Not Detected  --   --   --  Not Detected  --  Not Detected  --   --  <35*  --   --   --   --   --   --   --   --  Not Detected  --  Not Detected  --  <137*  --  Not Detected   < > CMV DNA Not Detected   CMVRESINST 39*  --   --   --   --   --  350*  --   --   --   --   --   --   --  103*  80* 75*  --  521*  --   --   --  46*  --   --   --   --   --   --   --   --   --    CSPEC  --   --   --   --   --   --   --   --   --   --   --   --   --   --   --   --   --   --   --   --   --   --   --   --   --   --   --   --   --   --  EDTA PLASMA   CMVLOG 1.6 <1.5  --   --   --   --  2.5  --   --   --   --   --   --  <1.5 2.0  1.9 1.9  --  2.7  --   --    < > 1.7  --   --   --   --  <2.1  --   --    < > Not Calculated   00917  --   --  46*   < >  --    < >  --    < >  --    < >  --    < >  --   --   --   --    < >  --   --   --   --   --   --    < >  --    < >  --    < >  --    < >  --    CMVQAL  --   --   --   --   --   --   --   --   --   --   --   --  Not Detected  --   --   --   --   --  Not Detected Not Detected  --   --   --   --   --   --   --   --   --   --   --     < > = values  "in this interval not displayed.       No results found for: \"H6RES\"    EBV DNA Copies/mL   Date Value Ref Range Status   05/01/2021 38,186 (A) EBVNEG^EBV DNA Not Detected [Copies]/mL Final   02/22/2021 75,709 (A) EBVNEG^EBV DNA Not Detected [Copies]/mL Final   01/25/2021 5,619 (A) EBVNEG^EBV DNA Not Detected [Copies]/mL Final   12/09/2020 10,686 (A) EBVNEG^EBV DNA Not Detected [Copies]/mL Final   09/09/2020 2,935 (A) EBVNEG^EBV DNA Not Detected [Copies]/mL Final   03/09/2020 8,918 (A) EBVNEG^EBV DNA Not Detected [Copies]/mL Final   02/19/2020 38,419 (A) EBVNEG^EBV DNA Not Detected [Copies]/mL Final   12/18/2019 11,933 (A) EBVNEG^EBV DNA Not Detected [Copies]/mL Final   11/11/2019 9,669 (A) EBVNEG^EBV DNA Not Detected [Copies]/mL Final   10/24/2019 13,391 (A) EBVNEG^EBV DNA Not Detected [Copies]/mL Final   08/01/2018 EBV DNA Not Detected EBVNEG^EBV DNA Not Detected [Copies]/mL Final       BK viral loads   Recent Labs   Lab Test 08/07/19  0959   BKSPEC Plasma   BKRES BK Virus DNA Not Detected         Imaging:  CXR 6/26/24  Impression: Stable support devices. Similar pulmonary opacities, left  more than right, compared to prior radiograph from 6/24/2024,  representing edema/infection with atelectasis.    CXR 6/24/24  Impression:   1. Overall improved aeration in the left lung with persistent  opacities in the left lung and right lung base, likely improving  pulmonary edema or infection.  2. Stable support devices.    XR Chest Port 1 View  Result Date: 6/20/2024  Impression: Bilateral pulmonary opacities, mildly increased in the upper lobes suggesting increased edema and underlying infection. The endotracheal tube projects 2.3 cm above the gee.    XR Chest Port 1 View  Result Date: 6/19/2024  IMPRESSION: ET tube tip projects 1.6 cm above the gee. Small bilateral pleural effusions with bibasilar, right greater than left airspace opacities suspicious for infection versus atelectasis.     CT Chest Hi-Resolution wo " Contrast  Result Date: 6/18/2024  IMPRESSION: Bilateral transplanted lungs. Slight decreased confluent consolidative opacities in right lower lobe with more micronodular component of possible infection in the right lower lobe. Other abnormalities throughout the lungs similar to February of the transplanted lungs. Pleural effusions again noted. Narrowing of the bronchus intermedius on the right there appears to be occlusion of the right middle lobe bronchus with narrowing of the distal aspect of the right lower lobe bronchus with short segment occlusions of the medial and posterior basilar bronchial segments to the right lower lobe. No such abnormality on the left.. No ectopic air. Anastomoses appear grossly intact bilaterally with just under approximate 50% narrowing distal to the right mainstem anastomosis proximal to the upper lobe bronchus takeoff. This right middle lobe bronchial occlusion is new from February. Mitral annular calcifications with left atrial prominence, please correlate for arrhythmia. Borderline pulmonary enlargement concerning for possible pulmonary hypertension. Borderline mid ascending aortic ectasia.       ECHO:  Echo Complete  Result Date: 6/19/2024  Interpretation Summary Global and regional left ventricular function is normal with an EF of 55-60%. Right ventricular function, chamber size, wall motion, and thickness are normal. The inferior vena cava cannot be assessed. No pericardial effusion is present. No significant valvular abnormalities present.     Adrianna Zamarripa MD  [facility]  Contact information available via [facility] Paging/Directory

## 2024-07-19 NOTE — PROGRESS NOTES
"[facility] Medicine  Progress Note    Patient Name: Leno Thompson Jr.  MRN: [id number]  Patient Class: IP- Inpatient   Admission Date: 7/14/2024  Length of Stay: 5 days  Attending Physician: Franlkin Najera MD  Primary Care Provider: Nancy Gutierrez FNP-TOD        Subjective:     Principal Problem:Hypokalemia        HPI:  Fall     Weakness       Pt presented to ED per EMS with c/o FALL  and laceration to right lower leg. Pt denies hitting head. Pt c/o  chronic shoulder pain and neck pain. Pt reports he is unable to care for himself and currently living in a hotel. Pt was extremely disheveled on arrival. Pt stated "I dont have anyone to care for me, I've been staying at a hotel and constantly falling".         HPI:   69 year old man with chronic complex medical history that involves previous anoxic brain injury that has left him with difficulty speaking, HTN, HLD, previous left amputation, CAD presented with complaints of a fall. Denies hitting head. Patient is wheelchair bound. Patient has complicated social situation as currently living in a hotel. Patient has no one to take care of him. Currently has no ride home     On arrival to ED BNP found to be elevated. Ddimer elevated, subsequent CTA revealed no PE. CXR showed minor pleural effusion. Admitted for CHF    Overview/Hospital Course:  07/15/2024 pt admitted with CHF, h/o anoxic brain injury, falling at home apparently fell out of his wheelchair and could not get up.  He has been living in a hotel and cannot take care of himself.  07/16/2024 Cr up significantly overnight on iv lasix 1.7 to 2.30  Echocardiogram EF 55%  Overall feels better today  07/17/2024  Lab improving   Awaiting placement  07/18/2024  No new c/o  Awaiting placement   07/19/2024  No new c/o  BP better controlled   Cardio add amlodipine    Interval History:      Review of Systems   Constitutional:  Negative for appetite change, fatigue and fever. "   Respiratory:  Negative for cough, shortness of breath and wheezing.    Cardiovascular:  Negative for chest pain and leg swelling.   Gastrointestinal:  Negative for abdominal distention, abdominal pain, constipation, diarrhea, nausea and vomiting.   Skin:  Negative for color change, pallor, rash and wound.   Neurological:  Negative for tremors, syncope and headaches.   Psychiatric/Behavioral:  Negative for agitation and behavioral problems.      Objective:     Vital Signs (Most Recent):  Temp: 98.6 °F (37 °C) (07/19/24 0720)  Pulse: (!) 56 (07/19/24 0731)  Resp: 18 (07/19/24 0731)  BP: (!) 149/57 (07/19/24 0720)  SpO2: (!) 94 % (07/19/24 0731) Vital Signs (24h Range):  Temp:  [97.2 °F (36.2 °C)-98.6 °F (37 °C)] 98.6 °F (37 °C)  Pulse:  [56-68] 56  Resp:  [17-20] 18  SpO2:  [94 %-98 %] 94 %  BP: (130-174)/(55-92) 149/57     Weight: 62.7 kg (138 lb 3.7 oz)  Body mass index is 21.02 kg/m².    Intake/Output Summary (Last 24 hours) at 7/19/2024 1237  Last data filed at 7/19/2024 0830  Gross per 24 hour   Intake 1840 ml   Output 1075 ml   Net 765 ml         Physical Exam  Vitals and nursing note reviewed. Exam conducted with a chaperone present.   Constitutional:       General: He is not in acute distress.     Appearance: Normal appearance. He is not ill-appearing.   Cardiovascular:      Rate and Rhythm: Normal rate and regular rhythm.      Pulses: Normal pulses.      Heart sounds: Normal heart sounds. No murmur heard.  Pulmonary:      Effort: Pulmonary effort is normal. No respiratory distress.      Breath sounds: Normal breath sounds. No wheezing, rhonchi or rales.   Abdominal:      General: Abdomen is flat.      Palpations: Abdomen is soft.      Tenderness: There is no guarding.   Musculoskeletal:         General: No swelling or tenderness. Normal range of motion.      Right lower leg: No edema.      Left lower leg: No edema.      Comments: Amputee left bka   Skin:     General: Skin is warm and dry.      Coloration:  "Skin is not jaundiced or pale.   Neurological:      General: No focal deficit present.      Mental Status: He is alert and oriented to person, place, and time. Mental status is at baseline.      Cranial Nerves: No cranial nerve deficit.      Motor: No weakness.      Coordination: Coordination normal.      Gait: Gait normal.   Psychiatric:         Mood and Affect: Mood normal.         Behavior: Behavior normal.             Significant Labs: All pertinent labs within the past 24 hours have been reviewed.  CBC:   No results for input(s): "WBC", "HGB", "HCT", "PLT" in the last 48 hours.    CMP:   Recent Labs   Lab 07/18/24  0425 07/19/24  0412    132*   K 4.7 5.0    104   CO2 27 22   BUN 34* 30*   CREATININE 1.95* 1.63*   CALCIUM 9.1 9.4       Significant Imaging: I have reviewed all pertinent imaging results/findings within the past 24 hours.    Assessment/Plan:      * Hypokalemia  .Patient has hypokalemia which is Acute and currently improving. Most recent potassium levels reviewed-   Lab Results   Component Value Date    K 3.9 07/17/2024   . Will continue potassium replacement per protocol and recheck repeat levels after replacement completed.     improved    Stage 3a chronic kidney disease  Creatine stable for now. BMP reviewed- noted Estimated Creatinine Clearance: 36.6 mL/min (A) (based on SCr of 1.7 mg/dL (H)). according to latest data. Based on current GFR, CKD stage is stage 3 - GFR 30-59.  Monitor UOP and serial BMP and adjust therapy as needed. Renally dose meds. Avoid nephrotoxic medications and procedures.    CHF (congestive heart failure)  Patient is identified as having Diastolic (HFpEF) heart failure that is Acute on chronic. CHF is currently uncontrolled due to Dyspnea not returned to baseline after 3 doses of IV diuretic and >3 pillow orthopnea. Latest ECHO performed and demonstrates- Results for orders placed during the hospital encounter of 07/14/24    Echo    Interpretation Summary    " "Left Ventricle: The left ventricle is normal in size. There is normal systolic function with a visually estimated ejection fraction of 55%. There is inferobasal hypokinesis noted. Grade I diastolic dysfunction.    Right Ventricle: Normal right ventricular cavity size. Systolic function is normal.    Aortic Valve: There is mild aortic valve sclerosis. There is mild to moderate (1-2+) aortic regurgitation.    Mitral Valve: There is mild (1+) regurgitation.    Unable to determine PASP.  . Continue Beta Blocker and ACE/ARB and monitor clinical status closely. Monitor on telemetry. Patient is on CHF pathway.  Monitor strict Is&Os and daily weights.  Place on fluid restriction of 1.5 L. Cardiology has been consulted. Continue to stress to patient importance of self efficacy and  on diet for CHF. Last BNP reviewed- and noted below No results for input(s): "BNP", "BNPTRIAGEBLO" in the last 168 hours.    Cardio following   Resume BB, ACE inh when toelerated        Debility  Patient with Acute on chronic debility due to chronic unspecified fatigue and other reduced mobility. Latest AMPAC and GEMS scores have been reviewed. Evaluation for etiology is underway. Plan includes PT/OT consulted.      VTE Risk Mitigation (From admission, onward)           Ordered     enoxaparin injection 40 mg  Every 24 hours         07/16/24 1620     IP VTE HIGH RISK PATIENT  Once         07/14/24 1802                    Discharge Planning   LELIA: 7/22/2024     Code Status: DNR   Is the patient medically ready for discharge?:     Reason for patient still in hospital (select all that apply): Patient trending condition, Laboratory test, and Treatment  Discharge Plan A: Skilled Nursing Facility   Discharge Delays: (!) Post-Acute Set-up              Franklin Najera MD  Department of Hospital Medicine   [facility]    "

## 2024-07-19 NOTE — PLAN OF CARE
[facility] nurse will eval patient on Monday, July 22 around 0830 , nursing informed.

## 2024-07-19 NOTE — SUBJECTIVE & OBJECTIVE
Interval History:      Review of Systems   Constitutional:  Negative for appetite change, fatigue and fever.   Respiratory:  Negative for cough, shortness of breath and wheezing.    Cardiovascular:  Negative for chest pain and leg swelling.   Gastrointestinal:  Negative for abdominal distention, abdominal pain, constipation, diarrhea, nausea and vomiting.   Skin:  Negative for color change, pallor, rash and wound.   Neurological:  Negative for tremors, syncope and headaches.   Psychiatric/Behavioral:  Negative for agitation and behavioral problems.      Objective:     Vital Signs (Most Recent):  Temp: 98.6 °F (37 °C) (07/19/24 0720)  Pulse: (!) 56 (07/19/24 0731)  Resp: 18 (07/19/24 0731)  BP: (!) 149/57 (07/19/24 0720)  SpO2: (!) 94 % (07/19/24 0731) Vital Signs (24h Range):  Temp:  [97.2 °F (36.2 °C)-98.6 °F (37 °C)] 98.6 °F (37 °C)  Pulse:  [56-68] 56  Resp:  [17-20] 18  SpO2:  [94 %-98 %] 94 %  BP: (130-174)/(55-92) 149/57     Weight: 62.7 kg (138 lb 3.7 oz)  Body mass index is 21.02 kg/m².    Intake/Output Summary (Last 24 hours) at 7/19/2024 1237  Last data filed at 7/19/2024 0830  Gross per 24 hour   Intake 1840 ml   Output 1075 ml   Net 765 ml         Physical Exam  Vitals and nursing note reviewed. Exam conducted with a chaperone present.   Constitutional:       General: He is not in acute distress.     Appearance: Normal appearance. He is not ill-appearing.   Cardiovascular:      Rate and Rhythm: Normal rate and regular rhythm.      Pulses: Normal pulses.      Heart sounds: Normal heart sounds. No murmur heard.  Pulmonary:      Effort: Pulmonary effort is normal. No respiratory distress.      Breath sounds: Normal breath sounds. No wheezing, rhonchi or rales.   Abdominal:      General: Abdomen is flat.      Palpations: Abdomen is soft.      Tenderness: There is no guarding.   Musculoskeletal:         General: No swelling or tenderness. Normal range of motion.      Right lower leg: No edema.      Left lower  "leg: No edema.      Comments: Amputee left bka   Skin:     General: Skin is warm and dry.      Coloration: Skin is not jaundiced or pale.   Neurological:      General: No focal deficit present.      Mental Status: He is alert and oriented to person, place, and time. Mental status is at baseline.      Cranial Nerves: No cranial nerve deficit.      Motor: No weakness.      Coordination: Coordination normal.      Gait: Gait normal.   Psychiatric:         Mood and Affect: Mood normal.         Behavior: Behavior normal.             Significant Labs: All pertinent labs within the past 24 hours have been reviewed.  CBC:   No results for input(s): "WBC", "HGB", "HCT", "PLT" in the last 48 hours.    CMP:   Recent Labs   Lab 07/18/24  0425 07/19/24  0412    132*   K 4.7 5.0    104   CO2 27 22   BUN 34* 30*   CREATININE 1.95* 1.63*   CALCIUM 9.1 9.4       Significant Imaging: I have reviewed all pertinent imaging results/findings within the past 24 hours.  "

## 2024-07-19 NOTE — PROGRESS NOTES
Inpatient Nutrition Follow-Up    Admit Date: 7/14/2024   Total duration of encounter: 5 days   Patient Age: 69 y.o.    Nutrition Recommendation/Prescription     Continue Heart Healthy Diet. RD to consider Renal modifier pending PO intake due to calories provided via Renal Diet.     Increase Boost Glucose Control to TID (190 kcal, 16 gm pro each) to aid in nutrition intake.     Continue to encourage PO intake and honor patient preferences.    Dietitian will monitor Electrolytes, Energy Intake, Food and Beverage Intake, Mental Status, Renal Function, Weight, and Weight Change and adjust MNT as needed.     Monitoring & Evaluation     Communication of Recommendations: reviewed with nurse and reviewed with patient    Reason Seen: continuous nutrition monitoring    Nutrition Risk/Follow-Up: moderate (follow-up in 3-5 days)     Next Date to be Seen by RD: 07/19/24  Please consult if re-assessment needed sooner.      Nutrition Assessment     Malnutrition Assessment/Nutrition-Focused Physical Exam       Malnutrition Level:  (Unable to determine at this time. NFPE not appropriate.) (07/15/24 6503)  Energy Intake (Malnutrition):  (Unable to obtain.) (07/15/24 1935)  Weight Loss (Malnutrition): greater than 7.5% in 3 months (Per EMR patient lost 12.5# (8.5%) in 3 months.) (07/15/24 6570)                                                  A minimum of two characteristics is recommended for diagnosis of either severe or non-severe malnutrition.    Chart Review    Malnutrition Screening Tool Results   Have you recently lost weight without trying?: No  Have you been eating poorly because of a decreased appetite?: No   MST Score: 0     Diagnosis:  Hypokalemia  CKD 3a  CHF  Debility    Past Medical History:   Diagnosis Date    Anoxic brain injury     Arthritis     CAD (coronary artery disease)     Cardiac arrest     Heart attack     HLD (hyperlipidemia)     Hypertension     Mitral valve regurgitation     Muscle spasms of both lower  extremities     Muscle spasms of both lower extremities     PEG (percutaneous endoscopic gastrostomy) adjustment/replacement/removal     Status post placement of stent in right coronary artery      Past Surgical History:   Procedure Laterality Date    LEG AMPUTATION Left        Scheduled Medications:  amLODIPine, 5 mg, Daily  atorvastatin, 20 mg, Daily  busPIRone, 7.5 mg, BID  citalopram, 20 mg, Daily  clopidogreL, 75 mg, Daily  enoxparin, 40 mg, Q24H (prophylaxis, 1700)  ezetimibe, 10 mg, Daily  isosorbide mononitrate, 30 mg, Daily  nicotine, 1 patch, Daily  polyethylene glycol, 17 g, Daily  potassium chloride, 40 mEq, Daily    Continuous Infusions:   PRN Medications:   Current Facility-Administered Medications:     acetaminophen, 650 mg, Oral, Q8H PRN    melatonin, 6 mg, Oral, Nightly PRN    ondansetron, 4 mg, Intravenous, Q8H PRN    sodium chloride 0.9%, 10 mL, Intravenous, PRN    Calorie Containing IV Medications: no significant kcals from medications at this time    Nutrition-Related Labs:   Recent Labs   Lab 07/14/24  1339 07/15/24  0524 07/16/24  0453 07/17/24  0449 07/18/24  0425 07/19/24  0412    137 137 134* 136 132*   K 3.5 3.0* 3.7 3.9 4.7 5.0   CALCIUM 9.3 9.4 9.4 9.3 9.1 9.4   MG  --   --  2.40 2.60*  --   --    CO2 26 26 28 24 27 22   BUN 19 19 34* 38* 34* 30*   CREATININE 1.85* 1.70* 2.31* 1.97* 1.95* 1.63*   EGFRNORACEVR 39 44 30 37 37 46   GLUCOSE 108 117* 100 114 92 95   BILITOT 0.7 0.6 0.4  --   --   --    ALKPHOS 103 104 98  --   --   --    ALT 12 11 10  --   --   --    AST 18 19 16*  --   --   --    ALBUMIN 4.3 4.0 4.0  --   --   --    WBC 5.54 5.73 7.12 6.21  --   --    HGB 12.5* 12.4* 12.2* 12.0*  --   --    HCT 36.5 36.9 36.0 36.3  --   --      CrCl:    Lab Value: 36.6    Date: 7/15  CrCl:    Lab Value: 39    Date: 7/19      Nutrition Orders:  Diet Heart Healthy  Dietary nutrition supplements Boost Glucose Control - Chocolate; BID    Appetite/Oral Intake: fair/25-50% of meals  Factors  "Affecting Nutritional Intake: decreased appetite  Social Needs Impacting Access to Food:  Case management working on NH SNF placement due to living in hotel. Patient denied SDOH questions with RD.  Food/Worship/Cultural Preferences: Food Preferences: Dislike: Cauliflower, Broccoli, Squash, Zucchini and Eggplant. /    Food Allergies: Review of patient's allergies indicates:  No Known Allergies      Skin Integrity: bruised (ecchymotic), abrasion  Wound(s):       Overview/Hospital Course:    (7/15): Patient with history of anoxic brain injury and PEG reports okay appetite but does not typically eat much. Patient primarily answered in yes or no with a few short answers. RD got the impression of confusion. Patient has averaged 50%- 2 meals and was brought a boost glucose control. Patient asked if he could get 4/day, but due to current renal function RD to hold off on giving more than 2/day and using Boost G.C. instead of Novasource due to lower protein. Per EMR patient lost 12.5# (8.5#) in 3 months but unable to determine PO intake history and NFPE to be attempted on follow up pending patients mental status.  GI: WDL/LBM-, : WDL, FUNCTIONAL SCREEN:Eatin - independent, Nutrition: 3-->adequate,Connor Score: 17, NO EDEMA NOTED, 24 HR I/O: 1314/1380.     (): Patient reports okay appetite, but the food is bland and is drinking ONS. Nurse reports that we are still waiting on placement for patient. Per EMR patient averaged 54%-13 meals meeting 89% nutrient needs with ONS. Patient weighed 61.3 kg on 7/15, CBW- 62.7 kg showing possible 3.08# weight gain. GI: WDL/LBM-, : WDL, FUNCTIONAL SCREEN:Eatin - independent, Nutrition: 4-->excellent,Connor Score: 20, NO EDEMA NOTED, 24 HR I/O: 2080/1025.      Anthropometrics    Height: 5' 8" (172.7 cm) Height Method: Stated  Last Weight: 62.7 kg (138 lb 3.7 oz) (24 0621) Weight Method: Bed Scale  BMI (Calculated): 21  BMI Classification: underweight (BMI less " than 22 if >65 years of age)        Ideal Body Weight (IBW), Male: 154 lb     % Ideal Body Weight, Male (lb): 87.73 %  Amputation %: 5.9  Total Amputation %: 5.9                    Usual Weight Provided By: EMR weight history    Wt Readings from Last 5 Encounters:   07/19/24 62.7 kg (138 lb 3.7 oz)   07/02/24 54.4 kg (119 lb 14.9 oz)   06/19/24 54.4 kg (120 lb)   06/19/24 64.9 kg (143 lb)   05/24/24 70.3 kg (155 lb)     Weight Change(s) Since Admission:  Admit Weight: 62.7 kg (138 lb 4.8 oz) (07/14/24 1249)      Estimated Needs    Weight Used For Calorie Calculations: 61.3 kg (135 lb 2.3 oz)  Energy Calorie Requirements (kcal): 9378-4829 KCAL (25-30 KCAL/KG CBW)  Energy Need Method: Kcal/kg  Weight Used For Protein Calculations: 61.3 kg (135 lb 2.3 oz)  Protein Requirements: 61-74 GM PRO (0.8-1.0 GM/KG CBW)  Fluid Requirements (mL): 1000 ML H2O + DAILY OUTPUT        Enteral Nutrition    Patient not receiving enteral nutrition at this time.    Parenteral Nutrition    Patient not receiving parenteral nutrition support at this time.    Evaluation of Received Nutrient Intake    Calories: meeting estimated needs  Protein: meeting estimated needs    Patient Education    Not applicable.         Nutrition Diagnosis     PES: Inadequate energy intake related to inability to consume sufficient nutrients as evidenced by underweight (BMI <22, > 65 years old). (active)    Nutrition Interventions     Intervention(s): general/healthful diet, modified composition of meals/snacks, commercial beverage, multivitamin/mineral supplement therapy, and collaboration with other providers    Goal: Meet Greater than 75% of nutritional needs by discharge. (goal progressing)    Goal: Maintain weight throughout hospitalization. (goal progressing)    Nutrition Goals & Monitoring     Dietitian will monitor: food and beverage intake, energy intake, weight, weight change, and electrolyte/renal panel  Discharge planning:    Continue Heart Healthy Diet  and offer Boost as able. Chocolate Glucose control given due to flavor preference.   Next Date to be Seen by RD: 07/19/24  Please consult if re-assessment needed sooner.

## 2024-07-19 NOTE — PROGRESS NOTES
[facility]-Med/Surg  Wound Care    Patient Name:  Leno Thompson Jr.   MRN:  [id number]  Date: 7/19/2024  Diagnosis: Hypokalemia    History:     Past Medical History:   Diagnosis Date    Anoxic brain injury     Arthritis     CAD (coronary artery disease)     Cardiac arrest     Heart attack     HLD (hyperlipidemia)     Hypertension     Mitral valve regurgitation     Muscle spasms of both lower extremities     Muscle spasms of both lower extremities     PEG (percutaneous endoscopic gastrostomy) adjustment/replacement/removal     Status post placement of stent in right coronary artery        Social History     Socioeconomic History    Marital status: Single   Tobacco Use    Smoking status: Every Day     Current packs/day: 1.00     Average packs/day: 1 pack/day for 52.5 years (52.5 ttl pk-yrs)     Types: Cigarettes     Start date: 1972     Passive exposure: Current    Smokeless tobacco: Never   Substance and Sexual Activity    Alcohol use: Never    Drug use: Never    Sexual activity: Not Currently     Social Determinants of Health     Financial Resource Strain: High Risk (7/15/2024)    Overall Financial Resource Strain (CARDIA)     Difficulty of Paying Living Expenses: Hard   Food Insecurity: Food Insecurity Present (7/15/2024)    Hunger Vital Sign     Worried About Running Out of Food in the Last Year: Sometimes true     Ran Out of Food in the Last Year: Sometimes true   Transportation Needs: Unmet Transportation Needs (7/15/2024)    TRANSPORTATION NEEDS     Transportation : Yes, it has kept me from non-medical meetings, appointments, work or from getting things that I need.   Physical Activity: Inactive (7/15/2024)    Exercise Vital Sign     Days of Exercise per Week: 0 days     Minutes of Exercise per Session: 0 min   Stress: Stress Concern Present (7/15/2024)    [employer] [place] [place] [employer] - Occupational Stress Questionnaire     Feeling of Stress : To some extent   Housing Stability: High  Risk (7/15/2024)    Housing Stability Vital Sign     Unable to Pay for Housing in the Last Year: Yes     Homeless in the Last Year: Yes       Precautions:     Allergies as of 07/14/2024    (No Known Allergies)       WOC Assessment Details/Treatment        07/19/24 1412   WOCN Assessment   WOCN Total Time (mins) 15   Visit Date 07/19/24   Visit Time 1400   Consult Type New   Intervention changed        Wound 07/14/24 1900 Abrasion(s) Right anterior;lower;proximal Leg #1   Date First Assessed/Time First Assessed: 07/14/24 (c) 1900   Present on Original Admission: Yes  Primary Wound Type: Abrasion(s)  Side: Right  Orientation: anterior;lower;proximal  Location: Leg  Wound Number: #1   Wound Image    Dressing Appearance Intact;Dry;Clean   Drainage Amount Scant   Drainage Characteristics/Odor Serosanguineous;No odor   Appearance Pink;Moist   Periwound Area Intact   Wound Length (cm) 0.5 cm   Wound Width (cm) 0.7 cm   Wound Depth (cm) 0.1 cm   Wound Volume (cm^3) 0.035 cm^3   Wound Surface Area (cm^2) 0.35 cm^2   Care Cleansed with:;Sterile normal saline   Dressing Applied;Foam   Dressing Change Due 07/24/24        Wound 07/14/24 1900 Abrasion(s) Right anterior;lower;distal Leg #2   Date First Assessed/Time First Assessed: 07/14/24 1900   Primary Wound Type: Abrasion(s)  Side: Right  Orientation: anterior;lower;distal  Location: Leg  Wound Number: #2   Wound Image    Dressing Appearance Intact;Dry;Clean   Drainage Amount Scant   Drainage Characteristics/Odor Serosanguineous;No odor   Appearance Red;Moist   Periwound Area Intact   Wound Length (cm) 8.7 cm   Wound Width (cm) 1 cm   Wound Depth (cm) 0.1 cm   Wound Volume (cm^3) 0.87 cm^3   Wound Surface Area (cm^2) 8.7 cm^2   Care Cleansed with:;Sterile normal saline   Dressing Applied;Foam   Dressing Change Due 07/24/24   [REMOVED]      Wound 07/14/24 1900 Abrasion(s) Right lateral;upper Back #3   Final Assessment Date/Final Assessment Time: 07/19/24 1414  Date First  Assessed/Time First Assessed: 07/14/24 1900   Primary Wound Type: Abrasion(s)  Side: Right  Orientation: lateral;upper  Location: Back  Wound Number: #3  Is this injury device rela...   Wound Image    Care   (removed foam, wound resolved and left open to air.)       07/19/2024

## 2024-07-19 NOTE — PLAN OF CARE
Rec'd call from Casi @ [facility] stating they are in network with pt's insurance ( [employer] )  [facility] to send a nurse out to interview patient for placement.

## 2024-07-19 NOTE — SUBJECTIVE & OBJECTIVE
"    ROS  Objective:     Vital Signs (Most Recent):  Temp: 98.1 °F (36.7 °C) (07/19/24 1101)  Pulse: 60 (07/19/24 1101)  Resp: 18 (07/19/24 1101)  BP: (!) 133/58 (07/19/24 1101)  SpO2: 96 % (07/19/24 1101) Vital Signs (24h Range):  Temp:  [97.2 °F (36.2 °C)-98.6 °F (37 °C)] 98.1 °F (36.7 °C)  Pulse:  [56-68] 60  Resp:  [17-20] 18  SpO2:  [94 %-98 %] 96 %  BP: (130-174)/(55-92) 133/58     Weight: 62.7 kg (138 lb 3.7 oz)  Body mass index is 21.02 kg/m².     SpO2: 96 %         Intake/Output Summary (Last 24 hours) at 7/19/2024 1339  Last data filed at 7/19/2024 1200  Gross per 24 hour   Intake 1720 ml   Output 1075 ml   Net 645 ml       Lines/Drains/Airways       Peripheral Intravenous Line  Duration                  Peripheral IV - Single Lumen 07/14/24 1326 20 G Anterior;Distal;Left Upper Arm 5 days                       Physical Exam       Significant Labs: CMP   Recent Labs   Lab 07/18/24  0425 07/19/24  0412    132*   K 4.7 5.0    104   CO2 27 22   BUN 34* 30*   CREATININE 1.95* 1.63*   CALCIUM 9.1 9.4    and CBC No results for input(s): "WBC", "HGB", "HCT", "PLT" in the last 48 hours.      "

## 2024-07-20 LAB
ANION GAP SERPL CALC-SCNC: 9 MEQ/L (ref 2–13)
BASOPHILS # BLD AUTO: 0.09 X10(3)/MCL (ref 0.01–0.08)
BASOPHILS NFR BLD AUTO: 1.4 % (ref 0.1–1.2)
BUN SERPL-MCNC: 36 MG/DL (ref 7–20)
CALCIUM SERPL-MCNC: 9.5 MG/DL (ref 8.4–10.2)
CHLORIDE SERPL-SCNC: 106 MMOL/L (ref 98–110)
CO2 SERPL-SCNC: 20 MMOL/L (ref 21–32)
CREAT SERPL-MCNC: 1.65 MG/DL (ref 0.66–1.25)
CREAT/UREA NIT SERPL: 22 (ref 12–20)
EOSINOPHIL # BLD AUTO: 0.29 X10(3)/MCL (ref 0.04–0.54)
EOSINOPHIL NFR BLD AUTO: 4.5 % (ref 0.7–7)
ERYTHROCYTE [DISTWIDTH] IN BLOOD BY AUTOMATED COUNT: 14.4 %
GFR SERPLBLD CREATININE-BSD FMLA CKD-EPI: 45 ML/MIN/1.73/M2
GLUCOSE SERPL-MCNC: 97 MG/DL (ref 70–115)
HCT VFR BLD AUTO: 36.1 % (ref 36–52)
HGB BLD-MCNC: 12.3 G/DL (ref 13–18)
IMM GRANULOCYTES # BLD AUTO: 0.02 X10(3)/MCL (ref 0–0.03)
IMM GRANULOCYTES NFR BLD AUTO: 0.3 % (ref 0–0.5)
LYMPHOCYTES # BLD AUTO: 0.95 X10(3)/MCL (ref 1.32–3.57)
LYMPHOCYTES NFR BLD AUTO: 14.8 % (ref 20–55)
MAGNESIUM SERPL-MCNC: 2.6 MG/DL (ref 1.8–2.4)
MCH RBC QN AUTO: 27.3 PG (ref 27–34)
MCHC RBC AUTO-ENTMCNC: 34.1 G/DL (ref 31–37)
MCV RBC AUTO: 80.2 FL (ref 79–99)
MONOCYTES # BLD AUTO: 0.56 X10(3)/MCL (ref 0.3–0.82)
MONOCYTES NFR BLD AUTO: 8.7 % (ref 4.7–12.5)
NEUTROPHILS # BLD AUTO: 4.53 X10(3)/MCL (ref 1.78–5.38)
NEUTROPHILS NFR BLD AUTO: 70.3 % (ref 37–73)
NRBC BLD AUTO-RTO: 0 %
PLATELET # BLD AUTO: 184 X10(3)/MCL (ref 140–371)
PMV BLD AUTO: 9.7 FL (ref 9.4–12.4)
POTASSIUM SERPL-SCNC: 5.2 MMOL/L (ref 3.5–5.1)
RBC # BLD AUTO: 4.5 X10(6)/MCL (ref 4–6)
SODIUM SERPL-SCNC: 135 MMOL/L (ref 136–145)
WBC # BLD AUTO: 6.44 X10(3)/MCL (ref 4–11.5)

## 2024-07-20 PROCEDURE — 63600175 PHARM REV CODE 636 W HCPCS: Performed by: INTERNAL MEDICINE

## 2024-07-20 PROCEDURE — 25000003 PHARM REV CODE 250: Performed by: INTERNAL MEDICINE

## 2024-07-20 PROCEDURE — 21400001 HC TELEMETRY ROOM

## 2024-07-20 PROCEDURE — 36415 COLL VENOUS BLD VENIPUNCTURE: CPT | Performed by: FAMILY MEDICINE

## 2024-07-20 PROCEDURE — 80048 BASIC METABOLIC PNL TOTAL CA: CPT | Performed by: FAMILY MEDICINE

## 2024-07-20 PROCEDURE — 83735 ASSAY OF MAGNESIUM: CPT | Performed by: FAMILY MEDICINE

## 2024-07-20 PROCEDURE — 94761 N-INVAS EAR/PLS OXIMETRY MLT: CPT

## 2024-07-20 PROCEDURE — 85025 COMPLETE CBC W/AUTO DIFF WBC: CPT | Performed by: FAMILY MEDICINE

## 2024-07-20 RX ADMIN — ATORVASTATIN CALCIUM 20 MG: 20 TABLET, FILM COATED ORAL at 08:07

## 2024-07-20 RX ADMIN — CLOPIDOGREL BISULFATE 75 MG: 75 TABLET ORAL at 08:07

## 2024-07-20 RX ADMIN — BUSPIRONE HYDROCHLORIDE 7.5 MG: 7.5 TABLET ORAL at 08:07

## 2024-07-20 RX ADMIN — AMLODIPINE BESYLATE 5 MG: 5 TABLET ORAL at 08:07

## 2024-07-20 RX ADMIN — ISOSORBIDE MONONITRATE 30 MG: 30 TABLET, EXTENDED RELEASE ORAL at 08:07

## 2024-07-20 RX ADMIN — EZETIMIBE 10 MG: 10 TABLET ORAL at 08:07

## 2024-07-20 RX ADMIN — POLYETHYLENE GLYCOL 3350 17 G: 17 POWDER, FOR SOLUTION ORAL at 08:07

## 2024-07-20 RX ADMIN — ENOXAPARIN SODIUM 40 MG: 40 INJECTION SUBCUTANEOUS at 04:07

## 2024-07-20 RX ADMIN — CITALOPRAM HYDROBROMIDE 20 MG: 20 TABLET ORAL at 08:07

## 2024-07-20 NOTE — SUBJECTIVE & OBJECTIVE
Interval History:      Review of Systems   Constitutional:  Negative for appetite change, fatigue and fever.   Respiratory:  Negative for cough, shortness of breath and wheezing.    Cardiovascular:  Negative for chest pain and leg swelling.   Gastrointestinal:  Negative for abdominal distention, abdominal pain, constipation, diarrhea, nausea and vomiting.   Skin:  Negative for color change, pallor, rash and wound.   Neurological:  Negative for tremors, syncope and headaches.   Psychiatric/Behavioral:  Negative for agitation and behavioral problems.      Objective:     Vital Signs (Most Recent):  Temp: 98.4 °F (36.9 °C) (07/20/24 1530)  Pulse: 66 (07/20/24 1530)  Resp: 18 (07/20/24 1530)  BP: (!) 158/55 (07/20/24 1530)  SpO2: 98 % (07/20/24 1530) Vital Signs (24h Range):  Temp:  [97.3 °F (36.3 °C)-98.8 °F (37.1 °C)] 98.4 °F (36.9 °C)  Pulse:  [51-95] 66  Resp:  [18-20] 18  SpO2:  [94 %-98 %] 98 %  BP: (135-158)/(52-68) 158/55     Weight: 62.8 kg (138 lb 6.4 oz)  Body mass index is 21.04 kg/m².    Intake/Output Summary (Last 24 hours) at 7/20/2024 1636  Last data filed at 7/20/2024 1300  Gross per 24 hour   Intake 1560 ml   Output 275 ml   Net 1285 ml         Physical Exam  Vitals and nursing note reviewed. Exam conducted with a chaperone present.   Constitutional:       General: He is not in acute distress.     Appearance: Normal appearance. He is not ill-appearing.   Cardiovascular:      Rate and Rhythm: Normal rate and regular rhythm.      Pulses: Normal pulses.      Heart sounds: Normal heart sounds. No murmur heard.  Pulmonary:      Effort: Pulmonary effort is normal. No respiratory distress.      Breath sounds: Normal breath sounds. No wheezing, rhonchi or rales.   Abdominal:      General: Abdomen is flat.      Palpations: Abdomen is soft.      Tenderness: There is no guarding.   Musculoskeletal:         General: No swelling or tenderness. Normal range of motion.      Right lower leg: No edema.      Left lower  leg: No edema.      Comments: Amputee left bka   Skin:     General: Skin is warm and dry.      Coloration: Skin is not jaundiced or pale.   Neurological:      General: No focal deficit present.      Mental Status: He is alert and oriented to person, place, and time. Mental status is at baseline.      Cranial Nerves: No cranial nerve deficit.      Motor: No weakness.      Coordination: Coordination normal.      Gait: Gait normal.   Psychiatric:         Mood and Affect: Mood normal.         Behavior: Behavior normal.             Significant Labs: All pertinent labs within the past 24 hours have been reviewed.  CBC:   Recent Labs   Lab 07/20/24  0400   WBC 6.44   HGB 12.3*   HCT 36.1          CMP:   Recent Labs   Lab 07/19/24  0412 07/20/24  0400   * 135*   K 5.0 5.2*    106   CO2 22 20*   BUN 30* 36*   CREATININE 1.63* 1.65*   CALCIUM 9.4 9.5       Significant Imaging: I have reviewed all pertinent imaging results/findings within the past 24 hours.

## 2024-07-20 NOTE — PROGRESS NOTES
"[facility] Medicine  Progress Note    Patient Name: Leno Thompson Jr.  MRN: [id number]  Patient Class: IP- Inpatient   Admission Date: 7/14/2024  Length of Stay: 6 days  Attending Physician: Franklin Najera MD  Primary Care Provider: Nancy Gutierrez FNP-TOD        Subjective:     Principal Problem:Hypokalemia        HPI:  Fall     Weakness       Pt presented to ED per EMS with c/o FALL  and laceration to right lower leg. Pt denies hitting head. Pt c/o  chronic shoulder pain and neck pain. Pt reports he is unable to care for himself and currently living in a hotel. Pt was extremely disheveled on arrival. Pt stated "I dont have anyone to care for me, I've been staying at a hotel and constantly falling".         HPI:   69 year old man with chronic complex medical history that involves previous anoxic brain injury that has left him with difficulty speaking, HTN, HLD, previous left amputation, CAD presented with complaints of a fall. Denies hitting head. Patient is wheelchair bound. Patient has complicated social situation as currently living in a hotel. Patient has no one to take care of him. Currently has no ride home     On arrival to ED BNP found to be elevated. Ddimer elevated, subsequent CTA revealed no PE. CXR showed minor pleural effusion. Admitted for CHF    Overview/Hospital Course:  07/15/2024 pt admitted with CHF, h/o anoxic brain injury, falling at home apparently fell out of his wheelchair and could not get up.  He has been living in a hotel and cannot take care of himself.  07/16/2024 Cr up significantly overnight on iv lasix 1.7 to 2.30  Echocardiogram EF 55%  Overall feels better today  07/17/2024  Lab improving   Awaiting placement  07/18/2024  No new c/o  Awaiting placement   07/19/2024  No new c/o  BP better controlled   Cardio add amlodipine  07/20/2024  Awaiting placement     Interval History:      Review of Systems   Constitutional:  Negative for appetite " change, fatigue and fever.   Respiratory:  Negative for cough, shortness of breath and wheezing.    Cardiovascular:  Negative for chest pain and leg swelling.   Gastrointestinal:  Negative for abdominal distention, abdominal pain, constipation, diarrhea, nausea and vomiting.   Skin:  Negative for color change, pallor, rash and wound.   Neurological:  Negative for tremors, syncope and headaches.   Psychiatric/Behavioral:  Negative for agitation and behavioral problems.      Objective:     Vital Signs (Most Recent):  Temp: 98.4 °F (36.9 °C) (07/20/24 1530)  Pulse: 66 (07/20/24 1530)  Resp: 18 (07/20/24 1530)  BP: (!) 158/55 (07/20/24 1530)  SpO2: 98 % (07/20/24 1530) Vital Signs (24h Range):  Temp:  [97.3 °F (36.3 °C)-98.8 °F (37.1 °C)] 98.4 °F (36.9 °C)  Pulse:  [51-95] 66  Resp:  [18-20] 18  SpO2:  [94 %-98 %] 98 %  BP: (135-158)/(52-68) 158/55     Weight: 62.8 kg (138 lb 6.4 oz)  Body mass index is 21.04 kg/m².    Intake/Output Summary (Last 24 hours) at 7/20/2024 1636  Last data filed at 7/20/2024 1300  Gross per 24 hour   Intake 1560 ml   Output 275 ml   Net 1285 ml         Physical Exam  Vitals and nursing note reviewed. Exam conducted with a chaperone present.   Constitutional:       General: He is not in acute distress.     Appearance: Normal appearance. He is not ill-appearing.   Cardiovascular:      Rate and Rhythm: Normal rate and regular rhythm.      Pulses: Normal pulses.      Heart sounds: Normal heart sounds. No murmur heard.  Pulmonary:      Effort: Pulmonary effort is normal. No respiratory distress.      Breath sounds: Normal breath sounds. No wheezing, rhonchi or rales.   Abdominal:      General: Abdomen is flat.      Palpations: Abdomen is soft.      Tenderness: There is no guarding.   Musculoskeletal:         General: No swelling or tenderness. Normal range of motion.      Right lower leg: No edema.      Left lower leg: No edema.      Comments: Amputee left bka   Skin:     General: Skin is warm  and dry.      Coloration: Skin is not jaundiced or pale.   Neurological:      General: No focal deficit present.      Mental Status: He is alert and oriented to person, place, and time. Mental status is at baseline.      Cranial Nerves: No cranial nerve deficit.      Motor: No weakness.      Coordination: Coordination normal.      Gait: Gait normal.   Psychiatric:         Mood and Affect: Mood normal.         Behavior: Behavior normal.             Significant Labs: All pertinent labs within the past 24 hours have been reviewed.  CBC:   Recent Labs   Lab 07/20/24  0400   WBC 6.44   HGB 12.3*   HCT 36.1          CMP:   Recent Labs   Lab 07/19/24  0412 07/20/24  0400   * 135*   K 5.0 5.2*    106   CO2 22 20*   BUN 30* 36*   CREATININE 1.63* 1.65*   CALCIUM 9.4 9.5       Significant Imaging: I have reviewed all pertinent imaging results/findings within the past 24 hours.    Assessment/Plan:      * Hypokalemia  .Patient has hypokalemia which is Acute and currently improving. Most recent potassium levels reviewed-   Lab Results   Component Value Date    K 3.9 07/17/2024   . Will continue potassium replacement per protocol and recheck repeat levels after replacement completed.     improved    Stage 3a chronic kidney disease  Creatine stable for now. BMP reviewed- noted Estimated Creatinine Clearance: 36.6 mL/min (A) (based on SCr of 1.7 mg/dL (H)). according to latest data. Based on current GFR, CKD stage is stage 3 - GFR 30-59.  Monitor UOP and serial BMP and adjust therapy as needed. Renally dose meds. Avoid nephrotoxic medications and procedures.    CHF (congestive heart failure)  Patient is identified as having Diastolic (HFpEF) heart failure that is Acute on chronic. CHF is currently uncontrolled due to Dyspnea not returned to baseline after 3 doses of IV diuretic and >3 pillow orthopnea. Latest ECHO performed and demonstrates- Results for orders placed during the hospital encounter of  "07/14/24    Echo    Interpretation Summary    Left Ventricle: The left ventricle is normal in size. There is normal systolic function with a visually estimated ejection fraction of 55%. There is inferobasal hypokinesis noted. Grade I diastolic dysfunction.    Right Ventricle: Normal right ventricular cavity size. Systolic function is normal.    Aortic Valve: There is mild aortic valve sclerosis. There is mild to moderate (1-2+) aortic regurgitation.    Mitral Valve: There is mild (1+) regurgitation.    Unable to determine PASP.  . Continue Beta Blocker and ACE/ARB and monitor clinical status closely. Monitor on telemetry. Patient is on CHF pathway.  Monitor strict Is&Os and daily weights.  Place on fluid restriction of 1.5 L. Cardiology has been consulted. Continue to stress to patient importance of self efficacy and  on diet for CHF. Last BNP reviewed- and noted below No results for input(s): "BNP", "BNPTRIAGEBLO" in the last 168 hours.    Cardio following   Resume BB, ACE inh when toelerated        Debility  Patient with Acute on chronic debility due to chronic unspecified fatigue and other reduced mobility. Latest AMPAC and GEMS scores have been reviewed. Evaluation for etiology is underway. Plan includes PT/OT consulted.      VTE Risk Mitigation (From admission, onward)           Ordered     enoxaparin injection 40 mg  Every 24 hours         07/16/24 1620     IP VTE HIGH RISK PATIENT  Once         07/14/24 1802                    Discharge Planning   LELIA: 7/22/2024     Code Status: DNR   Is the patient medically ready for discharge?:     Reason for patient still in hospital (select all that apply): Treatment and Consult recommendations  Discharge Plan A: Skilled Nursing Facility   Discharge Delays: (!) Post-Acute Set-up              Franklin Najera MD  Department of Hospital Medicine   [facility]    "

## 2024-07-21 LAB
ANION GAP SERPL CALC-SCNC: 8 MEQ/L (ref 2–13)
BUN SERPL-MCNC: 44 MG/DL (ref 7–20)
CALCIUM SERPL-MCNC: 9.7 MG/DL (ref 8.4–10.2)
CHLORIDE SERPL-SCNC: 105 MMOL/L (ref 98–110)
CO2 SERPL-SCNC: 23 MMOL/L (ref 21–32)
CREAT SERPL-MCNC: 1.78 MG/DL (ref 0.66–1.25)
CREAT/UREA NIT SERPL: 25 (ref 12–20)
GFR SERPLBLD CREATININE-BSD FMLA CKD-EPI: 41 ML/MIN/1.73/M2
GLUCOSE SERPL-MCNC: 93 MG/DL (ref 70–115)
POTASSIUM SERPL-SCNC: 4.9 MMOL/L (ref 3.5–5.1)
SODIUM SERPL-SCNC: 136 MMOL/L (ref 136–145)

## 2024-07-21 PROCEDURE — 80048 BASIC METABOLIC PNL TOTAL CA: CPT | Performed by: FAMILY MEDICINE

## 2024-07-21 PROCEDURE — 21400001 HC TELEMETRY ROOM

## 2024-07-21 PROCEDURE — 63600175 PHARM REV CODE 636 W HCPCS: Performed by: INTERNAL MEDICINE

## 2024-07-21 PROCEDURE — 25000003 PHARM REV CODE 250: Performed by: INTERNAL MEDICINE

## 2024-07-21 PROCEDURE — 94761 N-INVAS EAR/PLS OXIMETRY MLT: CPT

## 2024-07-21 PROCEDURE — 36415 COLL VENOUS BLD VENIPUNCTURE: CPT | Performed by: FAMILY MEDICINE

## 2024-07-21 RX ADMIN — EZETIMIBE 10 MG: 10 TABLET ORAL at 08:07

## 2024-07-21 RX ADMIN — BUSPIRONE HYDROCHLORIDE 7.5 MG: 7.5 TABLET ORAL at 08:07

## 2024-07-21 RX ADMIN — BUSPIRONE HYDROCHLORIDE 7.5 MG: 7.5 TABLET ORAL at 09:07

## 2024-07-21 RX ADMIN — ATORVASTATIN CALCIUM 20 MG: 20 TABLET, FILM COATED ORAL at 08:07

## 2024-07-21 RX ADMIN — CITALOPRAM HYDROBROMIDE 20 MG: 20 TABLET ORAL at 08:07

## 2024-07-21 RX ADMIN — ISOSORBIDE MONONITRATE 30 MG: 30 TABLET, EXTENDED RELEASE ORAL at 08:07

## 2024-07-21 RX ADMIN — ENOXAPARIN SODIUM 40 MG: 40 INJECTION SUBCUTANEOUS at 04:07

## 2024-07-21 RX ADMIN — POLYETHYLENE GLYCOL 3350 17 G: 17 POWDER, FOR SOLUTION ORAL at 08:07

## 2024-07-21 RX ADMIN — AMLODIPINE BESYLATE 5 MG: 5 TABLET ORAL at 08:07

## 2024-07-21 RX ADMIN — CLOPIDOGREL BISULFATE 75 MG: 75 TABLET ORAL at 08:07

## 2024-07-22 LAB
ANION GAP SERPL CALC-SCNC: 8 MEQ/L (ref 2–13)
BUN SERPL-MCNC: 44 MG/DL (ref 7–20)
CALCIUM SERPL-MCNC: 9.9 MG/DL (ref 8.4–10.2)
CHLORIDE SERPL-SCNC: 108 MMOL/L (ref 98–110)
CO2 SERPL-SCNC: 20 MMOL/L (ref 21–32)
CREAT SERPL-MCNC: 1.64 MG/DL (ref 0.66–1.25)
CREAT/UREA NIT SERPL: 27 (ref 12–20)
GFR SERPLBLD CREATININE-BSD FMLA CKD-EPI: 46 ML/MIN/1.73/M2
GLUCOSE SERPL-MCNC: 93 MG/DL (ref 70–115)
POTASSIUM SERPL-SCNC: 5.2 MMOL/L (ref 3.5–5.1)
SODIUM SERPL-SCNC: 136 MMOL/L (ref 136–145)

## 2024-07-22 PROCEDURE — 80048 BASIC METABOLIC PNL TOTAL CA: CPT | Performed by: FAMILY MEDICINE

## 2024-07-22 PROCEDURE — 21400001 HC TELEMETRY ROOM

## 2024-07-22 PROCEDURE — 94761 N-INVAS EAR/PLS OXIMETRY MLT: CPT

## 2024-07-22 PROCEDURE — 99900035 HC TECH TIME PER 15 MIN (STAT)

## 2024-07-22 PROCEDURE — 97116 GAIT TRAINING THERAPY: CPT

## 2024-07-22 PROCEDURE — 63600175 PHARM REV CODE 636 W HCPCS: Performed by: INTERNAL MEDICINE

## 2024-07-22 PROCEDURE — 25000003 PHARM REV CODE 250: Performed by: INTERNAL MEDICINE

## 2024-07-22 PROCEDURE — 36415 COLL VENOUS BLD VENIPUNCTURE: CPT | Performed by: FAMILY MEDICINE

## 2024-07-22 RX ADMIN — BUSPIRONE HYDROCHLORIDE 7.5 MG: 7.5 TABLET ORAL at 09:07

## 2024-07-22 RX ADMIN — ENOXAPARIN SODIUM 40 MG: 40 INJECTION SUBCUTANEOUS at 04:07

## 2024-07-22 RX ADMIN — AMLODIPINE BESYLATE 5 MG: 5 TABLET ORAL at 10:07

## 2024-07-22 RX ADMIN — EZETIMIBE 10 MG: 10 TABLET ORAL at 10:07

## 2024-07-22 RX ADMIN — BUSPIRONE HYDROCHLORIDE 7.5 MG: 7.5 TABLET ORAL at 10:07

## 2024-07-22 RX ADMIN — POLYETHYLENE GLYCOL 3350 17 G: 17 POWDER, FOR SOLUTION ORAL at 10:07

## 2024-07-22 RX ADMIN — ISOSORBIDE MONONITRATE 30 MG: 30 TABLET, EXTENDED RELEASE ORAL at 10:07

## 2024-07-22 RX ADMIN — ATORVASTATIN CALCIUM 20 MG: 20 TABLET, FILM COATED ORAL at 10:07

## 2024-07-22 RX ADMIN — CLOPIDOGREL BISULFATE 75 MG: 75 TABLET ORAL at 10:07

## 2024-07-22 RX ADMIN — CITALOPRAM HYDROBROMIDE 20 MG: 20 TABLET ORAL at 10:07

## 2024-07-22 NOTE — PLAN OF CARE
07/22/24 1143   Discharge Reassessment   Assessment Type Discharge Planning Reassessment   Did the patient's condition or plan change since previous assessment? No   Discharge Plan discussed with: Patient   Communicated LELIA with patient/caregiver Yes   Discharge Plan A Skilled Nursing Facility   Discharge Plan B New Nursing Home placement - [facility] care facility   DME Needed Upon Discharge  none   Transition of Care Barriers None   Why the patient remains in the hospital Placement issues   Post-Acute Status   Post-Acute Authorization Placement   Post-Acute Placement Status Pending payor review/awaiting authorization (if required)   Coverage [facility]   Hospital Resources/Appts/Education Provided Post-Acute resouces added to AVS   Discharge Delays (!) Post-Acute Set-up

## 2024-07-22 NOTE — PT/OT/SLP PROGRESS
Physical Therapy Treatment    Patient Name:  Leno Thompson Jr.   MRN:  [id number]    Recommendations:     Discharge Recommendations: Moderate Intensity Therapy  Discharge Equipment Recommendations:    Barriers to discharge:  current medical status    Assessment:     Leno Thompson Jr. is a 69 y.o. male admitted with a medical diagnosis of Hypokalemia.  He presents with the following impairments/functional limitations: weakness, impaired self care skills, impaired functional mobility, gait instability, impaired balance     Pt found with HOB elevated. He is agreeable to PT tx this afternoon. Pt completed supine to sit, min A. Pt able to don LLE prosthesis independently while sitting EOB. Sit to stand completed, CGA/min A with RW. Gait training completed x30 feet with RW, CGA/min A for safety. Pt then completed sit to supine, min A. Pt left with HOB elevated and all needs met.    Rehab Prognosis: Fair; patient would benefit from acute skilled PT services to address these deficits and reach maximum level of function.    Recent Surgery: * No surgery found *      Plan:     During this hospitalization, patient to be seen 5 x/week (5-6x/week, 1-2x/day) to address the identified rehab impairments via gait training, therapeutic activities, therapeutic exercises and progress toward the following goals:    Plan of Care Expires:  08/15/24    Subjective     Chief Complaint: none currently verbalized  Patient/Family Comments/goals: to get stronger  Pain/Comfort:         Objective:     Communicated with nursing prior to session.  Patient found HOB elevated with bed alarm, telemetry, pulse ox (continuous) upon PT entry to room.     General Precautions: Standard, fall  Orthopedic Precautions:    Braces:    Respiratory Status: Room air     Functional Mobility:  Bed Mobility:     Supine to Sit: minimum assistance  Sit to Supine: minimum assistance  Transfers:     Sit to Stand:  contact guard assistance and minimum assistance with  rolling walker  Gait: 30 feet with RW, CGA/min A for safety      AM-PAC 6 CLICK MOBILITY          Treatment & Education:  See above    Patient left HOB elevated with all lines intact, call button in reach, bed alarm on, and nurse notified..    GOALS:   Multidisciplinary Problems       Physical Therapy Goals          Problem: Physical Therapy    Goal Priority Disciplines Outcome Goal Variances Interventions   Physical Therapy Goal     PT, PT/OT Progressing     Description: Goals to be met by: discharge     Patient will increase functional independence with mobility by performin. Supine to sit with Contact Guard Assistance  2. Sit to stand transfer with Moderate Assistance and Maximum Assistance  3. Bed to chair transfer with Moderate Assistance using Rolling Walker                         Time Tracking:     PT Received On: 24  PT Start Time: 1405     PT Stop Time: 1420  PT Total Time (min): 15 min     Billable Minutes: Gait Training 15    Treatment Type: Treatment  PT/PTA: PT           2024

## 2024-07-22 NOTE — PROGRESS NOTES
"[facility] Medicine  Progress Note    Patient Name: Leno Thompson Jr.  MRN: [id number]  Patient Class: IP- Inpatient   Admission Date: 7/14/2024  Length of Stay: 7 days  Attending Physician: Franklin Najera MD  Primary Care Provider: Nancy Gutierrez FNP-TOD        Subjective:     Principal Problem:Hypokalemia        HPI:  Fall     Weakness       Pt presented to ED per EMS with c/o FALL  and laceration to right lower leg. Pt denies hitting head. Pt c/o  chronic shoulder pain and neck pain. Pt reports he is unable to care for himself and currently living in a hotel. Pt was extremely disheveled on arrival. Pt stated "I dont have anyone to care for me, I've been staying at a hotel and constantly falling".         HPI:   69 year old man with chronic complex medical history that involves previous anoxic brain injury that has left him with difficulty speaking, HTN, HLD, previous left amputation, CAD presented with complaints of a fall. Denies hitting head. Patient is wheelchair bound. Patient has complicated social situation as currently living in a hotel. Patient has no one to take care of him. Currently has no ride home     On arrival to ED BNP found to be elevated. Ddimer elevated, subsequent CTA revealed no PE. CXR showed minor pleural effusion. Admitted for CHF    Overview/Hospital Course:  07/15/2024 pt admitted with CHF, h/o anoxic brain injury, falling at home apparently fell out of his wheelchair and could not get up.  He has been living in a hotel and cannot take care of himself.  07/16/2024 Cr up significantly overnight on iv lasix 1.7 to 2.30  Echocardiogram EF 55%  Overall feels better today  07/17/2024  Lab improving   Awaiting placement  07/18/2024  No new c/o  Awaiting placement   07/19/2024  No new c/o  BP better controlled   Cardio add amlodipine  07/20/2024  Awaiting placement   07/21/2024  No new c/o    Interval History:      Review of Systems   Constitutional:  " Negative for appetite change, fatigue and fever.   Respiratory:  Negative for cough, shortness of breath and wheezing.    Cardiovascular:  Negative for chest pain and leg swelling.   Gastrointestinal:  Negative for abdominal distention, abdominal pain, constipation, diarrhea, nausea and vomiting.   Skin:  Negative for color change, pallor, rash and wound.   Neurological:  Negative for tremors, syncope and headaches.   Psychiatric/Behavioral:  Negative for agitation and behavioral problems.      Objective:     Vital Signs (Most Recent):  Temp: 98.4 °F (36.9 °C) (07/21/24 2016)  Pulse: 70 (07/21/24 2016)  Resp: 18 (07/21/24 2016)  BP: (!) 136/51 (07/21/24 2016)  SpO2: 96 % (07/21/24 2016) Vital Signs (24h Range):  Temp:  [97.6 °F (36.4 °C)-98.4 °F (36.9 °C)] 98.4 °F (36.9 °C)  Pulse:  [53-70] 70  Resp:  [16-20] 18  SpO2:  [96 %-99 %] 96 %  BP: (112-147)/(51-59) 136/51     Weight: 62.9 kg (138 lb 10.7 oz)  Body mass index is 21.08 kg/m².    Intake/Output Summary (Last 24 hours) at 7/21/2024 2141  Last data filed at 7/21/2024 1750  Gross per 24 hour   Intake 720 ml   Output 800 ml   Net -80 ml         Physical Exam  Vitals and nursing note reviewed. Exam conducted with a chaperone present.   Constitutional:       General: He is not in acute distress.     Appearance: Normal appearance. He is not ill-appearing.   Cardiovascular:      Rate and Rhythm: Normal rate and regular rhythm.      Pulses: Normal pulses.      Heart sounds: Normal heart sounds. No murmur heard.  Pulmonary:      Effort: Pulmonary effort is normal. No respiratory distress.      Breath sounds: Normal breath sounds. No wheezing, rhonchi or rales.   Abdominal:      General: Abdomen is flat.      Palpations: Abdomen is soft.      Tenderness: There is no guarding.   Musculoskeletal:         General: No swelling or tenderness. Normal range of motion.      Right lower leg: No edema.      Left lower leg: No edema.      Comments: Amputee left bka   Skin:      General: Skin is warm and dry.      Coloration: Skin is not jaundiced or pale.   Neurological:      General: No focal deficit present.      Mental Status: He is alert and oriented to person, place, and time. Mental status is at baseline.      Cranial Nerves: No cranial nerve deficit.      Motor: No weakness.      Coordination: Coordination normal.      Gait: Gait normal.   Psychiatric:         Mood and Affect: Mood normal.         Behavior: Behavior normal.             Significant Labs: All pertinent labs within the past 24 hours have been reviewed.  CBC:   Recent Labs   Lab 07/20/24  0400   WBC 6.44   HGB 12.3*   HCT 36.1          CMP:   Recent Labs   Lab 07/20/24  0400 07/21/24  0422   * 136   K 5.2* 4.9    105   CO2 20* 23   BUN 36* 44*   CREATININE 1.65* 1.78*   CALCIUM 9.5 9.7       Significant Imaging: I have reviewed all pertinent imaging results/findings within the past 24 hours.    Assessment/Plan:      * Hypokalemia  .Patient has hypokalemia which is Acute and currently improving. Most recent potassium levels reviewed-   Lab Results   Component Value Date    K 3.9 07/17/2024   . Will continue potassium replacement per protocol and recheck repeat levels after replacement completed.     improved    Stage 3a chronic kidney disease  Creatine stable for now. BMP reviewed- noted Estimated Creatinine Clearance: 36.6 mL/min (A) (based on SCr of 1.7 mg/dL (H)). according to latest data. Based on current GFR, CKD stage is stage 3 - GFR 30-59.  Monitor UOP and serial BMP and adjust therapy as needed. Renally dose meds. Avoid nephrotoxic medications and procedures.    CHF (congestive heart failure)  Patient is identified as having Diastolic (HFpEF) heart failure that is Acute on chronic. CHF is currently uncontrolled due to Dyspnea not returned to baseline after 3 doses of IV diuretic and >3 pillow orthopnea. Latest ECHO performed and demonstrates- Results for orders placed during the hospital  "encounter of 07/14/24    Echo    Interpretation Summary    Left Ventricle: The left ventricle is normal in size. There is normal systolic function with a visually estimated ejection fraction of 55%. There is inferobasal hypokinesis noted. Grade I diastolic dysfunction.    Right Ventricle: Normal right ventricular cavity size. Systolic function is normal.    Aortic Valve: There is mild aortic valve sclerosis. There is mild to moderate (1-2+) aortic regurgitation.    Mitral Valve: There is mild (1+) regurgitation.    Unable to determine PASP.  . Continue Beta Blocker and ACE/ARB and monitor clinical status closely. Monitor on telemetry. Patient is on CHF pathway.  Monitor strict Is&Os and daily weights.  Place on fluid restriction of 1.5 L. Cardiology has been consulted. Continue to stress to patient importance of self efficacy and  on diet for CHF. Last BNP reviewed- and noted below No results for input(s): "BNP", "BNPTRIAGEBLO" in the last 168 hours.    Cardio following   Resume BB, ACE inh when toelerated        Debility  Patient with Acute on chronic debility due to chronic unspecified fatigue and other reduced mobility. Latest AMPAC and GEMS scores have been reviewed. Evaluation for etiology is underway. Plan includes PT/OT consulted.      VTE Risk Mitigation (From admission, onward)           Ordered     enoxaparin injection 40 mg  Every 24 hours         07/16/24 1620     IP VTE HIGH RISK PATIENT  Once         07/14/24 1802                    Discharge Planning   LELIA: 7/22/2024     Code Status: DNR   Is the patient medically ready for discharge?:     Reason for patient still in hospital (select all that apply): Pending disposition  Discharge Plan A: Skilled Nursing Facility   Discharge Delays: (!) Post-Acute Set-up              Franklin Najera MD  Department of Hospital Medicine   [facility]    "

## 2024-07-22 NOTE — SUBJECTIVE & OBJECTIVE
Interval History:      Review of Systems   Constitutional:  Negative for appetite change, fatigue and fever.   Respiratory:  Negative for cough, shortness of breath and wheezing.    Cardiovascular:  Negative for chest pain and leg swelling.   Gastrointestinal:  Negative for abdominal distention, abdominal pain, constipation, diarrhea, nausea and vomiting.   Skin:  Negative for color change, pallor, rash and wound.   Neurological:  Negative for tremors, syncope and headaches.   Psychiatric/Behavioral:  Negative for agitation and behavioral problems.      Objective:     Vital Signs (Most Recent):  Temp: 98.4 °F (36.9 °C) (07/21/24 2016)  Pulse: 70 (07/21/24 2016)  Resp: 18 (07/21/24 2016)  BP: (!) 136/51 (07/21/24 2016)  SpO2: 96 % (07/21/24 2016) Vital Signs (24h Range):  Temp:  [97.6 °F (36.4 °C)-98.4 °F (36.9 °C)] 98.4 °F (36.9 °C)  Pulse:  [53-70] 70  Resp:  [16-20] 18  SpO2:  [96 %-99 %] 96 %  BP: (112-147)/(51-59) 136/51     Weight: 62.9 kg (138 lb 10.7 oz)  Body mass index is 21.08 kg/m².    Intake/Output Summary (Last 24 hours) at 7/21/2024 2141  Last data filed at 7/21/2024 1750  Gross per 24 hour   Intake 720 ml   Output 800 ml   Net -80 ml         Physical Exam  Vitals and nursing note reviewed. Exam conducted with a chaperone present.   Constitutional:       General: He is not in acute distress.     Appearance: Normal appearance. He is not ill-appearing.   Cardiovascular:      Rate and Rhythm: Normal rate and regular rhythm.      Pulses: Normal pulses.      Heart sounds: Normal heart sounds. No murmur heard.  Pulmonary:      Effort: Pulmonary effort is normal. No respiratory distress.      Breath sounds: Normal breath sounds. No wheezing, rhonchi or rales.   Abdominal:      General: Abdomen is flat.      Palpations: Abdomen is soft.      Tenderness: There is no guarding.   Musculoskeletal:         General: No swelling or tenderness. Normal range of motion.      Right lower leg: No edema.      Left lower  Pt c/o back, neck, and bilat knee pain after falling twice today, denies LOC and head injury, Pt is A&OX3, calm, ambulatory with discomfort, afebrile, breathes are equal and unabored, sensation and movement intact throughout extermities leg: No edema.      Comments: Amputee left bka   Skin:     General: Skin is warm and dry.      Coloration: Skin is not jaundiced or pale.   Neurological:      General: No focal deficit present.      Mental Status: He is alert and oriented to person, place, and time. Mental status is at baseline.      Cranial Nerves: No cranial nerve deficit.      Motor: No weakness.      Coordination: Coordination normal.      Gait: Gait normal.   Psychiatric:         Mood and Affect: Mood normal.         Behavior: Behavior normal.             Significant Labs: All pertinent labs within the past 24 hours have been reviewed.  CBC:   Recent Labs   Lab 07/20/24  0400   WBC 6.44   HGB 12.3*   HCT 36.1          CMP:   Recent Labs   Lab 07/20/24  0400 07/21/24  0422   * 136   K 5.2* 4.9    105   CO2 20* 23   BUN 36* 44*   CREATININE 1.65* 1.78*   CALCIUM 9.5 9.7       Significant Imaging: I have reviewed all pertinent imaging results/findings within the past 24 hours.

## 2024-07-22 NOTE — PLAN OF CARE
Rec'd call from [facility] @ [facility] stating patient has been accepted. GAW to send off for authorization to pt's insurance ( [employer] ).

## 2024-07-23 VITALS
WEIGHT: 139.31 LBS | TEMPERATURE: 98 F | HEIGHT: 68 IN | DIASTOLIC BLOOD PRESSURE: 60 MMHG | OXYGEN SATURATION: 99 % | HEART RATE: 61 BPM | BODY MASS INDEX: 21.11 KG/M2 | RESPIRATION RATE: 18 BRPM | SYSTOLIC BLOOD PRESSURE: 144 MMHG

## 2024-07-23 LAB
ANION GAP SERPL CALC-SCNC: 9 MEQ/L (ref 2–13)
BUN SERPL-MCNC: 47 MG/DL (ref 7–20)
CALCIUM SERPL-MCNC: 9.5 MG/DL (ref 8.4–10.2)
CHLORIDE SERPL-SCNC: 105 MMOL/L (ref 98–110)
CO2 SERPL-SCNC: 22 MMOL/L (ref 21–32)
CREAT SERPL-MCNC: 1.62 MG/DL (ref 0.66–1.25)
CREAT/UREA NIT SERPL: 29 (ref 12–20)
GFR SERPLBLD CREATININE-BSD FMLA CKD-EPI: 46 ML/MIN/1.73/M2
GLUCOSE SERPL-MCNC: 89 MG/DL (ref 70–115)
POTASSIUM SERPL-SCNC: 5.3 MMOL/L (ref 3.5–5.1)
SARS-COV-2 RDRP RESP QL NAA+PROBE: NEGATIVE
SODIUM SERPL-SCNC: 136 MMOL/L (ref 136–145)

## 2024-07-23 PROCEDURE — 36415 COLL VENOUS BLD VENIPUNCTURE: CPT | Performed by: FAMILY MEDICINE

## 2024-07-23 PROCEDURE — U0002 COVID-19 LAB TEST NON-CDC: HCPCS | Performed by: FAMILY MEDICINE

## 2024-07-23 PROCEDURE — 25000003 PHARM REV CODE 250: Performed by: INTERNAL MEDICINE

## 2024-07-23 PROCEDURE — 80048 BASIC METABOLIC PNL TOTAL CA: CPT | Performed by: FAMILY MEDICINE

## 2024-07-23 PROCEDURE — 94761 N-INVAS EAR/PLS OXIMETRY MLT: CPT

## 2024-07-23 RX ORDER — AMLODIPINE BESYLATE 5 MG/1
5 TABLET ORAL DAILY
Qty: 90 TABLET | Refills: 3 | Status: SHIPPED | OUTPATIENT
Start: 2024-07-24 | End: 2025-07-24

## 2024-07-23 RX ADMIN — BUSPIRONE HYDROCHLORIDE 7.5 MG: 7.5 TABLET ORAL at 08:07

## 2024-07-23 RX ADMIN — AMLODIPINE BESYLATE 5 MG: 5 TABLET ORAL at 08:07

## 2024-07-23 RX ADMIN — CLOPIDOGREL BISULFATE 75 MG: 75 TABLET ORAL at 08:07

## 2024-07-23 RX ADMIN — ATORVASTATIN CALCIUM 20 MG: 20 TABLET, FILM COATED ORAL at 08:07

## 2024-07-23 RX ADMIN — EZETIMIBE 10 MG: 10 TABLET ORAL at 08:07

## 2024-07-23 RX ADMIN — CITALOPRAM HYDROBROMIDE 20 MG: 20 TABLET ORAL at 08:07

## 2024-07-23 RX ADMIN — POLYETHYLENE GLYCOL 3350 17 G: 17 POWDER, FOR SOLUTION ORAL at 08:07

## 2024-07-23 RX ADMIN — ISOSORBIDE MONONITRATE 30 MG: 30 TABLET, EXTENDED RELEASE ORAL at 08:07

## 2024-07-23 NOTE — ASSESSMENT & PLAN NOTE
Patient with Acute on chronic debility due to chronic unspecified fatigue and other reduced mobility. Latest AMPAC and GEMS scores have been reviewed. Evaluation for etiology is underway. Plan includes PT/OT consulted.  Cont PT

## 2024-07-23 NOTE — PLAN OF CARE
Problem: Adult Inpatient Plan of Care  Goal: Plan of Care Review  Outcome: Met  Goal: Patient-Specific Goal (Individualized)  Outcome: Met  Goal: Absence of Hospital-Acquired Illness or Injury  Outcome: Met  Goal: Optimal Comfort and Wellbeing  Outcome: Met  Goal: Readiness for Transition of Care  Outcome: Met     Problem: Diabetes Comorbidity  Goal: Blood Glucose Level Within Targeted Range  Outcome: Met     Problem: Activity Intolerance  Goal: Enhanced Capacity and Energy  Outcome: Met     Problem: Skin Injury Risk Increased  Goal: Skin Health and Integrity  Outcome: Met     Problem: Fall Injury Risk  Goal: Absence of Fall and Fall-Related Injury  Outcome: Met     Problem: Wound  Goal: Optimal Coping  Outcome: Met  Goal: Optimal Functional Ability  Outcome: Met  Goal: Absence of Infection Signs and Symptoms  Outcome: Met  Goal: Improved Oral Intake  Outcome: Met  Goal: Optimal Pain Control and Function  Outcome: Met  Goal: Skin Health and Integrity  Outcome: Met  Goal: Optimal Wound Healing  Outcome: Met

## 2024-07-23 NOTE — DISCHARGE SUMMARY
Hospital Medicine  Discharge Summary    Patient Name: Leno Thompson Jr.  MRN: [id number]  Admit Date: 7/14/2024  Discharge Date:    Status: IP- Inpatient   Length of Stay: 9      PHYSICIANS   Admitting Physician: Ezio Bacon MD  Discharging Physician: Franklin Najera MD.  Primary Care Physician: Nancy Gutierrez, FNP-C  Consults: {consultation: 02920}      DISCHARGE DIAGNOSES   ***      PROCEDURES   * No surgery found *   ***      HOSPITAL COURSE    ***      STATUS  Improved***Stable    DISPOSITION  Discharge to {Discharge recommendations:84742}    DIET  ***    ACTIVITY  As tolerated  ***    FOLLOW-UP      Contact information for after-discharge care       Destination       [facility] .    Service: Skilled Nursing  Contact information:  [address] [place] [id number] [phone]                                     DISCHARGE MEDICATION RECONCILIATION        Medication List        START taking these medications      amLODIPine 5 MG tablet  Commonly known as: NORVASC  Take 1 tablet (5 mg total) by mouth once daily.  Start taking on: July 24, 2024            CONTINUE taking these medications      atorvastatin 20 MG tablet  Commonly known as: LIPITOR     busPIRone 7.5 MG tablet  Commonly known as: BUSPAR  Take 1 tablet (7.5 mg total) by mouth 2 (two) times a day.     citalopram 10 MG tablet  Commonly known as: CeleXA  Take 2 tablets (20 mg total) by mouth once daily.     clopidogreL 75 mg tablet  Commonly known as: PLAVIX     ezetimibe 10 mg tablet  Commonly known as: ZETIA     isosorbide mononitrate 30 MG 24 hr tablet  Commonly known as: IMDUR  Take 1 tablet (30 mg total) by mouth once daily.     JARDIANCE 10 mg tablet  Generic drug: empagliflozin     SENNA WITH DOCUSATE SODIUM 8.6-50 mg per tablet  Generic drug: senna-docusate 8.6-50 mg            STOP taking these medications      baclofen 10 MG tablet  Commonly known as: LIORESAL     diazePAM 2 MG tablet  Commonly known as:  "VALIUM     furosemide 20 MG tablet  Commonly known as: LASIX     lisinopriL 10 MG tablet     meloxicam 15 MG tablet  Commonly known as: MOBIC     metoprolol succinate 100 MG 24 hr tablet  Commonly known as: TOPROL-XL     naproxen 500 MG tablet  Commonly known as: NAPROSYN               Where to Get Your Medications        These medications were sent to [id number] - [place] - [address] AT [facility] [address]  [address], [place] [address]      Phone: [phone]   amLODIPine 5 MG tablet           PHYSICAL EXAM   VITALS: T 98.1 °F (36.7 °C)   /65   P (!) 56   RR 16   O2 98 %    ***        DIAGNOSITCS   CBC:   Recent Labs   Lab 07/17/24  0449 07/20/24  0400   WBC 6.21 6.44   HGB 12.0* 12.3*   HCT 36.3 36.1    184       CMP:   Recent Labs   Lab 07/17/24  0449 07/18/24  0425 07/20/24  0400 07/21/24  0422 07/22/24  0433 07/23/24  0421   CALCIUM 9.3   < > 9.5 9.7 9.9 9.5   *   < > 135* 136 136 136   K 3.9   < > 5.2* 4.9 5.2* 5.3*   CO2 24   < > 20* 23 20* 22      < > 106 105 108 105   BUN 38*   < > 36* 44* 44* 47*   CREATININE 1.97*   < > 1.65* 1.78* 1.64* 1.62*   MG 2.60*  --  2.60*  --   --   --     < > = values in this interval not displayed.     Estimated Creatinine Clearance: 39.6 mL/min (A) (based on SCr of 1.62 mg/dL (H)).    {:[id number]}     COAG:  No results for input(s): "APTT", "INR", "PTT" in the last 168 hours.    CARDIAC ENZYMES: No results for input(s): "TROPONINI", "CPK", "CKMB" in the last 72 hours.     No results for input(s): "AMYLASE", "LIPASE" in the last 168 hours.    No results for input(s): "POCTGLUCOSE" in the last 72 hours.     Microbiology Results (last 7 days)       ** No results found for the last 168 hours. **             No results for input(s): "CHOL", "TRIG", "HDL", "LDL" in the last 72 hours.   Lab Results   Component Value Date    HGBA1C 5.2 06/12/2024        Echo    Result Date: 7/15/2024    Left Ventricle: The left " ventricle is normal in size. There is normal systolic function with a visually estimated ejection fraction of 55%. There is inferobasal hypokinesis noted. Grade I diastolic dysfunction.   Right Ventricle: Normal right ventricular cavity size. Systolic function is normal.   Aortic Valve: There is mild aortic valve sclerosis. There is mild to moderate (1-2+) aortic regurgitation.   Mitral Valve: There is mild (1+) regurgitation.   Unable to determine PASP.     CTA Chest Non-Coronary (PE Studies)    Result Date: 7/14/2024  MILD EMPHYSEMATOUS EXAMINATION: CTA CHEST NON CORONARY (PE STUDIES) CLINICAL HISTORY: Pulmonary embolism (PE) suspected, positive D-dimer; TECHNIQUE: Axial CT images were obtained through the chest after IV administration of 80 cc Omnipaque 350 contrast.  MIP, coronal and sagittal reconstructions submitted and interpreted. Automated exposure control utilized. DLP: 116 mGy-cm COMPARISON: None FINDINGS: The visualized thyroid is unremarkable. Pulmonary artery is normal in diameter. No filling defects are in the main pulmonary artery or segmental branches. Heart is normal size.  Thoracic aorta is normal in caliber. Central airways are clear. Emphysematous changes of the lungs are seen.  Calcified pleural plaque is seen along the lateral right lower lobe (5/79), correlate for spaces exposure.  There are no focal consolidation, pneumothorax or pleural effusion. No pathologically enlarged mediastinal or axillary lymph nodes. No acute osseous findings. Visualized portions of the upper abdomen show no acute findings.     1. No evidence of pulmonary artery thromboembolic disease. 2. Calcified pleural plaque is seen along the lateral right lower lobe (5/79), correlate for spaces exposure. Electronically signed by: Donavon Galdamez Date:    07/14/2024 Time:    15:00    X-Ray Cervical Spine AP And Lateral    Result Date: 7/14/2024  EXAMINATION: Three radiographic views of the CERVICAL SPINE. CLINICAL HISTORY:  Cervicalgia TECHNIQUE: Three radiographic views of the CERVICAL SPINE. COMPARISON: MRI of the cervical spine 2024. FINDINGS: There is normal sagittal alignment.  There is no spondylolisthesis.  There is diffuse degenerative disc space narrowing.  There is no loss of vertebral body height.  The dens is intact.  The anterior atlantoaxial articulation is normal.     No fracture or static subluxation of the cervical spine. Electronically signed by: Dusty Moctezuma MD Date:    2024 Time:    14:14    X-Ray Chest AP Portable    Result Date: 2024  EXAMINATION: XR CHEST AP PORTABLE CLINICAL HISTORY: weakness; COMPARISON: 2024 FINDINGS: Single AP chest radiograph is provided for evaluation. Cardiac silhouette is normal in size. Blunting of the right costophrenic angle may reflect a small right-sided pleural effusion.  Otherwise no evidence of pneumothorax or focal consolidation. No acute osseous findings.     Possible small right-sided pleural effusion. Electronically signed by: Donavon Galdamez Date:    2024 Time:    14:13      {:14162}     Patient Screened for food insecurity, housing instability, transportation needs, utility difficulties, and interpersonal safety.  {SBSDOHSCREENIN}    Discharge time: 33 minutes         Franklin Najera MD  [facility] Medicine

## 2024-07-23 NOTE — PROGRESS NOTES
"[facility] Medicine  Progress Note    Patient Name: Leno Thompson Jr.  MRN: [id number]  Patient Class: IP- Inpatient   Admission Date: 7/14/2024  Length of Stay: 8 days  Attending Physician: Franklin Najera MD  Primary Care Provider: Nancy Gutierrez FNP-C        Subjective:     Principal Problem:Hypokalemia        HPI:  Fall     Weakness       Pt presented to ED per EMS with c/o FALL  and laceration to right lower leg. Pt denies hitting head. Pt c/o  chronic shoulder pain and neck pain. Pt reports he is unable to care for himself and currently living in a hotel. Pt was extremely disheveled on arrival. Pt stated "I dont have anyone to care for me, I've been staying at a hotel and constantly falling".         HPI:   69 year old man with chronic complex medical history that involves previous anoxic brain injury that has left him with difficulty speaking, HTN, HLD, previous left amputation, CAD presented with complaints of a fall. Denies hitting head. Patient is wheelchair bound. Patient has complicated social situation as currently living in a hotel. Patient has no one to take care of him. Currently has no ride home     On arrival to ED BNP found to be elevated. Ddimer elevated, subsequent CTA revealed no PE. CXR showed minor pleural effusion. Admitted for CHF    Overview/Hospital Course:  07/15/2024 pt admitted with CHF, h/o anoxic brain injury, falling at home apparently fell out of his wheelchair and could not get up.  He has been living in a hotel and cannot take care of himself.  07/16/2024 Cr up significantly overnight on iv lasix 1.7 to 2.30  Echocardiogram EF 55%  Overall feels better today  07/17/2024  Lab improving   Awaiting placement  07/18/2024  No new c/o  Awaiting placement   07/19/2024  No new c/o  BP better controlled   Cardio add amlodipine  07/20/2024  Awaiting placement   07/21/2024  No new c/o  07/22/2024  BP better controlled  No new c/o  Awaiting SNF " placement     Interval History:      Review of Systems   Constitutional:  Negative for appetite change, fatigue and fever.   Respiratory:  Negative for cough, shortness of breath and wheezing.    Cardiovascular:  Negative for chest pain and leg swelling.   Gastrointestinal:  Negative for abdominal distention, abdominal pain, constipation, diarrhea, nausea and vomiting.   Skin:  Negative for color change, pallor, rash and wound.   Neurological:  Negative for tremors, syncope and headaches.   Psychiatric/Behavioral:  Negative for agitation and behavioral problems.      Objective:     Vital Signs (Most Recent):  Temp: 98.9 °F (37.2 °C) (07/22/24 1910)  Pulse: (!) 48 (07/22/24 2001)  Resp: 20 (07/22/24 1910)  BP: (!) 155/63 (07/22/24 1910)  SpO2: 95 % (07/22/24 2001) Vital Signs (24h Range):  Temp:  [97.1 °F (36.2 °C)-98.9 °F (37.2 °C)] 98.9 °F (37.2 °C)  Pulse:  [48-73] 48  Resp:  [18-20] 20  SpO2:  [95 %-100 %] 95 %  BP: (115-155)/(52-88) 155/63     Weight: 63.2 kg (139 lb 4.8 oz)  Body mass index is 21.18 kg/m².    Intake/Output Summary (Last 24 hours) at 7/22/2024 2149  Last data filed at 7/22/2024 2039  Gross per 24 hour   Intake 2260 ml   Output 626 ml   Net 1634 ml         Physical Exam  Vitals and nursing note reviewed. Exam conducted with a chaperone present.   Constitutional:       General: He is not in acute distress.     Appearance: Normal appearance. He is not ill-appearing.   Cardiovascular:      Rate and Rhythm: Normal rate and regular rhythm.      Pulses: Normal pulses.      Heart sounds: Normal heart sounds. No murmur heard.  Pulmonary:      Effort: Pulmonary effort is normal. No respiratory distress.      Breath sounds: Normal breath sounds. No wheezing, rhonchi or rales.   Abdominal:      General: Abdomen is flat.      Palpations: Abdomen is soft.      Tenderness: There is no guarding.   Musculoskeletal:         General: No swelling or tenderness. Normal range of motion.      Right lower leg: No  "edema.      Left lower leg: No edema.      Comments: Amputee left bka   Skin:     General: Skin is warm and dry.      Coloration: Skin is not jaundiced or pale.   Neurological:      General: No focal deficit present.      Mental Status: He is alert and oriented to person, place, and time. Mental status is at baseline.      Cranial Nerves: No cranial nerve deficit.      Motor: No weakness.      Coordination: Coordination normal.      Gait: Gait normal.   Psychiatric:         Mood and Affect: Mood normal.         Behavior: Behavior normal.             Significant Labs: All pertinent labs within the past 24 hours have been reviewed.  CBC:   No results for input(s): "WBC", "HGB", "HCT", "PLT" in the last 48 hours.      CMP:   Recent Labs   Lab 07/21/24  0422 07/22/24  0433    136   K 4.9 5.2*    108   CO2 23 20*   BUN 44* 44*   CREATININE 1.78* 1.64*   CALCIUM 9.7 9.9       Significant Imaging: I have reviewed all pertinent imaging results/findings within the past 24 hours.    Assessment/Plan:      * Hypokalemia  .Patient has hypokalemia which is Acute and currently improving. Most recent potassium levels reviewed-   Lab Results   Component Value Date    K 5.2 (H) 07/22/2024   . Will continue potassium replacement per protocol and recheck repeat levels after replacement completed.     Resolved      Stage 3a chronic kidney disease  Creatine stable for now. BMP reviewed- noted Estimated Creatinine Clearance: 39.1 mL/min (A) (based on SCr of 1.64 mg/dL (H)). according to latest data. Based on current GFR, CKD stage is stage 3 - GFR 30-59.  Monitor UOP and serial BMP and adjust therapy as needed. Renally dose meds. Avoid nephrotoxic medications and procedures.  stable    CHF (congestive heart failure)  Patient is identified as having Diastolic (HFpEF) heart failure that is Acute on chronic. CHF is currently uncontrolled due to Dyspnea not returned to baseline after 3 doses of IV diuretic and >3 pillow orthopnea. " "Latest ECHO performed and demonstrates- Results for orders placed during the hospital encounter of 07/14/24    Echo    Interpretation Summary    Left Ventricle: The left ventricle is normal in size. There is normal systolic function with a visually estimated ejection fraction of 55%. There is inferobasal hypokinesis noted. Grade I diastolic dysfunction.    Right Ventricle: Normal right ventricular cavity size. Systolic function is normal.    Aortic Valve: There is mild aortic valve sclerosis. There is mild to moderate (1-2+) aortic regurgitation.    Mitral Valve: There is mild (1+) regurgitation.    Unable to determine PASP.  . Continue Beta Blocker and ACE/ARB and monitor clinical status closely. Monitor on telemetry. Patient is on CHF pathway.  Monitor strict Is&Os and daily weights.  Place on fluid restriction of 1.5 L. Cardiology has been consulted. Continue to stress to patient importance of self efficacy and  on diet for CHF. Last BNP reviewed- and noted below No results for input(s): "BNP", "BNPTRIAGEBLO" in the last 168 hours.    Cardio following   Resume BB, ACE inh when toelerated        Debility  Patient with Acute on chronic debility due to chronic unspecified fatigue and other reduced mobility. Latest AMPAC and GEMS scores have been reviewed. Evaluation for etiology is underway. Plan includes PT/OT consulted.  Cont PT        VTE Risk Mitigation (From admission, onward)           Ordered     enoxaparin injection 40 mg  Every 24 hours         07/16/24 1620     IP VTE HIGH RISK PATIENT  Once         07/14/24 1802                    Discharge Planning   LELIA: 7/24/2024     Code Status: DNR   Is the patient medically ready for discharge?:     Reason for patient still in hospital (select all that apply): Treatment and Pending disposition  Discharge Plan A: Skilled Nursing Facility   Discharge Delays: (!) Post-Acute Set-up              Franklin Najera MD  Department of Hospital Medicine   [facility]

## 2024-07-23 NOTE — ASSESSMENT & PLAN NOTE
Creatine stable for now. BMP reviewed- noted Estimated Creatinine Clearance: 39.1 mL/min (A) (based on SCr of 1.64 mg/dL (H)). according to latest data. Based on current GFR, CKD stage is stage 3 - GFR 30-59.  Monitor UOP and serial BMP and adjust therapy as needed. Renally dose meds. Avoid nephrotoxic medications and procedures.  stable

## 2024-07-23 NOTE — PT/OT/SLP DISCHARGE
Physical Therapy Discharge Summary    Name: Leno Thompson Jr.  MRN: [id number]   Principal Problem: Hypokalemia     Patient Discharged from acute Physical Therapy on 24.  Please refer to prior PT noted date on 24 for functional status.     Assessment:     Patient appropriate for care in another setting.    Objective:     GOALS:   Multidisciplinary Problems       Physical Therapy Goals          Problem: Physical Therapy    Goal Priority Disciplines Outcome Goal Variances Interventions   Physical Therapy Goal     PT, PT/OT Adequate for Care Transition     Description: Goals to be met by: discharge     Patient will increase functional independence with mobility by performin. Supine to sit with Contact Guard Assistance  2. Sit to stand transfer with Moderate Assistance and Maximum Assistance  3. Bed to chair transfer with Moderate Assistance using Rolling Walker                         Reasons for Discontinuation of Therapy Services  Transfer to alternate level of care.      Plan:     Patient Discharged to: Skilled Nursing Facility.      2024

## 2024-07-23 NOTE — PLAN OF CARE
Patient's son-Ward Villegas notified that patient has been approved for [facility] SNF and will be transferred later today.  Also informed him that his father's belongings must be picked up from the room today.  He verbalized understanding.

## 2024-07-23 NOTE — PLAN OF CARE
Rec'd  call from [facility] @ [facility] stating patient is approved for admit today , nursing informed and will notify MD on rounds.

## 2024-07-23 NOTE — ASSESSMENT & PLAN NOTE
.Patient has hypokalemia which is Acute and currently improving. Most recent potassium levels reviewed-   Lab Results   Component Value Date    K 5.2 (H) 07/22/2024   . Will continue potassium replacement per protocol and recheck repeat levels after replacement completed.     Resolved

## 2024-07-23 NOTE — PLAN OF CARE
Problem: Physical Therapy  Goal: Physical Therapy Goal  Description: Goals to be met by: discharge     Patient will increase functional independence with mobility by performin. Supine to sit with Contact Guard Assistance  2. Sit to stand transfer with Moderate Assistance and Maximum Assistance  3. Bed to chair transfer with Moderate Assistance using Rolling Walker    Outcome: Adequate for Care Transition

## 2024-07-23 NOTE — SUBJECTIVE & OBJECTIVE
Interval History:      Review of Systems   Constitutional:  Negative for appetite change, fatigue and fever.   Respiratory:  Negative for cough, shortness of breath and wheezing.    Cardiovascular:  Negative for chest pain and leg swelling.   Gastrointestinal:  Negative for abdominal distention, abdominal pain, constipation, diarrhea, nausea and vomiting.   Skin:  Negative for color change, pallor, rash and wound.   Neurological:  Negative for tremors, syncope and headaches.   Psychiatric/Behavioral:  Negative for agitation and behavioral problems.      Objective:     Vital Signs (Most Recent):  Temp: 98.9 °F (37.2 °C) (07/22/24 1910)  Pulse: (!) 48 (07/22/24 2001)  Resp: 20 (07/22/24 1910)  BP: (!) 155/63 (07/22/24 1910)  SpO2: 95 % (07/22/24 2001) Vital Signs (24h Range):  Temp:  [97.1 °F (36.2 °C)-98.9 °F (37.2 °C)] 98.9 °F (37.2 °C)  Pulse:  [48-73] 48  Resp:  [18-20] 20  SpO2:  [95 %-100 %] 95 %  BP: (115-155)/(52-88) 155/63     Weight: 63.2 kg (139 lb 4.8 oz)  Body mass index is 21.18 kg/m².    Intake/Output Summary (Last 24 hours) at 7/22/2024 2149  Last data filed at 7/22/2024 2039  Gross per 24 hour   Intake 2260 ml   Output 626 ml   Net 1634 ml         Physical Exam  Vitals and nursing note reviewed. Exam conducted with a chaperone present.   Constitutional:       General: He is not in acute distress.     Appearance: Normal appearance. He is not ill-appearing.   Cardiovascular:      Rate and Rhythm: Normal rate and regular rhythm.      Pulses: Normal pulses.      Heart sounds: Normal heart sounds. No murmur heard.  Pulmonary:      Effort: Pulmonary effort is normal. No respiratory distress.      Breath sounds: Normal breath sounds. No wheezing, rhonchi or rales.   Abdominal:      General: Abdomen is flat.      Palpations: Abdomen is soft.      Tenderness: There is no guarding.   Musculoskeletal:         General: No swelling or tenderness. Normal range of motion.      Right lower leg: No edema.      Left  "lower leg: No edema.      Comments: Amputee left bka   Skin:     General: Skin is warm and dry.      Coloration: Skin is not jaundiced or pale.   Neurological:      General: No focal deficit present.      Mental Status: He is alert and oriented to person, place, and time. Mental status is at baseline.      Cranial Nerves: No cranial nerve deficit.      Motor: No weakness.      Coordination: Coordination normal.      Gait: Gait normal.   Psychiatric:         Mood and Affect: Mood normal.         Behavior: Behavior normal.             Significant Labs: All pertinent labs within the past 24 hours have been reviewed.  CBC:   No results for input(s): "WBC", "HGB", "HCT", "PLT" in the last 48 hours.      CMP:   Recent Labs   Lab 07/21/24  0422 07/22/24  0433    136   K 4.9 5.2*    108   CO2 23 20*   BUN 44* 44*   CREATININE 1.78* 1.64*   CALCIUM 9.7 9.9       Significant Imaging: I have reviewed all pertinent imaging results/findings within the past 24 hours.  "

## 2024-07-23 NOTE — PLAN OF CARE
07/23/24 1236   Final Note   Anticipated Discharge Disposition SNF  ([facility])   What phone number can be called within the next 1-3 days to see how you are doing after discharge? [phone]   Hospital Resources/Appts/Education Provided Post-Acute resouces added to AVS   Post-Acute Status   Post-Acute Placement Status Set-up Complete/Auth obtained   Coverage [facility] MCR   Discharge Delays None known at this time

## 2024-09-10 ENCOUNTER — TELEPHONE (OUTPATIENT)
Dept: FAMILY MEDICINE | Facility: CLINIC | Age: 69
End: 2024-09-10
Payer: MEDICARE

## 2024-09-10 NOTE — TELEPHONE ENCOUNTER
Pt is scheduled on 09/16/2024 for  a 3mo follow up  and no showed hislab appointment. Please call to reschedule the labs. If there isn't an available appointment, see if he can go to the hospital to have them drawn. If not, please reschedule both appointments.

## 2024-09-16 PROBLEM — I21.3 ST ELEVATION (STEMI) MYOCARDIAL INFARCTION OF UNSPECIFIED SITE: Status: RESOLVED | Noted: 2018-06-20 | Resolved: 2024-09-16

## 2024-10-10 ENCOUNTER — HOSPITAL ENCOUNTER (EMERGENCY)
Facility: HOSPITAL | Age: 69
Discharge: NURSING, REHAB OR LONG-TERM CARE | End: 2024-10-10
Attending: EMERGENCY MEDICINE
Payer: MEDICARE

## 2024-10-10 VITALS
TEMPERATURE: 98 F | SYSTOLIC BLOOD PRESSURE: 132 MMHG | DIASTOLIC BLOOD PRESSURE: 57 MMHG | RESPIRATION RATE: 18 BRPM | HEART RATE: 62 BPM | HEIGHT: 68 IN | WEIGHT: 141.63 LBS | OXYGEN SATURATION: 99 % | BODY MASS INDEX: 21.46 KG/M2

## 2024-10-10 DIAGNOSIS — R25.9 ABNORMAL INVOLUNTARY MOVEMENTS: Primary | ICD-10-CM

## 2024-10-10 LAB
BACTERIA #/AREA URNS AUTO: NORMAL /HPF
BILIRUB UR QL STRIP.AUTO: NEGATIVE
CLARITY UR: CLEAR
COLOR UR AUTO: YELLOW
GLUCOSE UR QL STRIP: >=1000
HGB UR QL STRIP: ABNORMAL
KETONES UR QL STRIP: NEGATIVE
LEUKOCYTE ESTERASE UR QL STRIP: NEGATIVE
NITRITE UR QL STRIP: NEGATIVE
PH UR STRIP: 6 [PH]
PROT UR QL STRIP: NEGATIVE
RBC #/AREA URNS AUTO: NORMAL /HPF
SP GR UR STRIP.AUTO: 1.02 (ref 1–1.03)
SQUAMOUS #/AREA URNS AUTO: NORMAL /HPF
UROBILINOGEN UR STRIP-ACNC: 0.2
WBC #/AREA URNS AUTO: NORMAL /HPF

## 2024-10-10 PROCEDURE — 81015 MICROSCOPIC EXAM OF URINE: CPT | Performed by: EMERGENCY MEDICINE

## 2024-10-10 PROCEDURE — 25000003 PHARM REV CODE 250: Performed by: EMERGENCY MEDICINE

## 2024-10-10 PROCEDURE — 99283 EMERGENCY DEPT VISIT LOW MDM: CPT

## 2024-10-10 PROCEDURE — 81003 URINALYSIS AUTO W/O SCOPE: CPT | Performed by: EMERGENCY MEDICINE

## 2024-10-10 RX ORDER — PREDNISONE 20 MG/1
20 TABLET ORAL DAILY
COMMUNITY
Start: 2024-10-09 | End: 2024-10-13

## 2024-10-10 RX ORDER — GUAIFENESIN 600 MG/1
600 TABLET, EXTENDED RELEASE ORAL 2 TIMES DAILY
COMMUNITY

## 2024-10-10 RX ORDER — PROPYLENE GLYCOL 0.06 MG/ML
2 SOLUTION/ DROPS OPHTHALMIC NIGHTLY
COMMUNITY

## 2024-10-10 RX ORDER — BACLOFEN 10 MG/1
10 TABLET ORAL 3 TIMES DAILY
Qty: 90 TABLET | Refills: 0 | Status: SHIPPED | OUTPATIENT
Start: 2024-10-10

## 2024-10-10 RX ORDER — BACLOFEN 10 MG/1
10 TABLET ORAL ONCE
Status: COMPLETED | OUTPATIENT
Start: 2024-10-10 | End: 2024-10-10

## 2024-10-10 RX ORDER — CARBOXYMETHYLCELLULOSE SODIUM 10 MG/ML
2 SOLUTION/ DROPS OPHTHALMIC 4 TIMES DAILY
COMMUNITY

## 2024-10-10 RX ORDER — AMOXICILLIN AND CLAVULANATE POTASSIUM 500; 125 MG/1; MG/1
1 TABLET, FILM COATED ORAL 2 TIMES DAILY
COMMUNITY
Start: 2024-10-08 | End: 2024-10-15

## 2024-10-10 RX ADMIN — BACLOFEN 10 MG: 10 TABLET ORAL at 12:10

## 2024-10-10 NOTE — DISCHARGE INSTRUCTIONS
If baclofen 3 times a day does not adequately control your symptoms, you may need to restart your Valium 2 mg 3 times a day

## 2024-10-10 NOTE — ED PROVIDER NOTES
Encounter Date: 10/10/2024       History     Chief Complaint   Patient presents with    Shaking     Pt presented to ED per EMS with c/o constant shaking. Nurse reported uncontrollable muscle spasms and poor trunk control. Nurse reported new meds of Augmentin and Prednisone for Upper Resp infection.      Patient is a 69-year-old male who presents to the emergency department with complaints of involuntary uncontrollable movements.  Patient has a prior history of a cardiac arrest and states that he has had these movements since then.  He states that his physician put him on an unknown medicine 3 times a day, which controlled his symptoms.  States he has recently moved to a new living facility and they have discontinued that medicine.  Patient is unsure of the exact name of the medicine, but knows he took it 3 times a day.  Denies all other complaints      Review of patient's allergies indicates:  No Known Allergies  Past Medical History:   Diagnosis Date    Anoxic brain injury     Arthritis     CAD (coronary artery disease)     Cardiac arrest     Heart attack     HLD (hyperlipidemia)     Hx of left BKA     Hypertension     Mitral valve regurgitation     Muscle spasms of both lower extremities     Muscle spasms of both lower extremities     PEG (percutaneous endoscopic gastrostomy) adjustment/replacement/removal     Status post placement of stent in right coronary artery      Past Surgical History:   Procedure Laterality Date    LEG AMPUTATION Left      No family history on file.  Social History     Tobacco Use    Smoking status: Every Day     Current packs/day: 1.00     Average packs/day: 1 pack/day for 52.8 years (52.8 ttl pk-yrs)     Types: Cigarettes     Start date: 1972     Passive exposure: Current    Smokeless tobacco: Never   Substance Use Topics    Alcohol use: Never    Drug use: Never     Review of Systems   Musculoskeletal:         Involuntary uncontrollable movements   All other systems reviewed and are  negative.      Physical Exam     Initial Vitals [10/10/24 0945]   BP Pulse Resp Temp SpO2   (!) 141/61 98 18 98 °F (36.7 °C) 96 %      MAP       --         Physical Exam    Nursing note and vitals reviewed.  Constitutional: He appears well-developed and well-nourished. No distress.   HENT:   Head: Normocephalic and atraumatic. Mouth/Throat: Oropharynx is clear and moist.   Eyes: Conjunctivae and EOM are normal. Pupils are equal, round, and reactive to light.   Neck: Neck supple. No tracheal deviation present.   Cardiovascular:  Normal rate, regular rhythm, normal heart sounds and intact distal pulses.           Pulmonary/Chest: Breath sounds normal. No respiratory distress.   Abdominal: Abdomen is soft. He exhibits no distension. There is no abdominal tenderness. There is no rebound and no guarding.   Musculoskeletal:      Cervical back: Neck supple.      Comments: Intermittent involuntary uncontrollable spastic movements of all extremities.    Left BKA     Neurological: He is alert and oriented to person, place, and time. GCS score is 15. GCS eye subscore is 4. GCS verbal subscore is 5. GCS motor subscore is 6.   No focal deficits   Skin: Skin is warm. No rash noted. No erythema.   Psychiatric: He has a normal mood and affect. His behavior is normal.         ED Course   Procedures  Labs Reviewed   URINALYSIS, REFLEX TO URINE CULTURE - Abnormal       Result Value    Color, UA Yellow      Appearance, UA Clear      Specific Gravity, UA 1.020      pH, UA 6.0      Protein, UA Negative      Glucose, UA >=1000 (*)     Ketones, UA Negative      Blood, UA Trace-Intact (*)     Bilirubin, UA Negative      Urobilinogen, UA 0.2      Nitrites, UA Negative      Leukocyte Esterase, UA Negative      Narrative:      URINE STABILITY IS 2 HOURS AT ROOM TEMP OR    SIX HOURS REFRIGERATED. PERFORMING TESTING ON    SPECIMENS GREATER THAN THIS AGE MAY AFFECT THE    FOLLOWING TESTS:    PH          SPECIFIC GRAVITY           BLOOD    CLARITY      BILIRUBIN               UROBILINOGEN   URINALYSIS, MICROSCOPIC - Normal    Bacteria, UA None Seen      RBC, UA 0-5      WBC, UA 0-2      Squamous Epithelial Cells, UA Rare            Imaging Results    None          Medications   baclofen tablet 10 mg (10 mg Oral Given 10/10/24 1229)     Medical Decision Making  Involuntary uncontrollable spastic movements of all 4 extremities.  Patient is awake alert and oriented during these episodes.  He states he used to be on medication, which controlled them.  Chart was reviewed and he used to be on baclofen 10 mg 3 times a day and Valium 2 mg 3 times a day.  He is no longer on those medications at this new facility.  Patient was given baclofen 10 mg here in the emergency department, which did improve his symptoms.  I called the living facility and spoke with the nurse there.  Informed her of my intent to restart the baclofen 10 mg 3 times a day.  I also informed her that it appears used to be on Valium 2 mg 3 times a day, and that he may need that restarted if the baclofen is not enough.    On reexamination here after the baclofen, patient reported feeling much better.  Discharged back with baclofen prescription    Amount and/or Complexity of Data Reviewed  External Data Reviewed: notes.     Details: Outside records reviewed.  Patient used to be on baclofen 10 mg 3 times a day.  He is no longer on this.  Also used to be on Valium 2 mg 3 times a day in his no longer on that medication either    Risk  OTC drugs.  Prescription drug management.                                      Clinical Impression:  Final diagnoses:  [R25.9] Abnormal involuntary movements (Primary)          ED Disposition Condition    Discharge Stable          ED Prescriptions       Medication Sig Dispense Start Date End Date Auth. Provider    baclofen (LIORESAL) 10 MG tablet Take 1 tablet (10 mg total) by mouth 3 (three) times daily. 90 tablet 10/10/2024 -- Dallas Booker MD          Follow-up  Information       Follow up With Specialties Details Why Contact Info    [facility]  As needed, If symptoms worsen [id number]  [facility] [id number] [phone]    Follow up with primary care physician                 Dallas Booker MD  10/10/24 1319

## 2025-01-22 ENCOUNTER — HOSPITAL ENCOUNTER (INPATIENT)
Facility: HOSPITAL | Age: 70
LOS: 3 days | Discharge: HOME | DRG: 204 | End: 2025-01-25
Attending: INTERNAL MEDICINE | Admitting: FAMILY MEDICINE
Payer: MEDICARE

## 2025-01-22 DIAGNOSIS — Z95.5 HISTORY OF CORONARY ARTERY STENT PLACEMENT: ICD-10-CM

## 2025-01-22 DIAGNOSIS — J18.9 PNEUMONIA DUE TO INFECTIOUS ORGANISM, UNSPECIFIED LATERALITY, UNSPECIFIED PART OF LUNG: ICD-10-CM

## 2025-01-22 DIAGNOSIS — Z86.79 HISTORY OF CAD (CORONARY ARTERY DISEASE): ICD-10-CM

## 2025-01-22 DIAGNOSIS — I25.119 CHEST PAIN DUE TO CAD: ICD-10-CM

## 2025-01-22 DIAGNOSIS — R06.02 SOB (SHORTNESS OF BREATH): ICD-10-CM

## 2025-01-22 DIAGNOSIS — R07.9 CHEST PAIN, UNSPECIFIED TYPE: Primary | ICD-10-CM

## 2025-01-22 DIAGNOSIS — R07.9 CHEST PAIN: ICD-10-CM

## 2025-01-22 DIAGNOSIS — F17.200 TOBACCO DEPENDENCY: ICD-10-CM

## 2025-01-22 LAB
ALBUMIN SERPL-MCNC: 4.6 G/DL (ref 3.4–5)
ALBUMIN/GLOB SERPL: 1.3 RATIO
ALP SERPL-CCNC: 79 UNIT/L (ref 50–144)
ALT SERPL-CCNC: 22 UNIT/L (ref 1–45)
ANION GAP SERPL CALC-SCNC: 8 MEQ/L (ref 2–13)
AST SERPL-CCNC: 27 UNIT/L (ref 17–59)
BASOPHILS # BLD AUTO: 0.12 X10(3)/MCL (ref 0.01–0.08)
BASOPHILS NFR BLD AUTO: 1.6 % (ref 0.1–1.2)
BILIRUB SERPL-MCNC: 0.7 MG/DL (ref 0–1)
BNP BLD-MCNC: 786 PG/ML (ref 0–124.9)
BUN SERPL-MCNC: 25 MG/DL (ref 7–20)
CALCIUM SERPL-MCNC: 9.2 MG/DL (ref 8.4–10.2)
CHLORIDE SERPL-SCNC: 106 MMOL/L (ref 98–110)
CO2 SERPL-SCNC: 24 MMOL/L (ref 21–32)
CREAT SERPL-MCNC: 1.37 MG/DL (ref 0.66–1.25)
CREAT/UREA NIT SERPL: 18 (ref 12–20)
EOSINOPHIL # BLD AUTO: 0.22 X10(3)/MCL (ref 0.04–0.54)
EOSINOPHIL NFR BLD AUTO: 2.9 % (ref 0.7–7)
ERYTHROCYTE [DISTWIDTH] IN BLOOD BY AUTOMATED COUNT: 13.9 %
GFR SERPLBLD CREATININE-BSD FMLA CKD-EPI: 57 ML/MIN/1.73/M2
GLOBULIN SER-MCNC: 3.5 GM/DL (ref 2–3.9)
GLUCOSE SERPL-MCNC: 103 MG/DL (ref 70–115)
HCT VFR BLD AUTO: 44.1 % (ref 36–52)
HGB BLD-MCNC: 14.9 G/DL (ref 13–18)
IMM GRANULOCYTES # BLD AUTO: 0.03 X10(3)/MCL (ref 0–0.03)
IMM GRANULOCYTES NFR BLD AUTO: 0.4 % (ref 0–0.5)
INR PPP: 1.1
LYMPHOCYTES # BLD AUTO: 1.73 X10(3)/MCL (ref 1.32–3.57)
LYMPHOCYTES NFR BLD AUTO: 22.6 % (ref 20–55)
MAGNESIUM SERPL-MCNC: 2.2 MG/DL (ref 1.8–2.4)
MCH RBC QN AUTO: 29.4 PG (ref 27–34)
MCHC RBC AUTO-ENTMCNC: 33.8 G/DL (ref 31–37)
MCV RBC AUTO: 87.2 FL (ref 79–99)
MONOCYTES # BLD AUTO: 0.88 X10(3)/MCL (ref 0.3–0.82)
MONOCYTES NFR BLD AUTO: 11.5 % (ref 4.7–12.5)
NEUTROPHILS # BLD AUTO: 4.68 X10(3)/MCL (ref 1.78–5.38)
NEUTROPHILS NFR BLD AUTO: 61 % (ref 37–73)
NRBC BLD AUTO-RTO: 0 %
PLATELET # BLD AUTO: 220 X10(3)/MCL (ref 140–371)
PMV BLD AUTO: 8.9 FL (ref 9.4–12.4)
POTASSIUM SERPL-SCNC: 4.4 MMOL/L (ref 3.5–5.1)
PROT SERPL-MCNC: 8.1 GM/DL (ref 6.3–8.2)
PROTHROMBIN TIME: 11.1 SECONDS (ref 9.3–11.9)
RBC # BLD AUTO: 5.06 X10(6)/MCL (ref 4–6)
SODIUM SERPL-SCNC: 138 MMOL/L (ref 136–145)
TROPONIN I SERPL-MCNC: 0.02 NG/ML (ref 0–0.03)
TROPONIN I SERPL-MCNC: 0.02 NG/ML (ref 0–0.03)
TROPONIN I SERPL-MCNC: 0.03 NG/ML (ref 0–0.03)
WBC # BLD AUTO: 7.66 X10(3)/MCL (ref 4–11.5)

## 2025-01-22 PROCEDURE — 25000242 PHARM REV CODE 250 ALT 637 W/ HCPCS: Performed by: INTERNAL MEDICINE

## 2025-01-22 PROCEDURE — 25000003 PHARM REV CODE 250: Performed by: INTERNAL MEDICINE

## 2025-01-22 PROCEDURE — 85610 PROTHROMBIN TIME: CPT | Performed by: INTERNAL MEDICINE

## 2025-01-22 PROCEDURE — 96365 THER/PROPH/DIAG IV INF INIT: CPT

## 2025-01-22 PROCEDURE — 93005 ELECTROCARDIOGRAM TRACING: CPT

## 2025-01-22 PROCEDURE — 83880 ASSAY OF NATRIURETIC PEPTIDE: CPT | Performed by: INTERNAL MEDICINE

## 2025-01-22 PROCEDURE — 94761 N-INVAS EAR/PLS OXIMETRY MLT: CPT

## 2025-01-22 PROCEDURE — 93010 ELECTROCARDIOGRAM REPORT: CPT | Mod: ,,, | Performed by: INTERNAL MEDICINE

## 2025-01-22 PROCEDURE — 11000001 HC ACUTE MED/SURG PRIVATE ROOM

## 2025-01-22 PROCEDURE — 36415 COLL VENOUS BLD VENIPUNCTURE: CPT | Performed by: INTERNAL MEDICINE

## 2025-01-22 PROCEDURE — 85025 COMPLETE CBC W/AUTO DIFF WBC: CPT | Performed by: INTERNAL MEDICINE

## 2025-01-22 PROCEDURE — 99285 EMERGENCY DEPT VISIT HI MDM: CPT | Mod: 25

## 2025-01-22 PROCEDURE — 80053 COMPREHEN METABOLIC PANEL: CPT | Performed by: INTERNAL MEDICINE

## 2025-01-22 PROCEDURE — 84484 ASSAY OF TROPONIN QUANT: CPT | Performed by: INTERNAL MEDICINE

## 2025-01-22 PROCEDURE — 83735 ASSAY OF MAGNESIUM: CPT | Performed by: INTERNAL MEDICINE

## 2025-01-22 PROCEDURE — 94640 AIRWAY INHALATION TREATMENT: CPT

## 2025-01-22 PROCEDURE — 63600175 PHARM REV CODE 636 W HCPCS: Performed by: INTERNAL MEDICINE

## 2025-01-22 PROCEDURE — 21400001 HC TELEMETRY ROOM

## 2025-01-22 PROCEDURE — 96375 TX/PRO/DX INJ NEW DRUG ADDON: CPT

## 2025-01-22 RX ORDER — ATORVASTATIN CALCIUM 40 MG/1
40 TABLET, FILM COATED ORAL NIGHTLY
Status: DISCONTINUED | OUTPATIENT
Start: 2025-01-23 | End: 2025-01-25 | Stop reason: HOSPADM

## 2025-01-22 RX ORDER — ATORVASTATIN CALCIUM 20 MG/1
20 TABLET, FILM COATED ORAL NIGHTLY
Status: DISCONTINUED | OUTPATIENT
Start: 2025-01-22 | End: 2025-01-22

## 2025-01-22 RX ORDER — BUSPIRONE HYDROCHLORIDE 7.5 MG/1
7.5 TABLET ORAL 2 TIMES DAILY
Status: DISCONTINUED | OUTPATIENT
Start: 2025-01-22 | End: 2025-01-25 | Stop reason: HOSPADM

## 2025-01-22 RX ORDER — ASPIRIN 325 MG
325 TABLET ORAL
Status: COMPLETED | OUTPATIENT
Start: 2025-01-22 | End: 2025-01-22

## 2025-01-22 RX ORDER — BACLOFEN 10 MG/1
10 TABLET ORAL 3 TIMES DAILY
Status: DISCONTINUED | OUTPATIENT
Start: 2025-01-22 | End: 2025-01-25

## 2025-01-22 RX ORDER — RANOLAZINE 500 MG/1
500 TABLET, EXTENDED RELEASE ORAL 2 TIMES DAILY
Status: DISCONTINUED | OUTPATIENT
Start: 2025-01-23 | End: 2025-01-22

## 2025-01-22 RX ORDER — CLOPIDOGREL BISULFATE 75 MG/1
75 TABLET ORAL DAILY
Status: DISCONTINUED | OUTPATIENT
Start: 2025-01-22 | End: 2025-01-25 | Stop reason: HOSPADM

## 2025-01-22 RX ORDER — RANOLAZINE 500 MG/1
500 TABLET, EXTENDED RELEASE ORAL 2 TIMES DAILY
Status: DISCONTINUED | OUTPATIENT
Start: 2025-01-23 | End: 2025-01-25 | Stop reason: HOSPADM

## 2025-01-22 RX ORDER — AMLODIPINE BESYLATE 5 MG/1
5 TABLET ORAL DAILY
Status: DISCONTINUED | OUTPATIENT
Start: 2025-01-22 | End: 2025-01-25 | Stop reason: HOSPADM

## 2025-01-22 RX ORDER — FUROSEMIDE 10 MG/ML
20 INJECTION INTRAMUSCULAR; INTRAVENOUS
Status: COMPLETED | OUTPATIENT
Start: 2025-01-22 | End: 2025-01-22

## 2025-01-22 RX ORDER — ISOSORBIDE MONONITRATE 30 MG/1
30 TABLET, EXTENDED RELEASE ORAL DAILY
Status: DISCONTINUED | OUTPATIENT
Start: 2025-01-23 | End: 2025-01-25 | Stop reason: HOSPADM

## 2025-01-22 RX ORDER — METOPROLOL SUCCINATE 25 MG/1
25 TABLET, EXTENDED RELEASE ORAL 2 TIMES DAILY
Status: DISCONTINUED | OUTPATIENT
Start: 2025-01-23 | End: 2025-01-25 | Stop reason: HOSPADM

## 2025-01-22 RX ORDER — EZETIMIBE 10 MG/1
10 TABLET ORAL NIGHTLY
Status: DISCONTINUED | OUTPATIENT
Start: 2025-01-22 | End: 2025-01-25 | Stop reason: HOSPADM

## 2025-01-22 RX ORDER — CITALOPRAM 20 MG/1
20 TABLET, FILM COATED ORAL DAILY
Status: DISCONTINUED | OUTPATIENT
Start: 2025-01-22 | End: 2025-01-25 | Stop reason: HOSPADM

## 2025-01-22 RX ORDER — IPRATROPIUM BROMIDE AND ALBUTEROL SULFATE 2.5; .5 MG/3ML; MG/3ML
3 SOLUTION RESPIRATORY (INHALATION)
Status: COMPLETED | OUTPATIENT
Start: 2025-01-22 | End: 2025-01-22

## 2025-01-22 RX ORDER — SODIUM CHLORIDE 0.9 % (FLUSH) 0.9 %
10 SYRINGE (ML) INJECTION
Status: DISCONTINUED | OUTPATIENT
Start: 2025-01-22 | End: 2025-01-25 | Stop reason: HOSPADM

## 2025-01-22 RX ORDER — CEFTRIAXONE 1 G/1
1 INJECTION, POWDER, FOR SOLUTION INTRAMUSCULAR; INTRAVENOUS
Status: COMPLETED | OUTPATIENT
Start: 2025-01-22 | End: 2025-01-22

## 2025-01-22 RX ADMIN — ATORVASTATIN CALCIUM 20 MG: 20 TABLET, FILM COATED ORAL at 08:01

## 2025-01-22 RX ADMIN — AZITHROMYCIN MONOHYDRATE 500 MG: 500 INJECTION, POWDER, LYOPHILIZED, FOR SOLUTION INTRAVENOUS at 08:01

## 2025-01-22 RX ADMIN — FUROSEMIDE 20 MG: 10 INJECTION, SOLUTION INTRAMUSCULAR; INTRAVENOUS at 08:01

## 2025-01-22 RX ADMIN — CEFTRIAXONE SODIUM 1 G: 1 INJECTION, POWDER, FOR SOLUTION INTRAMUSCULAR; INTRAVENOUS at 08:01

## 2025-01-22 RX ADMIN — CITALOPRAM HYDROBROMIDE 20 MG: 20 TABLET ORAL at 02:01

## 2025-01-22 RX ADMIN — ASPIRIN 325 MG ORAL TABLET 325 MG: 325 PILL ORAL at 05:01

## 2025-01-22 RX ADMIN — IPRATROPIUM BROMIDE AND ALBUTEROL SULFATE 3 ML: .5; 3 SOLUTION RESPIRATORY (INHALATION) at 06:01

## 2025-01-22 RX ADMIN — BACLOFEN 10 MG: 10 TABLET ORAL at 08:01

## 2025-01-22 RX ADMIN — BACLOFEN 10 MG: 10 TABLET ORAL at 02:01

## 2025-01-22 RX ADMIN — EZETIMIBE 10 MG: 10 TABLET ORAL at 08:01

## 2025-01-22 RX ADMIN — CLOPIDOGREL BISULFATE 75 MG: 75 TABLET ORAL at 02:01

## 2025-01-22 RX ADMIN — BUSPIRONE HYDROCHLORIDE 7.5 MG: 7.5 TABLET ORAL at 08:01

## 2025-01-22 RX ADMIN — BUSPIRONE HYDROCHLORIDE 7.5 MG: 7.5 TABLET ORAL at 02:01

## 2025-01-22 RX ADMIN — AMLODIPINE BESYLATE 5 MG: 5 TABLET ORAL at 02:01

## 2025-01-22 NOTE — SUBJECTIVE & OBJECTIVE
Past Medical History:   Diagnosis Date    Anoxic brain injury     Arthritis     CAD (coronary artery disease)     Cardiac arrest     Heart attack     HLD (hyperlipidemia)     Hx of left BKA     Hypertension     Mitral valve regurgitation     Muscle spasms of both lower extremities     Muscle spasms of both lower extremities     PEG (percutaneous endoscopic gastrostomy) adjustment/replacement/removal     Status post placement of stent in right coronary artery        Past Surgical History:   Procedure Laterality Date    LEG AMPUTATION Left        Review of patient's allergies indicates:  No Known Allergies    No current facility-administered medications on file prior to encounter.     Current Outpatient Medications on File Prior to Encounter   Medication Sig    amLODIPine (NORVASC) 5 MG tablet Take 1 tablet (5 mg total) by mouth once daily.    atorvastatin (LIPITOR) 20 MG tablet Take 20 mg by mouth every evening.    baclofen (LIORESAL) 10 MG tablet Take 1 tablet (10 mg total) by mouth 3 (three) times daily.    busPIRone (BUSPAR) 7.5 MG tablet Take 1 tablet (7.5 mg total) by mouth 2 (two) times a day.    carboxymethylcellulose sodium (ARTIFICIAL TEARS, [facility],) 1 % Drop Apply 2 drops to eye 4 (four) times daily.    citalopram (CELEXA) 10 MG tablet Take 2 tablets (20 mg total) by mouth once daily. (Patient taking differently: Take 30 mg by mouth once daily.)    clopidogreL (PLAVIX) 75 mg tablet Take 75 mg by mouth once daily.    empagliflozin (JARDIANCE) 10 mg tablet Take 10 mg by mouth once daily.    ezetimibe (ZETIA) 10 mg tablet Take 10 mg by mouth every evening.    guaiFENesin (MUCINEX) 600 mg 12 hr tablet Take 600 mg by mouth 2 (two) times daily.    isosorbide mononitrate (IMDUR) 30 MG 24 hr tablet Take 1 tablet (30 mg total) by mouth once daily.    propylene glycoL (SYSTANE COMPLETE) 0.6 % Drop Apply 2 drops to eye every evening.    senna-docusate 8.6-50 mg (SENNA WITH DOCUSATE SODIUM) 8.6-50 mg per tablet Take 1  tablet by mouth once daily.    vitamin D3-folic acid 125 mcg (5,000 unit)-1 mg Tab Take 1 tablet by mouth Daily.     Family History    None       Tobacco Use    Smoking status: Every Day     Current packs/day: 1.00     Average packs/day: 1 pack/day for 53.1 years (53.1 ttl pk-yrs)     Types: Cigarettes     Start date: 1972     Passive exposure: Current    Smokeless tobacco: Never   Substance and Sexual Activity    Alcohol use: Never    Drug use: Never    Sexual activity: Not Currently     Review of Systems   Constitutional:  Negative for appetite change, fatigue and fever.   Respiratory:  Negative for cough, shortness of breath and wheezing.    Cardiovascular:  Positive for chest pain. Negative for leg swelling.   Gastrointestinal:  Negative for abdominal distention, abdominal pain, constipation, diarrhea, nausea and vomiting.   Skin:  Negative for color change, pallor, rash and wound.   Neurological:  Negative for tremors, syncope and headaches.   Psychiatric/Behavioral:  Negative for agitation and behavioral problems.      Objective:     Vital Signs (Most Recent):  Temp: 97.8 °F (36.6 °C) (01/22/25 1505)  Pulse: 63 (01/22/25 1505)  Resp: 18 (01/22/25 1505)  BP: (!) 154/79 (01/22/25 1505)  SpO2: 96 % (01/22/25 1505) Vital Signs (24h Range):  Temp:  [97.5 °F (36.4 °C)-98.5 °F (36.9 °C)] 97.8 °F (36.6 °C)  Pulse:  [56-67] 63  Resp:  [10-20] 18  SpO2:  [93 %-98 %] 96 %  BP: (131-178)/(44-89) 154/79     Weight: 69.6 kg (153 lb 7 oz)  Body mass index is 23.33 kg/m².     Physical Exam  Vitals and nursing note reviewed. Exam conducted with a chaperone present.   Constitutional:       General: He is not in acute distress.     Appearance: Normal appearance. He is normal weight. He is not ill-appearing.   HENT:      Head: Normocephalic and atraumatic.      Nose: Nose normal.   Eyes:      General: No scleral icterus.     Conjunctiva/sclera: Conjunctivae normal.   Cardiovascular:      Rate and Rhythm: Normal rate and regular  rhythm.      Pulses: Normal pulses.      Heart sounds: Normal heart sounds.   Pulmonary:      Effort: Pulmonary effort is normal.      Breath sounds: Normal breath sounds.   Abdominal:      General: Abdomen is flat. Bowel sounds are normal.      Palpations: Abdomen is soft.   Musculoskeletal:      Right lower leg: No edema.      Left lower leg: No edema.   Skin:     General: Skin is warm and dry.      Findings: No erythema or rash.   Neurological:      General: No focal deficit present.      Mental Status: He is alert and oriented to person, place, and time.   Psychiatric:         Mood and Affect: Mood normal.         Behavior: Behavior normal.         Thought Content: Thought content normal.                Significant Labs: All pertinent labs within the past 24 hours have been reviewed.  CBC:   Recent Labs   Lab 01/22/25  0533   WBC 7.66   HGB 14.9   HCT 44.1        CMP:   Recent Labs   Lab 01/22/25  0533      K 4.4      CO2 24   BUN 25*   CREATININE 1.37*   CALCIUM 9.2   ALBUMIN 4.6   BILITOT 0.7   ALKPHOS 79   AST 27   ALT 22       Significant Imaging: I have reviewed all pertinent imaging results/findings within the past 24 hours.

## 2025-01-22 NOTE — CONSULTS
Inpatient consult to Cardiology  Consult performed by: Shorty Snell AGACNP-BC  Consult ordered by: Odilon Pablo DO  Reason for consult: chest pain        Telecardiology Consult  From: [facility]  DOS: 1/22/2025   Requesting Physician: Dr. Pablo   Reason for consult: Chest pain  ER consult  Duration of consult: 36 minutes      [facility]  Telecardiology  Consult Note    Patient Name: Leno Thompson Jr.  MRN: [id number]  Admission Date: 1/22/2025  Hospital Length of Stay: 0 days  Code Status: Prior   Attending Provider: Odilon Pablo DO   Consulting Provider: CARLENE Lang  Primary Care Physician: Nancy Gutierrez FNP-C  Principal Problem:<principal problem not specified>    Patient information was obtained from patient, past medical records, and ER records.     Subjective:     Chief Complaint:  Consulted for chest pain     HPI:   This is a 69 year old male, who is formerly known to Dr. Langston and who is now followed by Dr. Bowden, with a history of CAD/stent, anoxic brain injury, cardiac arrest, MI, HLD, HTN, PAD, Left BKA, VHD/MR, dysphagia/PEG. He presented to the to ER on 1.22.25 with complaints of cough, shortness of breath, and left sided chest pain. He stated the pain started around 2 AM.  He denied radiation of the pain. He stated the pain just stays on the left side. He rated the pain 5/10. The pain was exacerbated by deep breaths. The pain was not relieved by any thing. He was still having chest pain at the time of exam. EKG revealed ST and T wave abnormalities in the inferior leads. Initial troponin was negative. CXR revealed streaky and hazy opacities in the right lung base. [facility] has been consulted for chest pain.      PMH: CAD/stent, anoxic brain injury, cardiac arrest, MI, HLD, HTN, PAD, Left BKA, VHD/MR, dysphagia/PEG.  PSH: LHC/stent, Peripheral angiogram, L BKA, PEG,   Social History: Current tobacco  use, denies EtOH and illicit drug use  Family History: unknown    Previous Cardiac Diagnostics:   [facility] 7.15.24  Left Ventricle: The left ventricle is normal in size. There is normal systolic function with a visually estimated ejection fraction of 55%. There is inferobasal hypokinesis noted. Grade I diastolic dysfunction.  Right Ventricle: Normal right ventricular cavity size. Systolic function is normal.  Aortic Valve: There is mild aortic valve sclerosis. There is mild to moderate (1-2+) aortic regurgitation.  Mitral Valve: There is mild (1+) regurgitation.  Unable to determine PASP.    [facility] 12.16.21  Left atrial enlargement   Moderate LV systolic dysfunction ejection fraction 40%   Moderately severe mitral Regurgitation   Moderate Tricuspid Regurgitation   Aortic valve sclerosis   No evidence of any vegetations noted on this study     [facility] 11.15.21  Left Main: This vessel originates from the left coronary sinus, bifurcating into the Left anterior descending and the Left Circumflex arteries. No evidence of any obstructive disease noted.   Left Anterior descending artery: Medium caliber vessel, originated from the left main coronary artery, traverses in the anterior inter-ventricular groove, to reach the apex of the heart, No evidence of any obstructive disease noted. This vessel also, gives origin to multiple diagonal branches, free of any atherosclerotic obstructive disease.   Left Circumflex artery : Medium caliber vessel, originates from the Left main coronary artery, traverses in the Left AV groove, this vessel gives origin to obtuse marginal branches, free of any significant atherosclerotic obstructive disease.  This is a nondominant vessel.   Right Coronary Artery : Medium caliber vessel, originates from the Right Coronary sinus, noted are stents in the proximal to mid segment of the right coronary artery which are all 100% occluded in-stent restenosis with left-to-right collaterals   Left Ventricle : Normal  size and configuration, moderate LV systolic   dysfunction ejection fraction 40 to 45%.           Review of patient's allergies indicates:  No Known Allergies    No current facility-administered medications on file prior to encounter.     Current Outpatient Medications on File Prior to Encounter   Medication Sig    amLODIPine (NORVASC) 5 MG tablet Take 1 tablet (5 mg total) by mouth once daily.    atorvastatin (LIPITOR) 20 MG tablet Take 20 mg by mouth every evening.    baclofen (LIORESAL) 10 MG tablet Take 1 tablet (10 mg total) by mouth 3 (three) times daily.    busPIRone (BUSPAR) 7.5 MG tablet Take 1 tablet (7.5 mg total) by mouth 2 (two) times a day.    carboxymethylcellulose sodium (ARTIFICIAL TEARS, CMC,) 1 % Drop Apply 2 drops to eye 4 (four) times daily.    citalopram (CELEXA) 10 MG tablet Take 2 tablets (20 mg total) by mouth once daily. (Patient taking differently: Take 30 mg by mouth once daily.)    clopidogreL (PLAVIX) 75 mg tablet Take 75 mg by mouth once daily.    empagliflozin (JARDIANCE) 10 mg tablet Take 10 mg by mouth once daily.    ezetimibe (ZETIA) 10 mg tablet Take 10 mg by mouth every evening.    guaiFENesin (MUCINEX) 600 mg 12 hr tablet Take 600 mg by mouth 2 (two) times daily.    isosorbide mononitrate (IMDUR) 30 MG 24 hr tablet Take 1 tablet (30 mg total) by mouth once daily.    propylene glycoL (SYSTANE COMPLETE) 0.6 % Drop Apply 2 drops to eye every evening.    senna-docusate 8.6-50 mg (SENNA WITH DOCUSATE SODIUM) 8.6-50 mg per tablet Take 1 tablet by mouth once daily.    vitamin D3-folic acid 125 mcg (5,000 unit)-1 mg Tab Take 1 tablet by mouth Daily.       Review of Systems:  Review of Systems   Constitutional: Negative.    HENT: Negative.     Eyes: Negative.    Respiratory:  Positive for cough and shortness of breath.    Cardiovascular:  Positive for chest pain.   Gastrointestinal: Negative.    Endocrine: Negative.    Genitourinary: Negative.    Musculoskeletal: Negative.    Skin:  Negative.    Allergic/Immunologic: Negative.    Neurological: Negative.    Hematological: Negative.    Psychiatric/Behavioral: Negative.         Objective:     Vital Signs (Most Recent):  Temp: 98.4 °F (36.9 °C) (01/22/25 0459)  Pulse: (!) 57 (01/22/25 0745)  Resp: 14 (01/22/25 0745)  BP: (!) 153/46 (01/22/25 0745)  SpO2: 95 % (01/22/25 0745) Vital Signs (24h Range):  Temp:  [98.4 °F (36.9 °C)] 98.4 °F (36.9 °C)  Pulse:  [57-64] 57  Resp:  [10-20] 14  SpO2:  [93 %-98 %] 95 %  BP: (148-178)/(44-80) 153/46     Weight: 80.7 kg (178 lb)  Body mass index is 27.06 kg/m².    SpO2: 95 %       No intake or output data in the 24 hours ending 01/22/25 0908    Lines/Drains/Airways       Drain  Duration             Male External Urinary Catheter 01/22/25 0855 <1 day              Peripheral Intravenous Line  Duration                  Peripheral IV - Single Lumen 01/22/25 0518 20 G Yes Posterior;Right Hand <1 day                      Significant Labs:   Chemistries:   Recent Labs   Lab 01/22/25  0533      K 4.4      CO2 24   BUN 25*   CREATININE 1.37*   CALCIUM 9.2   BILITOT 0.7   ALKPHOS 79   ALT 22   AST 27   GLUCOSE 103   MG 2.20   TROPONINI 0.026        CBC/Anemia Labs: Coags:    Recent Labs   Lab 01/22/25 0533   WBC 7.66   HGB 14.9   HCT 44.1      MCV 87.2   RDW 13.9    Recent Labs   Lab 01/22/25  0533   INR 1.1            Significant Imaging:   Imaging Results              X-Ray Chest 1 View (Final result)  Result time 01/22/25 07:30:24      Final result by Sridhar Pineda MD (01/22/25 07:30:24)                   Impression:      1. There are streaky and hazy opacities in the right lung base which may reflect atelectasis and/or infiltrate.      Electronically signed by: Sridhar Pineda MD  Date:    01/22/2025  Time:    07:30               Narrative:    EXAMINATION:  XR CHEST 1 VIEW    CLINICAL HISTORY:  Chest pain, unspecified.    COMPARISON:  Chest x-ray 07/14/2024    FINDINGS:  Frontal view of the chest  was obtained.  The cardiac silhouette is within normal limits for size. The aorta is mildly tortuous and partially calcified.  There are streaky and hazy opacities in the right basilar lung which may reflect atelectasis and/or infiltrate.  No visible pneumothorax is appreciated.  There is slight blunting of the right costophrenic sulcus which is similar to the previous study and may be related to pleural thickening/scarring.  No acute displaced fracture is visualized.                                        EKG:        Telemetry:  SR    Physical Exam:  Physical Exam  HENT:      Head: Atraumatic.   Eyes:      Extraocular Movements: Extraocular movements intact.   Cardiovascular:      Rate and Rhythm: Normal rate and regular rhythm.      Heart sounds: Normal heart sounds.   Pulmonary:      Effort: Pulmonary effort is normal.   Neurological:      General: No focal deficit present.      Mental Status: He is alert and oriented to person, place, and time.   Psychiatric:         Mood and Affect: Mood normal.         Behavior: Behavior normal.         Home Medications:   No current facility-administered medications on file prior to encounter.     Current Outpatient Medications on File Prior to Encounter   Medication Sig Dispense Refill    amLODIPine (NORVASC) 5 MG tablet Take 1 tablet (5 mg total) by mouth once daily. 90 tablet 3    atorvastatin (LIPITOR) 20 MG tablet Take 20 mg by mouth every evening.      baclofen (LIORESAL) 10 MG tablet Take 1 tablet (10 mg total) by mouth 3 (three) times daily. 90 tablet 0    busPIRone (BUSPAR) 7.5 MG tablet Take 1 tablet (7.5 mg total) by mouth 2 (two) times a day. 60 tablet 1    carboxymethylcellulose sodium (ARTIFICIAL TEARS, CMC,) 1 % Drop Apply 2 drops to eye 4 (four) times daily.      citalopram (CELEXA) 10 MG tablet Take 2 tablets (20 mg total) by mouth once daily. (Patient taking differently: Take 30 mg by mouth once daily.) 30 tablet 2    clopidogreL (PLAVIX) 75 mg tablet Take  75 mg by mouth once daily.      empagliflozin (JARDIANCE) 10 mg tablet Take 10 mg by mouth once daily.      ezetimibe (ZETIA) 10 mg tablet Take 10 mg by mouth every evening.      guaiFENesin (MUCINEX) 600 mg 12 hr tablet Take 600 mg by mouth 2 (two) times daily.      isosorbide mononitrate (IMDUR) 30 MG 24 hr tablet Take 1 tablet (30 mg total) by mouth once daily. 30 tablet 2    propylene glycoL (SYSTANE COMPLETE) 0.6 % Drop Apply 2 drops to eye every evening.      senna-docusate 8.6-50 mg (SENNA WITH DOCUSATE SODIUM) 8.6-50 mg per tablet Take 1 tablet by mouth once daily.      vitamin D3-folic acid 125 mcg (5,000 unit)-1 mg Tab Take 1 tablet by mouth Daily.         Current Inpatient Medications:    Current Facility-Administered Medications:     azithromycin (ZITHROMAX) 500 mg in 0.9% NaCl 250 mL IVPB (admixture device), 500 mg, Intravenous, ED 1 Time, Odilon Pablo, DO, Last Rate: 250 mL/hr at 01/22/25 0853, 500 mg at 01/22/25 0853    Current Outpatient Medications:     amLODIPine (NORVASC) 5 MG tablet, Take 1 tablet (5 mg total) by mouth once daily., Disp: 90 tablet, Rfl: 3    atorvastatin (LIPITOR) 20 MG tablet, Take 20 mg by mouth every evening., Disp: , Rfl:     baclofen (LIORESAL) 10 MG tablet, Take 1 tablet (10 mg total) by mouth 3 (three) times daily., Disp: 90 tablet, Rfl: 0    busPIRone (BUSPAR) 7.5 MG tablet, Take 1 tablet (7.5 mg total) by mouth 2 (two) times a day., Disp: 60 tablet, Rfl: 1    carboxymethylcellulose sodium (ARTIFICIAL TEARS, CMC,) 1 % Drop, Apply 2 drops to eye 4 (four) times daily., Disp: , Rfl:     citalopram (CELEXA) 10 MG tablet, Take 2 tablets (20 mg total) by mouth once daily. (Patient taking differently: Take 30 mg by mouth once daily.), Disp: 30 tablet, Rfl: 2    clopidogreL (PLAVIX) 75 mg tablet, Take 75 mg by mouth once daily., Disp: , Rfl:     empagliflozin (JARDIANCE) 10 mg tablet, Take 10 mg by mouth once daily., Disp: , Rfl:     ezetimibe (ZETIA) 10 mg tablet, Take  10 mg by mouth every evening., Disp: , Rfl:     guaiFENesin (MUCINEX) 600 mg 12 hr tablet, Take 600 mg by mouth 2 (two) times daily., Disp: , Rfl:     isosorbide mononitrate (IMDUR) 30 MG 24 hr tablet, Take 1 tablet (30 mg total) by mouth once daily., Disp: 30 tablet, Rfl: 2    propylene glycoL (SYSTANE COMPLETE) 0.6 % Drop, Apply 2 drops to eye every evening., Disp: , Rfl:     senna-docusate 8.6-50 mg (SENNA WITH DOCUSATE SODIUM) 8.6-50 mg per tablet, Take 1 tablet by mouth once daily., Disp: , Rfl:     vitamin D3-folic acid 125 mcg (5,000 unit)-1 mg Tab, Take 1 tablet by mouth Daily., Disp: , Rfl:            Assessment:     IMPRESSION:  Chest pain  -Initial troponin is negative  ? PNA  CAD/stent  Hx of Cardiac arrest  HTN  HLD  PAD/L BKA  VHD/mod-severe MR  Hx of anoxic brain injury    PLAN:     PLAN:  Admit to obs  Continue to trend CE  ABX per primary   Continue home medications.   Consult Dr. Bowden as inpatient  F/U with Dr. Bowden upon DC.    Thank you for your consult.     Shorty Snell, [facility]  Cardiology  [facility]  01/22/2025 9:08 AM

## 2025-01-22 NOTE — PLAN OF CARE
Problem: Adult Inpatient Plan of Care  Goal: Plan of Care Review  Outcome: Progressing  Goal: Patient-Specific Goal (Individualized)  Outcome: Progressing  Goal: Absence of Hospital-Acquired Illness or Injury  Outcome: Progressing  Goal: Optimal Comfort and Wellbeing  Outcome: Progressing  Goal: Readiness for Transition of Care  Outcome: Progressing     Problem: Wound  Goal: Optimal Coping  Outcome: Progressing  Goal: Optimal Functional Ability  Outcome: Progressing  Goal: Absence of Infection Signs and Symptoms  Outcome: Progressing  Goal: Improved Oral Intake  Outcome: Progressing  Goal: Optimal Pain Control and Function  Outcome: Progressing  Goal: Skin Health and Integrity  Outcome: Progressing  Goal: Optimal Wound Healing  Outcome: Progressing     Problem: Skin Injury Risk Increased  Goal: Skin Health and Integrity  Outcome: Progressing     Problem: Diabetes Comorbidity  Goal: Blood Glucose Level Within Targeted Range  Outcome: Progressing     Problem: Fall Injury Risk  Goal: Absence of Fall and Fall-Related Injury  Outcome: Progressing

## 2025-01-22 NOTE — ED PROVIDER NOTES
Encounter Date: 1/22/2025       History     Chief Complaint   Patient presents with    Chest Pain    Shoulder Pain    Cough     STARTED AT 2AM     This is a 69-year-old male patient came into the emergency room with history of having chest pain that is in the substernal area moderate intensity achy type with no specific aggravating or relieving factors.  Reports it is localized .  Has associated shortness of breath but no palpitations.  Does have history of hypertension, dyslipidemia, smoking, CAD, coronary stents.  Patient also has history of left BKA, mitral valve regurgitation.  Denies having nausea, vomiting, abdominal pain.        Review of patient's allergies indicates:  No Known Allergies  Past Medical History:   Diagnosis Date    Anoxic brain injury     Arthritis     CAD (coronary artery disease)     Cardiac arrest     Heart attack     HLD (hyperlipidemia)     Hx of left BKA     Hypertension     Mitral valve regurgitation     Muscle spasms of both lower extremities     Muscle spasms of both lower extremities     PEG (percutaneous endoscopic gastrostomy) adjustment/replacement/removal     Status post placement of stent in right coronary artery      Past Surgical History:   Procedure Laterality Date    LEG AMPUTATION Left      No family history on file.  Social History     Tobacco Use    Smoking status: Every Day     Current packs/day: 1.00     Average packs/day: 1 pack/day for 53.1 years (53.1 ttl pk-yrs)     Types: Cigarettes     Start date: 1972     Passive exposure: Current    Smokeless tobacco: Never   Substance Use Topics    Alcohol use: Never    Drug use: Never     Review of Systems   Constitutional: Negative.    HENT: Negative.  Negative for congestion, drooling and ear discharge.    Eyes:  Negative for pain.   Respiratory:  Positive for shortness of breath. Negative for cough.    Cardiovascular:  Positive for chest pain. Negative for palpitations.   Gastrointestinal:  Negative for abdominal pain,  nausea and vomiting.   Endocrine: Negative.    Genitourinary:  Negative for dysuria, flank pain, frequency, hematuria and penile discharge.   Musculoskeletal: Negative.  Negative for joint swelling, neck pain and neck stiffness.   Skin:  Negative for pallor and rash.   Neurological:  Negative for dizziness, seizures, light-headedness, numbness and headaches.   Psychiatric/Behavioral:  Negative for behavioral problems and confusion.    All other systems reviewed and are negative.      Physical Exam     Initial Vitals [01/22/25 0459]   BP Pulse Resp Temp SpO2   (!) 178/80 (!) 57 20 98.4 °F (36.9 °C) 98 %      MAP       --         Physical Exam    Nursing note and vitals reviewed.  Constitutional: He appears well-developed and well-nourished.   HENT:   Head: Normocephalic and atraumatic.   Eyes: EOM are normal. Pupils are equal, round, and reactive to light.   Neck: Neck supple.   Normal range of motion.  Cardiovascular:  Normal rate, regular rhythm, normal heart sounds and intact distal pulses.           Pulmonary/Chest: Breath sounds normal.   Abdominal: Abdomen is soft. Bowel sounds are normal.   Musculoskeletal:         General: Normal range of motion.      Cervical back: Normal range of motion and neck supple.     Neurological: He is alert and oriented to person, place, and time. He has normal strength. GCS score is 15. GCS eye subscore is 4. GCS verbal subscore is 5. GCS motor subscore is 6.   Skin: Skin is warm and dry. Capillary refill takes less than 2 seconds.   Psychiatric: He has a normal mood and affect.         ED Course   Procedures  Labs Reviewed   COMPREHENSIVE METABOLIC PANEL - Abnormal       Result Value    Sodium 138      Potassium 4.4      Chloride 106      CO2 24      Glucose 103      Blood Urea Nitrogen 25 (*)     Creatinine 1.37 (*)     Calcium 9.2      Protein Total 8.1      Albumin 4.6      Globulin 3.5      Albumin/Globulin Ratio 1.3      Bilirubin Total 0.7      ALP 79      ALT 22      AST  27      eGFR 57      Anion Gap 8.0      BUN/Creatinine Ratio 18     NT-PRO NATRIURETIC PEPTIDE - Abnormal    ProBNP 786.0 (*)    CBC WITH DIFFERENTIAL - Abnormal    WBC 7.66      RBC 5.06      Hgb 14.9      Hct 44.1      MCV 87.2      MCH 29.4      MCHC 33.8      RDW 13.9      Platelet 220      MPV 8.9 (*)     Neut % 61.0      Lymph % 22.6      Mono % 11.5      Eos % 2.9      Basophil % 1.6 (*)     Lymph # 1.73      Neut # 4.68      Mono # 0.88 (*)     Eos # 0.22      Baso # 0.12 (*)     Imm Gran # 0.03      Imm Grans % 0.4      NRBC% 0.0     TROPONIN I - Normal    Troponin-I 0.026     MAGNESIUM - Normal    Magnesium Level 2.20     PROTIME-INR - Normal    PT 11.1      INR 1.1      Narrative:     Protimes are used to monitor anticoagulant agents such as warfarin. PT INR values are based on the current patient normal mean and the MARY BETH value for the specific instrument reagent used.  **Routine theraputic target values for the INR are 2.0-3.0**   TROPONIN I - Normal    Troponin-I 0.023     CBC W/ AUTO DIFFERENTIAL    Narrative:     The following orders were created for panel order CBC auto differential.  Procedure                               Abnormality         Status                     ---------                               -----------         ------                     CBC with Differential[[id number]]       Abnormal            Final result                 Please view results for these tests on the individual orders.     EKG Readings: (Independently Interpreted)   EKG shows sinus bradycardia with heart rate of 59 beats per minute, MI interval 142 QRS duration 112  milliseconds.  No ST depressions or ST elevations.       Imaging Results              X-Ray Chest 1 View (Final result)  Result time 01/22/25 07:30:24      Final result by Sridhar Pineda MD (01/22/25 07:30:24)                   Impression:      1. There are streaky and hazy opacities in the right lung base which may reflect atelectasis and/or  infiltrate.      Electronically signed by: Sridhar Pineda MD  Date:    01/22/2025  Time:    07:30               Narrative:    EXAMINATION:  XR CHEST 1 VIEW    CLINICAL HISTORY:  Chest pain, unspecified.    COMPARISON:  Chest x-ray 07/14/2024    FINDINGS:  Frontal view of the chest was obtained.  The cardiac silhouette is within normal limits for size. The aorta is mildly tortuous and partially calcified.  There are streaky and hazy opacities in the right basilar lung which may reflect atelectasis and/or infiltrate.  No visible pneumothorax is appreciated.  There is slight blunting of the right costophrenic sulcus which is similar to the previous study and may be related to pleural thickening/scarring.  No acute displaced fracture is visualized.                                    X-Rays:   Independently Interpreted Readings:   Other Readings:  Chest x-ray shows:   Impression:     1. There are streaky and hazy opacities in the right lung base which may reflect atelectasis and/or infiltrate.      Medications   azithromycin (ZITHROMAX) 500 mg in 0.9% NaCl 250 mL IVPB (admixture device) (0 mg Intravenous Stopped 1/22/25 0953)   aspirin tablet 325 mg (325 mg Oral Given 1/22/25 0534)   albuterol-ipratropium 2.5 mg-0.5 mg/3 mL nebulizer solution 3 mL (3 mLs Nebulization Given 1/22/25 0627)   cefTRIAXone injection 1 g (1 g Intravenous Given 1/22/25 0853)   furosemide injection 20 mg (20 mg Intravenous Given 1/22/25 0853)     Medical Decision Making  This is a 69-year-old male patient came into the emergency room with history of having chest pain that is in the substernal area moderate intensity achy type with no specific aggravating or relieving factors.  Reports it is localized .  Has associated shortness of breath but no palpitations.  Does have history of hypertension, dyslipidemia, smoking, CAD, coronary stents.  Patient also has history of left BKA, mitral valve regurgitation.  Denies having nausea, vomiting, abdominal  pain.    Differential diagnosis: 1. NSTEMI 2. STEMI 3.  Costochondritis 4. GERD    Patient's 1st set of troponin is negative.  BNP is elevated.  Chest x-ray shows infiltrate.  Patient has been given aspirin, DuoNeb.  Patient also has received Rocephin and Zithromax.  [facility] has evaluated the patient.  Advised the patient to be admitted.  I spoke with patient's own cardiologist Dr. Bowden and he has agree with the plan to admit the patient and trend cardiac markers.  We will call hospitalist and admit the patient.      Chest x-ray shows:  Impression:   1. There are streaky and hazy opacities in the right lung base which may reflect atelectasis and/or infiltrate.    Patient has been given Rocephin and azithromycin.   Dr. Tovar [place] hospitalist has accepted the patient for admission.    Amount and/or Complexity of Data Reviewed  Labs: ordered.  Radiology: ordered.    Risk  OTC drugs.  Prescription drug management.  Decision regarding hospitalization.                                      Clinical Impression:  Final diagnoses:  [R07.9] Chest pain  [R07.9] Chest pain, unspecified type (Primary)  [Z86.79] History of CAD (coronary artery disease)  [Z95.5] History of coronary artery stent placement  [J18.9] Pneumonia due to infectious organism, unspecified laterality, unspecified part of lung          ED Disposition Condition    Admit Stable                Odilon Pablo, DO  01/22/25 0944       Odilon Pablo, DO  01/22/25 0985

## 2025-01-22 NOTE — HPI
Chest Pain     Shoulder Pain    Cough       STARTED AT 2AM      This is a 69-year-old male patient came into the emergency room with history of having chest pain that is in the substernal area moderate intensity achy type with no specific aggravating or relieving factors.  Pt followed by DR. Bowden lives at [facility].  Pt Reports pain was  localized .  Has associated shortness of breath but no palpitations.  Does have history of hypertension, dyslipidemia, smoking, CAD, coronary stents.  Patient also has history of left BKA, mitral valve regurgitation.  Denies having nausea, vomiting, abdominal pain.  In ER prior to admit, pt pain has resolved.  The pain was exacerbated by deep breaths. The pain was not relieved by any thing. He was still having chest pain at the time of exam. EKG revealed ST and T wave abnormalities in the inferior leads. Initial troponin was negative. CXR revealed streaky and hazy opacities in the right lung base. CIS was onsulted for chest pain enzymes x 2 are negative, recommended admit overnight  for observation and rule out MI.

## 2025-01-22 NOTE — H&P
[facility]  History & Physical    Patient Name: Leno Thompson Jr.  MRN: [id number]  Patient Class: IP- Inpatient  Admission Date: 1/22/2025  Attending Physician: Fiorella Madison MD  Primary Care Provider: Nancy Gutierrez FNP-C         Patient information was obtained from patient, past medical records, and ER records.     Subjective:     Principal Problem:<principal problem not specified>    Chief Complaint:   Chief Complaint   Patient presents with    Chest Pain    Shoulder Pain    Cough     STARTED AT 2AM        HPI:     Chest Pain     Shoulder Pain    Cough       STARTED AT 2AM      This is a 69-year-old male patient came into the emergency room with history of having chest pain that is in the substernal area moderate intensity achy type with no specific aggravating or relieving factors.  Pt followed by DR. Bowden lives at [facility].  Pt Reports pain was  localized .  Has associated shortness of breath but no palpitations.  Does have history of hypertension, dyslipidemia, smoking, CAD, coronary stents.  Patient also has history of left BKA, mitral valve regurgitation.  Denies having nausea, vomiting, abdominal pain.  In ER prior to admit, pt pain has resolved.  The pain was exacerbated by deep breaths. The pain was not relieved by any thing. He was still having chest pain at the time of exam. EKG revealed ST and T wave abnormalities in the inferior leads. Initial troponin was negative. CXR revealed streaky and hazy opacities in the right lung base. CIS was onsulted for chest pain enzymes x 2 are negative, recommended admit overnight  for observation and rule out MI.     Past Medical History:   Diagnosis Date    Anoxic brain injury     Arthritis     CAD (coronary artery disease)     Cardiac arrest     Heart attack     HLD (hyperlipidemia)     Hx of left BKA     Hypertension     Mitral valve regurgitation     Muscle spasms of both lower extremities      Muscle spasms of both lower extremities     PEG (percutaneous endoscopic gastrostomy) adjustment/replacement/removal     Status post placement of stent in right coronary artery        Past Surgical History:   Procedure Laterality Date    LEG AMPUTATION Left        Review of patient's allergies indicates:  No Known Allergies    No current facility-administered medications on file prior to encounter.     Current Outpatient Medications on File Prior to Encounter   Medication Sig    amLODIPine (NORVASC) 5 MG tablet Take 1 tablet (5 mg total) by mouth once daily.    atorvastatin (LIPITOR) 20 MG tablet Take 20 mg by mouth every evening.    baclofen (LIORESAL) 10 MG tablet Take 1 tablet (10 mg total) by mouth 3 (three) times daily.    busPIRone (BUSPAR) 7.5 MG tablet Take 1 tablet (7.5 mg total) by mouth 2 (two) times a day.    carboxymethylcellulose sodium (ARTIFICIAL TEARS, [facility],) 1 % Drop Apply 2 drops to eye 4 (four) times daily.    citalopram (CELEXA) 10 MG tablet Take 2 tablets (20 mg total) by mouth once daily. (Patient taking differently: Take 30 mg by mouth once daily.)    clopidogreL (PLAVIX) 75 mg tablet Take 75 mg by mouth once daily.    empagliflozin (JARDIANCE) 10 mg tablet Take 10 mg by mouth once daily.    ezetimibe (ZETIA) 10 mg tablet Take 10 mg by mouth every evening.    guaiFENesin (MUCINEX) 600 mg 12 hr tablet Take 600 mg by mouth 2 (two) times daily.    isosorbide mononitrate (IMDUR) 30 MG 24 hr tablet Take 1 tablet (30 mg total) by mouth once daily.    propylene glycoL (SYSTANE COMPLETE) 0.6 % Drop Apply 2 drops to eye every evening.    senna-docusate 8.6-50 mg (SENNA WITH DOCUSATE SODIUM) 8.6-50 mg per tablet Take 1 tablet by mouth once daily.    vitamin D3-folic acid 125 mcg (5,000 unit)-1 mg Tab Take 1 tablet by mouth Daily.     Family History    None       Tobacco Use    Smoking status: Every Day     Current packs/day: 1.00     Average packs/day: 1 pack/day for 53.1 years (53.1 ttl pk-yrs)      Types: Cigarettes     Start date: 1972     Passive exposure: Current    Smokeless tobacco: Never   Substance and Sexual Activity    Alcohol use: Never    Drug use: Never    Sexual activity: Not Currently     Review of Systems   Constitutional:  Negative for appetite change, fatigue and fever.   Respiratory:  Negative for cough, shortness of breath and wheezing.    Cardiovascular:  Positive for chest pain. Negative for leg swelling.   Gastrointestinal:  Negative for abdominal distention, abdominal pain, constipation, diarrhea, nausea and vomiting.   Skin:  Negative for color change, pallor, rash and wound.   Neurological:  Negative for tremors, syncope and headaches.   Psychiatric/Behavioral:  Negative for agitation and behavioral problems.      Objective:     Vital Signs (Most Recent):  Temp: 97.8 °F (36.6 °C) (01/22/25 1505)  Pulse: 63 (01/22/25 1505)  Resp: 18 (01/22/25 1505)  BP: (!) 154/79 (01/22/25 1505)  SpO2: 96 % (01/22/25 1505) Vital Signs (24h Range):  Temp:  [97.5 °F (36.4 °C)-98.5 °F (36.9 °C)] 97.8 °F (36.6 °C)  Pulse:  [56-67] 63  Resp:  [10-20] 18  SpO2:  [93 %-98 %] 96 %  BP: (131-178)/(44-89) 154/79     Weight: 69.6 kg (153 lb 7 oz)  Body mass index is 23.33 kg/m².     Physical Exam  Vitals and nursing note reviewed. Exam conducted with a chaperone present.   Constitutional:       General: He is not in acute distress.     Appearance: Normal appearance. He is normal weight. He is not ill-appearing.   HENT:      Head: Normocephalic and atraumatic.      Nose: Nose normal.   Eyes:      General: No scleral icterus.     Conjunctiva/sclera: Conjunctivae normal.   Cardiovascular:      Rate and Rhythm: Normal rate and regular rhythm.      Pulses: Normal pulses.      Heart sounds: Normal heart sounds.   Pulmonary:      Effort: Pulmonary effort is normal.      Breath sounds: Normal breath sounds.   Abdominal:      General: Abdomen is flat. Bowel sounds are normal.      Palpations: Abdomen is soft.    Musculoskeletal:      Right lower leg: No edema.      Left lower leg: No edema.   Skin:     General: Skin is warm and dry.      Findings: No erythema or rash.   Neurological:      General: No focal deficit present.      Mental Status: He is alert and oriented to person, place, and time.   Psychiatric:         Mood and Affect: Mood normal.         Behavior: Behavior normal.         Thought Content: Thought content normal.                Significant Labs: All pertinent labs within the past 24 hours have been reviewed.  CBC:   Recent Labs   Lab 01/22/25  0533   WBC 7.66   HGB 14.9   HCT 44.1        CMP:   Recent Labs   Lab 01/22/25  0533      K 4.4      CO2 24   BUN 25*   CREATININE 1.37*   CALCIUM 9.2   ALBUMIN 4.6   BILITOT 0.7   ALKPHOS 79   AST 27   ALT 22       Significant Imaging: I have reviewed all pertinent imaging results/findings within the past 24 hours.  Assessment/Plan:     Chest pain  Consult cardiology   Follow cardiac enzymes        VTE Risk Mitigation (From admission, onward)      None                   Patient Screened for food insecurity, housing instability, transportation needs, utility difficulties, and interpersonal safety.  No needs identified           Fiorella Madison MD  Department of Hospital Medicine  [facility]

## 2025-01-23 LAB
ALBUMIN SERPL-MCNC: 4.3 G/DL (ref 3.4–5)
ALBUMIN/GLOB SERPL: 1.4 RATIO
ALP SERPL-CCNC: 82 UNIT/L (ref 50–144)
ALT SERPL-CCNC: 16 UNIT/L (ref 1–45)
ANION GAP SERPL CALC-SCNC: 4 MEQ/L (ref 2–13)
AST SERPL-CCNC: 20 UNIT/L (ref 17–59)
BASOPHILS # BLD AUTO: 0.09 X10(3)/MCL (ref 0.01–0.08)
BASOPHILS NFR BLD AUTO: 1.1 % (ref 0.1–1.2)
BILIRUB SERPL-MCNC: 1.2 MG/DL (ref 0–1)
BUN SERPL-MCNC: 33 MG/DL (ref 7–20)
CALCIUM SERPL-MCNC: 9.4 MG/DL (ref 8.4–10.2)
CHLORIDE SERPL-SCNC: 105 MMOL/L (ref 98–110)
CHOLEST SERPL-MCNC: 129 MG/DL (ref 0–200)
CO2 SERPL-SCNC: 28 MMOL/L (ref 21–32)
CREAT SERPL-MCNC: 2.15 MG/DL (ref 0.66–1.25)
CREAT/UREA NIT SERPL: 15 (ref 12–20)
EOSINOPHIL # BLD AUTO: 0.19 X10(3)/MCL (ref 0.04–0.54)
EOSINOPHIL NFR BLD AUTO: 2.4 % (ref 0.7–7)
ERYTHROCYTE [DISTWIDTH] IN BLOOD BY AUTOMATED COUNT: 13.8 %
GFR SERPLBLD CREATININE-BSD FMLA CKD-EPI: 33 ML/MIN/1.73/M2
GLOBULIN SER-MCNC: 3 GM/DL (ref 2–3.9)
GLUCOSE SERPL-MCNC: 107 MG/DL (ref 70–115)
HCT VFR BLD AUTO: 43.4 % (ref 36–52)
HDLC SERPL-MCNC: 36 MG/DL (ref 40–60)
HGB BLD-MCNC: 14.7 G/DL (ref 13–18)
IMM GRANULOCYTES # BLD AUTO: 0.01 X10(3)/MCL (ref 0–0.03)
IMM GRANULOCYTES NFR BLD AUTO: 0.1 % (ref 0–0.5)
LDLC SERPL DIRECT ASSAY-SCNC: 65.8 MG/DL (ref 30–100)
LYMPHOCYTES # BLD AUTO: 1.62 X10(3)/MCL (ref 1.32–3.57)
LYMPHOCYTES NFR BLD AUTO: 20.5 % (ref 20–55)
MCH RBC QN AUTO: 29.3 PG (ref 27–34)
MCHC RBC AUTO-ENTMCNC: 33.9 G/DL (ref 31–37)
MCV RBC AUTO: 86.5 FL (ref 79–99)
MONOCYTES # BLD AUTO: 1.05 X10(3)/MCL (ref 0.3–0.82)
MONOCYTES NFR BLD AUTO: 13.3 % (ref 4.7–12.5)
NEUTROPHILS # BLD AUTO: 4.96 X10(3)/MCL (ref 1.78–5.38)
NEUTROPHILS NFR BLD AUTO: 62.6 % (ref 37–73)
NRBC BLD AUTO-RTO: 0 %
PLATELET # BLD AUTO: 220 X10(3)/MCL (ref 140–371)
PMV BLD AUTO: 9 FL (ref 9.4–12.4)
POTASSIUM SERPL-SCNC: 4.6 MMOL/L (ref 3.5–5.1)
PROT SERPL-MCNC: 7.3 GM/DL (ref 6.3–8.2)
RBC # BLD AUTO: 5.02 X10(6)/MCL (ref 4–6)
SODIUM SERPL-SCNC: 137 MMOL/L (ref 136–145)
TRIGL SERPL-MCNC: 150 MG/DL (ref 30–200)
WBC # BLD AUTO: 7.92 X10(3)/MCL (ref 4–11.5)

## 2025-01-23 PROCEDURE — 80053 COMPREHEN METABOLIC PANEL: CPT | Performed by: INTERNAL MEDICINE

## 2025-01-23 PROCEDURE — 99900035 HC TECH TIME PER 15 MIN (STAT)

## 2025-01-23 PROCEDURE — 80061 LIPID PANEL: CPT | Performed by: INTERNAL MEDICINE

## 2025-01-23 PROCEDURE — 36415 COLL VENOUS BLD VENIPUNCTURE: CPT | Performed by: INTERNAL MEDICINE

## 2025-01-23 PROCEDURE — 94761 N-INVAS EAR/PLS OXIMETRY MLT: CPT

## 2025-01-23 PROCEDURE — 25000003 PHARM REV CODE 250: Performed by: INTERNAL MEDICINE

## 2025-01-23 PROCEDURE — 85025 COMPLETE CBC W/AUTO DIFF WBC: CPT | Performed by: INTERNAL MEDICINE

## 2025-01-23 PROCEDURE — 11000001 HC ACUTE MED/SURG PRIVATE ROOM

## 2025-01-23 PROCEDURE — 25000003 PHARM REV CODE 250: Performed by: FAMILY MEDICINE

## 2025-01-23 PROCEDURE — 21400001 HC TELEMETRY ROOM

## 2025-01-23 RX ORDER — SODIUM CHLORIDE 9 MG/ML
INJECTION, SOLUTION INTRAVENOUS CONTINUOUS
Status: DISCONTINUED | OUTPATIENT
Start: 2025-01-23 | End: 2025-01-25 | Stop reason: HOSPADM

## 2025-01-23 RX ADMIN — BACLOFEN 10 MG: 10 TABLET ORAL at 08:01

## 2025-01-23 RX ADMIN — SODIUM CHLORIDE: 9 INJECTION, SOLUTION INTRAVENOUS at 08:01

## 2025-01-23 RX ADMIN — CITALOPRAM HYDROBROMIDE 20 MG: 20 TABLET ORAL at 08:01

## 2025-01-23 RX ADMIN — RANOLAZINE 500 MG: 500 TABLET, EXTENDED RELEASE ORAL at 08:01

## 2025-01-23 RX ADMIN — BUSPIRONE HYDROCHLORIDE 7.5 MG: 7.5 TABLET ORAL at 08:01

## 2025-01-23 RX ADMIN — ATORVASTATIN CALCIUM 40 MG: 40 TABLET, FILM COATED ORAL at 08:01

## 2025-01-23 RX ADMIN — BACLOFEN 10 MG: 10 TABLET ORAL at 02:01

## 2025-01-23 RX ADMIN — EZETIMIBE 10 MG: 10 TABLET ORAL at 08:01

## 2025-01-23 RX ADMIN — AMLODIPINE BESYLATE 5 MG: 5 TABLET ORAL at 08:01

## 2025-01-23 RX ADMIN — ISOSORBIDE MONONITRATE 30 MG: 30 TABLET, EXTENDED RELEASE ORAL at 08:01

## 2025-01-23 RX ADMIN — METOPROLOL SUCCINATE 25 MG: 25 TABLET, EXTENDED RELEASE ORAL at 08:01

## 2025-01-23 RX ADMIN — CLOPIDOGREL BISULFATE 75 MG: 75 TABLET ORAL at 08:01

## 2025-01-23 RX ADMIN — SODIUM CHLORIDE: 9 INJECTION, SOLUTION INTRAVENOUS at 12:01

## 2025-01-23 NOTE — SUBJECTIVE & OBJECTIVE
Past Medical History:   Diagnosis Date    Anoxic brain injury     Arthritis     CAD (coronary artery disease)     Cardiac arrest     Heart attack     HLD (hyperlipidemia)     Hx of left BKA     Hypertension     Mitral valve regurgitation     Muscle spasms of both lower extremities     Muscle spasms of both lower extremities     PEG (percutaneous endoscopic gastrostomy) adjustment/replacement/removal     Status post placement of stent in right coronary artery        Past Surgical History:   Procedure Laterality Date    LEG AMPUTATION Left        Review of patient's allergies indicates:  No Known Allergies    No current facility-administered medications on file prior to encounter.     Current Outpatient Medications on File Prior to Encounter   Medication Sig    amLODIPine (NORVASC) 5 MG tablet Take 1 tablet (5 mg total) by mouth once daily.    atorvastatin (LIPITOR) 20 MG tablet Take 20 mg by mouth every evening.    baclofen (LIORESAL) 10 MG tablet Take 1 tablet (10 mg total) by mouth 3 (three) times daily.    busPIRone (BUSPAR) 7.5 MG tablet Take 1 tablet (7.5 mg total) by mouth 2 (two) times a day.    carboxymethylcellulose sodium (ARTIFICIAL TEARS, [facility],) 1 % Drop Apply 2 drops to eye 4 (four) times daily.    citalopram (CELEXA) 10 MG tablet Take 2 tablets (20 mg total) by mouth once daily. (Patient taking differently: Take 30 mg by mouth once daily.)    clopidogreL (PLAVIX) 75 mg tablet Take 75 mg by mouth once daily.    empagliflozin (JARDIANCE) 10 mg tablet Take 10 mg by mouth once daily.    ezetimibe (ZETIA) 10 mg tablet Take 10 mg by mouth every evening.    guaiFENesin (MUCINEX) 600 mg 12 hr tablet Take 600 mg by mouth 2 (two) times daily.    isosorbide mononitrate (IMDUR) 30 MG 24 hr tablet Take 1 tablet (30 mg total) by mouth once daily.    propylene glycoL (SYSTANE COMPLETE) 0.6 % Drop Apply 2 drops to eye every evening.    senna-docusate 8.6-50 mg (SENNA WITH DOCUSATE SODIUM) 8.6-50 mg per tablet Take 1  tablet by mouth once daily.    vitamin D3-folic acid 125 mcg (5,000 unit)-1 mg Tab Take 1 tablet by mouth Daily.     Family History    None       Tobacco Use    Smoking status: Every Day     Current packs/day: 1.00     Average packs/day: 1 pack/day for 53.1 years (53.1 ttl pk-yrs)     Types: Cigarettes     Start date: 1972     Passive exposure: Current    Smokeless tobacco: Never   Substance and Sexual Activity    Alcohol use: Never    Drug use: Never    Sexual activity: Not Currently     Review of Systems   Constitutional: Positive for malaise/fatigue.   HENT: Negative.     Eyes: Negative.    Cardiovascular:  Positive for chest pain.   Respiratory:  Positive for cough.    Endocrine: Negative.    Hematologic/Lymphatic: Negative.    Skin: Negative.    Musculoskeletal:  Positive for arthritis.   Gastrointestinal: Negative.    Genitourinary: Negative.    Neurological:  Positive for weakness.   Psychiatric/Behavioral:  The patient has insomnia.    Allergic/Immunologic: Negative.      Objective:     Vital Signs (Most Recent):  Temp: 98.3 °F (36.8 °C) (01/22/25 1920)  Pulse: (!) 56 (01/22/25 2134)  Resp: 18 (01/22/25 2134)  BP: (!) 141/74 (01/22/25 1920)  SpO2: 95 % (01/22/25 2134) Vital Signs (24h Range):  Temp:  [97.5 °F (36.4 °C)-98.5 °F (36.9 °C)] 98.3 °F (36.8 °C)  Pulse:  [56-67] 56  Resp:  [10-20] 18  SpO2:  [93 %-98 %] 95 %  BP: (131-178)/(44-89) 141/74     Weight: 69.6 kg (153 lb 7 oz)  Body mass index is 23.33 kg/m².    SpO2: 95 %         Intake/Output Summary (Last 24 hours) at 1/22/2025 2234  Last data filed at 1/22/2025 1835  Gross per 24 hour   Intake 720 ml   Output 1075 ml   Net -355 ml       Lines/Drains/Airways       Drain  Duration             Male External Urinary Catheter 01/22/25 0855 <1 day              Peripheral Intravenous Line  Duration                  Peripheral IV - Single Lumen 01/22/25 0518 20 G Yes Posterior;Right Hand <1 day                     Physical Exam  Constitutional:        General: He is not in acute distress.     Comments: No CP   HENT:      Head: Normocephalic and atraumatic.      Nose: No congestion.      Mouth/Throat:      Mouth: Mucous membranes are moist.   Cardiovascular:      Rate and Rhythm: Normal rate.      Comments: Soft MIRANDA lsb, mitral to apex  Pulmonary:      Effort: Pulmonary effort is normal.      Breath sounds: Normal breath sounds.   Abdominal:      Palpations: Abdomen is soft.      Tenderness: There is no abdominal tenderness.   Musculoskeletal:      Cervical back: Normal range of motion and neck supple.      Right lower leg: No edema.      Comments: Prior LBK amputation   Skin:     General: Skin is warm and dry.   Neurological:      General: No focal deficit present.      Mental Status: He is alert.   Psychiatric:         Mood and Affect: Mood normal.         Behavior: Behavior normal.          Significant Labs: CMP   Recent Labs   Lab 01/22/25  0533      K 4.4      CO2 24   BUN 25*   CREATININE 1.37*   CALCIUM 9.2   ALBUMIN 4.6   BILITOT 0.7   ALKPHOS 79   AST 27   ALT 22    and CBC   Recent Labs   Lab 01/22/25  0533   WBC 7.66   HGB 14.9   HCT 44.1

## 2025-01-23 NOTE — PLAN OF CARE
01/23/25 1034   Discharge Assessment   Assessment Type Discharge Planning Assessment   Confirmed/corrected address, phone number and insurance Yes   Confirmed Demographics Correct on Facesheet   Source of Information health record   When was your last doctors appointment? 01/14/25   Communicated LELIA with patient/caregiver Yes   Reason For Admission Chest Pain   People in Home facility resident   Facility Arrived From: [facility] [place]   Do you expect to return to your current living situation? Yes   Prior to hospitilization cognitive status: Alert/Oriented   Current cognitive status: Alert/Oriented   Walking or Climbing Stairs Difficulty yes   Walking or Climbing Stairs ambulation difficulty, requires equipment   Mobility Management Wheelchair, Rolling Walker, Prosthesis   Dressing/Bathing Difficulty yes   Dressing/Bathing bathing difficulty, assistance 1 person;dressing difficulty, assistance 1 person   Dressing/Bathing Management NH staff assists   Home Accessibility wheelchair accessible   Equipment Currently Used at Home walker, rolling;wheelchair;prosthesis   Readmission within 30 days? No   Do you take prescription medications? Yes   Do you have prescription coverage? Yes   Coverage MCR   Do you have any problems affording any of your prescribed medications? No   Is the patient taking medications as prescribed? yes   Who is going to help you get home at discharge? [employer]   How do you get to doctors appointments? other (see comments)  ([employer] [employer])   Are you on dialysis? No   Do you take coumadin? No   Discharge Plan A Return to nursing home   DME Needed Upon Discharge  none   Discharge Plan discussed with: Caregiver   Name(s) and Number(s) [facility]  [phone]   Transition of Care Barriers Mobility   Physical Activity   On average, how many days per week do you engage in moderate to strenuous exercise (like a brisk walk)? 0 days   On average, how many minutes do you engage  in exercise at this level? 0 min   Financial Resource Strain   How hard is it for you to pay for the very basics like food, housing, medical care, and heating? Not hard   Housing Stability   In the last 12 months, was there a time when you were not able to pay the mortgage or rent on time? N   At any time in the past 12 months, were you homeless or living in a shelter (including now)? N   Transportation Needs   Has the lack of transportation kept you from medical appointments, meetings, work or from getting things needed for daily living? No   Food Insecurity   Within the past 12 months, you worried that your food would run out before you got the money to buy more. Never true   Within the past 12 months, the food you bought just didn't last and you didn't have money to get more. Never true   Stress   Do you feel stress - tense, restless, nervous, or anxious, or unable to sleep at night because your mind is troubled all the time - these days? Not at all   Alcohol Use   Q1: How often do you have a drink containing alcohol? Never   Q2: How many drinks containing alcohol do you have on a typical day when you are drinking? None   Q3: How often do you have six or more drinks on one occasion? Never   Utilities   In the past 12 months has the electric, gas, oil, or water company threatened to shut off services in your home? No   Health Literacy   How often do you need to have someone help you when you read instructions, pamphlets, or other written material from your doctor or pharmacy? Sometimes

## 2025-01-23 NOTE — PLAN OF CARE
01/23/25 1034   Discharge Assessment   Assessment Type Discharge Planning Assessment   Confirmed/corrected address, phone number and insurance Yes   Confirmed Demographics Correct on Facesheet   Source of Information health record   When was your last doctors appointment? 01/14/25   Communicated LELIA with patient/caregiver Yes   Reason For Admission Chest Pain   People in Home facility resident   Facility Arrived From: [facility] [place]   Do you expect to return to your current living situation? Yes   Prior to hospitilization cognitive status: Alert/Oriented   Current cognitive status: Alert/Oriented   Walking or Climbing Stairs Difficulty yes   Walking or Climbing Stairs ambulation difficulty, requires equipment   Mobility Management Wheelchair, Rolling Walker, Prosthesis   Dressing/Bathing Difficulty yes   Dressing/Bathing bathing difficulty, assistance 1 person;dressing difficulty, assistance 1 person   Dressing/Bathing Management NH staff assists   Home Accessibility wheelchair accessible   Equipment Currently Used at Home walker, rolling;wheelchair;prosthesis   Readmission within 30 days? No   Do you take prescription medications? Yes   Do you have prescription coverage? Yes   Coverage MCR   Do you have any problems affording any of your prescribed medications? No   Is the patient taking medications as prescribed? yes   Who is going to help you get home at discharge? [employer] [employer]   How do you get to doctors appointments? other (see comments)  ([employer] [employer])   Are you on dialysis? No   Do you take coumadin? No   Discharge Plan A Return to nursing home   DME Needed Upon Discharge  none   Discharge Plan discussed with: Caregiver   Name(s) and Number(s) [facility] Age [facility] [address]  [phone]   Transition of Care Barriers Mobility   Physical Activity   On average, how many days per week do you engage in moderate to strenuous exercise (like a brisk walk)? 0 days   On average, how many minutes do you engage  in exercise at this level? 0 min   Financial Resource Strain   How hard is it for you to pay for the very basics like food, housing, medical care, and heating? Not hard   Housing Stability   In the last 12 months, was there a time when you were not able to pay the mortgage or rent on time? N   At any time in the past 12 months, were you homeless or living in a shelter (including now)? N   Transportation Needs   Has the lack of transportation kept you from medical appointments, meetings, work or from getting things needed for daily living? No   Food Insecurity   Within the past 12 months, you worried that your food would run out before you got the money to buy more. Never true   Within the past 12 months, the food you bought just didn't last and you didn't have money to get more. Never true   Stress   Do you feel stress - tense, restless, nervous, or anxious, or unable to sleep at night because your mind is troubled all the time - these days? Not at all   Alcohol Use   Q1: How often do you have a drink containing alcohol? Never   Q2: How many drinks containing alcohol do you have on a typical day when you are drinking? None   Q3: How often do you have six or more drinks on one occasion? Never   Utilities   In the past 12 months has the electric, gas, oil, or water company threatened to shut off services in your home? No   Health Literacy   How often do you need to have someone help you when you read instructions, pamphlets, or other written material from your doctor or pharmacy? Sometimes

## 2025-01-23 NOTE — SUBJECTIVE & OBJECTIVE
ROS  Objective:     Vital Signs (Most Recent):  Temp: 97.5 °F (36.4 °C) (01/23/25 1103)  Pulse: 61 (01/23/25 1103)  Resp: 18 (01/23/25 1103)  BP: (!) 171/72 (01/23/25 1103)  SpO2: 95 % (01/23/25 1103) Vital Signs (24h Range):  Temp:  [97.2 °F (36.2 °C)-98.5 °F (36.9 °C)] 97.5 °F (36.4 °C)  Pulse:  [56-63] 61  Resp:  [18-20] 18  SpO2:  [93 %-98 %] 95 %  BP: (141-171)/(57-79) 171/72     Weight: 69.6 kg (153 lb 7 oz)  Body mass index is 23.33 kg/m².     SpO2: 95 %         Intake/Output Summary (Last 24 hours) at 1/23/2025 1442  Last data filed at 1/23/2025 1220  Gross per 24 hour   Intake 1200 ml   Output 525 ml   Net 675 ml       Lines/Drains/Airways       Drain  Duration             Male External Urinary Catheter 01/22/25 0855 1 day              Peripheral Intravenous Line  Duration                  Peripheral IV - Single Lumen 01/22/25 0518 20 G Yes Posterior;Right Hand 1 day                       Physical Exam       Significant Labs: CMP   Recent Labs   Lab 01/22/25  0533 01/23/25  0512    137   K 4.4 4.6    105   CO2 24 28   BUN 25* 33*   CREATININE 1.37* 2.15*   CALCIUM 9.2 9.4   ALBUMIN 4.6 4.3   BILITOT 0.7 1.2*   ALKPHOS 79 82   AST 27 20   ALT 22 16    and CBC   Recent Labs   Lab 01/22/25  0533 01/23/25  0512   WBC 7.66 7.92   HGB 14.9 14.7   HCT 44.1 43.4    220

## 2025-01-23 NOTE — CONSULTS
[facility]-Med/Surg  Cardiology  Consult Note    Patient Name: Leno Thompson Jr.  MRN: [id number]  Admission Date: 1/22/2025  Hospital Length of Stay: 0 days  Code Status: Prior   Attending Provider: Dr Madison  Consulting Provider: Quinn Bowden MD  Primary Care Physician: Nancy Gutierrez, TANIKAP-TOD  Principal Problem:<principal problem not specified>    Patient information was obtained from patient , MD notes.     Consults  Subjective:     69-year-old gentleman, personal history of CAD, prior history of MI, right coronary total occlusion, total proximal, mid InStent restenosis per 2021 coronary angiogram, history of anoxic brain injury status post MI, prior history of cardiac arrest, prior history of moderate LV dysfunction, valvular heart disease-mitral regurgitation, hypertension, diabetes, PAD, prior history of left below-knee amputation, tobacco consumption-presented emergency room for evaluation of chest pain.  Chest pain occurred nighttime, nonexertional, moderate severity, localized left chest wall, exacerbation factors unclear.  EKG sinus rhythm transient inferolateral ST segment depression.  Troponin negative x3.  Patient is nursing home resident at [place].  Cardiology consulted for patient's chest pain.  Patient previously was not seen in my office.     Chest pain  Personal history of CAD-2021 coronary angiogram total RCA proximal, mid 100% InStent restenosis,  with collateral left to right, no significant left circumflex, LAD,  LM disease   Personal history of moderate LV dysfunction  Personal history of mitral regurgitation  Personal history of anoxic brain injury   Personal hx of PEG tube  Hypertension   Diabetes mellitus  PAD-prior history of left below-knee amputation  Tobacco consumption          Past Medical History:   Diagnosis Date    Anoxic brain injury     Arthritis     CAD (coronary artery disease)     Cardiac arrest     Heart attack     HLD (hyperlipidemia)     Hx of left BKA      Hypertension     Mitral valve regurgitation     Muscle spasms of both lower extremities     Muscle spasms of both lower extremities     PEG (percutaneous endoscopic gastrostomy) adjustment/replacement/removal     Status post placement of stent in right coronary artery        Past Surgical History:   Procedure Laterality Date    LEG AMPUTATION Left        Review of patient's allergies indicates:  No Known Allergies    No current facility-administered medications on file prior to encounter.     Current Outpatient Medications on File Prior to Encounter   Medication Sig    amLODIPine (NORVASC) 5 MG tablet Take 1 tablet (5 mg total) by mouth once daily.    atorvastatin (LIPITOR) 20 MG tablet Take 20 mg by mouth every evening.    baclofen (LIORESAL) 10 MG tablet Take 1 tablet (10 mg total) by mouth 3 (three) times daily.    busPIRone (BUSPAR) 7.5 MG tablet Take 1 tablet (7.5 mg total) by mouth 2 (two) times a day.    carboxymethylcellulose sodium (ARTIFICIAL TEARS, CMC,) 1 % Drop Apply 2 drops to eye 4 (four) times daily.    citalopram (CELEXA) 10 MG tablet Take 2 tablets (20 mg total) by mouth once daily. (Patient taking differently: Take 30 mg by mouth once daily.)    clopidogreL (PLAVIX) 75 mg tablet Take 75 mg by mouth once daily.    empagliflozin (JARDIANCE) 10 mg tablet Take 10 mg by mouth once daily.    ezetimibe (ZETIA) 10 mg tablet Take 10 mg by mouth every evening.    guaiFENesin (MUCINEX) 600 mg 12 hr tablet Take 600 mg by mouth 2 (two) times daily.    isosorbide mononitrate (IMDUR) 30 MG 24 hr tablet Take 1 tablet (30 mg total) by mouth once daily.    propylene glycoL (SYSTANE COMPLETE) 0.6 % Drop Apply 2 drops to eye every evening.    senna-docusate 8.6-50 mg (SENNA WITH DOCUSATE SODIUM) 8.6-50 mg per tablet Take 1 tablet by mouth once daily.    vitamin D3-folic acid 125 mcg (5,000 unit)-1 mg Tab Take 1 tablet by mouth Daily.     Family History    None       Tobacco Use    Smoking status: Every Day      Current packs/day: 1.00     Average packs/day: 1 pack/day for 53.1 years (53.1 ttl pk-yrs)     Types: Cigarettes     Start date: 1972     Passive exposure: Current    Smokeless tobacco: Never   Substance and Sexual Activity    Alcohol use: Never    Drug use: Never    Sexual activity: Not Currently     Review of Systems   Constitutional: Positive for malaise/fatigue.   HENT: Negative.     Eyes: Negative.    Cardiovascular:  Positive for chest pain.   Respiratory:  Positive for cough.    Endocrine: Negative.    Hematologic/Lymphatic: Negative.    Skin: Negative.    Musculoskeletal:  Positive for arthritis.   Gastrointestinal: Negative.    Genitourinary: Negative.    Neurological:  Positive for weakness.   Psychiatric/Behavioral:  The patient has insomnia.    Allergic/Immunologic: Negative.      Objective:     Vital Signs (Most Recent):  Temp: 98.3 °F (36.8 °C) (01/22/25 1920)  Pulse: (!) 56 (01/22/25 2134)  Resp: 18 (01/22/25 2134)  BP: (!) 141/74 (01/22/25 1920)  SpO2: 95 % (01/22/25 2134) Vital Signs (24h Range):  Temp:  [97.5 °F (36.4 °C)-98.5 °F (36.9 °C)] 98.3 °F (36.8 °C)  Pulse:  [56-67] 56  Resp:  [10-20] 18  SpO2:  [93 %-98 %] 95 %  BP: (131-178)/(44-89) 141/74     Weight: 69.6 kg (153 lb 7 oz)  Body mass index is 23.33 kg/m².    SpO2: 95 %         Intake/Output Summary (Last 24 hours) at 1/22/2025 2234  Last data filed at 1/22/2025 1835  Gross per 24 hour   Intake 720 ml   Output 1075 ml   Net -355 ml       Lines/Drains/Airways       Drain  Duration             Male External Urinary Catheter 01/22/25 0855 <1 day              Peripheral Intravenous Line  Duration                  Peripheral IV - Single Lumen 01/22/25 0518 20 G Yes Posterior;Right Hand <1 day                     Physical Exam  Constitutional:       General: He is not in acute distress.     Comments: No CP   HENT:      Head: Normocephalic and atraumatic.      Nose: No congestion.      Mouth/Throat:      Mouth: Mucous membranes are moist.    Cardiovascular:      Rate and Rhythm: Normal rate.      Comments: Soft MIRANDA lsb, mitral to apex  Pulmonary:      Effort: Pulmonary effort is normal.      Breath sounds: Normal breath sounds.   Abdominal:      Palpations: Abdomen is soft.      Tenderness: There is no abdominal tenderness.   Musculoskeletal:      Cervical back: Normal range of motion and neck supple.      Right lower leg: No edema.      Comments: Prior LBK amputation   Skin:     General: Skin is warm and dry.   Neurological:      General: No focal deficit present.      Mental Status: He is alert.   Psychiatric:         Mood and Affect: Mood normal.         Behavior: Behavior normal.          Significant Labs: CMP   Recent Labs   Lab 01/22/25  0533      K 4.4      CO2 24   BUN 25*   CREATININE 1.37*   CALCIUM 9.2   ALBUMIN 4.6   BILITOT 0.7   ALKPHOS 79   AST 27   ALT 22    and CBC   Recent Labs   Lab 01/22/25  0533   WBC 7.66   HGB 14.9   HCT 44.1        CXR right basilar streaky atelectasis vs pneumonia infiltrate  Troponin neg x 3  Echo - pending    Assessment and Plan:       Chest pain  Personal history of CAD-2021 coronary angiogram total RCA proximal, mid 100% InStent restenosis,  with collateral left to right, no significant left circumflex, LAD,  LM disease   Personal history of moderate LV dysfunction  Personal history of mitral regurgitation  Personal history of anoxic brain injury   Personal hx of PEG tube  Hypertension   Diabetes mellitus  PAD-prior history of left below-knee amputation  Tobacco consumption    Add Ranexa, BB  HD statin  Echo pending  Lexiscan stress test  pending  Cessation tobacco                Quinn Bowden MD  Cardiology   [facility]

## 2025-01-23 NOTE — PROGRESS NOTES
[facility]-Med/Surg  Cardiology  Progress Note    Patient Name: Leno Thompson Jr.  MRN: [id number]  Admission Date: 1/22/2025  Hospital Length of Stay: 1 days  Code Status: Prior   Attending Physician: Fiorella Madison MD   Primary Care Physician: Nancy Gutierrez, FNP-C  Expected Discharge Date: 1/24/2025  Principal Problem:<principal problem not specified>    Subjective:         69-year-old gentleman, personal history of CAD, prior history of MI, right coronary total occlusion, total proximal, mid InStent restenosis per 2021 coronary angiogram, history of anoxic brain injury status post MI, prior history of cardiac arrest, prior history of moderate LV dysfunction, valvular heart disease-mitral regurgitation, hypertension, diabetes, PAD, prior history of left below-knee amputation, tobacco consumption-presented emergency room for evaluation of chest pain.  Chest pain occurred nighttime, nonexertional, moderate severity, localized left chest wall, exacerbation factors unclear.  EKG sinus rhythm transient inferolateral ST segment depression.  Troponin negative x3.  Patient is nursing home resident at [place].  Cardiology consulted for patient's chest pain.  Patient previously was not seen in my office.      Chest pain  Personal history of CAD-2021 coronary angiogram total RCA proximal, mid 100% InStent restenosis,  with collateral left to right, no significant left circumflex, LAD,  LM disease   Personal history of moderate LV dysfunction  Personal history of mitral regurgitation  Personal history of anoxic brain injury   Personal hx of PEG tube  Hypertension   Diabetes mellitus  PAD-prior history of left below-knee amputation  Tobacco consumption           Objective:     Vital Signs (Most Recent):  Temp: 97.5 °F (36.4 °C) (01/23/25 1103)  Pulse: 61 (01/23/25 1103)  Resp: 18 (01/23/25 1103)  BP: (!) 171/72 (01/23/25 1103)  SpO2: 95 % (01/23/25 1103) Vital Signs (24h Range):  Temp:  [97.2 °F (36.2  °C)-98.5 °F (36.9 °C)] 97.5 °F (36.4 °C)  Pulse:  [56-63] 61  Resp:  [18-20] 18  SpO2:  [93 %-98 %] 95 %  BP: (141-171)/(57-79) 171/72     Weight: 69.6 kg (153 lb 7 oz)  Body mass index is 23.33 kg/m².     SpO2: 95 %         Intake/Output Summary (Last 24 hours) at 1/23/2025 1442  Last data filed at 1/23/2025 1220  Gross per 24 hour   Intake 1200 ml   Output 525 ml   Net 675 ml        Physical Exam  Constitutional:       General: He is not in acute distress.     Comments: No CP   HENT:      Head: Normocephalic and atraumatic.      Nose: No congestion.      Mouth/Throat:      Mouth: Mucous membranes are moist.   Cardiovascular:      Rate and Rhythm: Normal rate.      Comments: Soft MIRANDA lsb, mitral to apex  Pulmonary:      Effort: Pulmonary effort is normal.      Breath sounds: Normal breath sounds.   Abdominal:      Palpations: Abdomen is soft.      Tenderness: There is no abdominal tenderness.   Musculoskeletal:      Cervical back: Normal range of motion and neck supple.      Right lower leg: No edema.      Comments: Prior LBK amputation   Skin:     General: Skin is warm and dry.   Neurological:      General: No focal deficit present.      Mental Status: He is alert.   Psychiatric:         Mood and Affect: Mood normal.         Behavior: Behavior normal.           Significant Labs: CMP   Recent Labs   Lab 01/22/25  0533 01/23/25  0512    137   K 4.4 4.6    105   CO2 24 28   BUN 25* 33*   CREATININE 1.37* 2.15*   CALCIUM 9.2 9.4   ALBUMIN 4.6 4.3   BILITOT 0.7 1.2*   ALKPHOS 79 82   AST 27 20   ALT 22 16    and CBC   Recent Labs   Lab 01/22/25  0533 01/23/25  0512   WBC 7.66 7.92   HGB 14.9 14.7   HCT 44.1 43.4    220        CXR right basilar streaky atelectasis vs pneumonia infiltrate  Troponin neg x 3  Echo - pending    Assessment and Plan:        Chest pain  Personal history of CAD-2021 coronary angiogram total RCA proximal, mid 100% InStent restenosis,  with collateral left to right, no  significant left circumflex, LAD,  LM disease   Personal history of moderate LV dysfunction  Personal history of mitral regurgitation  Personal history of anoxic brain injury   Personal hx of PEG tube  Hypertension   Diabetes mellitus  PAD-prior history of left below-knee amputation  Tobacco consumption     Add Ranexa, BB  HD statin  Echo pending  Lexiscan stress test tomorrow  Cessation tobacco                  Quinn Bowden MD  Cardiology  [facility]

## 2025-01-23 NOTE — SUBJECTIVE & OBJECTIVE
Past Medical History:   Diagnosis Date    Anoxic brain injury     Arthritis     CAD (coronary artery disease)     Cardiac arrest     Heart attack     HLD (hyperlipidemia)     Hx of left BKA     Hypertension     Mitral valve regurgitation     Muscle spasms of both lower extremities     Muscle spasms of both lower extremities     PEG (percutaneous endoscopic gastrostomy) adjustment/replacement/removal     Status post placement of stent in right coronary artery        Past Surgical History:   Procedure Laterality Date    LEG AMPUTATION Left        Review of patient's allergies indicates:  No Known Allergies    No current facility-administered medications on file prior to encounter.     Current Outpatient Medications on File Prior to Encounter   Medication Sig    amLODIPine (NORVASC) 5 MG tablet Take 1 tablet (5 mg total) by mouth once daily.    atorvastatin (LIPITOR) 20 MG tablet Take 20 mg by mouth every evening.    baclofen (LIORESAL) 10 MG tablet Take 1 tablet (10 mg total) by mouth 3 (three) times daily.    busPIRone (BUSPAR) 7.5 MG tablet Take 1 tablet (7.5 mg total) by mouth 2 (two) times a day.    carboxymethylcellulose sodium (ARTIFICIAL TEARS, CMC,) 1 % Drop Apply 2 drops to eye 4 (four) times daily.    citalopram (CELEXA) 10 MG tablet Take 2 tablets (20 mg total) by mouth once daily. (Patient taking differently: Take 30 mg by mouth once daily.)    clopidogreL (PLAVIX) 75 mg tablet Take 75 mg by mouth once daily.    empagliflozin (JARDIANCE) 10 mg tablet Take 10 mg by mouth once daily.    ezetimibe (ZETIA) 10 mg tablet Take 10 mg by mouth every evening.    guaiFENesin (MUCINEX) 600 mg 12 hr tablet Take 600 mg by mouth 2 (two) times daily.    isosorbide mononitrate (IMDUR) 30 MG 24 hr tablet Take 1 tablet (30 mg total) by mouth once daily.    propylene glycoL (SYSTANE COMPLETE) 0.6 % Drop Apply 2 drops to eye every evening.    senna-docusate 8.6-50 mg (SENNA WITH DOCUSATE SODIUM) 8.6-50 mg per tablet Take 1  tablet by mouth once daily.    vitamin D3-folic acid 125 mcg (5,000 unit)-1 mg Tab Take 1 tablet by mouth Daily.     Family History    None       Tobacco Use    Smoking status: Every Day     Current packs/day: 1.00     Average packs/day: 1 pack/day for 53.1 years (53.1 ttl pk-yrs)     Types: Cigarettes     Start date: 1972     Passive exposure: Current    Smokeless tobacco: Never   Substance and Sexual Activity    Alcohol use: Never    Drug use: Never    Sexual activity: Not Currently     Review of Systems   Constitutional:  Negative for appetite change, fatigue and fever.   Respiratory:  Negative for cough, shortness of breath and wheezing.    Cardiovascular:  Positive for chest pain. Negative for leg swelling.   Gastrointestinal:  Negative for abdominal distention, abdominal pain, constipation, diarrhea, nausea and vomiting.   Skin:  Negative for color change, pallor, rash and wound.   Neurological:  Negative for tremors, syncope and headaches.   Psychiatric/Behavioral:  Negative for agitation and behavioral problems.      Objective:     Vital Signs (Most Recent):  Temp: 98.5 °F (36.9 °C) (01/23/25 0747)  Pulse: 61 (01/23/25 0818)  Resp: 18 (01/23/25 0747)  BP: (!) 157/57 (01/23/25 0747)  SpO2: 95 % (01/23/25 0818) Vital Signs (24h Range):  Temp:  [97.2 °F (36.2 °C)-98.5 °F (36.9 °C)] 98.5 °F (36.9 °C)  Pulse:  [56-67] 61  Resp:  [16-20] 18  SpO2:  [93 %-98 %] 95 %  BP: (131-157)/(57-79) 157/57     Weight: 69.6 kg (153 lb 7 oz)  Body mass index is 23.33 kg/m².     Physical Exam  Vitals and nursing note reviewed. Exam conducted with a chaperone present.   Constitutional:       General: He is not in acute distress.     Appearance: Normal appearance. He is normal weight. He is not ill-appearing.   HENT:      Head: Normocephalic and atraumatic.      Nose: Nose normal.   Eyes:      General: No scleral icterus.     Conjunctiva/sclera: Conjunctivae normal.   Cardiovascular:      Rate and Rhythm: Normal rate and regular  rhythm.      Pulses: Normal pulses.      Heart sounds: Normal heart sounds.   Pulmonary:      Effort: Pulmonary effort is normal.      Breath sounds: Normal breath sounds.   Abdominal:      General: Abdomen is flat. Bowel sounds are normal.      Palpations: Abdomen is soft.   Musculoskeletal:      Right lower leg: No edema.      Left lower leg: No edema.   Skin:     General: Skin is warm and dry.      Findings: No erythema or rash.   Neurological:      General: No focal deficit present.      Mental Status: He is alert and oriented to person, place, and time.   Psychiatric:         Mood and Affect: Mood normal.         Behavior: Behavior normal.         Thought Content: Thought content normal.                Significant Labs: All pertinent labs within the past 24 hours have been reviewed.  CBC:   Recent Labs   Lab 01/22/25  0533 01/23/25  0512   WBC 7.66 7.92   HGB 14.9 14.7   HCT 44.1 43.4    220     CMP:   Recent Labs   Lab 01/22/25  0533 01/23/25  0512    137   K 4.4 4.6    105   CO2 24 28   BUN 25* 33*   CREATININE 1.37* 2.15*   CALCIUM 9.2 9.4   ALBUMIN 4.6 4.3   BILITOT 0.7 1.2*   ALKPHOS 79 82   AST 27 20   ALT 22 16       Significant Imaging: I have reviewed all pertinent imaging results/findings within the past 24 hours.

## 2025-01-23 NOTE — HOSPITAL COURSE
01/23/2025 pt awake and alert, improving denies any chest pain  Cr up this am, did not receive any lasix per hx

## 2025-01-24 LAB
ALBUMIN SERPL-MCNC: 4.1 G/DL (ref 3.4–5)
ALBUMIN/GLOB SERPL: 1.6 RATIO
ALP SERPL-CCNC: 75 UNIT/L (ref 50–144)
ALT SERPL-CCNC: 16 UNIT/L (ref 1–45)
ANION GAP SERPL CALC-SCNC: 8 MEQ/L (ref 2–13)
AORTIC VALVE CUSP SEPERATION: 1.83 CM
AST SERPL-CCNC: 19 UNIT/L (ref 17–59)
AV INDEX (PROSTH): 0.92
AV MEAN GRADIENT: 4 MMHG
AV PEAK GRADIENT: 8 MMHG
AV REGURGITATION PRESSURE HALF TIME: 376 MS
AV VELOCITY RATIO: 0.64
BASOPHILS # BLD AUTO: 0.11 X10(3)/MCL (ref 0.01–0.08)
BASOPHILS NFR BLD AUTO: 1.5 % (ref 0.1–1.2)
BILIRUB SERPL-MCNC: 0.9 MG/DL (ref 0–1)
BSA FOR ECHO PROCEDURE: 1.83 M2
BUN SERPL-MCNC: 42 MG/DL (ref 7–20)
CALCIUM SERPL-MCNC: 9.1 MG/DL (ref 8.4–10.2)
CHLORIDE SERPL-SCNC: 108 MMOL/L (ref 98–110)
CO2 SERPL-SCNC: 22 MMOL/L (ref 21–32)
CREAT SERPL-MCNC: 1.98 MG/DL (ref 0.66–1.25)
CREAT/UREA NIT SERPL: 21 (ref 12–20)
CV ECHO LV RWT: 0.42 CM
CV STRESS BASE HR: 48 BPM
DIASTOLIC BLOOD PRESSURE: 63 MMHG
DOP CALC AO PEAK VEL: 1.4 M/S
DOP CALC AO VTI: 28 CM
DOP CALC LVOT PEAK VEL: 0.9 M/S
DOP CALCLVOT PEAK VEL VTI: 25.8 CM
E WAVE DECELERATION TIME: 443 MSEC
ECHO LV POSTERIOR WALL: 1.1 CM (ref 0.6–1.1)
EJECTION FRACTION- HIGH: 65 %
END DIASTOLIC INDEX-HIGH: 158 ML/M2
END DIASTOLIC INDEX-LOW: 94 ML/M2
END SYSTOLIC INDEX-HIGH: 71 ML/M2
END SYSTOLIC INDEX-LOW: 33 ML/M2
EOSINOPHIL # BLD AUTO: 0.24 X10(3)/MCL (ref 0.04–0.54)
EOSINOPHIL NFR BLD AUTO: 3.4 % (ref 0.7–7)
ERYTHROCYTE [DISTWIDTH] IN BLOOD BY AUTOMATED COUNT: 13.7 %
FRACTIONAL SHORTENING: 25 % (ref 28–44)
GFR SERPLBLD CREATININE-BSD FMLA CKD-EPI: 36 ML/MIN/1.73/M2
GLOBULIN SER-MCNC: 2.5 GM/DL (ref 2–3.9)
GLUCOSE SERPL-MCNC: 103 MG/DL (ref 70–115)
HCT VFR BLD AUTO: 40.4 % (ref 36–52)
HGB BLD-MCNC: 13.5 G/DL (ref 13–18)
IMM GRANULOCYTES # BLD AUTO: 0.02 X10(3)/MCL (ref 0–0.03)
IMM GRANULOCYTES NFR BLD AUTO: 0.3 % (ref 0–0.5)
INFERIOR VENA CAVA SIZE SNIFF: 0.4 CM
INTERVENTRICULAR SEPTUM: 1.1 CM (ref 0.6–1.1)
IVC DIAMETER: 1.9 CM
LEFT ATRIUM SIZE: 3.7 CM
LEFT ATRIUM VOLUME MOD A4C: 26.8 ML
LEFT INTERNAL DIMENSION IN SYSTOLE: 3.9 CM (ref 2.1–4)
LEFT VENTRICLE DIASTOLIC VOLUME INDEX: 71.09 ML/M2
LEFT VENTRICLE DIASTOLIC VOLUME: 130.1 ML
LEFT VENTRICLE END SYSTOLIC VOLUME APICAL 4 CHAMBER: 26.8 ML
LEFT VENTRICLE MASS INDEX: 120.6 G/M2
LEFT VENTRICLE SYSTOLIC VOLUME INDEX: 36.7 ML/M2
LEFT VENTRICLE SYSTOLIC VOLUME: 67.1 ML
LEFT VENTRICULAR INTERNAL DIMENSION IN DIASTOLE: 5.2 CM (ref 3.5–6)
LEFT VENTRICULAR MASS: 220.8 G
LVED V (TEICH): 130.1 ML
LVES V (TEICH): 67.1 ML
LVOT MG: 1.6 MMHG
LVOT MV: 0.57 CM/S
LYMPHOCYTES # BLD AUTO: 1.63 X10(3)/MCL (ref 1.32–3.57)
LYMPHOCYTES NFR BLD AUTO: 22.8 % (ref 20–55)
MCH RBC QN AUTO: 28.9 PG (ref 27–34)
MCHC RBC AUTO-ENTMCNC: 33.4 G/DL (ref 31–37)
MCV RBC AUTO: 86.5 FL (ref 79–99)
MONOCYTES # BLD AUTO: 0.9 X10(3)/MCL (ref 0.3–0.82)
MONOCYTES NFR BLD AUTO: 12.6 % (ref 4.7–12.5)
MV PEAK A VEL: 0.72 M/S
NEUTROPHILS # BLD AUTO: 4.26 X10(3)/MCL (ref 1.78–5.38)
NEUTROPHILS NFR BLD AUTO: 59.4 % (ref 37–73)
NRBC BLD AUTO-RTO: 0 %
NUC REST DIASTOLIC VOLUME INDEX: 146
NUC REST EJECTION FRACTION: 47
NUC REST SYSTOLIC VOLUME INDEX: 77
NUC STRESS DIASTOLIC VOLUME INDEX: 158
NUC STRESS EJECTION FRACTION: 46 %
NUC STRESS SYSTOLIC VOLUME INDEX: 85
OHS CV CPX 85 PERCENT MAX PREDICTED HEART RATE MALE: 130
OHS CV CPX MAX PREDICTED HEART RATE: 153
OHS CV CPX PATIENT IS FEMALE: 0
OHS CV CPX PATIENT IS MALE: 1
OHS CV CPX PEAK DIASTOLIC BLOOD PRESSURE: 63 MMHG
OHS CV CPX PEAK HEAR RATE: 69 BPM
OHS CV CPX PEAK RATE PRESSURE PRODUCT: NORMAL
OHS CV CPX PEAK SYSTOLIC BLOOD PRESSURE: 147 MMHG
OHS CV CPX PERCENT MAX PREDICTED HEART RATE ACHIEVED: 45
OHS CV CPX RATE PRESSURE PRODUCT PRESENTING: 7008
OHS CV INITIAL DOSE: 10 MCG/KG/MIN
OHS CV PEAK DOSE: 30 MCG/KG/MIN
OHS QRS DURATION: 110 MS
OHS QRS DURATION: 112 MS
OHS QTC CALCULATION: 419 MS
OHS QTC CALCULATION: 444 MS
PISA AR MAX VEL: 2.6 M/S
PLATELET # BLD AUTO: 205 X10(3)/MCL (ref 140–371)
PMV BLD AUTO: 9.2 FL (ref 9.4–12.4)
POTASSIUM SERPL-SCNC: 4.4 MMOL/L (ref 3.5–5.1)
PROT SERPL-MCNC: 6.6 GM/DL (ref 6.3–8.2)
RA PRESSURE ESTIMATED: 8 MMHG
RBC # BLD AUTO: 4.67 X10(6)/MCL (ref 4–6)
RETIRED EF AND QEF - SEE NOTES: 53 %
SODIUM SERPL-SCNC: 138 MMOL/L (ref 136–145)
SYSTOLIC BLOOD PRESSURE: 146 MMHG
TDI LATERAL: 0.11 M/S
TDI SEPTAL: 0.05 M/S
TDI: 0.08 M/S
TRICUSPID ANNULAR PLANE SYSTOLIC EXCURSION: 2.02 CM
WBC # BLD AUTO: 7.16 X10(3)/MCL (ref 4–11.5)
Z-SCORE OF LEFT VENTRICULAR DIMENSION IN END DIASTOLE: 0.25
Z-SCORE OF LEFT VENTRICULAR DIMENSION IN END SYSTOLE: 1.74

## 2025-01-24 PROCEDURE — 63600175 PHARM REV CODE 636 W HCPCS: Performed by: FAMILY MEDICINE

## 2025-01-24 PROCEDURE — 25000003 PHARM REV CODE 250: Performed by: FAMILY MEDICINE

## 2025-01-24 PROCEDURE — 99900035 HC TECH TIME PER 15 MIN (STAT)

## 2025-01-24 PROCEDURE — 25000003 PHARM REV CODE 250: Performed by: INTERNAL MEDICINE

## 2025-01-24 PROCEDURE — A9500 TC99M SESTAMIBI: HCPCS | Performed by: FAMILY MEDICINE

## 2025-01-24 PROCEDURE — 94761 N-INVAS EAR/PLS OXIMETRY MLT: CPT

## 2025-01-24 PROCEDURE — 21400001 HC TELEMETRY ROOM

## 2025-01-24 PROCEDURE — 80053 COMPREHEN METABOLIC PANEL: CPT | Performed by: INTERNAL MEDICINE

## 2025-01-24 PROCEDURE — 85025 COMPLETE CBC W/AUTO DIFF WBC: CPT | Performed by: INTERNAL MEDICINE

## 2025-01-24 PROCEDURE — 36415 COLL VENOUS BLD VENIPUNCTURE: CPT | Performed by: INTERNAL MEDICINE

## 2025-01-24 RX ORDER — TETRAKIS(2-METHOXYISOBUTYLISOCYANIDE)COPPER(I) TETRAFLUOROBORATE 1 MG/ML
30 INJECTION, POWDER, LYOPHILIZED, FOR SOLUTION INTRAVENOUS
Status: COMPLETED | OUTPATIENT
Start: 2025-01-24 | End: 2025-01-24

## 2025-01-24 RX ORDER — REGADENOSON 0.08 MG/ML
0.4 INJECTION, SOLUTION INTRAVENOUS ONCE
Status: COMPLETED | OUTPATIENT
Start: 2025-01-24 | End: 2025-01-24

## 2025-01-24 RX ORDER — RANOLAZINE 500 MG/1
500 TABLET, EXTENDED RELEASE ORAL 2 TIMES DAILY
Status: DISCONTINUED | OUTPATIENT
Start: 2025-01-24 | End: 2025-01-24

## 2025-01-24 RX ORDER — TETRAKIS(2-METHOXYISOBUTYLISOCYANIDE)COPPER(I) TETRAFLUOROBORATE 1 MG/ML
10 INJECTION, POWDER, LYOPHILIZED, FOR SOLUTION INTRAVENOUS
Status: COMPLETED | OUTPATIENT
Start: 2025-01-24 | End: 2025-01-24

## 2025-01-24 RX ORDER — AMINOPHYLLINE 25 MG/ML
50 INJECTION, SOLUTION INTRAVENOUS
Status: DISCONTINUED | OUTPATIENT
Start: 2025-01-24 | End: 2025-01-25 | Stop reason: HOSPADM

## 2025-01-24 RX ADMIN — BACLOFEN 10 MG: 10 TABLET ORAL at 03:01

## 2025-01-24 RX ADMIN — BUSPIRONE HYDROCHLORIDE 7.5 MG: 7.5 TABLET ORAL at 09:01

## 2025-01-24 RX ADMIN — SODIUM CHLORIDE: 9 INJECTION, SOLUTION INTRAVENOUS at 06:01

## 2025-01-24 RX ADMIN — KIT FOR THE PREPARATION OF TECHNETIUM TC99M SESTAMIBI 10 MILLICURIE: 1 INJECTION, POWDER, LYOPHILIZED, FOR SOLUTION PARENTERAL at 08:01

## 2025-01-24 RX ADMIN — CLOPIDOGREL BISULFATE 75 MG: 75 TABLET ORAL at 10:01

## 2025-01-24 RX ADMIN — BACLOFEN 10 MG: 10 TABLET ORAL at 09:01

## 2025-01-24 RX ADMIN — SODIUM CHLORIDE: 9 INJECTION, SOLUTION INTRAVENOUS at 03:01

## 2025-01-24 RX ADMIN — BACLOFEN 10 MG: 10 TABLET ORAL at 10:01

## 2025-01-24 RX ADMIN — RANOLAZINE 500 MG: 500 TABLET, EXTENDED RELEASE ORAL at 09:01

## 2025-01-24 RX ADMIN — CITALOPRAM HYDROBROMIDE 20 MG: 20 TABLET ORAL at 10:01

## 2025-01-24 RX ADMIN — ATORVASTATIN CALCIUM 40 MG: 40 TABLET, FILM COATED ORAL at 09:01

## 2025-01-24 RX ADMIN — BUSPIRONE HYDROCHLORIDE 7.5 MG: 7.5 TABLET ORAL at 10:01

## 2025-01-24 RX ADMIN — ISOSORBIDE MONONITRATE 30 MG: 30 TABLET, EXTENDED RELEASE ORAL at 10:01

## 2025-01-24 RX ADMIN — SODIUM CHLORIDE: 9 INJECTION, SOLUTION INTRAVENOUS at 05:01

## 2025-01-24 RX ADMIN — REGADENOSON 0.4 MG: 0.08 INJECTION, SOLUTION INTRAVENOUS at 09:01

## 2025-01-24 RX ADMIN — KIT FOR THE PREPARATION OF TECHNETIUM TC99M SESTAMIBI 30 MILLICURIE: 1 INJECTION, POWDER, LYOPHILIZED, FOR SOLUTION PARENTERAL at 09:01

## 2025-01-24 RX ADMIN — AMLODIPINE BESYLATE 5 MG: 5 TABLET ORAL at 10:01

## 2025-01-24 RX ADMIN — EZETIMIBE 10 MG: 10 TABLET ORAL at 09:01

## 2025-01-24 RX ADMIN — RANOLAZINE 500 MG: 500 TABLET, EXTENDED RELEASE ORAL at 10:01

## 2025-01-24 NOTE — PLAN OF CARE
01/24/25 1434   Discharge Reassessment   Assessment Type Discharge Planning Reassessment   Did the patient's condition or plan change since previous assessment? No   Discharge Plan discussed with: Caregiver;Patient   Name(s) and Number(s) [place] Age of [place] [phone]   Communicated LELIA with patient/caregiver Yes   Discharge Plan A Return to nursing home   DME Needed Upon Discharge  none   Transition of Care Barriers Mobility   Why the patient remains in the hospital Requires continued medical care   Post-Acute Status   Post-Acute Authorization Placement   Post-Acute Placement Status Set-up Complete/Auth obtained   Coverage Medicare   Hospital Resources/Appts/Education Provided Post-Acute resouces added to AVS   Discharge Delays None known at this time

## 2025-01-24 NOTE — PLAN OF CARE
Problem: Adult Inpatient Plan of Care  Goal: Plan of Care Review  1/24/2025 0455 by Nathanael Foster RN  Outcome: Progressing  1/24/2025 0453 by Nathanael Foster RN  Outcome: Progressing  Goal: Absence of Hospital-Acquired Illness or Injury  1/24/2025 0455 by Nathanael Foster RN  Outcome: Progressing  1/24/2025 0453 by Nathanael Foster RN  Outcome: Progressing  Goal: Optimal Comfort and Wellbeing  1/24/2025 0455 by Nathanael Foster RN  Outcome: Progressing  1/24/2025 0453 by Nathanael Foster RN  Outcome: Progressing  Goal: Readiness for Transition of Care  1/24/2025 0455 by Nathanael Foster RN  Outcome: Progressing  1/24/2025 0453 by Nathanael Foster RN  Outcome: Progressing     Problem: Skin Injury Risk Increased  Goal: Skin Health and Integrity  1/24/2025 0455 by Nathanael Foster RN  Outcome: Progressing  1/24/2025 0453 by Nathanael Foster RN  Outcome: Progressing     Problem: Diabetes Comorbidity  Goal: Blood Glucose Level Within Targeted Range  1/24/2025 0455 by Nathanael Foster RN  Outcome: Progressing  1/24/2025 0453 by Nathanael Foster RN  Outcome: Progressing     Problem: Fall Injury Risk  Goal: Absence of Fall and Fall-Related Injury  1/24/2025 0455 by Nathanael Foster RN  Outcome: Progressing  1/24/2025 0453 by Nathanael Foster RN  Outcome: Progressing     Problem: Chest Pain  Goal: Resolution of Chest Pain Symptoms  Outcome: Progressing

## 2025-01-25 VITALS
OXYGEN SATURATION: 97 % | TEMPERATURE: 98 F | DIASTOLIC BLOOD PRESSURE: 62 MMHG | HEART RATE: 57 BPM | HEIGHT: 68 IN | SYSTOLIC BLOOD PRESSURE: 124 MMHG | WEIGHT: 153.44 LBS | BODY MASS INDEX: 23.26 KG/M2 | RESPIRATION RATE: 20 BRPM

## 2025-01-25 LAB
ALBUMIN SERPL-MCNC: 3.9 G/DL (ref 3.4–5)
ALBUMIN/GLOB SERPL: 1.6 RATIO
ALP SERPL-CCNC: 77 UNIT/L (ref 50–144)
ALT SERPL-CCNC: 18 UNIT/L (ref 1–45)
ANION GAP SERPL CALC-SCNC: 8 MEQ/L (ref 2–13)
AST SERPL-CCNC: 22 UNIT/L (ref 17–59)
BASOPHILS # BLD AUTO: 0.09 X10(3)/MCL (ref 0.01–0.08)
BASOPHILS NFR BLD AUTO: 1.4 % (ref 0.1–1.2)
BILIRUB SERPL-MCNC: 1 MG/DL (ref 0–1)
BUN SERPL-MCNC: 32 MG/DL (ref 7–20)
CALCIUM SERPL-MCNC: 8.7 MG/DL (ref 8.4–10.2)
CHLORIDE SERPL-SCNC: 109 MMOL/L (ref 98–110)
CO2 SERPL-SCNC: 22 MMOL/L (ref 21–32)
CREAT SERPL-MCNC: 1.66 MG/DL (ref 0.66–1.25)
CREAT/UREA NIT SERPL: 19 (ref 12–20)
EOSINOPHIL # BLD AUTO: 0.25 X10(3)/MCL (ref 0.04–0.54)
EOSINOPHIL NFR BLD AUTO: 4 % (ref 0.7–7)
ERYTHROCYTE [DISTWIDTH] IN BLOOD BY AUTOMATED COUNT: 13.5 %
GFR SERPLBLD CREATININE-BSD FMLA CKD-EPI: 45 ML/MIN/1.73/M2
GLOBULIN SER-MCNC: 2.4 GM/DL (ref 2–3.9)
GLUCOSE SERPL-MCNC: 97 MG/DL (ref 70–115)
HCT VFR BLD AUTO: 39.5 % (ref 36–52)
HGB BLD-MCNC: 13.5 G/DL (ref 13–18)
IMM GRANULOCYTES # BLD AUTO: 0.01 X10(3)/MCL (ref 0–0.03)
IMM GRANULOCYTES NFR BLD AUTO: 0.2 % (ref 0–0.5)
LYMPHOCYTES # BLD AUTO: 1.41 X10(3)/MCL (ref 1.32–3.57)
LYMPHOCYTES NFR BLD AUTO: 22.4 % (ref 20–55)
MCH RBC QN AUTO: 29.3 PG (ref 27–34)
MCHC RBC AUTO-ENTMCNC: 34.2 G/DL (ref 31–37)
MCV RBC AUTO: 85.9 FL (ref 79–99)
MONOCYTES # BLD AUTO: 0.7 X10(3)/MCL (ref 0.3–0.82)
MONOCYTES NFR BLD AUTO: 11.1 % (ref 4.7–12.5)
NEUTROPHILS # BLD AUTO: 3.84 X10(3)/MCL (ref 1.78–5.38)
NEUTROPHILS NFR BLD AUTO: 60.9 % (ref 37–73)
NRBC BLD AUTO-RTO: 0 %
PLATELET # BLD AUTO: 196 X10(3)/MCL (ref 140–371)
PMV BLD AUTO: 9.1 FL (ref 9.4–12.4)
POTASSIUM SERPL-SCNC: 4.3 MMOL/L (ref 3.5–5.1)
PROT SERPL-MCNC: 6.3 GM/DL (ref 6.3–8.2)
RBC # BLD AUTO: 4.6 X10(6)/MCL (ref 4–6)
SARS-COV-2 RDRP RESP QL NAA+PROBE: NEGATIVE
SODIUM SERPL-SCNC: 139 MMOL/L (ref 136–145)
WBC # BLD AUTO: 6.3 X10(3)/MCL (ref 4–11.5)

## 2025-01-25 PROCEDURE — 25000003 PHARM REV CODE 250: Performed by: FAMILY MEDICINE

## 2025-01-25 PROCEDURE — 25000003 PHARM REV CODE 250: Performed by: INTERNAL MEDICINE

## 2025-01-25 PROCEDURE — U0002 COVID-19 LAB TEST NON-CDC: HCPCS | Performed by: FAMILY MEDICINE

## 2025-01-25 PROCEDURE — 80053 COMPREHEN METABOLIC PANEL: CPT | Performed by: INTERNAL MEDICINE

## 2025-01-25 PROCEDURE — 85025 COMPLETE CBC W/AUTO DIFF WBC: CPT | Performed by: INTERNAL MEDICINE

## 2025-01-25 PROCEDURE — 36415 COLL VENOUS BLD VENIPUNCTURE: CPT | Performed by: INTERNAL MEDICINE

## 2025-01-25 RX ORDER — METOPROLOL SUCCINATE 25 MG/1
25 TABLET, EXTENDED RELEASE ORAL 2 TIMES DAILY
Start: 2025-01-25 | End: 2026-01-25

## 2025-01-25 RX ORDER — RANOLAZINE 500 MG/1
500 TABLET, EXTENDED RELEASE ORAL 2 TIMES DAILY
Qty: 60 TABLET | Refills: 11 | Status: SHIPPED | OUTPATIENT
Start: 2025-01-25 | End: 2026-01-25

## 2025-01-25 RX ADMIN — RANOLAZINE 500 MG: 500 TABLET, EXTENDED RELEASE ORAL at 09:01

## 2025-01-25 RX ADMIN — BACLOFEN 15 MG: 5 TABLET ORAL at 02:01

## 2025-01-25 RX ADMIN — CITALOPRAM HYDROBROMIDE 20 MG: 20 TABLET ORAL at 09:01

## 2025-01-25 RX ADMIN — BUSPIRONE HYDROCHLORIDE 7.5 MG: 7.5 TABLET ORAL at 09:01

## 2025-01-25 RX ADMIN — SODIUM CHLORIDE: 9 INJECTION, SOLUTION INTRAVENOUS at 05:01

## 2025-01-25 RX ADMIN — BACLOFEN 10 MG: 10 TABLET ORAL at 09:01

## 2025-01-25 RX ADMIN — ISOSORBIDE MONONITRATE 30 MG: 30 TABLET, EXTENDED RELEASE ORAL at 09:01

## 2025-01-25 RX ADMIN — CLOPIDOGREL BISULFATE 75 MG: 75 TABLET ORAL at 09:01

## 2025-01-25 RX ADMIN — AMLODIPINE BESYLATE 5 MG: 5 TABLET ORAL at 09:01

## 2025-01-25 RX ADMIN — SODIUM CHLORIDE: 9 INJECTION, SOLUTION INTRAVENOUS at 12:01

## 2025-01-25 NOTE — PLAN OF CARE
Problem: Adult Inpatient Plan of Care  Goal: Plan of Care Review  Outcome: Met  Goal: Patient-Specific Goal (Individualized)  Outcome: Met  Goal: Absence of Hospital-Acquired Illness or Injury  Outcome: Met  Goal: Optimal Comfort and Wellbeing  Outcome: Met  Goal: Readiness for Transition of Care  Outcome: Met     Problem: Skin Injury Risk Increased  Goal: Skin Health and Integrity  Outcome: Met     Problem: Diabetes Comorbidity  Goal: Blood Glucose Level Within Targeted Range  Outcome: Met     Problem: Fall Injury Risk  Goal: Absence of Fall and Fall-Related Injury  Outcome: Met     Problem: Chest Pain  Goal: Resolution of Chest Pain Symptoms  Outcome: Met

## 2025-01-25 NOTE — PLAN OF CARE
Problem: Adult Inpatient Plan of Care  Goal: Plan of Care Review  Outcome: Progressing  Goal: Patient-Specific Goal (Individualized)  Outcome: Progressing  Goal: Absence of Hospital-Acquired Illness or Injury  Outcome: Progressing  Goal: Optimal Comfort and Wellbeing  Outcome: Progressing  Goal: Readiness for Transition of Care  Outcome: Progressing     Problem: Skin Injury Risk Increased  Goal: Skin Health and Integrity  Outcome: Progressing     Problem: Diabetes Comorbidity  Goal: Blood Glucose Level Within Targeted Range  Outcome: Progressing     Problem: Fall Injury Risk  Goal: Absence of Fall and Fall-Related Injury  Outcome: Progressing     Problem: Chest Pain  Goal: Resolution of Chest Pain Symptoms  Outcome: Progressing

## 2025-01-25 NOTE — DISCHARGE SUMMARY
Hospital Medicine  Discharge Summary    Patient Name: Leno Thompson Jr.  MRN: [id number]  Admit Date: 1/22/2025  Discharge Date:  1/25/2025  Status: IP- Inpatient   Length of Stay: 3      PHYSICIANS   Admitting Physician: Fiorella Madison MD  Discharging Physician: Franklin Najera MD.  Primary Care Physician: Nancy Gutierrez, FNP-C  Consults:       DISCHARGE DIAGNOSES   Chest pain  Personal history of CAD-2021 coronary angiogram total RCA proximal, mid 100% InStent restenosis,  with collateral left to right, no significant left circumflex, LAD,  LM disease   Personal history of moderate LV dysfunction  Personal history of mitral regurgitation  Personal history of anoxic brain injury   Personal hx of PEG tube  Hypertension   Diabetes mellitus  PAD-prior history of left below-knee amputation  Tobacco consumption      PROCEDURES   * No surgery found *         HOSPITAL COURSE       69-year-old male patient came into the emergency room with history of having chest pain that is in the substernal area moderate intensity achy type with no specific aggravating or relieving factors.  Pt followed by DR. Bowden lives at [facility].  Pt Reports pain was  localized .  Has associated shortness of breath but no palpitations.  Does have history of hypertension, dyslipidemia, smoking, CAD, coronary stents.  Patient also has history of left BKA, mitral valve regurgitation.  Denies having nausea, vomiting, abdominal pain.  In ER prior to admit, pt pain has resolved.  The pain was exacerbated by deep breaths. The pain was not relieved by any thing. He was still having chest pain at the time of exam. EKG revealed ST and T wave abnormalities in the inferior leads. Initial troponin was negative. CXR revealed streaky and hazy opacities in the right lung base. CIS was onsulted for chest pain enzymes x 2 are negative, recommended admit overnight  for observation and rule out MI.     Echo mild depressed LV function  IRIS -  mild depressed LV function, inf/lat infarct with periinfarct ischemia  Cardiology follow rec maximize medical therapy  Cont BB  Added ranexa  Post d/c follow up     STATUS  ImprovedStable    DISPOSITION  Discharge to NH    DIET      ACTIVITY  As tolerated      FOLLOW-UP      Contact information for after-discharge care       Destination       [facility] .    Service: Nursing Home  Contact information:  [address] [facility] [address]  [phone]                                     DISCHARGE MEDICATION RECONCILIATION        Medication List        START taking these medications      ranolazine 500 MG Tb12  Commonly known as: RANEXA  Take 1 tablet (500 mg total) by mouth 2 (two) times daily.            CONTINUE taking these medications      amLODIPine 5 MG tablet  Commonly known as: NORVASC  Take 1 tablet (5 mg total) by mouth once daily.     ARTIFICIAL TEARS (CMC) 1 % Drop  Generic drug: carboxymethylcellulose sodium     atorvastatin 20 MG tablet  Commonly known as: LIPITOR     baclofen 10 MG tablet  Commonly known as: LIORESAL  Take 1 tablet (10 mg total) by mouth 3 (three) times daily.     busPIRone 7.5 MG tablet  Commonly known as: BUSPAR  Take 1 tablet (7.5 mg total) by mouth 2 (two) times a day.     citalopram 10 MG tablet  Commonly known as: CeleXA  Take 2 tablets (20 mg total) by mouth once daily.     clopidogreL 75 mg tablet  Commonly known as: PLAVIX     ezetimibe 10 mg tablet  Commonly known as: ZETIA     guaiFENesin 600 mg 12 hr tablet  Commonly known as: MUCINEX     isosorbide mononitrate 30 MG 24 hr tablet  Commonly known as: IMDUR  Take 1 tablet (30 mg total) by mouth once daily.     JARDIANCE 10 mg tablet  Generic drug: empagliflozin     SENNA WITH DOCUSATE SODIUM 8.6-50 mg per tablet  Generic drug: senna-docusate 8.6-50 mg     SYSTANE COMPLETE 0.6 % Drop  Generic drug: propylene glycoL     vitamin D3-folic acid 125 mcg (5,000 unit)-1 mg Tab               Where to Get  Your Medications        These medications were sent to [id number] - [place], [place] - [address] AT [facility]  [address], [place] [address]      Phone: [phone]   ranolazine 500 MG Tb12           PHYSICAL EXAM   VITALS: T 97.5 °F (36.4 °C)   /62   P (!) 57   RR 20   O2 97 %        Constitutional:       General: He is not in acute distress.     Appearance: Normal appearance. He is normal weight. He is not ill-appearing.   HENT:      Head: Normocephalic and atraumatic.      Nose: Nose normal.   Eyes:      General: No scleral icterus.     Conjunctiva/sclera: Conjunctivae normal.   Cardiovascular:      Rate and Rhythm: Normal rate and regular rhythm.      Pulses: Normal pulses.      Heart sounds: Normal heart sounds.   Pulmonary:      Effort: Pulmonary effort is normal.      Breath sounds: Normal breath sounds.   Abdominal:      General: Abdomen is flat. Bowel sounds are normal.      Palpations: Abdomen is soft.   Musculoskeletal:      Right lower leg: No edema.      Left lower leg: No edema.   Skin:     General: Skin is warm and dry.      Findings: No erythema or rash.   Neurological:      General: No focal deficit present.      Mental Status: He is alert and oriented to person, place, and time.   Psychiatric:         Mood and Affect: Mood normal.         Behavior: Behavior normal.         Thought Content: Thought content normal.     DIAGNOSITCS   CBC:   Recent Labs   Lab 01/23/25  0512 01/24/25  0530 01/25/25  0636   WBC 7.92 7.16 6.30   HGB 14.7 13.5 13.5   HCT 43.4 40.4 39.5    205 196       CMP:   Recent Labs   Lab 01/22/25  0533 01/23/25  0512 01/24/25  0530 01/25/25  0636   CALCIUM 9.2 9.4 9.1 8.7   ALBUMIN 4.6 4.3 4.1 3.9    137 138 139   K 4.4 4.6 4.4 4.3   CO2 24 28 22 22    105 108 109   BUN 25* 33* 42* 32*   CREATININE 1.37* 2.15* 1.98* 1.66*   ALKPHOS 79 82 75 77   ALT 22 16 16 18   AST 27 20 19 22   BILITOT 0.7 1.2* 0.9 1.0   MG 2.20   "--   --   --      Estimated Creatinine Clearance: 41.8 mL/min (A) (based on SCr of 1.66 mg/dL (H)).    Labs within the past 24 hours have been reviewed.     COAG:  Recent Labs   Lab 01/22/25  0533   INR 1.1       CARDIAC ENZYMES: No results for input(s): "TROPONINI", "CPK", "CKMB" in the last 72 hours.     No results for input(s): "AMYLASE", "LIPASE" in the last 168 hours.    No results for input(s): "POCTGLUCOSE" in the last 72 hours.     Microbiology Results (last 7 days)       ** No results found for the last 168 hours. **             Recent Labs     01/23/25  0512   CHOL 129   TRIG 150   HDL 36*      Lab Results   Component Value Date    HGBA1C 5.2 06/12/2024        Echo    Result Date: 1/24/2025  Mild depressed LV function EF 45% Mild inferior lateral wall hypokinetic wall motion Grade I diastology Moderate AI Moderate MR Insufficient TR for RVSP IVC none dilated    Nuclear Stress - Cardiology Interpreted    Result Date: 1/24/2025    Abnormal myocardial perfusion scan.   There is a moderate to severe intensity, medium sized, mostly fixed perfusion abnormality with some reversibilty in the inferolateral wall(s) in the typical distribution of the LCX and RCA territory.   The gated perfusion images showed an ejection fraction of 47% at rest. The gated perfusion images showed an ejection fraction of 46% post stress. Normal ejection fraction is greater than 53%.   The ECG portion of the study is negative for ischemia.     X-Ray Chest 1 View    Result Date: 1/24/2025  EXAMINATION: XR CHEST 1 VIEW CLINICAL HISTORY: Sob; COMPARISON: 01/22/2025 FINDINGS: Single view of the chest shows no focal consolidation, pneumothorax or pleural effusion.  Heart size is normal.  Central aeration has improved since the prior study.     Improved aeration since the prior study Electronically signed by: Richie Mitchell MD Date:    01/24/2025 Time:    12:04    X-Ray Chest 1 View    Result Date: 1/22/2025  EXAMINATION: XR CHEST 1 VIEW " CLINICAL HISTORY: Chest pain, unspecified. COMPARISON: Chest x-ray 07/14/2024 FINDINGS: Frontal view of the chest was obtained.  The cardiac silhouette is within normal limits for size. The aorta is mildly tortuous and partially calcified.  There are streaky and hazy opacities in the right basilar lung which may reflect atelectasis and/or infiltrate.  No visible pneumothorax is appreciated.  There is slight blunting of the right costophrenic sulcus which is similar to the previous study and may be related to pleural thickening/scarring.  No acute displaced fracture is visualized.     1. There are streaky and hazy opacities in the right lung base which may reflect atelectasis and/or infiltrate. Electronically signed by: Sridhar Pineda MD Date:    01/22/2025 Time:    07:30      N/A     Patient Screened for food insecurity, housing instability, transportation needs, utility difficulties, and interpersonal safety.  No needs identified    Discharge time: 33 minutes         Franklin Najera MD  [facility] Medicine

## 2025-01-25 NOTE — SUBJECTIVE & OBJECTIVE
ROS  Objective:     Vital Signs (Most Recent):  Temp: 98.4 °F (36.9 °C) (01/24/25 2311)  Pulse: (!) 52 (01/24/25 2311)  Resp: 16 (01/24/25 2311)  BP: 137/70 (01/24/25 2311)  SpO2: 98 % (01/24/25 2311) Vital Signs (24h Range):  Temp:  [97.4 °F (36.3 °C)-98.4 °F (36.9 °C)] 98.4 °F (36.9 °C)  Pulse:  [50-53] 52  Resp:  [12-18] 16  SpO2:  [94 %-100 %] 98 %  BP: (129-142)/(58-70) 137/70     Weight: 69.6 kg (153 lb 7 oz)  Body mass index is 23.33 kg/m².     SpO2: 98 %         Intake/Output Summary (Last 24 hours) at 1/24/2025 2335  Last data filed at 1/24/2025 1800  Gross per 24 hour   Intake 1800 ml   Output 1350 ml   Net 450 ml       Lines/Drains/Airways       Drain  Duration             Male External Urinary Catheter 01/22/25 0855 2 days              Peripheral Intravenous Line  Duration                  Peripheral IV - Single Lumen 01/24/25 1617 22 G Anterior;Right Forearm <1 day                       Physical Exam       Significant Labs: CMP   Recent Labs   Lab 01/23/25  0512 01/24/25  0530    138   K 4.6 4.4    108   CO2 28 22   BUN 33* 42*   CREATININE 2.15* 1.98*   CALCIUM 9.4 9.1   ALBUMIN 4.3 4.1   BILITOT 1.2* 0.9   ALKPHOS 82 75   AST 20 19   ALT 16 16    and CBC   Recent Labs   Lab 01/23/25  0512 01/24/25  0530   WBC 7.92 7.16   HGB 14.7 13.5   HCT 43.4 40.4    205

## 2025-01-25 NOTE — PROGRESS NOTES
[facility]-Med/Surg  Cardiology  Progress Note    Patient Name: Leno Thompson Jr.  MRN: [id number]  Admission Date: 1/22/2025  Hospital Length of Stay: 2 days  Code Status: Prior   Attending Physician: Fiorella Madison MD   Primary Care Physician: Nancy Gutierrez, FNP-C  Expected Discharge Date: 1/25/2025  Principal Problem:<principal problem not specified>    Subjective:     69-year-old gentleman, personal history of CAD, prior history of MI, right coronary total occlusion, total proximal, mid InStent restenosis per 2021 coronary angiogram, history of anoxic brain injury status post MI, prior history of cardiac arrest, prior history of moderate LV dysfunction, valvular heart disease-mitral regurgitation, hypertension, diabetes, PAD, prior history of left below-knee amputation, tobacco consumption-presented emergency room for evaluation of chest pain.  Chest pain occurred nighttime, nonexertional, moderate severity, localized left chest wall, exacerbation factors unclear.  EKG sinus rhythm transient inferolateral ST segment depression.  Troponin negative x3.  Patient is nursing home resident at [facility].  Cardiology consulted for patient's chest pain.  Patient previously was not seen in my office.     Echo mild depressed LV function  IRIS - mild depressed LV function, inf/lat infarct with periinfarct ischemia     Chest pain  Personal history of CAD-2021 coronary angiogram total RCA proximal, mid 100% InStent restenosis,  with collateral left to right, no significant left circumflex, LAD,  LM disease   Personal history of moderate LV dysfunction  Personal history of mitral regurgitation  Personal history of anoxic brain injury   Personal hx of PEG tube  Hypertension   Diabetes mellitus  PAD-prior history of left below-knee amputation  Tobacco consumption                Objective:     Vital Signs (Most Recent):  Temp: 98.4 °F (36.9 °C) (01/24/25 2311)  Pulse: (!) 52 (01/24/25 2311)  Resp: 16 (01/24/25  2311)  BP: 137/70 (01/24/25 2311)  SpO2: 98 % (01/24/25 2311) Vital Signs (24h Range):  Temp:  [97.4 °F (36.3 °C)-98.4 °F (36.9 °C)] 98.4 °F (36.9 °C)  Pulse:  [50-53] 52  Resp:  [12-18] 16  SpO2:  [94 %-100 %] 98 %  BP: (129-142)/(58-70) 137/70     Weight: 69.6 kg (153 lb 7 oz)  Body mass index is 23.33 kg/m².     SpO2: 98 %         Intake/Output Summary (Last 24 hours) at 1/24/2025 2335  Last data filed at 1/24/2025 1800  Gross per 24 hour   Intake 1800 ml   Output 1350 ml   Net 450 ml       Physical Exam  Constitutional:       General: He is not in acute distress.     Comments: No CP   HENT:      Head: Normocephalic and atraumatic.      Nose: No congestion.      Mouth/Throat:      Mouth: Mucous membranes are moist.   Cardiovascular:      Rate and Rhythm: Normal rate.      Comments: Soft MIRANDA lsb, mitral to apex  Pulmonary:      Effort: Pulmonary effort is normal.      Breath sounds: Normal breath sounds.   Abdominal:      Palpations: Abdomen is soft.      Tenderness: There is no abdominal tenderness.   Musculoskeletal:      Cervical back: Normal range of motion and neck supple.      Right lower leg: No edema.      Comments: Prior LBK amputation   Skin:     General: Skin is warm and dry.   Neurological:      General: No focal deficit present.      Mental Status: He is alert.   Psychiatric:         Mood and Affect: Mood normal.         Behavior: Behavior normal.             Significant Labs: CMP   Recent Labs   Lab 01/23/25  0512 01/24/25  0530    138   K 4.6 4.4    108   CO2 28 22   BUN 33* 42*   CREATININE 2.15* 1.98*   CALCIUM 9.4 9.1   ALBUMIN 4.3 4.1   BILITOT 1.2* 0.9   ALKPHOS 82 75   AST 20 19   ALT 16 16    and CBC   Recent Labs   Lab 01/23/25  0512 01/24/25  0530   WBC 7.92 7.16   HGB 14.7 13.5   HCT 43.4 40.4    205     CXR right basilar streaky atelectasis vs pneumonia infiltrate  Troponin neg x 3  Echo - pending    Assessment and Plan:       Chest pain  Personal history of CAD-2021  coronary angiogram total RCA proximal, mid 100% InStent restenosis,  with collateral left to right, no significant left circumflex, LAD,  LM disease   Personal history of moderate LV dysfunction  Personal history of mitral regurgitation  Personal history of anoxic brain injury   Personal hx of PEG tube  Hypertension   Diabetes mellitus  PAD-prior history of left below-knee amputation  Tobacco consumption     Add Ranexa, BB  HD statin  Echo - as above  Lexiscan stress test - as above  Maximize medical therapy   Post D/C follow up   Cessation tobacco               Quinn Bowden MD  Cardiology  [facility]-Med/Surg

## 2025-01-27 NOTE — PLAN OF CARE
Patient discharged over the weekend and the NH is requesting more info on their patient. Clinical faxed to Nimo @ [facility].

## 2025-02-24 ENCOUNTER — HOSPITAL ENCOUNTER (EMERGENCY)
Facility: HOSPITAL | Age: 70
Discharge: NURSING, REHAB OR LONG-TERM CARE | End: 2025-02-24
Attending: OTOLARYNGOLOGY
Payer: MEDICARE

## 2025-02-24 VITALS
HEIGHT: 68 IN | WEIGHT: 172.13 LBS | TEMPERATURE: 98 F | SYSTOLIC BLOOD PRESSURE: 110 MMHG | HEART RATE: 66 BPM | BODY MASS INDEX: 26.09 KG/M2 | RESPIRATION RATE: 16 BRPM | OXYGEN SATURATION: 96 % | DIASTOLIC BLOOD PRESSURE: 63 MMHG

## 2025-02-24 DIAGNOSIS — R06.02 SHORTNESS OF BREATH: ICD-10-CM

## 2025-02-24 DIAGNOSIS — R06.02 SOB (SHORTNESS OF BREATH): ICD-10-CM

## 2025-02-24 DIAGNOSIS — J44.1 COPD EXACERBATION: ICD-10-CM

## 2025-02-24 DIAGNOSIS — J10.1 INFLUENZA A: Primary | ICD-10-CM

## 2025-02-24 DIAGNOSIS — R06.00 DYSPNEA, UNSPECIFIED TYPE: ICD-10-CM

## 2025-02-24 LAB
ALBUMIN SERPL-MCNC: 4 G/DL (ref 3.4–5)
ALBUMIN/GLOB SERPL: 1.5 RATIO
ALP SERPL-CCNC: 61 UNIT/L (ref 50–144)
ALT SERPL-CCNC: 29 UNIT/L (ref 1–45)
ANION GAP SERPL CALC-SCNC: 9 MEQ/L (ref 2–13)
AST SERPL-CCNC: 30 UNIT/L (ref 17–59)
BASOPHILS # BLD AUTO: 0.04 X10(3)/MCL (ref 0.01–0.08)
BASOPHILS NFR BLD AUTO: 0.8 % (ref 0.1–1.2)
BILIRUB SERPL-MCNC: 0.6 MG/DL (ref 0–1)
BNP BLD-MCNC: 1590 PG/ML (ref 0–124.9)
BUN SERPL-MCNC: 26 MG/DL (ref 7–20)
CALCIUM SERPL-MCNC: 8.7 MG/DL (ref 8.4–10.2)
CHLORIDE SERPL-SCNC: 105 MMOL/L (ref 98–110)
CO2 SERPL-SCNC: 23 MMOL/L (ref 21–32)
CREAT SERPL-MCNC: 1.82 MG/DL (ref 0.66–1.25)
CREAT/UREA NIT SERPL: 14 (ref 12–20)
D DIMER PPP IA.FEU-MCNC: 1.47 MG/L (ref 0.19–0.5)
EOSINOPHIL # BLD AUTO: 0.03 X10(3)/MCL (ref 0.04–0.54)
EOSINOPHIL NFR BLD AUTO: 0.6 % (ref 0.7–7)
ERYTHROCYTE [DISTWIDTH] IN BLOOD BY AUTOMATED COUNT: 15 %
FLUAV AG UPPER RESP QL IA.RAPID: DETECTED
FLUBV AG UPPER RESP QL IA.RAPID: NOT DETECTED
GFR SERPLBLD CREATININE-BSD FMLA CKD-EPI: 40 ML/MIN/1.73/M2
GLOBULIN SER-MCNC: 2.7 GM/DL (ref 2–3.9)
GLUCOSE SERPL-MCNC: 118 MG/DL (ref 70–115)
HCT VFR BLD AUTO: 37 % (ref 36–52)
HGB BLD-MCNC: 12.5 G/DL (ref 13–18)
IMM GRANULOCYTES # BLD AUTO: 0.02 X10(3)/MCL (ref 0–0.03)
IMM GRANULOCYTES NFR BLD AUTO: 0.4 % (ref 0–0.5)
LACTATE SERPL-SCNC: 1.2 MMOL/L (ref 0.4–2)
LYMPHOCYTES # BLD AUTO: 0.62 X10(3)/MCL (ref 1.32–3.57)
LYMPHOCYTES NFR BLD AUTO: 12.7 % (ref 20–55)
MCH RBC QN AUTO: 29.6 PG (ref 27–34)
MCHC RBC AUTO-ENTMCNC: 33.8 G/DL (ref 31–37)
MCV RBC AUTO: 87.7 FL (ref 79–99)
MONOCYTES # BLD AUTO: 0.88 X10(3)/MCL (ref 0.3–0.82)
MONOCYTES NFR BLD AUTO: 18 % (ref 4.7–12.5)
NEUTROPHILS # BLD AUTO: 3.31 X10(3)/MCL (ref 1.78–5.38)
NEUTROPHILS NFR BLD AUTO: 67.5 % (ref 37–73)
NRBC BLD AUTO-RTO: 0 %
PLATELET # BLD AUTO: 137 X10(3)/MCL (ref 140–371)
PMV BLD AUTO: 9.1 FL (ref 9.4–12.4)
POTASSIUM SERPL-SCNC: 4 MMOL/L (ref 3.5–5.1)
PROT SERPL-MCNC: 6.7 GM/DL (ref 6.3–8.2)
RBC # BLD AUTO: 4.22 X10(6)/MCL (ref 4–6)
SARS-COV-2 RNA RESP QL NAA+PROBE: NEGATIVE
SODIUM SERPL-SCNC: 137 MMOL/L (ref 136–145)
TROPONIN I SERPL-MCNC: 0.06 NG/ML (ref 0–0.03)
WBC # BLD AUTO: 4.9 X10(3)/MCL (ref 4–11.5)

## 2025-02-24 PROCEDURE — 96375 TX/PRO/DX INJ NEW DRUG ADDON: CPT

## 2025-02-24 PROCEDURE — 25000242 PHARM REV CODE 250 ALT 637 W/ HCPCS: Performed by: OTOLARYNGOLOGY

## 2025-02-24 PROCEDURE — 99285 EMERGENCY DEPT VISIT HI MDM: CPT | Mod: 25

## 2025-02-24 PROCEDURE — 94761 N-INVAS EAR/PLS OXIMETRY MLT: CPT

## 2025-02-24 PROCEDURE — 25500020 PHARM REV CODE 255: Performed by: OTOLARYNGOLOGY

## 2025-02-24 PROCEDURE — 25000003 PHARM REV CODE 250: Performed by: OTOLARYNGOLOGY

## 2025-02-24 PROCEDURE — 83605 ASSAY OF LACTIC ACID: CPT | Performed by: OTOLARYNGOLOGY

## 2025-02-24 PROCEDURE — 85379 FIBRIN DEGRADATION QUANT: CPT | Performed by: OTOLARYNGOLOGY

## 2025-02-24 PROCEDURE — 93005 ELECTROCARDIOGRAM TRACING: CPT

## 2025-02-24 PROCEDURE — 85025 COMPLETE CBC W/AUTO DIFF WBC: CPT | Performed by: OTOLARYNGOLOGY

## 2025-02-24 PROCEDURE — 83880 ASSAY OF NATRIURETIC PEPTIDE: CPT | Performed by: OTOLARYNGOLOGY

## 2025-02-24 PROCEDURE — 94640 AIRWAY INHALATION TREATMENT: CPT

## 2025-02-24 PROCEDURE — 84484 ASSAY OF TROPONIN QUANT: CPT | Performed by: OTOLARYNGOLOGY

## 2025-02-24 PROCEDURE — 93010 ELECTROCARDIOGRAM REPORT: CPT | Mod: ,,, | Performed by: INTERNAL MEDICINE

## 2025-02-24 PROCEDURE — 80053 COMPREHEN METABOLIC PANEL: CPT | Performed by: OTOLARYNGOLOGY

## 2025-02-24 PROCEDURE — 96365 THER/PROPH/DIAG IV INF INIT: CPT

## 2025-02-24 PROCEDURE — 27000221 HC OXYGEN, UP TO 24 HOURS

## 2025-02-24 PROCEDURE — 63600175 PHARM REV CODE 636 W HCPCS: Performed by: OTOLARYNGOLOGY

## 2025-02-24 RX ORDER — FUROSEMIDE 10 MG/ML
20 INJECTION INTRAMUSCULAR; INTRAVENOUS
Status: COMPLETED | OUTPATIENT
Start: 2025-02-24 | End: 2025-02-24

## 2025-02-24 RX ORDER — OSELTAMIVIR PHOSPHATE 75 MG/1
75 CAPSULE ORAL 2 TIMES DAILY
Qty: 10 CAPSULE | Refills: 0 | Status: SHIPPED | OUTPATIENT
Start: 2025-02-24 | End: 2025-03-01

## 2025-02-24 RX ORDER — IPRATROPIUM BROMIDE AND ALBUTEROL SULFATE 2.5; .5 MG/3ML; MG/3ML
3 SOLUTION RESPIRATORY (INHALATION)
Status: COMPLETED | OUTPATIENT
Start: 2025-02-24 | End: 2025-02-24

## 2025-02-24 RX ORDER — CEFTRIAXONE 1 G/1
1 INJECTION, POWDER, FOR SOLUTION INTRAMUSCULAR; INTRAVENOUS
Status: COMPLETED | OUTPATIENT
Start: 2025-02-24 | End: 2025-02-24

## 2025-02-24 RX ORDER — OSELTAMIVIR PHOSPHATE 75 MG/1
75 CAPSULE ORAL
Status: COMPLETED | OUTPATIENT
Start: 2025-02-24 | End: 2025-02-24

## 2025-02-24 RX ADMIN — FUROSEMIDE 20 MG: 10 INJECTION, SOLUTION INTRAMUSCULAR; INTRAVENOUS at 11:02

## 2025-02-24 RX ADMIN — CEFTRIAXONE SODIUM 1 G: 1 INJECTION, POWDER, FOR SOLUTION INTRAMUSCULAR; INTRAVENOUS at 12:02

## 2025-02-24 RX ADMIN — IPRATROPIUM BROMIDE AND ALBUTEROL SULFATE 3 ML: .5; 3 SOLUTION RESPIRATORY (INHALATION) at 12:02

## 2025-02-24 RX ADMIN — OSELTAMAVIR PHOSPHATE 75 MG: 75 CAPSULE ORAL at 12:02

## 2025-02-24 RX ADMIN — IOHEXOL 71 ML: 350 INJECTION, SOLUTION INTRAVENOUS at 11:02

## 2025-02-24 RX ADMIN — AZITHROMYCIN DIHYDRATE 500 MG: 500 INJECTION, POWDER, LYOPHILIZED, FOR SOLUTION INTRAVENOUS at 12:02

## 2025-02-24 NOTE — ED PROVIDER NOTES
Encounter Date: 2/24/2025       History     Chief Complaint   Patient presents with    Shortness of Breath     Pt presented to ED per EMS from [facility] with c/o shortness of breath since Saturday. Nurse reports pt is a smoker and has had increased SOB for a few days.      THE PATIENT WAS SENT FROM THE NH FOR EVALUATION OF SOB THAT HAS BEEN WORSENING SINCE SATURDAY. HE STATES HE HAS A COUGH. HE HAS EXPIRATORY WHEEZES ON EXAM. HE STILL SMOKES. HIS O2 SAT IS 94% WITH A RR OF 22. HE IS AFEBRILE.        Review of patient's allergies indicates:  No Known Allergies  Past Medical History:   Diagnosis Date    Anoxic brain injury     Arthritis     CAD (coronary artery disease)     Cardiac arrest     Heart attack     HLD (hyperlipidemia)     Hx of left BKA     Hypertension     Mitral valve regurgitation     Muscle spasms of both lower extremities     Muscle spasms of both lower extremities     PEG (percutaneous endoscopic gastrostomy) adjustment/replacement/removal     Status post placement of stent in right coronary artery      Past Surgical History:   Procedure Laterality Date    LEG AMPUTATION Left      No family history on file.  Social History[1]  Review of Systems   Constitutional:  Positive for activity change and fatigue.   HENT:  Positive for congestion.    Eyes: Negative.    Respiratory:  Positive for cough, shortness of breath and wheezing.    Cardiovascular: Negative.    Gastrointestinal: Negative.    Genitourinary: Negative.    Musculoskeletal: Negative.    Neurological: Negative.    Psychiatric/Behavioral: Negative.     All other systems reviewed and are negative.      Physical Exam     Initial Vitals [02/24/25 0933]   BP Pulse Resp Temp SpO2   (!) 132/59 68 (!) 22 98.3 °F (36.8 °C) (!) 94 %      MAP       --         Physical Exam    Nursing note and vitals reviewed.  Constitutional: He appears well-developed and well-nourished.   HENT:   Head: Normocephalic and atraumatic.   Eyes: EOM are  normal. Pupils are equal, round, and reactive to light.   Neck: Neck supple.   Normal range of motion.  Cardiovascular:  Normal rate, regular rhythm and normal heart sounds.           Abdominal: Abdomen is soft. Bowel sounds are normal.   Musculoskeletal:         General: Normal range of motion.      Cervical back: Normal range of motion and neck supple.      Comments: LEFT BKA PRESENT     Neurological: He is alert and oriented to person, place, and time. He has normal strength. GCS score is 15. GCS eye subscore is 4. GCS verbal subscore is 5. GCS motor subscore is 6.   Skin: Skin is warm and dry.         ED Course   Procedures  Labs Reviewed   COMPREHENSIVE METABOLIC PANEL - Abnormal       Result Value    Sodium 137      Potassium 4.0      Chloride 105      CO2 23      Glucose 118 (*)     Blood Urea Nitrogen 26 (*)     Creatinine 1.82 (*)     Calcium 8.7      Protein Total 6.7      Albumin 4.0      Globulin 2.7      Albumin/Globulin Ratio 1.5      Bilirubin Total 0.6      ALP 61      ALT 29      AST 30      eGFR 40      Anion Gap 9.0      BUN/Creatinine Ratio 14     TROPONIN I - Abnormal    Troponin-I 0.061 (*)    NT-PRO NATRIURETIC PEPTIDE - Abnormal    ProBNP 1,590.0 (*)    D DIMER, QUANTITATIVE - Abnormal    D-Dimer 1.47 (*)    CBC WITH DIFFERENTIAL - Abnormal    WBC 4.90      RBC 4.22      Hgb 12.5 (*)     Hct 37.0      MCV 87.7      MCH 29.6      MCHC 33.8      RDW 15.0      Platelet 137 (*)     MPV 9.1 (*)     Neut % 67.5      Lymph % 12.7 (*)     Mono % 18.0 (*)     Eos % 0.6 (*)     Basophil % 0.8      Lymph # 0.62 (*)     Neut # 3.31      Mono # 0.88 (*)     Eos # 0.03 (*)     Baso # 0.04      Imm Gran # 0.02      Imm Grans % 0.4      NRBC% 0.0     COVID/FLU A&B PCR - Abnormal    Influenza A PCR Detected (*)     Influenza B PCR Not Detected      SARS-CoV-2 PCR Negative      Narrative:     The Xpert Xpress SARS-CoV-2/FLU/RSV plus is a rapid, multiplexed real-time PCR test intended for the simultaneous  qualitative detection and differentiation of SARS-CoV-2, Influenza A, Influenza B, and respiratory syncytial virus (RSV) viral RNA in either nasopharyngeal swab or nasal swab specimens.         LACTIC ACID, PLASMA - Normal    Lactic Acid Level 1.2     CBC W/ AUTO DIFFERENTIAL    Narrative:     The following orders were created for panel order CBC Auto Differential.  Procedure                               Abnormality         Status                     ---------                               -----------         ------                     CBC with Differential[[id number]]       Abnormal            Final result                 Please view results for these tests on the individual orders.     EKG Readings: (Independently Interpreted)   Initial Reading: No STEMI. Rhythm: Normal Sinus Rhythm. Ectopy: No Ectopy. Conduction: Normal. ST Segments: Non-Specific ST Segment Depression. Axis: Normal.       Imaging Results              CTA Chest Non-Coronary (PE Studies) (Final result)  Result time 02/24/25 11:50:50      Final result by Rigoberto Brandon III, MD (02/24/25 11:50:50)                   Impression:      1. There is no evidence of pulmonary embolus or other significant abnormalities involving the pulmonary arterial vasculature to the level of the segmental arterial branches.  2. Chronic changes are present including calcified pleural plaques within the right hemithorax.  These changes may be related to previous trauma but can also be seen with asbestos related disease and clinical correlation is recommended.  See above comments regarding additional chronic changes.      Electronically signed by: Rigoberto Brandon  Date:    02/24/2025  Time:    11:50               Narrative:    EXAMINATION:  STUDY:CTA CHEST NON CORONARY (PE STUDIES)    CLINICAL HISTORY AND TECHNIQUE:  Elevated D-dimer, suspected pulmonary embolus    This patient has had 5 CT and nuclear medicine scans performed within the last 12 months.    The following DOSE  REDUCTION TECHNIQUES are used for all CT scans at [facility]:    1. Automated exposure control.  2. Adjustment of the mA and/or kv according to patient size.  3. Use of iterative reconstruction technique.    COMPARISON:  Chest x-ray performed earlier the same day    FINDINGS:  CT angiography of the pulmonary arterial vasculature with intravenous contrast was performed with post processing and multiplanar and 3 dimensional reconstruction of images. The quality of the study is good. The pulmonary arterial vasculature appears unremarkable without evidence of significant thrombus/pulmonary embolus, stenotic segments, aneurysmal dilatation, vascular malformations or other significant abnormalities to the level of the segmental arterial branches. Mild emphysematous changes are noted bilaterally with increased interstitial lung markings and scattered areas of parenchymal scarring.  Calcified pleural plaques are noted within the right hemithorax measuring as large as 2-3 cm in greatest diameter.  Atherosclerotic plaquing is noted throughout the thoracic aorta is primary branches.  Moderate degenerative changes are noted involving the thoracic spine.  Punctate calcifications are noted within the spleen as can be seen with old granulomatous disease.                                       X-Ray Chest 1 View (Final result)  Result time 02/24/25 10:35:22      Final result by Rigoberto Brandon III, MD (02/24/25 10:35:22)                   Impression:      1. The pulmonary vasculature is congested with mildly increased interstitial lung markings.  2. Chronic changes are present as described above and as seen previously.      Electronically signed by: Rigoberto Brandon  Date:    02/24/2025  Time:    10:35               Narrative:    EXAMINATION:  STUDY: XR CHEST 1 VIEW    CLINICAL HISTORY AND TECHNIQUE:  Shortness of breath    COMPARISON:  01/24/2025    FINDINGS:  The size of the heart is at the upper limits of normal  with vascular congestion and mildly increased interstitial lung markings.Mild parenchymal scarring is noted within the right lower lung field as noted on previous imaging.  I see no lobar segmental infiltrates.  No significant pleural effusions are noted.  Mild degenerative changes are noted throughout the thoracic spine.  Changes are noted involving the lateral aspect of the right clavicle which I suspect are related to previous trauma (unchanged when compared to the previous exam).                                       Medications   furosemide injection 20 mg (20 mg Intravenous Given 2/24/25 1147)   albuterol-ipratropium 2.5 mg-0.5 mg/3 mL nebulizer solution 3 mL (3 mLs Nebulization Given 2/24/25 1233)   albuterol-ipratropium 2.5 mg-0.5 mg/3 mL nebulizer solution 3 mL (3 mLs Nebulization Given 2/24/25 1238)   iohexoL (OMNIPAQUE 350) injection 80 mL (71 mLs Intravenous Given 2/24/25 1140)   cefTRIAXone injection 1 g (1 g Intravenous Given 2/24/25 1232)   azithromycin (ZITHROMAX) 500 mg in 0.9% NaCl 250 mL IVPB (admixture device) (0 mg Intravenous Stopped 2/24/25 1333)   oseltamivir capsule 75 mg (75 mg Oral Given 2/24/25 1234)     Medical Decision Making  RULE OUT PNEUMONIA VS BRONCHITIS VS CHF. WILL ORDER LABS AND CXR AND REEVALUATE.    Amount and/or Complexity of Data Reviewed  Labs: ordered.  Radiology: ordered.    Risk  Prescription drug management.                                      Clinical Impression:  Final diagnoses:  [R06.02] Shortness of breath  [R06.02] SOB (shortness of breath)  [R06.00] Dyspnea, unspecified type  [J44.1] COPD exacerbation  [J10.1] Influenza A (Primary)          ED Disposition Condition    Discharge Stable          ED Prescriptions       Medication Sig Dispense Start Date End Date Auth. Provider    oseltamivir (TAMIFLU) 75 MG capsule Take 1 capsule (75 mg total) by mouth 2 (two) times daily. for 5 days 10 capsule 2/24/2025 3/1/2025 Morenita Potts MD          Follow-up Information     None            Morenita Potts MD  02/25/25 0614         [1]   Social History  Tobacco Use    Smoking status: Every Day     Current packs/day: 1.00     Average packs/day: 1 pack/day for 53.2 years (53.2 ttl pk-yrs)     Types: Cigarettes     Start date: 1972     Passive exposure: Current    Smokeless tobacco: Never   Substance Use Topics    Alcohol use: Never    Drug use: Never        Morenita Potts MD  02/25/25 0614

## 2025-02-25 LAB
OHS QRS DURATION: 106 MS
OHS QTC CALCULATION: 461 MS

## 2025-06-20 ENCOUNTER — HOSPITAL ENCOUNTER (EMERGENCY)
Facility: HOSPITAL | Age: 70
Discharge: ANOTHER HOSPITAL | End: 2025-06-21
Attending: FAMILY MEDICINE
Payer: MEDICARE

## 2025-06-20 DIAGNOSIS — I21.4 NSTEMI (NON-ST ELEVATED MYOCARDIAL INFARCTION): Primary | ICD-10-CM

## 2025-06-20 DIAGNOSIS — R07.9 CHEST PAIN: ICD-10-CM

## 2025-06-20 LAB
ALBUMIN SERPL-MCNC: 4.5 G/DL (ref 3.4–5)
ALBUMIN/GLOB SERPL: 1.4 RATIO
ALP SERPL-CCNC: 81 UNIT/L (ref 50–144)
ALT SERPL-CCNC: 22 UNIT/L (ref 1–45)
ANION GAP SERPL CALC-SCNC: 6 MEQ/L (ref 2–13)
AST SERPL-CCNC: 22 UNIT/L (ref 17–59)
BASOPHILS # BLD AUTO: 0.12 X10(3)/MCL (ref 0.01–0.08)
BASOPHILS NFR BLD AUTO: 1.5 % (ref 0.1–1.2)
BILIRUB SERPL-MCNC: 0.8 MG/DL (ref 0–1)
BNP BLD-MCNC: 541 PG/ML (ref 0–124.9)
BUN SERPL-MCNC: 19 MG/DL (ref 7–20)
CALCIUM SERPL-MCNC: 9.2 MG/DL (ref 8.4–10.2)
CHLORIDE SERPL-SCNC: 105 MMOL/L (ref 98–110)
CO2 SERPL-SCNC: 28 MMOL/L (ref 21–32)
CREAT SERPL-MCNC: 1.68 MG/DL (ref 0.66–1.25)
CREAT/UREA NIT SERPL: 11 (ref 12–20)
D DIMER PPP IA.FEU-MCNC: 0.8 MG/L FEU (ref 0.19–0.5)
EOSINOPHIL # BLD AUTO: 0.3 X10(3)/MCL (ref 0.04–0.54)
EOSINOPHIL NFR BLD AUTO: 3.7 % (ref 0.7–7)
ERYTHROCYTE [DISTWIDTH] IN BLOOD BY AUTOMATED COUNT: 12.6 %
GFR SERPLBLD CREATININE-BSD FMLA CKD-EPI: 44 ML/MIN/1.73/M2
GLOBULIN SER-MCNC: 3.3 GM/DL (ref 2–3.9)
GLUCOSE SERPL-MCNC: 130 MG/DL (ref 70–115)
HCT VFR BLD AUTO: 40.3 % (ref 36–52)
HGB BLD-MCNC: 13.9 G/DL (ref 13–18)
IMM GRANULOCYTES # BLD AUTO: 0.04 X10(3)/MCL (ref 0–0.03)
IMM GRANULOCYTES NFR BLD AUTO: 0.5 % (ref 0–0.5)
LYMPHOCYTES # BLD AUTO: 1.49 X10(3)/MCL (ref 1.32–3.57)
LYMPHOCYTES NFR BLD AUTO: 18.5 % (ref 20–55)
MCH RBC QN AUTO: 31 PG (ref 27–34)
MCHC RBC AUTO-ENTMCNC: 34.5 G/DL (ref 31–37)
MCV RBC AUTO: 90 FL (ref 79–99)
MONOCYTES # BLD AUTO: 0.8 X10(3)/MCL (ref 0.3–0.82)
MONOCYTES NFR BLD AUTO: 9.9 % (ref 4.7–12.5)
NEUTROPHILS # BLD AUTO: 5.31 X10(3)/MCL (ref 1.78–5.38)
NEUTROPHILS NFR BLD AUTO: 65.9 % (ref 37–73)
NRBC BLD AUTO-RTO: 0 %
OHS QRS DURATION: 106 MS
OHS QTC CALCULATION: 427 MS
PLATELET # BLD AUTO: 242 X10(3)/MCL (ref 140–371)
PMV BLD AUTO: 8.8 FL (ref 9.4–12.4)
POTASSIUM SERPL-SCNC: 4 MMOL/L (ref 3.5–5.1)
PROT SERPL-MCNC: 7.8 GM/DL (ref 6.3–8.2)
RBC # BLD AUTO: 4.48 X10(6)/MCL (ref 4–6)
SODIUM SERPL-SCNC: 139 MMOL/L (ref 136–145)
TROPONIN I SERPL-MCNC: 0.04 NG/ML (ref 0–0.03)
TROPONIN I SERPL-MCNC: 0.05 NG/ML (ref 0–0.03)
WBC # BLD AUTO: 8.06 X10(3)/MCL (ref 4–11.5)

## 2025-06-20 PROCEDURE — 93005 ELECTROCARDIOGRAM TRACING: CPT

## 2025-06-20 PROCEDURE — 63600175 PHARM REV CODE 636 W HCPCS: Performed by: FAMILY MEDICINE

## 2025-06-20 PROCEDURE — 94640 AIRWAY INHALATION TREATMENT: CPT | Mod: XB

## 2025-06-20 PROCEDURE — 93010 ELECTROCARDIOGRAM REPORT: CPT | Mod: ,,, | Performed by: INTERNAL MEDICINE

## 2025-06-20 PROCEDURE — 36415 COLL VENOUS BLD VENIPUNCTURE: CPT | Performed by: FAMILY MEDICINE

## 2025-06-20 PROCEDURE — 25000242 PHARM REV CODE 250 ALT 637 W/ HCPCS: Performed by: FAMILY MEDICINE

## 2025-06-20 PROCEDURE — 25000242 PHARM REV CODE 250 ALT 637 W/ HCPCS

## 2025-06-20 PROCEDURE — 25000003 PHARM REV CODE 250

## 2025-06-20 PROCEDURE — 63600175 PHARM REV CODE 636 W HCPCS

## 2025-06-20 PROCEDURE — 84484 ASSAY OF TROPONIN QUANT: CPT | Performed by: FAMILY MEDICINE

## 2025-06-20 PROCEDURE — 85025 COMPLETE CBC W/AUTO DIFF WBC: CPT | Performed by: FAMILY MEDICINE

## 2025-06-20 PROCEDURE — 96372 THER/PROPH/DIAG INJ SC/IM: CPT | Performed by: FAMILY MEDICINE

## 2025-06-20 PROCEDURE — 25500020 PHARM REV CODE 255: Performed by: FAMILY MEDICINE

## 2025-06-20 PROCEDURE — 99285 EMERGENCY DEPT VISIT HI MDM: CPT | Mod: 25

## 2025-06-20 PROCEDURE — 85379 FIBRIN DEGRADATION QUANT: CPT | Performed by: FAMILY MEDICINE

## 2025-06-20 PROCEDURE — 80053 COMPREHEN METABOLIC PANEL: CPT | Performed by: FAMILY MEDICINE

## 2025-06-20 PROCEDURE — 83880 ASSAY OF NATRIURETIC PEPTIDE: CPT | Performed by: FAMILY MEDICINE

## 2025-06-20 PROCEDURE — 27000221 HC OXYGEN, UP TO 24 HOURS

## 2025-06-20 PROCEDURE — 94761 N-INVAS EAR/PLS OXIMETRY MLT: CPT

## 2025-06-20 PROCEDURE — 94640 AIRWAY INHALATION TREATMENT: CPT

## 2025-06-20 PROCEDURE — 96374 THER/PROPH/DIAG INJ IV PUSH: CPT

## 2025-06-20 PROCEDURE — 25000003 PHARM REV CODE 250: Performed by: FAMILY MEDICINE

## 2025-06-20 RX ORDER — FUROSEMIDE 20 MG/1
20 TABLET ORAL DAILY
Status: ON HOLD | COMMUNITY
Start: 2025-06-06 | End: 2025-06-22 | Stop reason: HOSPADM

## 2025-06-20 RX ORDER — IPRATROPIUM BROMIDE AND ALBUTEROL SULFATE 2.5; .5 MG/3ML; MG/3ML
3 SOLUTION RESPIRATORY (INHALATION) EVERY 4 HOURS PRN
COMMUNITY
Start: 2025-06-07

## 2025-06-20 RX ORDER — FUROSEMIDE 20 MG/1
20 TABLET ORAL
Status: COMPLETED | OUTPATIENT
Start: 2025-06-20 | End: 2025-06-20

## 2025-06-20 RX ORDER — NITROGLYCERIN 20 MG/G
2 OINTMENT TOPICAL
Status: DISCONTINUED | OUTPATIENT
Start: 2025-06-20 | End: 2025-06-21 | Stop reason: HOSPADM

## 2025-06-20 RX ORDER — CLONAZEPAM 0.5 MG/1
0.5 TABLET ORAL EVERY 12 HOURS PRN
COMMUNITY
Start: 2025-06-02

## 2025-06-20 RX ORDER — DEXTROMETHORPHAN HYDROBROMIDE, GUAIFENESIN 5; 100 MG/5ML; MG/5ML
650 LIQUID ORAL 2 TIMES DAILY
COMMUNITY

## 2025-06-20 RX ORDER — IPRATROPIUM BROMIDE AND ALBUTEROL SULFATE 2.5; .5 MG/3ML; MG/3ML
3 SOLUTION RESPIRATORY (INHALATION)
Status: COMPLETED | OUTPATIENT
Start: 2025-06-20 | End: 2025-06-20

## 2025-06-20 RX ORDER — BACLOFEN 5 MG/1
20 TABLET ORAL 3 TIMES DAILY
COMMUNITY
Start: 2025-04-18

## 2025-06-20 RX ORDER — LORAZEPAM 2 MG/ML
1 INJECTION INTRAMUSCULAR
Status: COMPLETED | OUTPATIENT
Start: 2025-06-20 | End: 2025-06-20

## 2025-06-20 RX ORDER — ENOXAPARIN SODIUM 100 MG/ML
1 INJECTION SUBCUTANEOUS
Status: COMPLETED | OUTPATIENT
Start: 2025-06-20 | End: 2025-06-20

## 2025-06-20 RX ORDER — ASPIRIN 325 MG
325 TABLET ORAL
Status: COMPLETED | OUTPATIENT
Start: 2025-06-20 | End: 2025-06-20

## 2025-06-20 RX ADMIN — ASPIRIN 325 MG ORAL TABLET 325 MG: 325 PILL ORAL at 06:06

## 2025-06-20 RX ADMIN — IPRATROPIUM BROMIDE AND ALBUTEROL SULFATE 3 ML: .5; 3 SOLUTION RESPIRATORY (INHALATION) at 07:06

## 2025-06-20 RX ADMIN — FUROSEMIDE 20 MG: 20 TABLET ORAL at 07:06

## 2025-06-20 RX ADMIN — IPRATROPIUM BROMIDE AND ALBUTEROL SULFATE 3 ML: .5; 3 SOLUTION RESPIRATORY (INHALATION) at 08:06

## 2025-06-20 RX ADMIN — ENOXAPARIN SODIUM 80 MG: 80 INJECTION SUBCUTANEOUS at 09:06

## 2025-06-20 RX ADMIN — IPRATROPIUM BROMIDE AND ALBUTEROL SULFATE 3 ML: .5; 3 SOLUTION RESPIRATORY (INHALATION) at 02:06

## 2025-06-20 RX ADMIN — IOHEXOL 60 ML: 350 INJECTION, SOLUTION INTRAVENOUS at 08:06

## 2025-06-20 RX ADMIN — LORAZEPAM 1 MG: 2 INJECTION INTRAMUSCULAR; INTRAVENOUS at 08:06

## 2025-06-20 NOTE — CARE UPDATE
Outside Transfer Acceptance Note / Regional Referral Center    Referring facility: [facility]   Referring provider: ADDI UMANZOR  Accepting facility: [facility]  Accepting provider: Dr Bacilio Ochoa/[facility] transfer center   Admitting provider: please call NP on call for [facility] internal medicine hospitalist . She will designate attending / hospitalist on call   Reason for transfer: Higher Level of Care   Transfer diagnosis: Chest pains/ hx of cad w stents   Transfer specialty requested: Interventional Cardiology  Transfer specialty notified: No  Transfer level: NUMBER 1-5: 2  Bed type requested: medicine wit tele  Isolation status: No active isolations   Admission class or status: IP- Inpatient  Emergency      Narrative    70-year-old male history of anoxic brain injury severely dysarthric the patient has a history of coronary artery disease with 2 stents in the right coronary artery last catheterization in 2021 showed 100% occlusion.  The patient presents with substernal chest pressure shortness of breath no nausea no vomiting no diaphoresis he has a mildly elevated troponin and mild ST depression on his EKG.  Patient is currently chest pain-free.  He has been treated with aspirin and heparin.    ROS- chest tightness/chest pain   Cxrays - congestion   Cta chest - no pulmonary emboli but there is presence of emphysema/interstitial fibrosis/parenchymal scarring  Troponin .040  Bnp 541  Creatinine 1.68  Echo-1/24/25  Study Details A complete echo was performed using complete 2D. During the study, the apical and subcostal views were captured. There was No saline (bubble) used.  Overall the study quality was adequate. This was a portable study performed at the patient's bedside.     Echocardiography Findings    Left Ventricle There is mildly reduced systolic function.   EF 45%  Inferior latera wall hypokinetic-mild Grade I diastolic dysfunction.   Right Ventricle Normal right ventricular  cavity size. Systolic function is normal.   Left Atrium Normal left atrial size.   Right Atrium Normal right atrial size.   Aortic Valve There is normal leaflet mobility. Aortic valve peak velocity is 1.4 m/s. Mean gradient is 4 mmHg. There is moderate aortic regurgitation.   Mitral Valve There is normal leaflet mobility. There is moderate regurgitation with a posterolateral eccentrically directed jet.   Tricuspid Valve Insufficient TR for RVSP There is normal leaflet mobility.   Pulmonic Valve Not well visualized due to poor acoustic window.   IVC/SVC Intermediate venous pressure at 8 mmHg.   Ascending Aorta No significant aneurysmal changes   Pericardium and Other Findings There is no pericardial effusion.   Pulmonary Artery Insufficient TR for RVSP         Objective     Vitals: Temp: 97 °F (36.1 °C) (06/20/25 0630)  Pulse: 85 (06/20/25 1154)  Resp: 18 (06/20/25 1154)  BP: (!) 166/78 (06/20/25 1154)  SpO2: 96 % (06/20/25 1154)  Recent Labs: All pertinent labs within the past 24 hours have been reviewed.  Recent Lab Results         06/20/25  0641   06/20/25  0624        Albumin/Globulin Ratio 1.4         Albumin 4.5         ALP 81         ALT 22         Anion Gap 6.0         AST 22         Baso # 0.12         Basophil % 1.5         BILIRUBIN TOTAL 0.8         BUN 19         BUN/CREAT RATIO 11         Calcium 9.2         Chloride 105         CO2 28         Creatinine 1.68         D-Dimer 0.80  Comment: D-dimer values of less than 0.5ug/mL (mg/L) FEU in adult patients with a clinically low pre-test probability of developing a DVT yield  a 99% negative predictive value.  D-dimer increases naturally with age, therefore the negative predictive value in patients older than 80 is 21 - 31%.    D-Dimer testing is used as an aid in the diagnosis of VTE/PE. The D-Dimer test must be used with one or more additional tests such as imaging studies to evaluate VTE/PE         eGFR 44  Comment:                      EGFR  "INTERPRETATION    Beginning 8/15/22 we are reporting the eGFRcr calculation as recommended by the National Kid[employer]. The eGFRcr equation has similar overall performance characteristics to the older equation, but the values may differ by more than 10% particularly at higher values of eGFRcr and younger adult ages.    NKF stages of chronic kidney disease (CKD)  Stage 1: Kidney damage with normal or increased eGFR (>90 mL/min/1.73 m^2)  Stage 2: Mild reduction in GFR (60-89 mL/min/1.73 m^2)  Stage 3a: Moderate reduction in GFR (45-59 mL/min/1.73 m^2)  Stage 3b: Moderate reduction in GFR (30-44 mL/min/1.73 m^2)  Stage 4: Severe reduction in GFR (15-29 mL/min/1.73 m^2)  Stage 5: Kidney failure (GFR <15 mL/min/1.73 m^2)      Estimated GFR calculated using the CKD-EPI creatinine (2021) equation.         Eos # 0.30         Eos % 3.7         Globulin, Total 3.3         Glucose 130         Hematocrit 40.3         Hemoglobin 13.9         Immature Grans (Abs) 0.04         Immature Granulocytes 0.5         Lymph # 1.49         LYMPH % 18.5         MCH 31.0         MCHC 34.5         MCV 90.0         Mono # 0.80         Mono % 9.9         MPV 8.8         Neut # 5.31         Neut % 65.9         nRBC 0.0         QRS Duration   106       OHS QTC Calculation   427       Platelet Count 242         Potassium 4.0         ProBNP 541.0  Comment:   For ambulatory patients presenting to outpatient facilities with clinical suspicion of HF not previously diagnosed and at least one sign, symptom or risk factor for HF, NT-proBNP II test results should be interpreted as indicated below:    <125(pg/mL):"Negative: Heart Failure Unlikely"    >=125(pg/mL):"Consider Heart Failure as well as other causes of NT-proBNP elevation"               PROTEIN TOTAL 7.8         RBC 4.48         RDW 12.6         Sodium 139         Troponin I 0.040         WBC 8.06               Recent imaging: cta/ cxrays   Airway:     Vent settings: Oxygen Concentration " (%):  [28] 28       IV access:   Peripheral IV 06/20/25 0700 20 G Right Antecubital (Active)     Infusions: heparin drip  Allergies: Review of patient's allergies indicates:  No Known Allergies   NPO: Yes    Anticoagulation:   Anticoagulants       None             Instructions      [facility] , dr Corona evaluated pt and recommended transfer for Higher level of care .   Will transfer pt to [facility]/under internal medicine services with consultation to [facility] .     Plan -  Tranfer to [facility]/ internal mediccine hospitalist   Diagnosis - CP/ Hx of CAD w cardiac stents   Accepting MD- dr james cortes/[facility] / internal medicine hospitalist   Attending MD- please call NP on call for [facility] internal medicine hospitalist . She will designate attending / hospitalist on call   Condition - stable  Consultations- Consult cardiology on call  Nursing staff-   1-please call NP on call for [facility] internal medicine hospitalist . She will designate attending / hospitalist on call   2- update home meds as well as home meds in epic     NI cortes MD  [facility] transfer center   6/20/25

## 2025-06-20 NOTE — TELEMEDICINE CONSULT
[facility]    Cardiology  Consult Note    Patient Name: Leno Thompson Jr.  MRN: [id number]  Admission Date: 6/20/2025  Hospital Length of Stay: 0 days  Code Status: Prior   Attending Provider: Arturo Carbajal MD   Consulting Provider: Anshu Corona MD  Primary Care Physician: Nancy Gutierrez, FNP-C  Principal Problem:<principal problem not specified>    Patient information was obtained from patient and ER records.     Subjective:     Chief Complaint/Reason for Consult:  CP CAD    HPI:  Patient is a 70-year-old male history of anoxic brain injury severely dysarthric the patient has a history of coronary artery disease with 2 stents in the right coronary artery last catheterization in 2021 showed 100% occlusion.  The patient presents with substernal chest pressure shortness of breath no nausea no vomiting no diaphoresis he has a mildly elevated troponin and mild ST depression on his EKG.  Patient is currently chest pain-free.  He has been treated with aspirin and heparin.            Past Medical History:   Diagnosis Date    Anoxic brain injury     Arthritis     CAD (coronary artery disease)     Cardiac arrest     Heart attack     HLD (hyperlipidemia)     Hx of left BKA     Hypertension     Mitral valve regurgitation     Muscle spasms of both lower extremities     Muscle spasms of both lower extremities     PEG (percutaneous endoscopic gastrostomy) adjustment/replacement/removal     Status post placement of stent in right coronary artery      Past Surgical History:   Procedure Laterality Date    LEG AMPUTATION Left      Review of patient's allergies indicates:  No Known Allergies  No current facility-administered medications on file prior to encounter.     Current Outpatient Medications on File Prior to Encounter   Medication Sig    albuterol-ipratropium (DUO-NEB) 2.5 mg-0.5 mg/3 mL nebulizer solution Take 3 mLs by nebulization every 4 (four) hours as needed for Wheezing or Shortness of  Breath.    amLODIPine (NORVASC) 5 MG tablet Take 1 tablet (5 mg total) by mouth once daily.    atorvastatin (LIPITOR) 20 MG tablet Take 20 mg by mouth every evening.    baclofen (LIORESAL) 5 mg Tab tablet Take 20 mg by mouth 3 (three) times daily.    carboxymethylcellulose sodium (ARTIFICIAL TEARS, [facility],) 1 % Drop Apply 2 drops to eye 4 (four) times daily.    clonazePAM (KLONOPIN) 0.5 MG tablet Take 0.5 mg by mouth every 12 (twelve) hours as needed (Muscle jerk).    clopidogreL (PLAVIX) 75 mg tablet Take 75 mg by mouth once daily.    ezetimibe (ZETIA) 10 mg tablet Take 10 mg by mouth every evening.    furosemide (LASIX) 20 MG tablet Take 20 mg by mouth once daily.    isosorbide mononitrate (IMDUR) 30 MG 24 hr tablet Take 1 tablet (30 mg total) by mouth once daily.    ranolazine (RANEXA) 500 MG Tb12 Take 1 tablet (500 mg total) by mouth 2 (two) times daily.    senna-docusate 8.6-50 mg (SENNA WITH DOCUSATE SODIUM) 8.6-50 mg per tablet Take 1 tablet by mouth once daily.    vitamin D3-folic acid 125 mcg (5,000 unit)-1 mg Tab Take 1 tablet by mouth Daily.    acetaminophen (TYLENOL) 650 MG TbSR Take 650 mg by mouth 2 (two) times a day.    [DISCONTINUED] baclofen (LIORESAL) 10 MG tablet Take 1 tablet (10 mg total) by mouth 3 (three) times daily.    [DISCONTINUED] busPIRone (BUSPAR) 7.5 MG tablet Take 1 tablet (7.5 mg total) by mouth 2 (two) times a day.    [DISCONTINUED] citalopram (CELEXA) 10 MG tablet Take 2 tablets (20 mg total) by mouth once daily. (Patient taking differently: Take 30 mg by mouth once daily.)    [DISCONTINUED] empagliflozin (JARDIANCE) 10 mg tablet Take 10 mg by mouth once daily.    [DISCONTINUED] guaiFENesin (MUCINEX) 600 mg 12 hr tablet Take 600 mg by mouth 2 (two) times daily.    [DISCONTINUED] metoprolol succinate (TOPROL-XL) 25 MG 24 hr tablet Take 1 tablet (25 mg total) by mouth 2 (two) times daily.    [DISCONTINUED] propylene glycoL (SYSTANE COMPLETE) 0.6 % Drop Apply 2 drops to eye every  "evening.     Family History    None       Tobacco Use    Smoking status: Every Day     Current packs/day: 1.00     Average packs/day: 1 pack/day for 53.5 years (53.5 ttl pk-yrs)     Types: Cigarettes     Start date: 1972     Passive exposure: Current    Smokeless tobacco: Never   Substance and Sexual Activity    Alcohol use: Never    Drug use: Never    Sexual activity: Not Currently       Review of Systems   Respiratory:  Positive for chest tightness.    Cardiovascular:  Positive for chest pain.   All other systems reviewed and are negative.    Objective:     Vital Signs (Most Recent):  Temp: 97 °F (36.1 °C) (06/20/25 0630)  Pulse: 81 (06/20/25 0900)  Resp: 14 (06/20/25 0900)  BP: (!) 169/76 (06/20/25 0900)  SpO2: 97 % (06/20/25 0900) Vital Signs (24h Range):  Temp:  [97 °F (36.1 °C)] 97 °F (36.1 °C)  Pulse:  [68-90] 81  Resp:  [14-25] 14  SpO2:  [95 %-100 %] 97 %  BP: (142-179)/(65-92) 169/76   Weight: 76.2 kg (168 lb)  Body mass index is 25.54 kg/m².  SpO2: 97 %       Intake/Output Summary (Last 24 hours) at 6/20/2025 0929  Last data filed at 6/20/2025 0756  Gross per 24 hour   Intake --   Output 175 ml   Net -175 ml     Lines/Drains/Airways       Peripheral Intravenous Line  Duration             Peripheral IV 06/20/25 0700 20 G Right Antecubital <1 day                  Significant Labs:   Chemistries:   Recent Labs   Lab 06/20/25  0641      K 4.0      CO2 28   BUN 19   CREATININE 1.68*   CALCIUM 9.2   PROT 7.8   BILITOT 0.8   ALKPHOS 81   ALT 22   AST 22   TROPONINI 0.040*        CBC/Anemia Labs: Coags:    Recent Labs   Lab 06/20/25  0641   WBC 8.06   HGB 13.9   HCT 40.3      MCV 90.0   RDW 12.6    No results for input(s): "PT", "INR", "APTT" in the last 168 hours.     Significant Imaging:  Imaging Results              CTA Chest Non-Coronary (PE Studies) (Final result)  Result time 06/20/25 08:27:00      Final result by Rigoberto Brandon III, MD (06/20/25 08:27:00)                   Impression:    "   1. There is no evidence of pulmonary embolus or other significant abnormalities involving the pulmonary arterial vasculature to the level of the segmental arterial branches.  2. Chronic changes are present as described above.      Electronically signed by: Rigoberto Brandon  Date:    06/20/2025  Time:    08:27               Narrative:    EXAMINATION:  STUDY:CTA CHEST NON CORONARY (PE STUDIES)    CLINICAL HISTORY AND TECHNIQUE:  Suspected pulmonary embolus, elevated D-dimer    This patient has had 0 CT and nuclear medicine scans performed within the last 12 months.    The following DOSE REDUCTION TECHNIQUES are used for all CT scans at [facility]:    1. Automated exposure control.  2. Adjustment of the mA and/or kv according to patient size.  3. Use of iterative reconstruction technique.    COMPARISON:  None    FINDINGS:  CT angiography of the pulmonary arterial vasculature with intravenous contrast was performed with post processing and multiplanar and 3 dimensional reconstruction of images. The quality of the study is good. The pulmonary arterial vasculature appears unremarkable without evidence of significant thrombus/pulmonary embolus, stenotic segments, aneurysmal dilatation, vascular malformations or other significant abnormalities to the level of the segmental arterial branches. Moderate emphysematous changes are noted involving both lungs mild changes of interstitial fibrosis and scattered areas of parenchymal scarring.  Moderate atherosclerotic plaquing is noted within the thoracic aorta and its primary branches including coronary arteries.  Moderate degenerative changes involving thoracic spine.                                       X-Ray Chest AP Portable (Final result)  Result time 06/20/25 07:00:08      Final result by Rigoberto Brandon III, MD (06/20/25 07:00:08)                   Impression:      1. The pulmonary vasculature is congested with minimally increased interstitial lung  markings.  2. Chronic changes are present as described above.  See above comments.      Electronically signed by: Floriansunny Roma  Date:    06/20/2025  Time:    07:00               Narrative:    EXAMINATION:  STUDY: XR CHEST AP PORTABLE    CLINICAL HISTORY AND TECHNIQUE:  Chest pain    COMPARISON:  02/24/2025    FINDINGS:  The cardiac and mediastinal contours appear unremarkable.  The pulmonary vasculature is congested with minimally increased interstitial lung markings.  Parenchymal scarring is noted within the right lower lung field as seen previously.I see no lobar or segmental infiltrates.No significant pleural effusions are noted.Mild degenerative changes are noted involving the thoracic spine.                                    EKG:  Normal sinus rhythm 65 beats per minute with mild lateral ST depression    Telemetry:       Physical Exam  Vitals reviewed.   Cardiovascular:      Rate and Rhythm: Normal rate and regular rhythm.      Heart sounds: Murmur heard.   Pulmonary:      Effort: Pulmonary effort is normal.      Breath sounds: Normal breath sounds.   Abdominal:      General: Abdomen is flat.   Musculoskeletal:         General: Deformity present.      Right lower leg: No edema.   Neurological:      General: No focal deficit present.      Mental Status: He is alert.   Psychiatric:         Mood and Affect: Mood normal.      normal sinus rhythm      Home Medications:   Medications Ordered Prior to Encounter[1]  Current Schedule Inpatient Medications:    Continuous Infusions:    Assessment:   Patient is a 70-year-old male with known coronary artery disease also has a history of moderate to severe mitral regurgitation.  Who presents with substernal chest pressure shortness of breath with mild ST depression on EKG and mild elevated troponin.  He is being treated with aspirin and Lovenox.  He is currently chest pain-free      Plan:     I have asked the emergency room to contact Dr. Alonzo the patient's primary  cardiologist.  I believe the patient would benefit from transfer to a higher level of care where cardiac catheterization can be performed.                         A minimum of two characteristics is recommended for diagnosis of either severe or non-severe malnutrition.    Thank you for your consult.     Anshu Corona MD  Cardiology  [facility]        [1]   No current facility-administered medications on file prior to encounter.     Current Outpatient Medications on File Prior to Encounter   Medication Sig Dispense Refill    albuterol-ipratropium (DUO-NEB) 2.5 mg-0.5 mg/3 mL nebulizer solution Take 3 mLs by nebulization every 4 (four) hours as needed for Wheezing or Shortness of Breath.      amLODIPine (NORVASC) 5 MG tablet Take 1 tablet (5 mg total) by mouth once daily. 90 tablet 3    atorvastatin (LIPITOR) 20 MG tablet Take 20 mg by mouth every evening.      baclofen (LIORESAL) 5 mg Tab tablet Take 20 mg by mouth 3 (three) times daily.      carboxymethylcellulose sodium (ARTIFICIAL TEARS, [facility],) 1 % Drop Apply 2 drops to eye 4 (four) times daily.      clonazePAM (KLONOPIN) 0.5 MG tablet Take 0.5 mg by mouth every 12 (twelve) hours as needed (Muscle jerk).      clopidogreL (PLAVIX) 75 mg tablet Take 75 mg by mouth once daily.      ezetimibe (ZETIA) 10 mg tablet Take 10 mg by mouth every evening.      furosemide (LASIX) 20 MG tablet Take 20 mg by mouth once daily.      isosorbide mononitrate (IMDUR) 30 MG 24 hr tablet Take 1 tablet (30 mg total) by mouth once daily. 30 tablet 2    ranolazine (RANEXA) 500 MG Tb12 Take 1 tablet (500 mg total) by mouth 2 (two) times daily. 60 tablet 11    senna-docusate 8.6-50 mg (SENNA WITH DOCUSATE SODIUM) 8.6-50 mg per tablet Take 1 tablet by mouth once daily.      vitamin D3-folic acid 125 mcg (5,000 unit)-1 mg Tab Take 1 tablet by mouth Daily.      acetaminophen (TYLENOL) 650 MG TbSR Take 650 mg by mouth 2 (two) times a day.      [DISCONTINUED] baclofen  (LIORESAL) 10 MG tablet Take 1 tablet (10 mg total) by mouth 3 (three) times daily. 90 tablet 0    [DISCONTINUED] busPIRone (BUSPAR) 7.5 MG tablet Take 1 tablet (7.5 mg total) by mouth 2 (two) times a day. 60 tablet 1    [DISCONTINUED] citalopram (CELEXA) 10 MG tablet Take 2 tablets (20 mg total) by mouth once daily. (Patient taking differently: Take 30 mg by mouth once daily.) 30 tablet 2    [DISCONTINUED] empagliflozin (JARDIANCE) 10 mg tablet Take 10 mg by mouth once daily.      [DISCONTINUED] guaiFENesin (MUCINEX) 600 mg 12 hr tablet Take 600 mg by mouth 2 (two) times daily.      [DISCONTINUED] metoprolol succinate (TOPROL-XL) 25 MG 24 hr tablet Take 1 tablet (25 mg total) by mouth 2 (two) times daily.      [DISCONTINUED] propylene glycoL (SYSTANE COMPLETE) 0.6 % Drop Apply 2 drops to eye every evening.
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
## 2025-06-21 ENCOUNTER — HOSPITAL ENCOUNTER (OUTPATIENT)
Facility: HOSPITAL | Age: 70
LOS: 1 days | Discharge: OTHER INSTITUTION | End: 2025-06-22
Attending: INTERNAL MEDICINE | Admitting: INTERNAL MEDICINE
Payer: MEDICARE

## 2025-06-21 VITALS
WEIGHT: 168 LBS | DIASTOLIC BLOOD PRESSURE: 60 MMHG | BODY MASS INDEX: 25.46 KG/M2 | RESPIRATION RATE: 14 BRPM | TEMPERATURE: 99 F | OXYGEN SATURATION: 98 % | HEART RATE: 67 BPM | SYSTOLIC BLOOD PRESSURE: 143 MMHG | HEIGHT: 68 IN

## 2025-06-21 DIAGNOSIS — I21.4 NSTEMI (NON-ST ELEVATED MYOCARDIAL INFARCTION): ICD-10-CM

## 2025-06-21 DIAGNOSIS — R07.9 CHEST PAIN: Primary | ICD-10-CM

## 2025-06-21 LAB
ALBUMIN SERPL-MCNC: 4.1 G/DL (ref 3.4–4.8)
ALBUMIN/GLOB SERPL: 1.1 RATIO (ref 1.1–2)
ALP SERPL-CCNC: 94 UNIT/L (ref 40–150)
ALT SERPL-CCNC: 15 UNIT/L (ref 0–55)
ANION GAP SERPL CALC-SCNC: 11 MEQ/L
APICAL FOUR CHAMBER EJECTION FRACTION: 54 %
APICAL TWO CHAMBER EJECTION FRACTION: 53 %
APTT PPP: 30.5 SECONDS (ref 23.2–33.7)
APTT PPP: 37.6 SECONDS (ref 23.2–33.7)
AST SERPL-CCNC: 21 UNIT/L (ref 11–45)
AV INDEX (PROSTH): 0.76
AV MEAN GRADIENT: 6 MMHG
AV PEAK GRADIENT: 12 MMHG
AV VALVE AREA BY VELOCITY RATIO: 2 CM²
AV VALVE AREA: 2.2 CM²
AV VELOCITY RATIO: 0.71
BASOPHILS # BLD AUTO: 0.09 X10(3)/MCL
BASOPHILS # BLD AUTO: 0.1 X10(3)/MCL
BASOPHILS NFR BLD AUTO: 1.1 %
BASOPHILS NFR BLD AUTO: 1.3 %
BILIRUB SERPL-MCNC: 1.1 MG/DL
BSA FOR ECHO PROCEDURE: 1.93 M2
BUN SERPL-MCNC: 22.1 MG/DL (ref 8.4–25.7)
CALCIUM SERPL-MCNC: 9.2 MG/DL (ref 8.8–10)
CHLORIDE SERPL-SCNC: 104 MMOL/L (ref 98–107)
CO2 SERPL-SCNC: 23 MMOL/L (ref 23–31)
CREAT SERPL-MCNC: 1.71 MG/DL (ref 0.72–1.25)
CREAT/UREA NIT SERPL: 13
CV ECHO LV RWT: 0.38 CM
DOP CALC AO PEAK VEL: 1.7 M/S
DOP CALC AO VTI: 28.4 CM
DOP CALC LVOT AREA: 2.8 CM2
DOP CALC LVOT DIAMETER: 1.9 CM
DOP CALC LVOT PEAK VEL: 1.2 M/S
DOP CALC LVOT STROKE VOLUME: 61.2 CM3
DOP CALCLVOT PEAK VEL VTI: 21.6 CM
E WAVE DECELERATION TIME: 238 MSEC
E/A RATIO: 0.53
E/E' RATIO: 8 M/S
ECHO LV POSTERIOR WALL: 1.1 CM (ref 0.6–1.1)
EOSINOPHIL # BLD AUTO: 0.1 X10(3)/MCL (ref 0–0.9)
EOSINOPHIL # BLD AUTO: 0.14 X10(3)/MCL (ref 0–0.9)
EOSINOPHIL NFR BLD AUTO: 1.3 %
EOSINOPHIL NFR BLD AUTO: 1.7 %
ERYTHROCYTE [DISTWIDTH] IN BLOOD BY AUTOMATED COUNT: 12.8 % (ref 11.5–17)
ERYTHROCYTE [DISTWIDTH] IN BLOOD BY AUTOMATED COUNT: 12.9 % (ref 11.5–17)
FRACTIONAL SHORTENING: 22.4 % (ref 28–44)
GFR SERPLBLD CREATININE-BSD FMLA CKD-EPI: 43 ML/MIN/1.73/M2
GLOBULIN SER-MCNC: 3.6 GM/DL (ref 2.4–3.5)
GLUCOSE SERPL-MCNC: 113 MG/DL (ref 82–115)
HCT VFR BLD AUTO: 41.6 % (ref 42–52)
HCT VFR BLD AUTO: 42.8 % (ref 42–52)
HGB BLD-MCNC: 14.3 G/DL (ref 14–18)
HGB BLD-MCNC: 14.4 G/DL (ref 14–18)
IMM GRANULOCYTES # BLD AUTO: 0.02 X10(3)/MCL (ref 0–0.04)
IMM GRANULOCYTES # BLD AUTO: 0.03 X10(3)/MCL (ref 0–0.04)
IMM GRANULOCYTES NFR BLD AUTO: 0.2 %
IMM GRANULOCYTES NFR BLD AUTO: 0.4 %
INR PPP: 1.1
INTERVENTRICULAR SEPTUM: 1.3 CM (ref 0.6–1.1)
LEFT ATRIUM AREA SYSTOLIC (APICAL 2 CHAMBER): 22 CM2
LEFT ATRIUM AREA SYSTOLIC (APICAL 4 CHAMBER): 17.8 CM2
LEFT ATRIUM SIZE: 4.1 CM
LEFT ATRIUM VOLUME INDEX MOD: 30 ML/M2
LEFT ATRIUM VOLUME MOD: 57 ML
LEFT INTERNAL DIMENSION IN SYSTOLE: 4.5 CM (ref 2.1–4)
LEFT VENTRICLE DIASTOLIC VOLUME INDEX: 87.43 ML/M2
LEFT VENTRICLE DIASTOLIC VOLUME: 167 ML
LEFT VENTRICLE END DIASTOLIC VOLUME APICAL 2 CHAMBER: 80.9 ML
LEFT VENTRICLE END DIASTOLIC VOLUME APICAL 4 CHAMBER: 136 ML
LEFT VENTRICLE END SYSTOLIC VOLUME APICAL 2 CHAMBER: 68.1 ML
LEFT VENTRICLE END SYSTOLIC VOLUME APICAL 4 CHAMBER: 45.3 ML
LEFT VENTRICLE MASS INDEX: 155.5 G/M2
LEFT VENTRICLE SYSTOLIC VOLUME INDEX: 48.2 ML/M2
LEFT VENTRICLE SYSTOLIC VOLUME: 92 ML
LEFT VENTRICULAR INTERNAL DIMENSION IN DIASTOLE: 5.8 CM (ref 3.5–6)
LEFT VENTRICULAR MASS: 297 G
LV LATERAL E/E' RATIO: 8.3 M/S
LV SEPTAL E/E' RATIO: 8.3 M/S
LVED V (TEICH): 167 ML
LVES V (TEICH): 92.4 ML
LVOT MG: 3 MMHG
LVOT MV: 0.73 CM/S
LYMPHOCYTES # BLD AUTO: 1.17 X10(3)/MCL (ref 0.6–4.6)
LYMPHOCYTES # BLD AUTO: 1.27 X10(3)/MCL (ref 0.6–4.6)
LYMPHOCYTES NFR BLD AUTO: 14.1 %
LYMPHOCYTES NFR BLD AUTO: 16.7 %
MAGNESIUM SERPL-MCNC: 2.2 MG/DL (ref 1.6–2.6)
MCH RBC QN AUTO: 30.5 PG (ref 27–31)
MCH RBC QN AUTO: 31.2 PG (ref 27–31)
MCHC RBC AUTO-ENTMCNC: 33.6 G/DL (ref 33–36)
MCHC RBC AUTO-ENTMCNC: 34.4 G/DL (ref 33–36)
MCV RBC AUTO: 90.6 FL (ref 80–94)
MCV RBC AUTO: 90.7 FL (ref 80–94)
MONOCYTES # BLD AUTO: 0.76 X10(3)/MCL (ref 0.1–1.3)
MONOCYTES # BLD AUTO: 0.8 X10(3)/MCL (ref 0.1–1.3)
MONOCYTES NFR BLD AUTO: 10 %
MONOCYTES NFR BLD AUTO: 9.6 %
MV PEAK A VEL: 0.94 M/S
MV PEAK E VEL: 0.5 M/S
NEUTROPHILS # BLD AUTO: 5.33 X10(3)/MCL (ref 2.1–9.2)
NEUTROPHILS # BLD AUTO: 6.09 X10(3)/MCL (ref 2.1–9.2)
NEUTROPHILS NFR BLD AUTO: 70.3 %
NEUTROPHILS NFR BLD AUTO: 73.3 %
NRBC BLD AUTO-RTO: 0 %
NRBC BLD AUTO-RTO: 0 %
OHS LV EJECTION FRACTION SIMPSONS BIPLANE MOD: 53 %
PLATELET # BLD AUTO: 242 X10(3)/MCL (ref 130–400)
PLATELET # BLD AUTO: 248 X10(3)/MCL (ref 130–400)
PMV BLD AUTO: 8.8 FL (ref 7.4–10.4)
PMV BLD AUTO: 8.9 FL (ref 7.4–10.4)
POTASSIUM SERPL-SCNC: 3.9 MMOL/L (ref 3.5–5.1)
PROT SERPL-MCNC: 7.7 GM/DL (ref 5.8–7.6)
PROTHROMBIN TIME: 13.8 SECONDS (ref 12.5–14.5)
RA PRESSURE ESTIMATED: 8 MMHG
RBC # BLD AUTO: 4.59 X10(6)/MCL (ref 4.7–6.1)
RBC # BLD AUTO: 4.72 X10(6)/MCL (ref 4.7–6.1)
SODIUM SERPL-SCNC: 138 MMOL/L (ref 136–145)
TDI LATERAL: 0.06 M/S
TDI SEPTAL: 0.06 M/S
TDI: 0.06 M/S
TRICUSPID ANNULAR PLANE SYSTOLIC EXCURSION: 2.2 CM
TROPONIN I SERPL-MCNC: 0.08 NG/ML (ref 0–0.04)
TROPONIN I SERPL-MCNC: 0.1 NG/ML (ref 0–0.04)
WBC # BLD AUTO: 7.59 X10(3)/MCL (ref 4.5–11.5)
WBC # BLD AUTO: 8.31 X10(3)/MCL (ref 4.5–11.5)
Z-SCORE OF LEFT VENTRICULAR DIMENSION IN END DIASTOLE: 0.76
Z-SCORE OF LEFT VENTRICULAR DIMENSION IN END SYSTOLE: 2.43

## 2025-06-21 PROCEDURE — 85025 COMPLETE CBC W/AUTO DIFF WBC: CPT

## 2025-06-21 PROCEDURE — 80053 COMPREHEN METABOLIC PANEL: CPT

## 2025-06-21 PROCEDURE — 85025 COMPLETE CBC W/AUTO DIFF WBC: CPT | Performed by: NURSE PRACTITIONER

## 2025-06-21 PROCEDURE — 36415 COLL VENOUS BLD VENIPUNCTURE: CPT | Performed by: NURSE PRACTITIONER

## 2025-06-21 PROCEDURE — 83735 ASSAY OF MAGNESIUM: CPT

## 2025-06-21 PROCEDURE — 25000003 PHARM REV CODE 250: Performed by: NURSE PRACTITIONER

## 2025-06-21 PROCEDURE — 36415 COLL VENOUS BLD VENIPUNCTURE: CPT

## 2025-06-21 PROCEDURE — 25000003 PHARM REV CODE 250: Performed by: INTERNAL MEDICINE

## 2025-06-21 PROCEDURE — 36415 COLL VENOUS BLD VENIPUNCTURE: CPT | Performed by: INTERNAL MEDICINE

## 2025-06-21 PROCEDURE — 96366 THER/PROPH/DIAG IV INF ADDON: CPT

## 2025-06-21 PROCEDURE — 25000242 PHARM REV CODE 250 ALT 637 W/ HCPCS

## 2025-06-21 PROCEDURE — 96365 THER/PROPH/DIAG IV INF INIT: CPT

## 2025-06-21 PROCEDURE — 11000001 HC ACUTE MED/SURG PRIVATE ROOM

## 2025-06-21 PROCEDURE — 99900035 HC TECH TIME PER 15 MIN (STAT)

## 2025-06-21 PROCEDURE — 94640 AIRWAY INHALATION TREATMENT: CPT

## 2025-06-21 PROCEDURE — 63600175 PHARM REV CODE 636 W HCPCS: Performed by: NURSE PRACTITIONER

## 2025-06-21 PROCEDURE — 85730 THROMBOPLASTIN TIME PARTIAL: CPT | Performed by: NURSE PRACTITIONER

## 2025-06-21 PROCEDURE — 84484 ASSAY OF TROPONIN QUANT: CPT

## 2025-06-21 PROCEDURE — 85730 THROMBOPLASTIN TIME PARTIAL: CPT | Performed by: INTERNAL MEDICINE

## 2025-06-21 PROCEDURE — 85610 PROTHROMBIN TIME: CPT | Performed by: NURSE PRACTITIONER

## 2025-06-21 PROCEDURE — 84484 ASSAY OF TROPONIN QUANT: CPT | Performed by: INTERNAL MEDICINE

## 2025-06-21 RX ORDER — IBUPROFEN 200 MG
16 TABLET ORAL
Status: DISCONTINUED | OUTPATIENT
Start: 2025-06-21 | End: 2025-06-22 | Stop reason: HOSPADM

## 2025-06-21 RX ORDER — CLOPIDOGREL BISULFATE 75 MG/1
75 TABLET ORAL DAILY
Status: DISCONTINUED | OUTPATIENT
Start: 2025-06-21 | End: 2025-06-22 | Stop reason: HOSPADM

## 2025-06-21 RX ORDER — IPRATROPIUM BROMIDE AND ALBUTEROL SULFATE 2.5; .5 MG/3ML; MG/3ML
3 SOLUTION RESPIRATORY (INHALATION) EVERY 4 HOURS PRN
Status: DISCONTINUED | OUTPATIENT
Start: 2025-06-21 | End: 2025-06-21

## 2025-06-21 RX ORDER — CLONAZEPAM 0.5 MG/1
0.5 TABLET ORAL EVERY 12 HOURS PRN
Status: DISCONTINUED | OUTPATIENT
Start: 2025-06-21 | End: 2025-06-22 | Stop reason: HOSPADM

## 2025-06-21 RX ORDER — IBUPROFEN 200 MG
24 TABLET ORAL
Status: DISCONTINUED | OUTPATIENT
Start: 2025-06-21 | End: 2025-06-22 | Stop reason: HOSPADM

## 2025-06-21 RX ORDER — ASPIRIN 81 MG/1
81 TABLET ORAL DAILY
Status: DISCONTINUED | OUTPATIENT
Start: 2025-06-21 | End: 2025-06-22 | Stop reason: HOSPADM

## 2025-06-21 RX ORDER — GLUCAGON 1 MG
1 KIT INJECTION
Status: DISCONTINUED | OUTPATIENT
Start: 2025-06-21 | End: 2025-06-22 | Stop reason: HOSPADM

## 2025-06-21 RX ORDER — AMLODIPINE BESYLATE 5 MG/1
5 TABLET ORAL DAILY
Status: DISCONTINUED | OUTPATIENT
Start: 2025-06-22 | End: 2025-06-22 | Stop reason: HOSPADM

## 2025-06-21 RX ORDER — ALUMINUM HYDROXIDE, MAGNESIUM HYDROXIDE, AND SIMETHICONE 1200; 120; 1200 MG/30ML; MG/30ML; MG/30ML
30 SUSPENSION ORAL 4 TIMES DAILY PRN
Status: DISCONTINUED | OUTPATIENT
Start: 2025-06-21 | End: 2025-06-22 | Stop reason: HOSPADM

## 2025-06-21 RX ORDER — IPRATROPIUM BROMIDE AND ALBUTEROL SULFATE 2.5; .5 MG/3ML; MG/3ML
3 SOLUTION RESPIRATORY (INHALATION) EVERY 4 HOURS
Status: DISCONTINUED | OUTPATIENT
Start: 2025-06-21 | End: 2025-06-22 | Stop reason: HOSPADM

## 2025-06-21 RX ORDER — ATORVASTATIN CALCIUM 10 MG/1
20 TABLET, FILM COATED ORAL NIGHTLY
Status: DISCONTINUED | OUTPATIENT
Start: 2025-06-21 | End: 2025-06-22 | Stop reason: HOSPADM

## 2025-06-21 RX ORDER — POLYETHYLENE GLYCOL 3350 17 G/17G
17 POWDER, FOR SOLUTION ORAL 2 TIMES DAILY PRN
Status: DISCONTINUED | OUTPATIENT
Start: 2025-06-21 | End: 2025-06-22 | Stop reason: HOSPADM

## 2025-06-21 RX ORDER — HEPARIN SODIUM,PORCINE/D5W 25000/250
0-40 INTRAVENOUS SOLUTION INTRAVENOUS CONTINUOUS
Status: DISCONTINUED | OUTPATIENT
Start: 2025-06-21 | End: 2025-06-22 | Stop reason: HOSPADM

## 2025-06-21 RX ORDER — ACETAMINOPHEN 325 MG/1
650 TABLET ORAL EVERY 4 HOURS PRN
Status: DISCONTINUED | OUTPATIENT
Start: 2025-06-21 | End: 2025-06-22 | Stop reason: HOSPADM

## 2025-06-21 RX ORDER — MUPIROCIN 20 MG/G
OINTMENT TOPICAL 2 TIMES DAILY
Status: DISCONTINUED | OUTPATIENT
Start: 2025-06-21 | End: 2025-06-22 | Stop reason: HOSPADM

## 2025-06-21 RX ORDER — TALC
6 POWDER (GRAM) TOPICAL NIGHTLY PRN
Status: DISCONTINUED | OUTPATIENT
Start: 2025-06-21 | End: 2025-06-22 | Stop reason: HOSPADM

## 2025-06-21 RX ORDER — BISACODYL 10 MG/1
10 SUPPOSITORY RECTAL DAILY PRN
Status: DISCONTINUED | OUTPATIENT
Start: 2025-06-21 | End: 2025-06-22 | Stop reason: HOSPADM

## 2025-06-21 RX ORDER — SODIUM CHLORIDE 0.9 % (FLUSH) 0.9 %
10 SYRINGE (ML) INJECTION
Status: DISCONTINUED | OUTPATIENT
Start: 2025-06-21 | End: 2025-06-22 | Stop reason: HOSPADM

## 2025-06-21 RX ORDER — EZETIMIBE 10 MG/1
10 TABLET ORAL NIGHTLY
Status: DISCONTINUED | OUTPATIENT
Start: 2025-06-21 | End: 2025-06-22 | Stop reason: HOSPADM

## 2025-06-21 RX ADMIN — IPRATROPIUM BROMIDE AND ALBUTEROL SULFATE 3 ML: .5; 3 SOLUTION RESPIRATORY (INHALATION) at 07:06

## 2025-06-21 RX ADMIN — IPRATROPIUM BROMIDE AND ALBUTEROL SULFATE 3 ML: .5; 3 SOLUTION RESPIRATORY (INHALATION) at 11:06

## 2025-06-21 RX ADMIN — HEPARIN SODIUM 12 UNITS/KG/HR: 10000 INJECTION, SOLUTION INTRAVENOUS at 12:06

## 2025-06-21 RX ADMIN — CLOPIDOGREL 75 MG: 75 TABLET ORAL at 12:06

## 2025-06-21 RX ADMIN — HEPARIN SODIUM 15 UNITS/KG/HR: 10000 INJECTION, SOLUTION INTRAVENOUS at 09:06

## 2025-06-21 RX ADMIN — ASPIRIN 81 MG: 81 TABLET, DELAYED RELEASE ORAL at 12:06

## 2025-06-21 RX ADMIN — IPRATROPIUM BROMIDE AND ALBUTEROL SULFATE 3 ML: .5; 3 SOLUTION RESPIRATORY (INHALATION) at 04:06

## 2025-06-21 RX ADMIN — MUPIROCIN: 20 OINTMENT TOPICAL at 09:06

## 2025-06-21 RX ADMIN — ATORVASTATIN CALCIUM 20 MG: 10 TABLET, FILM COATED ORAL at 11:06

## 2025-06-21 RX ADMIN — EZETIMIBE 10 MG: 10 TABLET ORAL at 11:06

## 2025-06-21 NOTE — CONSULTS
[facility]  Cardiology  Consult Note    Patient Name: Leno Thompson Jr.  MRN: [id number]  Admission Date: 6/21/2025  Hospital Length of Stay: 0 days  Code Status: Full Code   Attending Provider: Bacilio Ochoa MD   Consulting Provider: Jax Cross MD  Primary Care Physician: Nancy Gutierrez, TANIKAPTAMAR  Principal Problem:<principal problem not specified>    Patient information was obtained from patient and ER records.     Subjective:     Chief Complaint:  NSTEMI     HPI:  70-year-old pleasant male known to Dr. Bowden transferred from [place] for higher level of care.  Complaint of chest pain 06/20/2025 EKG did not reveal any evidence of ST segment elevation.  Troponin mildly elevated.  Patient transferred to [facility].  Patient given aspirin heparin.  Patient seen at bedside today resting comfortably.  He does have a history of anoxic brain injury answer all questions appropriately, albeit a bit slowly.  He is alert and oriented.    Chart review reveals that he suffered a cardiac arrest in 2018 with anoxic brain injury. Subsequently had coronary angiogram at that time.  2018: Had successful PCI with a bare metal stent to the RCA.  Documented failed attempt at a left circumflex coronary artery  at that time.  2021:  Repeat angiography reveals occluded stents in the RCA.  However,  states that left circumflex coronary artery was patent on this report.    Conflicting reports.  Images not able to be reviewed.  2025:  Nuclear stress test reveals mainly inferior and inferolateral scar with mild ischemia.  Echocardiogram reveals EF 45% with inferolateral hypokinesis/akinesis with moderate MR    PMH:  PCI, valvular heart disease, cardiac arrest  PSH:  Traumatic left leg amputation        Past Medical History:   Diagnosis Date    Anoxic brain injury     Arthritis     CAD (coronary artery disease)     Cardiac arrest     Heart attack     HLD  (hyperlipidemia)     Hx of left BKA     Hypertension     Mitral valve regurgitation     Muscle spasms of both lower extremities     Muscle spasms of both lower extremities     PEG (percutaneous endoscopic gastrostomy) adjustment/replacement/removal     Status post placement of stent in right coronary artery        Past Surgical History:   Procedure Laterality Date    LEG AMPUTATION Left        Review of patient's allergies indicates:  No Known Allergies    Current Facility-Administered Medications on File Prior to Encounter   Medication    [COMPLETED] albuterol-ipratropium 2.5 mg-0.5 mg/3 mL nebulizer solution 3 mL    [COMPLETED] albuterol-ipratropium 2.5 mg-0.5 mg/3 mL nebulizer solution 3 mL    [COMPLETED] furosemide tablet 20 mg    [COMPLETED] LORazepam injection 1 mg    [DISCONTINUED] nitroGLYCERIN 2% TD oint ointment 2 inch     Current Outpatient Medications on File Prior to Encounter   Medication Sig    acetaminophen (TYLENOL) 650 MG TbSR Take 650 mg by mouth 2 (two) times a day.    albuterol-ipratropium (DUO-NEB) 2.5 mg-0.5 mg/3 mL nebulizer solution Take 3 mLs by nebulization every 4 (four) hours as needed for Wheezing or Shortness of Breath.    amLODIPine (NORVASC) 5 MG tablet Take 1 tablet (5 mg total) by mouth once daily.    atorvastatin (LIPITOR) 20 MG tablet Take 20 mg by mouth every evening.    baclofen (LIORESAL) 5 mg Tab tablet Take 20 mg by mouth 3 (three) times daily.    carboxymethylcellulose sodium (ARTIFICIAL TEARS, [facility],) 1 % Drop Apply 2 drops to eye 4 (four) times daily.    clonazePAM (KLONOPIN) 0.5 MG tablet Take 0.5 mg by mouth every 12 (twelve) hours as needed (Muscle jerk).    clopidogreL (PLAVIX) 75 mg tablet Take 75 mg by mouth once daily.    ezetimibe (ZETIA) 10 mg tablet Take 10 mg by mouth every evening.    furosemide (LASIX) 20 MG tablet Take 20 mg by mouth once daily. (Patient taking differently: Take 20 mg by mouth daily as needed (dyspnea).)    isosorbide mononitrate (IMDUR) 30 MG  24 hr tablet Take 1 tablet (30 mg total) by mouth once daily.    ranolazine (RANEXA) 500 MG Tb12 Take 1 tablet (500 mg total) by mouth 2 (two) times daily.    senna-docusate 8.6-50 mg (SENNA WITH DOCUSATE SODIUM) 8.6-50 mg per tablet Take 1 tablet by mouth once daily.    vitamin D3-folic acid 125 mcg (5,000 unit)-1 mg Tab Take 1 tablet by mouth Daily.     Family History    None       Tobacco Use    Smoking status: Every Day     Current packs/day: 1.00     Average packs/day: 1 pack/day for 53.5 years (53.5 ttl pk-yrs)     Types: Cigarettes     Start date: 1972     Passive exposure: Current    Smokeless tobacco: Never   Substance and Sexual Activity    Alcohol use: Never    Drug use: Never    Sexual activity: Not Currently     Review of Systems:  Review of Systems   Constitutional: Negative.    HENT: Negative.     Eyes: Negative.    Cardiovascular:  Positive for chest pain.   Gastrointestinal: Negative.    Endocrine: Negative.    Genitourinary: Negative.    Musculoskeletal: Negative.    Skin: Negative.    Allergic/Immunologic: Negative.    Neurological: Negative.    Hematological: Negative.    Psychiatric/Behavioral: Negative.         Objective:     Vital Signs (Most Recent):  Temp: 98.2 °F (36.8 °C) (06/21/25 1048)  Pulse: 74 (06/21/25 1048)  Resp: 15 (06/21/25 0727)  BP: (!) 155/66 (06/21/25 1048)  SpO2: (!) 93 % (06/21/25 1048) Vital Signs (24h Range):  Temp:  [97.4 °F (36.3 °C)-98.7 °F (37.1 °C)] 98.2 °F (36.8 °C)  Pulse:  [62-89] 74  Resp:  [12-24] 15  SpO2:  [93 %-99 %] 93 %  BP: (130-207)/() 155/66     Weight: 77.2 kg (170 lb 4.8 oz)  Body mass index is 25.89 kg/m².    SpO2: (!) 93 %       No intake or output data in the 24 hours ending 06/21/25 1110    Lines/Drains/Airways       Peripheral Intravenous Line  Duration             Peripheral IV 20 G Left;Posterior Hand -- days                      Significant Labs:  Recent Results (from the past 72 hours)   EKG 12-lead    Collection Time: 06/20/25  6:24 AM    Result Value Ref Range    QRS Duration 106 ms    [facility] QTC Calculation 427 ms   Comprehensive metabolic panel    Collection Time: 06/20/25  6:41 AM   Result Value Ref Range    Sodium 139 136 - 145 mmol/L    Potassium 4.0 3.5 - 5.1 mmol/L    Chloride 105 98 - 110 mmol/L    CO2 28 21 - 32 mmol/L    Glucose 130 (H) 70 - 115 mg/dL    Blood Urea Nitrogen 19 7.0 - 20.0 mg/dL    Creatinine 1.68 (H) 0.66 - 1.25 mg/dL    Calcium 9.2 8.4 - 10.2 mg/dL    Protein Total 7.8 6.3 - 8.2 gm/dL    Albumin 4.5 3.4 - 5.0 g/dL    Globulin 3.3 2.0 - 3.9 gm/dL    Albumin/Globulin Ratio 1.4 ratio    Bilirubin Total 0.8 0.0 - 1.0 mg/dL    ALP 81 50 - 144 unit/L    ALT 22 1 - 45 unit/L    AST 22 17 - 59 unit/L    eGFR 44 mL/min/1.73/m2    Anion Gap 6.0 2.0 - 13.0 mEq/L    BUN/Creatinine Ratio 11 (L) 12 - 20   Troponin I #1    Collection Time: 06/20/25  6:41 AM   Result Value Ref Range    Troponin-I 0.040 (H) 0.000 - 0.034 ng/mL   NT-Pro Natriuretic Peptide    Collection Time: 06/20/25  6:41 AM   Result Value Ref Range    ProBNP 541.0 (H) 0.0 - 124.9 PG/ML   D dimer, quantitative    Collection Time: 06/20/25  6:41 AM   Result Value Ref Range    D-Dimer 0.80 (H) 0.19 - 0.50 mg/L FEU   CBC with Differential    Collection Time: 06/20/25  6:41 AM   Result Value Ref Range    WBC 8.06 4.00 - 11.50 x10(3)/mcL    RBC 4.48 4.00 - 6.00 x10(6)/mcL    Hgb 13.9 13.0 - 18.0 g/dL    Hct 40.3 36.0 - 52.0 %    MCV 90.0 79.0 - 99.0 fL    MCH 31.0 27.0 - 34.0 pg    MCHC 34.5 31.0 - 37.0 g/dL    RDW 12.6 %    Platelet 242 140 - 371 x10(3)/mcL    MPV 8.8 (L) 9.4 - 12.4 fL    Neut % 65.9 37 - 73 %    Lymph % 18.5 (L) 20 - 55 %    Mono % 9.9 4.7 - 12.5 %    Eos % 3.7 0.7 - 7 %    Basophil % 1.5 (H) 0.1 - 1.2 %    Lymph # 1.49 1.32 - 3.57 x10(3)/mcL    Neut # 5.31 1.78 - 5.38 x10(3)/mcL    Mono # 0.80 0.3 - 0.82 x10(3)/mcL    Eos # 0.30 0.04 - 0.54 x10(3)/mcL    Baso # 0.12 (H) 0.01 - 0.08 x10(3)/mcL    Imm Gran # 0.04 (H) 0.00 - 0.03 x10(3)/mcL    Imm Grans % 0.5 0  - 0.5 %    NRBC% 0.0 <=1 %   Troponin I    Collection Time: 06/20/25  3:39 PM   Result Value Ref Range    Troponin-I 0.046 (H) 0.000 - 0.034 ng/mL   Comprehensive Metabolic Panel (CMP)    Collection Time: 06/21/25  7:12 AM   Result Value Ref Range    Sodium 138 136 - 145 mmol/L    Potassium 3.9 3.5 - 5.1 mmol/L    Chloride 104 98 - 107 mmol/L    CO2 23 23 - 31 mmol/L    Glucose 113 82 - 115 mg/dL    Blood Urea Nitrogen 22.1 8.4 - 25.7 mg/dL    Creatinine 1.71 (H) 0.72 - 1.25 mg/dL    Calcium 9.2 8.8 - 10.0 mg/dL    Protein Total 7.7 (H) 5.8 - 7.6 gm/dL    Albumin 4.1 3.4 - 4.8 g/dL    Globulin 3.6 (H) 2.4 - 3.5 gm/dL    Albumin/Globulin Ratio 1.1 1.1 - 2.0 ratio    Bilirubin Total 1.1 <=1.5 mg/dL    ALP 94 40 - 150 unit/L    ALT 15 0 - 55 unit/L    AST 21 11 - 45 unit/L    eGFR 43 mL/min/1.73/m2    Anion Gap 11.0 mEq/L    BUN/Creatinine Ratio 13    Magnesium    Collection Time: 06/21/25  7:12 AM   Result Value Ref Range    Magnesium Level 2.20 1.60 - 2.60 mg/dL   Troponin I    Collection Time: 06/21/25  7:12 AM   Result Value Ref Range    Troponin-I 0.099 (H) 0.000 - 0.045 ng/mL   CBC with Differential    Collection Time: 06/21/25  7:12 AM   Result Value Ref Range    WBC 8.31 4.50 - 11.50 x10(3)/mcL    RBC 4.59 (L) 4.70 - 6.10 x10(6)/mcL    Hgb 14.3 14.0 - 18.0 g/dL    Hct 41.6 (L) 42.0 - 52.0 %    MCV 90.6 80.0 - 94.0 fL    MCH 31.2 (H) 27.0 - 31.0 pg    MCHC 34.4 33.0 - 36.0 g/dL    RDW 12.8 11.5 - 17.0 %    Platelet 248 130 - 400 x10(3)/mcL    MPV 8.9 7.4 - 10.4 fL    Neut % 73.3 %    Lymph % 14.1 %    Mono % 9.6 %    Eos % 1.7 %    Basophil % 1.1 %    Imm Grans % 0.2 %    Neut # 6.09 2.1 - 9.2 x10(3)/mcL    Lymph # 1.17 0.6 - 4.6 x10(3)/mcL    Mono # 0.80 0.1 - 1.3 x10(3)/mcL    Eos # 0.14 0 - 0.9 x10(3)/mcL    Baso # 0.09 <=0.2 x10(3)/mcL    Imm Gran # 0.02 0.00 - 0.04 x10(3)/mcL    NRBC% 0.0 %         Last Lipids:  Lab Results   Component Value Date    CHOL 129 01/23/2025    CHOL 119 06/12/2024     Lab Results  "  Component Value Date    HDL 36 (L) 01/23/2025    HDL 36 (L) 06/12/2024     No results found for: "LDLCALC"  Lab Results   Component Value Date    TRIG 150 01/23/2025    TRIG 105 06/12/2024       Telemetry:  Sinus rhythm    Physical Exam:  History of anoxic brain injury but responds appropriately, albeit slowly  Alert and oriented x 3  S1 and S2 heard.  MR murmur auscultated  Lungs clear to auscultation bilateral  Contractures noted in the right upper extremity  Left below-knee amputation noted  Abdomen soft  Peripheries warm  No edema    Home Medications:   Medications Ordered Prior to Encounter[1]    Current Inpatient Medications:  Current Medications[2]         VTE Risk Mitigation (From admission, onward)           Ordered     heparin 25,000 units in dextrose 5% (100 units/ml) IV bolus from bag LOW INTENSITY nomogram - [facility]  As needed (PRN)        Question:  Heparin Infusion Adjustment (DO NOT MODIFY ANSWER)  Answer:  [email or web address]\[email or web address]\Images\Pharmacy\HeparinInfusions\heparin LOW INTENSITY nomogram for [facility] [id number]    06/21/25 1100     heparin 25,000 units in dextrose 5% (100 units/ml) IV bolus from bag LOW INTENSITY nomogram - [facility]  As needed (PRN)        Question:  Heparin Infusion Adjustment (DO NOT MODIFY ANSWER)  Answer:  [email or web address]\Images\Pharmacy\HeparinInfusions\heparin LOW INTENSITY nomogram for [facility] [id number]    06/21/25 1100     heparin 25,000 units in dextrose 5% (100 units/ml) IV bolus from bag LOW INTENSITY nomogram - [facility]  Once        Question:  Heparin Infusion Adjustment (DO NOT MODIFY ANSWER)  Answer:  \[email or web address]\epic\Images\Pharmacy\HeparinInfusions\heparin LOW INTENSITY nomogram for [facility] [id number]    06/21/25 1100     heparin 25,000 units in dextrose 5% 250 mL (100 units/mL) infusion LOW INTENSITY nomogram - [facility]  Continuous        Question:  Begin at (units/kg/hr)  Answer:  12    06/21/25 1100     IP VTE HIGH RISK PATIENT  Once         06/21/25 0642     Place sequential compression " device  Until discontinued         06/21/25 0642     Reason for No Pharmacological VTE Prophylaxis  Once        Comments: Will assess when patient is examined   Question:  Reasons:  Answer:  Physician Provided (leave comment)    06/21/25 0642                    Assessment:   - NSTEMI (likely type 1)  - Known CAD with prior RCA PCI in 2018 with subsequent occluded RCA stents 2021.  Conflicting reports about left circumflex coronary artery which stated left circumflex coronary artery had a  with failed revascularization attempt in 2018, however, angiogram report in 2021 stated that left circumflex coronary artery system was patent.  - LV dysfunction with EF 45% and moderate MR  - Abnormal SPECT MPI 1/25 with inferior and inferolateral infarct with mild rodnye-infarct ischemia  - History of anoxic brain injury following cardiac arrest 2018.  - Valvular heart disease:  Moderate MR per echocardiogram 1/25  - Traumatic left below-knee amputation      PLAN:   - Recommended coronary angiogram, however, patient states that he is feeling okay without chest pain and does not wish to pursue any invasive measures at this point in time.  He would prefer medical therapy.  He states if he develops any further chest pain will then consider angiogram.   - Aspirin 81 mg p.o. daily  - Plavix 75 mg p.o. daily  - Up titrate antianginal therapy  - IV heparin for 48 hours  - Echocardiogram  - Trend troponin until peak  - We will continue along.  Patient to follow up with Dr. Bowden upon discharge      Thank you for your consult.     Jax Cross MD  Cardiology  [facility] - 6th Floor Medical Telemetry  06/21/2025 11:10 AM          [1]   Current Facility-Administered Medications on File Prior to Encounter   Medication Dose Route Frequency Provider Last Rate Last Admin    [COMPLETED] albuterol-ipratropium 2.5 mg-0.5 mg/3 mL nebulizer solution 3 mL  3 mL Nebulization ED 1 Time Arturo Carbajal MD   3 mL at 06/20/25 9566     [COMPLETED] albuterol-ipratropium 2.5 mg-0.5 mg/3 mL nebulizer solution 3 mL  3 mL Nebulization ED 1 Time Elia Tony MD   3 mL at 06/20/25 2029    [COMPLETED] furosemide tablet 20 mg  20 mg Oral ED 1 Time Elia Tony MD   20 mg at 06/20/25 1954    [COMPLETED] LORazepam injection 1 mg  1 mg Intravenous ED 1 Time Elia Tony MD   1 mg at 06/20/25 2028    [DISCONTINUED] nitroGLYCERIN 2% TD oint ointment 2 inch  2 inch Topical (Top) ED 1 Time Elia Tony MD         Current Outpatient Medications on File Prior to Encounter   Medication Sig Dispense Refill    acetaminophen (TYLENOL) 650 MG TbSR Take 650 mg by mouth 2 (two) times a day.      albuterol-ipratropium (DUO-NEB) 2.5 mg-0.5 mg/3 mL nebulizer solution Take 3 mLs by nebulization every 4 (four) hours as needed for Wheezing or Shortness of Breath.      amLODIPine (NORVASC) 5 MG tablet Take 1 tablet (5 mg total) by mouth once daily. 90 tablet 3    atorvastatin (LIPITOR) 20 MG tablet Take 20 mg by mouth every evening.      baclofen (LIORESAL) 5 mg Tab tablet Take 20 mg by mouth 3 (three) times daily.      carboxymethylcellulose sodium (ARTIFICIAL TEARS, [facility],) 1 % Drop Apply 2 drops to eye 4 (four) times daily.      clonazePAM (KLONOPIN) 0.5 MG tablet Take 0.5 mg by mouth every 12 (twelve) hours as needed (Muscle jerk).      clopidogreL (PLAVIX) 75 mg tablet Take 75 mg by mouth once daily.      ezetimibe (ZETIA) 10 mg tablet Take 10 mg by mouth every evening.      furosemide (LASIX) 20 MG tablet Take 20 mg by mouth once daily. (Patient taking differently: Take 20 mg by mouth daily as needed (dyspnea).)      isosorbide mononitrate (IMDUR) 30 MG 24 hr tablet Take 1 tablet (30 mg total) by mouth once daily. 30 tablet 2    ranolazine (RANEXA) 500 MG Tb12 Take 1 tablet (500 mg total) by mouth 2 (two) times daily. 60 tablet 11    senna-docusate 8.6-50 mg (SENNA WITH DOCUSATE SODIUM) 8.6-50 mg per tablet Take 1 tablet by mouth once daily.       vitamin D3-folic acid 125 mcg (5,000 unit)-1 mg Tab Take 1 tablet by mouth Daily.     [2]   Current Facility-Administered Medications:     acetaminophen tablet 650 mg, 650 mg, Oral, Q4H PRN, Bibi, Carola, FNP    albuterol-ipratropium 2.5 mg-0.5 mg/3 mL nebulizer solution 3 mL, 3 mL, Nebulization, Q4H PRN, Bibi, Carola, FNP, 3 mL at 06/21/25 0727    aluminum-magnesium hydroxide-simethicone 200-200-20 mg/5 mL suspension 30 mL, 30 mL, Oral, QID PRN, Bibi, Carola, FNP    aspirin EC tablet 81 mg, 81 mg, Oral, Daily, Sebastian, Laterica L, FNP    bisacodyL suppository 10 mg, 10 mg, Rectal, Daily PRN, [place], Carola, FNP    clopidogreL tablet 75 mg, 75 mg, Oral, Daily, Sebastian, Laterica L, FNP    dextrose 50% injection 12.5 g, 12.5 g, Intravenous, PRN, [place], Carola, FNP    dextrose 50% injection 25 g, 25 g, Intravenous, PRN, Bibi, Carola, FNP    glucagon (human recombinant) injection 1 mg, 1 mg, Intramuscular, PRN, [place], Carola, FNP    glucose chewable tablet 16 g, 16 g, Oral, PRN, Bibi, Carola, FNP    glucose chewable tablet 24 g, 24 g, Oral, PRN, [place], Carola, FNP    heparin 25,000 units in dextrose 5% (100 units/ml) IV bolus from bag LOW INTENSITY nomogram - [facility], 51.8 Units/kg, Intravenous, Once, Sebastian, Laterica L, FNP    heparin 25,000 units in dextrose 5% (100 units/ml) IV bolus from bag LOW INTENSITY nomogram - [facility], 51.8 Units/kg, Intravenous, PRN, Sebastian, Laterica L, FNP    heparin 25,000 units in dextrose 5% (100 units/ml) IV bolus from bag LOW INTENSITY nomogram - [facility], 30 Units/kg, Intravenous, PRN, Sebastian, Laterica L, FNP    heparin 25,000 units in dextrose 5% 250 mL (100 units/mL) infusion LOW INTENSITY nomogram - [facility], 0-40 Units/kg/hr, Intravenous, Continuous, Sebastian, Laterica L, FNP    melatonin tablet 6 mg, 6 mg, Oral, Nightly PRN, Carola Mtz, OLGA    mupirocin 2 % ointment, , Nasal, BID, Bacilio Ochoa MD, Given at 06/21/25 0901    polyethylene glycol packet 17 g, 17 g,  Oral, BID PRN, Carola Mtz, TANIKAP    sodium chloride 0.9% flush 10 mL, 10 mL, Intravenous, PRN, Carola Mtz, TANIKAP

## 2025-06-21 NOTE — H&P
Hospital Medicine  History & Physical Examination       Patient: Leno Thompson Jr.  MRN: [id number]  STATUS: IP- Inpatient   DOS: 6/21/2025   PCP: Nancy Gutierrez FNP-C      CC: chest pain       HISTORY OF PRESENT ILLNESS     70 y.o. male with a history that includes CAD s/p prior PCI/stent, was  transferred from [place] for higher level of care, where he presented with chest pressure.   EKG did not reveal any evidence of ST segment elevation, but troponin was mildly elevated.  Patient transferred to [facility] for cardiology evaluation.       REVIEW OF SYSTEMS     Except as documented, all other systems reviewed and negative       PAST MEDICAL HISTORY     CAD  HTN  HLD  Cardiac arrest  Anoxic brain injury      PAST SURGICAL HISTORY     Traumatic L BKA  Coronary stent  PEG      FAMILY HISTORY     Reviewed, negative in relation to patient's current condition.      SOCIAL HISTORY     Current smoker, 1PPD x 50 years.  Denies alcohol or drug abuse  Screening for Social Drivers of health:  Patient Screened for food insecurity, housing instability, transportation needs, utility difficulties, and interpersonal safety. Case management to address any needs.       ALLERGIES     NKDA      HOME MEDICATIONS      acetaminophen (TYLENOL) 650 mg, 2 times daily    albuterol-ipratropium (DUO-NEB) 2.5 mg-0.5 mg/3 mL neb soln 3 mLs, Every 4 hours PRN    amLODIPine (NORVASC) 5 mg, Oral, Daily    atorvastatin (LIPITOR) 20 mg, Nightly    baclofen (LIORESAL) 20 mg, 3 times daily    carboxymethylcellulose sodium (artificial tears, CMC,) 1% drops 2 drops, 4 times daily    clonazePAM (KLONOPIN) 0.5 mg, Every 12 hours PRN    clopidogreL (PLAVIX) 75 mg, Daily    ezetimibe (ZETIA) 10 mg, Nightly    furosemide (LASIX) 20 mg, Daily    isosorbide mononitrate (IMDUR) 30 mg, Oral, Daily    ranolazine (RANEXA) 500 mg, Oral, 2 times daily    senna-docusate (SENNA WITH DOCUSATE SODIUM) 8.6-50mg tab 1 tablet, Daily    vitamin D3-folic acid 125 mcg (5,000  "unit)-1 mg Tab 1 tablet, Daily       PHYSICAL EXAM     VITALS: T 98.2 °F (36.8 °C)   BP (!) 155/66   P 75   RR 15   O2 95 %    GENERAL: Awake and in NAD  HEENT: NC/AT, EOMI, PERRL.  NECK: Supple,  No JVD  LUNGS: CTA B/L  CVS: RRR, S1S2 positive  GI/: Soft, NT/ND, bowel sounds positive.  EXTREMITIES: Peripheral pulses 2+, L BKA, no peripheral edema  DERM: Warm, dry.  No rashes or lesions noted.  NEURO: AAOx3, no focal neurologic deficit  PSYCH: Cooperative, appropriate mood and affect       DIAGNOSTICS     Recent Labs     06/21/25  0712 06/21/25  1146   WBC 8.31 7.59   RBC 4.59* 4.72   HGB 14.3 14.4   HCT 41.6* 42.8   MCV 90.6 90.7   MCH 31.2* 30.5   MCHC 34.4 33.6   RDW 12.8 12.9    242     No results for input(s): "LACTIC" in the last 72 hours.  Recent Labs     06/21/25  1146   INR 1.1   APTT 30.5     No results for input(s): "HGBA1C", "CHOL", "TRIG", "LDL", "VLDL", "HDL" in the last 72 hours.   Recent Labs     06/20/25  0641 06/21/25  0712    138   K 4.0 3.9   CO2 28 23   BUN 19 22.1   CREATININE 1.68* 1.71*   CALCIUM 9.2 9.2   MG  --  2.20   ALBUMIN 4.5 4.1   GLOBULIN 3.3 3.6*   ALKPHOS 81 94   ALT 22 15   AST 22 21   BILITOT 0.8 1.1     Recent Labs     06/20/25  1539 06/21/25  0712 06/21/25  1146   TROPONINI 0.046* 0.099* 0.077*          CTA Chest Non-Coronary (PE Studies)  Narrative: EXAMINATION:  STUDY:CTA CHEST NON CORONARY (PE STUDIES)    CLINICAL HISTORY AND TECHNIQUE:  Suspected pulmonary embolus, elevated D-dimer    This patient has had 0 CT and nuclear medicine scans performed within the last 12 months.    The following DOSE REDUCTION TECHNIQUES are used for all CT scans at [facility]:    1. Automated exposure control.  2. Adjustment of the mA and/or kv according to patient size.  3. Use of iterative reconstruction technique.    COMPARISON:  None    FINDINGS:  CT angiography of the pulmonary arterial vasculature with intravenous contrast was performed with post " processing and multiplanar and 3 dimensional reconstruction of images. The quality of the study is good. The pulmonary arterial vasculature appears unremarkable without evidence of significant thrombus/pulmonary embolus, stenotic segments, aneurysmal dilatation, vascular malformations or other significant abnormalities to the level of the segmental arterial branches. Moderate emphysematous changes are noted involving both lungs mild changes of interstitial fibrosis and scattered areas of parenchymal scarring.  Moderate atherosclerotic plaquing is noted within the thoracic aorta and its primary branches including coronary arteries.  Moderate degenerative changes involving thoracic spine.  Impression: 1. There is no evidence of pulmonary embolus or other significant abnormalities involving the pulmonary arterial vasculature to the level of the segmental arterial branches.  2. Chronic changes are present as described above.    Electronically signed by: Rigoberto Brandon  Date:    06/20/2025  Time:    08:27  X-Ray Chest AP Portable  Narrative: EXAMINATION:  STUDY: XR CHEST AP PORTABLE    CLINICAL HISTORY AND TECHNIQUE:  Chest pain    COMPARISON:  02/24/2025    FINDINGS:  The cardiac and mediastinal contours appear unremarkable.  The pulmonary vasculature is congested with minimally increased interstitial lung markings.  Parenchymal scarring is noted within the right lower lung field as seen previously.I see no lobar or segmental infiltrates.No significant pleural effusions are noted.Mild degenerative changes are noted involving the thoracic spine.  Impression: 1. The pulmonary vasculature is congested with minimally increased interstitial lung markings.  2. Chronic changes are present as described above.  See above comments.    Electronically signed by: Rigoberto Brandon  Date:    06/20/2025  Time:    07:00        ASSESSMENT     NSTEMI, possibly type I  h/o CAD s/p prior PCI/stents  h/o cardiac arrest, anoxic brain  injury  Essential HTN    PLAN     Appreciate Cardiology input  Cardiology recommending LHC, though patient declining at this time  Continue with DAPT, optimizing medical therapy.  Continue heparin infusion for now  Otherwise continue current management and monitoring in the interim        Prophylaxis: AC as above  Code Status: Full      Preet Bello MD  Hospital Medicine            If the patient has been admitted under observation status, it is at my discretion and under our care with hospital medicine services. [TWA]     Critical Care Time: 55 minutes spent in direct hands on care, review of labs, imaging and medical record, and discussion of diagnosis, treatment, prognosis with patient/family.  Patient remains at high-risk for clinical decompensation  Critical Care diagnosis: NSTEMI     none

## 2025-06-22 VITALS
WEIGHT: 170.31 LBS | SYSTOLIC BLOOD PRESSURE: 174 MMHG | BODY MASS INDEX: 25.81 KG/M2 | TEMPERATURE: 98 F | DIASTOLIC BLOOD PRESSURE: 68 MMHG | HEIGHT: 68 IN | RESPIRATION RATE: 18 BRPM | OXYGEN SATURATION: 95 % | HEART RATE: 89 BPM

## 2025-06-22 PROBLEM — R07.9 CHEST PAIN: Status: RESOLVED | Noted: 2025-01-22 | Resolved: 2025-06-22

## 2025-06-22 LAB
ALBUMIN SERPL-MCNC: 4.2 G/DL (ref 3.4–4.8)
ALBUMIN/GLOB SERPL: 1.3 RATIO (ref 1.1–2)
ALP SERPL-CCNC: 84 UNIT/L (ref 40–150)
ALT SERPL-CCNC: 19 UNIT/L (ref 0–55)
ANION GAP SERPL CALC-SCNC: 9 MEQ/L
APTT PPP: 53.5 SECONDS (ref 23.2–33.7)
APTT PPP: 65.4 SECONDS (ref 23.2–33.7)
AST SERPL-CCNC: 29 UNIT/L (ref 11–45)
BASOPHILS # BLD AUTO: 0.09 X10(3)/MCL
BASOPHILS NFR BLD AUTO: 1 %
BILIRUB SERPL-MCNC: 1.1 MG/DL
BUN SERPL-MCNC: 22.8 MG/DL (ref 8.4–25.7)
CALCIUM SERPL-MCNC: 9.1 MG/DL (ref 8.8–10)
CHLORIDE SERPL-SCNC: 104 MMOL/L (ref 98–107)
CO2 SERPL-SCNC: 23 MMOL/L (ref 23–31)
CREAT SERPL-MCNC: 1.68 MG/DL (ref 0.72–1.25)
CREAT/UREA NIT SERPL: 14
EOSINOPHIL # BLD AUTO: 0.11 X10(3)/MCL (ref 0–0.9)
EOSINOPHIL NFR BLD AUTO: 1.2 %
ERYTHROCYTE [DISTWIDTH] IN BLOOD BY AUTOMATED COUNT: 12.7 % (ref 11.5–17)
GFR SERPLBLD CREATININE-BSD FMLA CKD-EPI: 44 ML/MIN/1.73/M2
GLOBULIN SER-MCNC: 3.3 GM/DL (ref 2.4–3.5)
GLUCOSE SERPL-MCNC: 126 MG/DL (ref 82–115)
HCT VFR BLD AUTO: 41 % (ref 42–52)
HGB BLD-MCNC: 14.1 G/DL (ref 14–18)
IMM GRANULOCYTES # BLD AUTO: 0.03 X10(3)/MCL (ref 0–0.04)
IMM GRANULOCYTES NFR BLD AUTO: 0.3 %
LYMPHOCYTES # BLD AUTO: 1.33 X10(3)/MCL (ref 0.6–4.6)
LYMPHOCYTES NFR BLD AUTO: 14.2 %
MCH RBC QN AUTO: 30.5 PG (ref 27–31)
MCHC RBC AUTO-ENTMCNC: 34.4 G/DL (ref 33–36)
MCV RBC AUTO: 88.6 FL (ref 80–94)
MONOCYTES # BLD AUTO: 0.92 X10(3)/MCL (ref 0.1–1.3)
MONOCYTES NFR BLD AUTO: 9.8 %
NEUTROPHILS # BLD AUTO: 6.89 X10(3)/MCL (ref 2.1–9.2)
NEUTROPHILS NFR BLD AUTO: 73.5 %
NRBC BLD AUTO-RTO: 0 %
PLATELET # BLD AUTO: 257 X10(3)/MCL (ref 130–400)
PMV BLD AUTO: 8.9 FL (ref 7.4–10.4)
POTASSIUM SERPL-SCNC: 3.5 MMOL/L (ref 3.5–5.1)
PROT SERPL-MCNC: 7.5 GM/DL (ref 5.8–7.6)
RBC # BLD AUTO: 4.63 X10(6)/MCL (ref 4.7–6.1)
SODIUM SERPL-SCNC: 136 MMOL/L (ref 136–145)
WBC # BLD AUTO: 9.37 X10(3)/MCL (ref 4.5–11.5)

## 2025-06-22 PROCEDURE — 80053 COMPREHEN METABOLIC PANEL: CPT | Performed by: NURSE PRACTITIONER

## 2025-06-22 PROCEDURE — 99900035 HC TECH TIME PER 15 MIN (STAT)

## 2025-06-22 PROCEDURE — 96366 THER/PROPH/DIAG IV INF ADDON: CPT

## 2025-06-22 PROCEDURE — G0378 HOSPITAL OBSERVATION PER HR: HCPCS

## 2025-06-22 PROCEDURE — 25000242 PHARM REV CODE 250 ALT 637 W/ HCPCS: Performed by: INTERNAL MEDICINE

## 2025-06-22 PROCEDURE — 85730 THROMBOPLASTIN TIME PARTIAL: CPT | Performed by: INTERNAL MEDICINE

## 2025-06-22 PROCEDURE — 96375 TX/PRO/DX INJ NEW DRUG ADDON: CPT

## 2025-06-22 PROCEDURE — 36415 COLL VENOUS BLD VENIPUNCTURE: CPT | Performed by: INTERNAL MEDICINE

## 2025-06-22 PROCEDURE — 99900031 HC PATIENT EDUCATION (STAT)

## 2025-06-22 PROCEDURE — 85025 COMPLETE CBC W/AUTO DIFF WBC: CPT | Performed by: NURSE PRACTITIONER

## 2025-06-22 PROCEDURE — 63600175 PHARM REV CODE 636 W HCPCS

## 2025-06-22 PROCEDURE — 94640 AIRWAY INHALATION TREATMENT: CPT

## 2025-06-22 PROCEDURE — 25000003 PHARM REV CODE 250: Performed by: INTERNAL MEDICINE

## 2025-06-22 PROCEDURE — 63600175 PHARM REV CODE 636 W HCPCS: Performed by: NURSE PRACTITIONER

## 2025-06-22 PROCEDURE — 25000003 PHARM REV CODE 250: Performed by: NURSE PRACTITIONER

## 2025-06-22 RX ORDER — HYDRALAZINE HYDROCHLORIDE 20 MG/ML
10 INJECTION INTRAMUSCULAR; INTRAVENOUS EVERY 4 HOURS PRN
Status: DISCONTINUED | OUTPATIENT
Start: 2025-06-22 | End: 2025-06-22 | Stop reason: HOSPADM

## 2025-06-22 RX ORDER — ASPIRIN 81 MG/1
81 TABLET ORAL DAILY
Start: 2025-06-23 | End: 2026-06-23

## 2025-06-22 RX ORDER — ISOSORBIDE MONONITRATE 30 MG/1
30 TABLET, EXTENDED RELEASE ORAL DAILY
Status: DISCONTINUED | OUTPATIENT
Start: 2025-06-23 | End: 2025-06-22 | Stop reason: HOSPADM

## 2025-06-22 RX ADMIN — MUPIROCIN: 20 OINTMENT TOPICAL at 08:06

## 2025-06-22 RX ADMIN — IPRATROPIUM BROMIDE AND ALBUTEROL SULFATE 3 ML: .5; 3 SOLUTION RESPIRATORY (INHALATION) at 12:06

## 2025-06-22 RX ADMIN — AMLODIPINE BESYLATE 5 MG: 5 TABLET ORAL at 08:06

## 2025-06-22 RX ADMIN — ASPIRIN 81 MG: 81 TABLET, DELAYED RELEASE ORAL at 08:06

## 2025-06-22 RX ADMIN — IPRATROPIUM BROMIDE AND ALBUTEROL SULFATE 3 ML: .5; 3 SOLUTION RESPIRATORY (INHALATION) at 05:06

## 2025-06-22 RX ADMIN — IPRATROPIUM BROMIDE AND ALBUTEROL SULFATE 3 ML: .5; 3 SOLUTION RESPIRATORY (INHALATION) at 09:06

## 2025-06-22 RX ADMIN — HYDRALAZINE HYDROCHLORIDE 10 MG: 20 INJECTION INTRAMUSCULAR; INTRAVENOUS at 01:06

## 2025-06-22 RX ADMIN — CLOPIDOGREL 75 MG: 75 TABLET ORAL at 08:06

## 2025-06-22 RX ADMIN — HEPARIN SODIUM 15 UNITS/KG/HR: 10000 INJECTION, SOLUTION INTRAVENOUS at 01:06

## 2025-06-22 NOTE — NURSING
Report called to Briseida. Transportation will be set up be her and stated [facility] will be called to transfer patient.

## 2025-06-22 NOTE — PROGRESS NOTES
[facility] 6th Floor Medical Telemetry  Cardiology  Consult Note    Patient Name: Leno Thompson Jr.  MRN: [id number]  Admission Date: 6/21/2025  Hospital Length of Stay: 1 days  Code Status: Full Code   Attending Provider: Bacilio Ochoa MD   Consulting Provider: OLGA Nath  Primary Care Physician: Nancy Gutierrez FNP-C  Principal Problem:<principal problem not specified>    Patient information was obtained from patient and ER records.     Subjective:     Chief Complaint:  NSTEMI     HPI:  70-year-old pleasant male known to Dr. Bowden transferred from [place] for higher level of care.  Complaint of chest pain 06/20/2025 EKG did not reveal any evidence of ST segment elevation.  Troponin mildly elevated.  Patient transferred to [facility].  Patient given aspirin heparin.  Patient seen at bedside today resting comfortably.  He does have a history of anoxic brain injury answer all questions appropriately, albeit a bit slowly.  He is alert and oriented.    6.22.25: Patient Denies any CP, SOB or palps. Heparin gtt infusing.     Chart review reveals that he suffered a cardiac arrest in 2018 with anoxic brain injury. Subsequently had coronary angiogram at that time.  2018: Had successful PCI with a bare metal stent to the RCA.  Documented failed attempt at a left circumflex coronary artery  at that time.  2021:  Repeat angiography reveals occluded stents in the RCA.  However,  states that left circumflex coronary artery was patent on this report.    Conflicting reports.  Images not able to be reviewed.  2025:  Nuclear stress test reveals mainly inferior and inferolateral scar with mild ischemia.  Echocardiogram reveals EF 45% with inferolateral hypokinesis/akinesis with moderate MR    PMH:  PCI, valvular heart disease, cardiac arrest  PSH:  Traumatic left leg amputation    Cardiac Testing:  ECHO 6.21.25    Left Ventricle: The left ventricle is moderately  dilated. Mildly increased wall thickness. There is low normal systolic function with a visually estimated ejection fraction of 50 - 55%. Grade I diastolic dysfunction.    Right Ventricle: The right ventricle is normal in size Systolic function is normal. TAPSE is 2.2 cm.    Left Atrium: The left atrium is mildly dilated    Aortic Valve: There is moderate aortic valve sclerosis. There is mild aortic regurgitation.    Mitral Valve: Mildly thickened leaflets. There is mild mitral annular calcification. There is mild regurgitation.    IVC/SVC: Intermediate venous pressure at 8 mmHg.     Vitals          Past Medical History:   Diagnosis Date    Anoxic brain injury     Arthritis     CAD (coronary artery disease)     Cardiac arrest     Heart attack     HLD (hyperlipidemia)     Hx of left BKA     Hypertension     Mitral valve regurgitation     Muscle spasms of both lower extremities     Muscle spasms of both lower extremities     PEG (percutaneous endoscopic gastrostomy) adjustment/replacement/removal     Status post placement of stent in right coronary artery        Past Surgical History:   Procedure Laterality Date    LEG AMPUTATION Left        Review of patient's allergies indicates:  No Known Allergies    No current facility-administered medications on file prior to encounter.     Current Outpatient Medications on File Prior to Encounter   Medication Sig    acetaminophen (TYLENOL) 650 MG TbSR Take 650 mg by mouth 2 (two) times a day.    albuterol-ipratropium (DUO-NEB) 2.5 mg-0.5 mg/3 mL nebulizer solution Take 3 mLs by nebulization every 4 (four) hours as needed for Wheezing or Shortness of Breath.    amLODIPine (NORVASC) 5 MG tablet Take 1 tablet (5 mg total) by mouth once daily.    atorvastatin (LIPITOR) 20 MG tablet Take 20 mg by mouth every evening.    baclofen (LIORESAL) 5 mg Tab tablet Take 20 mg by mouth 3 (three) times daily.    carboxymethylcellulose sodium (ARTIFICIAL TEARS, [facility],) 1 % Drop Apply 2 drops to eye  4 (four) times daily.    clonazePAM (KLONOPIN) 0.5 MG tablet Take 0.5 mg by mouth every 12 (twelve) hours as needed (Muscle jerk).    clopidogreL (PLAVIX) 75 mg tablet Take 75 mg by mouth once daily.    ezetimibe (ZETIA) 10 mg tablet Take 10 mg by mouth every evening.    furosemide (LASIX) 20 MG tablet Take 20 mg by mouth once daily. (Patient taking differently: Take 20 mg by mouth daily as needed (dyspnea).)    isosorbide mononitrate (IMDUR) 30 MG 24 hr tablet Take 1 tablet (30 mg total) by mouth once daily.    ranolazine (RANEXA) 500 MG Tb12 Take 1 tablet (500 mg total) by mouth 2 (two) times daily.    senna-docusate 8.6-50 mg (SENNA WITH DOCUSATE SODIUM) 8.6-50 mg per tablet Take 1 tablet by mouth once daily.    vitamin D3-folic acid 125 mcg (5,000 unit)-1 mg Tab Take 1 tablet by mouth Daily.     Family History    None       Tobacco Use    Smoking status: Every Day     Current packs/day: 1.00     Average packs/day: 1 pack/day for 53.5 years (53.5 ttl pk-yrs)     Types: Cigarettes     Start date: 1972     Passive exposure: Current    Smokeless tobacco: Never   Substance and Sexual Activity    Alcohol use: Never    Drug use: Never    Sexual activity: Not Currently     Review of Systems:  Review of Systems   Constitutional: Negative.    HENT: Negative.     Eyes: Negative.    Cardiovascular:  Positive for chest pain.   Gastrointestinal: Negative.    Endocrine: Negative.    Genitourinary: Negative.    Musculoskeletal: Negative.    Skin: Negative.    Allergic/Immunologic: Negative.    Neurological: Negative.    Hematological: Negative.    Psychiatric/Behavioral: Negative.         Objective:     Vital Signs (Most Recent):  Temp: 97.6 °F (36.4 °C) (06/22/25 1054)  Pulse: 86 (06/22/25 1054)  Resp: 18 (06/22/25 1054)  BP: (!) 174/68 (06/22/25 1054)  SpO2: 95 % (06/22/25 1054) Vital Signs (24h Range):  Temp:  [97.2 °F (36.2 °C)-98.8 °F (37.1 °C)] 97.6 °F (36.4 °C)  Pulse:  [75-90] 86  Resp:  [15-20] 18  SpO2:  [91 %-97 %]  95 %  BP: (144-177)/(67-78) 174/68     Weight: 77.2 kg (170 lb 4.8 oz)  Body mass index is 25.89 kg/m².    SpO2: 95 %         Intake/Output Summary (Last 24 hours) at 6/22/2025 1113  Last data filed at 6/22/2025 0821  Gross per 24 hour   Intake 620 ml   Output 1275 ml   Net -655 ml       Lines/Drains/Airways       Peripheral Intravenous Line  Duration             Peripheral IV 06/21/25 1735 20 G Anterior;Left Forearm <1 day                      Significant Labs:  Recent Results (from the past 72 hours)   EKG 12-lead    Collection Time: 06/20/25  6:24 AM   Result Value Ref Range    QRS Duration 106 ms    [facility] QTC Calculation 427 ms   Comprehensive metabolic panel    Collection Time: 06/20/25  6:41 AM   Result Value Ref Range    Sodium 139 136 - 145 mmol/L    Potassium 4.0 3.5 - 5.1 mmol/L    Chloride 105 98 - 110 mmol/L    CO2 28 21 - 32 mmol/L    Glucose 130 (H) 70 - 115 mg/dL    Blood Urea Nitrogen 19 7.0 - 20.0 mg/dL    Creatinine 1.68 (H) 0.66 - 1.25 mg/dL    Calcium 9.2 8.4 - 10.2 mg/dL    Protein Total 7.8 6.3 - 8.2 gm/dL    Albumin 4.5 3.4 - 5.0 g/dL    Globulin 3.3 2.0 - 3.9 gm/dL    Albumin/Globulin Ratio 1.4 ratio    Bilirubin Total 0.8 0.0 - 1.0 mg/dL    ALP 81 50 - 144 unit/L    ALT 22 1 - 45 unit/L    AST 22 17 - 59 unit/L    eGFR 44 mL/min/1.73/m2    Anion Gap 6.0 2.0 - 13.0 mEq/L    BUN/Creatinine Ratio 11 (L) 12 - 20   Troponin I #1    Collection Time: 06/20/25  6:41 AM   Result Value Ref Range    Troponin-I 0.040 (H) 0.000 - 0.034 ng/mL   NT-Pro Natriuretic Peptide    Collection Time: 06/20/25  6:41 AM   Result Value Ref Range    ProBNP 541.0 (H) 0.0 - 124.9 PG/ML   D dimer, quantitative    Collection Time: 06/20/25  6:41 AM   Result Value Ref Range    D-Dimer 0.80 (H) 0.19 - 0.50 mg/L FEU   CBC with Differential    Collection Time: 06/20/25  6:41 AM   Result Value Ref Range    WBC 8.06 4.00 - 11.50 x10(3)/mcL    RBC 4.48 4.00 - 6.00 x10(6)/mcL    Hgb 13.9 13.0 - 18.0 g/dL    Hct 40.3 36.0 - 52.0 %     MCV 90.0 79.0 - 99.0 fL    MCH 31.0 27.0 - 34.0 pg    MCHC 34.5 31.0 - 37.0 g/dL    RDW 12.6 %    Platelet 242 140 - 371 x10(3)/mcL    MPV 8.8 (L) 9.4 - 12.4 fL    Neut % 65.9 37 - 73 %    Lymph % 18.5 (L) 20 - 55 %    Mono % 9.9 4.7 - 12.5 %    Eos % 3.7 0.7 - 7 %    Basophil % 1.5 (H) 0.1 - 1.2 %    Lymph # 1.49 1.32 - 3.57 x10(3)/mcL    Neut # 5.31 1.78 - 5.38 x10(3)/mcL    Mono # 0.80 0.3 - 0.82 x10(3)/mcL    Eos # 0.30 0.04 - 0.54 x10(3)/mcL    Baso # 0.12 (H) 0.01 - 0.08 x10(3)/mcL    Imm Gran # 0.04 (H) 0.00 - 0.03 x10(3)/mcL    Imm Grans % 0.5 0 - 0.5 %    NRBC% 0.0 <=1 %   Troponin I    Collection Time: 06/20/25  3:39 PM   Result Value Ref Range    Troponin-I 0.046 (H) 0.000 - 0.034 ng/mL   Comprehensive Metabolic Panel (CMP)    Collection Time: 06/21/25  7:12 AM   Result Value Ref Range    Sodium 138 136 - 145 mmol/L    Potassium 3.9 3.5 - 5.1 mmol/L    Chloride 104 98 - 107 mmol/L    CO2 23 23 - 31 mmol/L    Glucose 113 82 - 115 mg/dL    Blood Urea Nitrogen 22.1 8.4 - 25.7 mg/dL    Creatinine 1.71 (H) 0.72 - 1.25 mg/dL    Calcium 9.2 8.8 - 10.0 mg/dL    Protein Total 7.7 (H) 5.8 - 7.6 gm/dL    Albumin 4.1 3.4 - 4.8 g/dL    Globulin 3.6 (H) 2.4 - 3.5 gm/dL    Albumin/Globulin Ratio 1.1 1.1 - 2.0 ratio    Bilirubin Total 1.1 <=1.5 mg/dL    ALP 94 40 - 150 unit/L    ALT 15 0 - 55 unit/L    AST 21 11 - 45 unit/L    eGFR 43 mL/min/1.73/m2    Anion Gap 11.0 mEq/L    BUN/Creatinine Ratio 13    Magnesium    Collection Time: 06/21/25  7:12 AM   Result Value Ref Range    Magnesium Level 2.20 1.60 - 2.60 mg/dL   Troponin I    Collection Time: 06/21/25  7:12 AM   Result Value Ref Range    Troponin-I 0.099 (H) 0.000 - 0.045 ng/mL   CBC with Differential    Collection Time: 06/21/25  7:12 AM   Result Value Ref Range    WBC 8.31 4.50 - 11.50 x10(3)/mcL    RBC 4.59 (L) 4.70 - 6.10 x10(6)/mcL    Hgb 14.3 14.0 - 18.0 g/dL    Hct 41.6 (L) 42.0 - 52.0 %    MCV 90.6 80.0 - 94.0 fL    MCH 31.2 (H) 27.0 - 31.0 pg    MCHC 34.4  33.0 - 36.0 g/dL    RDW 12.8 11.5 - 17.0 %    Platelet 248 130 - 400 x10(3)/mcL    MPV 8.9 7.4 - 10.4 fL    Neut % 73.3 %    Lymph % 14.1 %    Mono % 9.6 %    Eos % 1.7 %    Basophil % 1.1 %    Imm Grans % 0.2 %    Neut # 6.09 2.1 - 9.2 x10(3)/mcL    Lymph # 1.17 0.6 - 4.6 x10(3)/mcL    Mono # 0.80 0.1 - 1.3 x10(3)/mcL    Eos # 0.14 0 - 0.9 x10(3)/mcL    Baso # 0.09 <=0.2 x10(3)/mcL    Imm Gran # 0.02 0.00 - 0.04 x10(3)/mcL    NRBC% 0.0 %   APTT    Collection Time: 06/21/25 11:46 AM   Result Value Ref Range    PTT 30.5 23.2 - 33.7 seconds   Protime-INR    Collection Time: 06/21/25 11:46 AM   Result Value Ref Range    PT 13.8 12.5 - 14.5 seconds    INR 1.1 <=1.3   CBC with Differential    Collection Time: 06/21/25 11:46 AM   Result Value Ref Range    WBC 7.59 4.50 - 11.50 x10(3)/mcL    RBC 4.72 4.70 - 6.10 x10(6)/mcL    Hgb 14.4 14.0 - 18.0 g/dL    Hct 42.8 42.0 - 52.0 %    MCV 90.7 80.0 - 94.0 fL    MCH 30.5 27.0 - 31.0 pg    MCHC 33.6 33.0 - 36.0 g/dL    RDW 12.9 11.5 - 17.0 %    Platelet 242 130 - 400 x10(3)/mcL    MPV 8.8 7.4 - 10.4 fL    Neut % 70.3 %    Lymph % 16.7 %    Mono % 10.0 %    Eos % 1.3 %    Basophil % 1.3 %    Imm Grans % 0.4 %    Neut # 5.33 2.1 - 9.2 x10(3)/mcL    Lymph # 1.27 0.6 - 4.6 x10(3)/mcL    Mono # 0.76 0.1 - 1.3 x10(3)/mcL    Eos # 0.10 0 - 0.9 x10(3)/mcL    Baso # 0.10 <=0.2 x10(3)/mcL    Imm Gran # 0.03 0.00 - 0.04 x10(3)/mcL    NRBC% 0.0 %   Troponin I    Collection Time: 06/21/25 11:46 AM   Result Value Ref Range    Troponin-I 0.077 (H) 0.000 - 0.045 ng/mL   Echo    Collection Time: 06/21/25  1:31 PM   Result Value Ref Range    BSA 1.93 m2    Kingston's Biplane MOD Ejection Fraction 53 %    A2C EF 53 %    A4C EF 54 %    LVOT stroke volume 61.2 cm3    LVIDd 5.8 3.5 - 6.0 cm    LV Systolic Volume 92 mL    LV Systolic Volume Index 48.2 mL/m2    LVIDs 4.5 (A) 2.1 - 4.0 cm    LV ESV A2C 68.10 mL    LV Diastolic Volume 167 mL    LV ESV A4C 45.30 mL    LV Diastolic Volume Index 87.43 mL/m2     LV EDV A2C [id number] mL    LV EDV A4C 136.00 mL    Left Ventricular End Systolic Volume by Teichholz Method 92.40 mL    Left Ventricular End Diastolic Volume by Teichholz Method 167.00 mL    IVS 1.3 (A) 0.6 - 1.1 cm    LVOT diameter 1.9 cm    LVOT area 2.8 cm2    FS 22.4 (A) 28 - 44 %    Left Ventricle Relative Wall Thickness 0.38 cm    PW 1.1 0.6 - 1.1 cm    LV mass 297.0 g    LV Mass Index 155.5 g/m2    MV Peak E Garcia 0.50 m/s    TDI LATERAL 0.06 m/s    TDI SEPTAL 0.06 m/s    E/E' ratio 8 m/s    MV Peak A Garcia 0.94 m/s    E/A ratio 0.53     E wave deceleration time 238 msec    LV SEPTAL E/E' RATIO 8.3 m/s    LV LATERAL E/E' RATIO 8.3 m/s    LVOT peak garcia 1.2 m/s    Left Ventricular Outflow Tract Mean Velocity 0.73 cm/s    Left Ventricular Outflow Tract Mean Gradient 3.00 mmHg    TAPSE 2.2 cm    LA size 4.1 cm    LA Vol (MOD) 57 mL    RAVEN (MOD) 30 mL/m2    AV mean gradient 6 mmHg    AV peak gradient 12 mmHg    Ao peak garcia 1.7 m/s    Ao VTI 28.4 cm    LVOT peak VTI 21.6 cm    AV valve area 2.2 cm²    AV Velocity Ratio 0.71     AV index (prosthetic) 0.76     TREMAYNE by Velocity Ratio 2.0 cm²    Mean e' 0.06 m/s    ZLVIDS 2.43     ZLVIDD 0.76     LA area A4C 17.80 cm2    LA area A2C 22.00 cm2    Est. RA pres 8 mmHg   PTT Heparin Monitoring    Collection Time: 06/21/25  6:46 PM   Result Value Ref Range    PTT Heparin Monitor 37.6 (H) 23.2 - 33.7 seconds   Comprehensive Metabolic Panel    Collection Time: 06/22/25  3:23 AM   Result Value Ref Range    Sodium 136 136 - 145 mmol/L    Potassium 3.5 3.5 - 5.1 mmol/L    Chloride 104 98 - 107 mmol/L    CO2 23 23 - 31 mmol/L    Glucose 126 (H) 82 - 115 mg/dL    Blood Urea Nitrogen 22.8 8.4 - 25.7 mg/dL    Creatinine 1.68 (H) 0.72 - 1.25 mg/dL    Calcium 9.1 8.8 - 10.0 mg/dL    Protein Total 7.5 5.8 - 7.6 gm/dL    Albumin 4.2 3.4 - 4.8 g/dL    Globulin 3.3 2.4 - 3.5 gm/dL    Albumin/Globulin Ratio 1.3 1.1 - 2.0 ratio    Bilirubin Total 1.1 <=1.5 mg/dL    ALP 84 40 - 150  "unit/L    ALT 19 0 - 55 unit/L    AST 29 11 - 45 unit/L    eGFR 44 mL/min/1.73/m2    Anion Gap 9.0 mEq/L    BUN/Creatinine Ratio 14    PTT Heparin Monitoring    Collection Time: 06/22/25  3:23 AM   Result Value Ref Range    PTT Heparin Monitor 65.4 (H) 23.2 - 33.7 seconds   CBC with Differential    Collection Time: 06/22/25  3:23 AM   Result Value Ref Range    WBC 9.37 4.50 - 11.50 x10(3)/mcL    RBC 4.63 (L) 4.70 - 6.10 x10(6)/mcL    Hgb 14.1 14.0 - 18.0 g/dL    Hct 41.0 (L) 42.0 - 52.0 %    MCV 88.6 80.0 - 94.0 fL    MCH 30.5 27.0 - 31.0 pg    MCHC 34.4 33.0 - 36.0 g/dL    RDW 12.7 11.5 - 17.0 %    Platelet 257 130 - 400 x10(3)/mcL    MPV 8.9 7.4 - 10.4 fL    Neut % 73.5 %    Lymph % 14.2 %    Mono % 9.8 %    Eos % 1.2 %    Basophil % 1.0 %    Imm Grans % 0.3 %    Neut # 6.89 2.1 - 9.2 x10(3)/mcL    Lymph # 1.33 0.6 - 4.6 x10(3)/mcL    Mono # 0.92 0.1 - 1.3 x10(3)/mcL    Eos # 0.11 0 - 0.9 x10(3)/mcL    Baso # 0.09 <=0.2 x10(3)/mcL    Imm Gran # 0.03 0.00 - 0.04 x10(3)/mcL    NRBC% 0.0 %   PTT Heparin Monitoring    Collection Time: 06/22/25 10:24 AM   Result Value Ref Range    PTT Heparin Monitor 53.5 (H) 23.2 - 33.7 seconds         Last Lipids:  Lab Results   Component Value Date    CHOL 129 01/23/2025    CHOL 119 06/12/2024     Lab Results   Component Value Date    HDL 36 (L) 01/23/2025    HDL 36 (L) 06/12/2024     No results found for: "LDLCALC"  Lab Results   Component Value Date    TRIG 150 01/23/2025    TRIG 105 06/12/2024       Telemetry:  Sinus rhythm    Physical Exam:  History of anoxic brain injury but responds appropriately, albeit slowly  Alert and oriented x 3  S1 and S2 heard.  MR murmur auscultated  Lungs clear to auscultation bilateral  Contractures noted in the right upper extremity  Left below-knee amputation noted  Abdomen soft  Peripheries warm  No edema    Home Medications:   Medications Ordered Prior to Encounter[1]    Current Inpatient Medications:  Current Medications[2]         VTE Risk " Mitigation (From admission, onward)           Ordered     heparin 25,000 units in dextrose 5% (100 units/ml) IV bolus from bag LOW INTENSITY nomogram - [facility]  As needed (PRN)        Question:  Heparin Infusion Adjustment (DO NOT MODIFY ANSWER)  Answer:  [email or web address]\Images\Pharmacy\HeparinInfusions\heparin LOW INTENSITY nomogram for [facility] [id number]    06/21/25 1100     heparin 25,000 units in dextrose 5% (100 units/ml) IV bolus from bag LOW INTENSITY nomogram - [facility]  As needed (PRN)        Question:  Heparin Infusion Adjustment (DO NOT MODIFY ANSWER)  Answer:  [email or web address]\epic\Images\Pharmacy\HeparinInfusions\heparin LOW INTENSITY nomogram for [facility] [id number]    06/21/25 1100     heparin 25,000 units in dextrose 5% 250 mL (100 units/mL) infusion LOW INTENSITY nomogram - [facility]  Continuous        Question:  Begin at (units/kg/hr)  Answer:  12    06/21/25 1100     IP VTE HIGH RISK PATIENT  Once         06/21/25 0642     Place sequential compression device  Until discontinued         06/21/25 0642     Reason for No Pharmacological VTE Prophylaxis  Once        Comments: Will assess when patient is examined   Question:  Reasons:  Answer:  Physician Provided (leave comment)    06/21/25 0642                    Assessment:   - NSTEMI (likely type 1)  - Known CAD with prior RCA PCI in 2018 with subsequent occluded RCA stents 2021.  Conflicting reports about left circumflex coronary artery which stated left circumflex coronary artery had a  with failed revascularization attempt in 2018, however, angiogram report in 2021 stated that left circumflex coronary artery system was patent.  - LV dysfunction with EF 45% and moderate MR per ECHO 1.24.25  - EF improved 50-55%, Grade I Diastolic dysfunction per ECHO 6.21.25  - Abnormal SPECT MPI 1/25 with inferior and inferolateral infarct with mild rodney-infarct ischemia  - History of anoxic brain injury following cardiac arrest 2018.  - Valvular heart disease:  Moderate MR per  echocardiogram 1/25, mild MR noted on recent ECHO  - Traumatic left below-knee amputation      PLAN:   - Echocardiogram reviewed.  EF improved 50-55%, Grade 1 diastolic  - Patient Recommended coronary angiogram, however, patient states that he is feeling okay without chest pain and does not wish to pursue any invasive measures at this point in time.  He would prefer medical therapy.  He states if he develops any further chest pain will then consider angiogram.   - IV heparin for 48 hours. Started 6.21.25. Troponin trending downward  - Continue Aspirin 81 mg p.o. daily and Plavix 75 mg p.o. daily, amlodipine,and statin  - Up titrate antianginal therapy. Add Imdur 30 mg   - We will continue along.  Patient to follow up with Dr. Bowden upon discharge          Anatoly Cortes, [facility]  Cardiology  [facility] - 6th Floor Medical Telemetry  06/22/2025 11:10 AM                         --------------   CARDIOLOGY ATTENDING ADDENDUM   Please also see NP/PA portion of note  ---------------  06/22/2025 11:42 AM    I evaluated Leno Thompson Jr..  The patient is a 70 y.o. male with:   No chief complaint on file.        Leno Thompson Jr. has the following scheduled Medications   albuterol-ipratropium  3 mL Nebulization Q4H    amLODIPine  5 mg Oral Daily    aspirin  81 mg Oral Daily    atorvastatin  20 mg Oral QHS    clopidogreL  75 mg Oral Daily    ezetimibe  10 mg Oral QHS    [START ON 6/23/2025] isosorbide mononitrate  30 mg Oral Daily    mupirocin   Nasal BID       Leno Thompson Jr. has the following Lab Results   Last BMP BMP  Lab Results   Component Value Date     06/22/2025    K 3.5 06/22/2025     06/22/2025    CO2 23 06/22/2025    BUN 22.8 06/22/2025    CREATININE 1.68 (H) 06/22/2025    CALCIUM 9.1 06/22/2025    ANIONGAP 8 11/15/2021    EGFRNORACEVR 44 06/22/2025      Last CBC     Lab Results   Component Value Date    WBC 9.37 06/22/2025    HGB 14.1 06/22/2025    HCT 41.0 (L) 06/22/2025     "MCV 88.6 06/22/2025     06/22/2025           BNP  No results found for: "BNP"  Troponin   Lab Results   Component Value Date    TROPONINI 0.077 (H) 06/21/2025    TROPONINI 0.099 (H) 06/21/2025    TROPONINI 0.046 (H) 06/20/2025     Last lipids    Lab Results   Component Value Date    CHOL 129 01/23/2025    HDL 36 (L) 01/23/2025    TRIG 150 01/23/2025        Leno Thompson Jr. has the following Echocardiogram Results  Results for orders placed during the hospital encounter of 06/21/25    Echo    Interpretation Summary    Left Ventricle: The left ventricle is moderately dilated. Mildly increased wall thickness. There is low normal systolic function with a visually estimated ejection fraction of 50 - 55%. Grade I diastolic dysfunction.    Right Ventricle: The right ventricle is normal in size Systolic function is normal. TAPSE is 2.2 cm.    Left Atrium: The left atrium is mildly dilated    Aortic Valve: There is moderate aortic valve sclerosis. There is mild aortic regurgitation.    Mitral Valve: Mildly thickened leaflets. There is mild mitral annular calcification. There is mild regurgitation.    IVC/SVC: Intermediate venous pressure at 8 mmHg.      Leno Thompson Jr. has the following Stress Test Results  Results for orders placed during the hospital encounter of 01/22/25    Nuclear Stress - Cardiology Interpreted    Interpretation Summary    Abnormal myocardial perfusion scan.    There is a moderate to severe intensity, medium sized, mostly fixed perfusion abnormality with some reversibilty in the inferolateral wall(s) in the typical distribution of the LCX and RCA territory.    The gated perfusion images showed an ejection fraction of 47% at rest. The gated perfusion images showed an ejection fraction of 46% post stress. Normal ejection fraction is greater than 53%.    The ECG portion of the study is negative for ischemia.      Leno Thompson Jr. has the following Coronary Angiogram Results   No results " "found for this or any previous visit.      Leno Thompson  has the following Holter Monitor Results   No cardiac monitor results found for the past 12 months    THE ABOVE ARE DIAGNOSTIC RESULTS PULLED INTO THIS NOTE ON Leno KEMP Jay Sauceda.    ROS    GEN   No fever  + weakness  RESP  + SOB  + wheezing  SKIN  No pruritus No rash  CARD  No CP  No palpitations  VASC  No cyanosis. Has left BKA  HEM   No adenopathy No bleeding  GI  No heart burn  No melena  NEURO  + dizziness No syncope    Blood pressure (!) 174/68, pulse 86, temperature 97.6 °F (36.4 °C), temperature source Oral, resp. rate 18, height 5' 8" (1.727 m), weight 77.2 kg (170 lb 4.8 oz), SpO2 95%.  PE    GEN  No acute distress  Not ill appearing Sitting in chair Eating  NECK  Supple Normal carotid upstroke and volume      CV   Normal rate  Regular rhythm    PUL  No respiratory distress  No wheezing.       ABD  No distention Mildly obese  No tenderness  LOW EXT   Left BKA   No edema    SKIN  No bruising  No rash  Moist skin  NEURO  Awake and alert    No severe weakness in upper extremities      Assessment/Plan  I agree with the assessment and plan as outlined above      Dusty Agustin MD    Cardiologist          [1]   No current facility-administered medications on file prior to encounter.     Current Outpatient Medications on File Prior to Encounter   Medication Sig Dispense Refill    acetaminophen (TYLENOL) 650 MG TbSR Take 650 mg by mouth 2 (two) times a day.      albuterol-ipratropium (DUO-NEB) 2.5 mg-0.5 mg/3 mL nebulizer solution Take 3 mLs by nebulization every 4 (four) hours as needed for Wheezing or Shortness of Breath.      amLODIPine (NORVASC) 5 MG tablet Take 1 tablet (5 mg total) by mouth once daily. 90 tablet 3    atorvastatin (LIPITOR) 20 MG tablet Take 20 mg by mouth every evening.      baclofen (LIORESAL) 5 mg Tab tablet Take 20 mg by mouth 3 (three) times daily.      carboxymethylcellulose sodium (ARTIFICIAL TEARS, [facility],) 1 % Drop Apply " 2 drops to eye 4 (four) times daily.      clonazePAM (KLONOPIN) 0.5 MG tablet Take 0.5 mg by mouth every 12 (twelve) hours as needed (Muscle jerk).      clopidogreL (PLAVIX) 75 mg tablet Take 75 mg by mouth once daily.      ezetimibe (ZETIA) 10 mg tablet Take 10 mg by mouth every evening.      furosemide (LASIX) 20 MG tablet Take 20 mg by mouth once daily. (Patient taking differently: Take 20 mg by mouth daily as needed (dyspnea).)      isosorbide mononitrate (IMDUR) 30 MG 24 hr tablet Take 1 tablet (30 mg total) by mouth once daily. 30 tablet 2    ranolazine (RANEXA) 500 MG Tb12 Take 1 tablet (500 mg total) by mouth 2 (two) times daily. 60 tablet 11    senna-docusate 8.6-50 mg (SENNA WITH DOCUSATE SODIUM) 8.6-50 mg per tablet Take 1 tablet by mouth once daily.      vitamin D3-folic acid 125 mcg (5,000 unit)-1 mg Tab Take 1 tablet by mouth Daily.     [2]   Current Facility-Administered Medications:     acetaminophen tablet 650 mg, 650 mg, Oral, Q4H PRN, Carola Mtz FNP    albuterol-ipratropium 2.5 mg-0.5 mg/3 mL nebulizer solution 3 mL, 3 mL, Nebulization, Q4H, Bacilio Ochoa MD, 3 mL at 06/22/25 0904    aluminum-magnesium hydroxide-simethicone 200-200-20 mg/5 mL suspension 30 mL, 30 mL, Oral, QID PRN, Carola Mtz FNP    amLODIPine tablet 5 mg, 5 mg, Oral, Daily, Preet Bello MD, 5 mg at 06/22/25 0859    aspirin EC tablet 81 mg, 81 mg, Oral, Daily, Anatoly Cortes FNP, 81 mg at 06/22/25 0859    atorvastatin tablet 20 mg, 20 mg, Oral, QHS, Preet Bello MD, 20 mg at 06/21/25 2343    bisacodyL suppository 10 mg, 10 mg, Rectal, Daily PRN, Carola Mtz FNP    clonazePAM tablet 0.5 mg, 0.5 mg, Oral, Q12H PRN, Preet Bello MD    clopidogreL tablet 75 mg, 75 mg, Oral, Daily, Anatoly Cortes, TANIKAP, 75 mg at 06/22/25 0859    dextrose 50% injection 12.5 g, 12.5 g, Intravenous, PRN, Carola Mtz, OLGA    dextrose 50% injection 25 g, 25 g, Intravenous, PRN, Carola Mtz FNP    ezetimibe  tablet 10 mg, 10 mg, Oral, QHS, Preet Bello MD, 10 mg at 06/21/25 2343    glucagon (human recombinant) injection 1 mg, 1 mg, Intramuscular, PRN, Tracy, Carola, FNP    glucose chewable tablet 16 g, 16 g, Oral, PRN, Bibi, Carola, FNP    glucose chewable tablet 24 g, 24 g, Oral, PRN, Bibi, Carola, FNP    heparin 25,000 units in dextrose 5% (100 units/ml) IV bolus from bag LOW INTENSITY nomogram - [facility], 51.8 Units/kg, Intravenous, PRN, Sebastian, Laterica L, FNP, 4,000 Units at 06/21/25 2130    heparin 25,000 units in dextrose 5% (100 units/ml) IV bolus from bag LOW INTENSITY nomogram - [facility], 30 Units/kg, Intravenous, PRN, Sebastian, Laterica L, FNP    heparin 25,000 units in dextrose 5% 250 mL (100 units/mL) infusion LOW INTENSITY nomogram - [facility], 0-40 Units/kg/hr, Intravenous, Continuous, Sebastian, Laterica L, FNP, Last Rate: 11.6 mL/hr at 06/22/25 0155, 15 Units/kg/hr at 06/22/25 0155    hydrALAZINE injection 10 mg, 10 mg, Intravenous, Q4H PRN, Bibi, Carola, FNP, 10 mg at 06/22/25 0143    melatonin tablet 6 mg, 6 mg, Oral, Nightly PRN, Tracy, Carola, FNP    mupirocin 2 % ointment, , Nasal, BID, Bacilio Ochoa MD, Given at 06/22/25 0859    polyethylene glycol packet 17 g, 17 g, Oral, BID PRN, Bibi, Carola, FNP    sodium chloride 0.9% flush 10 mL, 10 mL, Intravenous, PRN, Bibi, Carola, FNP

## 2025-06-22 NOTE — PLAN OF CARE
Problem: Diabetes Comorbidity  Goal: Blood Glucose Level Within Targeted Range  Outcome: Progressing     Problem: Adult Inpatient Plan of Care  Goal: Plan of Care Review  Outcome: Progressing  Goal: Patient-Specific Goal (Individualized)  Outcome: Progressing  Goal: Absence of Hospital-Acquired Illness or Injury  Outcome: Progressing  Goal: Optimal Comfort and Wellbeing  Outcome: Progressing  Goal: Readiness for Transition of Care  Outcome: Progressing     Problem: Skin Injury Risk Increased  Goal: Skin Health and Integrity  Outcome: Progressing

## 2025-06-22 NOTE — NURSING
Patient transported back to nursing home via [facility]. Paperwork given to transporters. Nursing home called to inform them patient is en route.

## 2025-06-22 NOTE — PLAN OF CARE
SSC sent discharge orders/AVS and clinicals to [facility] via Epic and manually. Nurse Goins notified and transport will be set up with Jimmy by their facility. Report packet given to nurse.

## 2025-06-22 NOTE — DISCHARGE SUMMARY
Hospital Medicine  Discharge Summary    Patient Name: Leno Thompson Jr.  MRN: [id number]  Admit Date: 6/21/2025  Discharge Date: 6/22/2025   Status: OP- Observation   Length of Stay: 1      PHYSICIANS   Admitting Physician: Preet Bello MD  Discharging Physician: Preet Bello MD.  Primary Care Physician: Nancy Gutierrez FNP-C  Consults: Cardiology      DISCHARGE DIAGNOSES   NSTEMI, possibly type I  h/o CAD s/p prior PCI/stents  h/o cardiac arrest, anoxic brain injury  Essential HTN      PROCEDURES   None      HOSPITAL COURSE      70 y.o. male with a history that includes CAD s/p prior PCI/stent, was transferred from [place] for higher level of care, where he presented with chest pressure. EKG did not reveal any evidence of ST segment elevation, but troponin was mildly elevated. Patient transferred to [facility] for cardiology evaluation. Troponin mildly elevated, up to 0.09..  Evaluated by cardiology, recommended [facility], though patient declining, opting for medical optimization instead at this time.  Continued on DAPT and statin.  Troponin remained only mildly elevated, and trended back down.  Chest pain had resolved.  Patient opting for medical therapy, otherwise stable for discharge home.  He will be arranged with close follow up with his primary cardiologist to discuss ongoing management.    STATUS  Improved    DISPOSITION  Discharge back to nursing home    DIET  Cardiac    ACTIVITY  As tolerated      FOLLOW-UP      Nancy Gutierrez FNP-C Follow up in 1 week(s).    Specialty: Family Medicine  Why: **We will call you with an appointment date and time**  Contact information:  [address]  [address]  [place] [id number] [phone]               Quinn Bowden MD Follow up in 1 week(s).    Specialty: Cardiology  Why: **We will call you with an appointment date and time**  Contact information:  [address]  [address]  [place] [id number] [phone]                 DISCHARGE MEDICATION RECONCILIATION     PRESCRIBED      aspirin 81 MG EC tablet  Commonly known as: ECOTRIN  Take 1 tablet (81 mg total) by mouth once daily.       CONTINUE      acetaminophen 650 MG Tbsr  Commonly known as: TYLENOL     albuterol-ipratropium 2.5 mg-0.5 mg/3 mL nebulizer solution  Commonly known as: DUO-NEB     amLODIPine 5 MG tablet  Commonly known as: NORVASC  Take 1 tablet (5 mg total) by mouth once daily.     ARTIFICIAL TEARS (CMC) 1 % Drop  Generic drug: carboxymethylcellulose sodium     atorvastatin 20 MG tablet  Commonly known as: LIPITOR     baclofen 5 mg Tab tablet  Commonly known as: LIORESAL     clonazePAM 0.5 MG tablet  Commonly known as: KlonoPIN     clopidogreL 75 mg tablet  Commonly known as: PLAVIX     ezetimibe 10 mg tablet  Commonly known as: ZETIA     isosorbide mononitrate 30 MG 24 hr tablet  Commonly known as: IMDUR  Take 1 tablet (30 mg total) by mouth once daily.     ranolazine 500 MG Tb12  Commonly known as: RANEXA  Take 1 tablet (500 mg total) by mouth 2 (two) times daily.     SENNA WITH DOCUSATE SODIUM 8.6-50 mg per tablet  Generic drug: senna-docusate     vitamin D3-folic acid 125 mcg (5,000 unit)-1 mg Tab       DISCONTINUE     furosemide 20 MG tablet  Commonly known as: LASIX              PHYSICAL EXAM   VITALS: T 97.6 °F (36.4 °C)   BP (!) 174/68   P 89   RR 18   O2 95 %    GENERAL: awake and in no acute distress  LUNGS: Respirations non-labored  CVS: Normal rate  ABD: Soft, non-tender  EXTREMITIES: Radial pulse 2+  NEURO: AAOx3  PSYCHIATRIC: Cooperative      Discharge time: 33 minutes     Preet Bello MD  [facility] Medicine       DIAGNOSITCS   CBC:   Recent Labs   Lab 06/21/25  0712 06/21/25  1146 06/22/25  0323   WBC 8.31 7.59 9.37   HGB 14.3 14.4 14.1   HCT 41.6* 42.8 41.0*    242 257       CMP:   Recent Labs   Lab 06/20/25  0641 06/21/25  0712 06/22/25  0323   * 113 126*   CALCIUM 9.2 9.2 9.1   ALBUMIN 4.5 4.1 4.2   PROT 7.8 7.7* 7.5    138 136   K 4.0 3.9 3.5   CO2 28 23 23    104 104   BUN  19 22.1 22.8   CREATININE 1.68* 1.71* 1.68*   ALKPHOS 81 94 84   ALT 22 15 19   AST 22 21 29   BILITOT 0.8 1.1 1.1   MG  --  2.20  --      Estimated Creatinine Clearance: 40.7 mL/min (A) (based on SCr of 1.68 mg/dL (H)).    COAG:  Recent Labs   Lab 06/21/25  1146   APTT 30.5   INR 1.1     CARDIAC ENZYMES:   Recent Labs     06/20/25  1539 06/21/25  0712 06/21/25  1146   TROPONINI 0.046* 0.099* 0.077*           Lab Results   Component Value Date    HGBA1C 5.2 06/12/2024        Echo  Result Date: 6/21/2025    Left Ventricle: The left ventricle is moderately dilated. Mildly increased wall thickness. There is low normal systolic function with a visually estimated ejection fraction of 50 - 55%. Grade I diastolic dysfunction.   Right Ventricle: The right ventricle is normal in size Systolic function is normal. TAPSE is 2.2 cm.   Left Atrium: The left atrium is mildly dilated   Aortic Valve: There is moderate aortic valve sclerosis. There is mild aortic regurgitation.   Mitral Valve: Mildly thickened leaflets. There is mild mitral annular calcification. There is mild regurgitation.   IVC/SVC: Intermediate venous pressure at 8 mmHg.     CTA Chest Non-Coronary (PE Studies)  Result Date: 6/20/2025  EXAMINATION: STUDY:CTA CHEST NON CORONARY (PE STUDIES) CLINICAL HISTORY AND TECHNIQUE: Suspected pulmonary embolus, elevated D-dimer This patient has had 0 CT and nuclear medicine scans performed within the last 12 months. The following DOSE REDUCTION TECHNIQUES are used for all CT scans at [facility]: 1. Automated exposure control. 2. Adjustment of the mA and/or kv according to patient size. 3. Use of iterative reconstruction technique. COMPARISON: None FINDINGS: CT angiography of the pulmonary arterial vasculature with intravenous contrast was performed with post processing and multiplanar and 3 dimensional reconstruction of images. The quality of the study is good. The pulmonary arterial vasculature appears  unremarkable without evidence of significant thrombus/pulmonary embolus, stenotic segments, aneurysmal dilatation, vascular malformations or other significant abnormalities to the level of the segmental arterial branches. Moderate emphysematous changes are noted involving both lungs mild changes of interstitial fibrosis and scattered areas of parenchymal scarring.  Moderate atherosclerotic plaquing is noted within the thoracic aorta and its primary branches including coronary arteries.  Moderate degenerative changes involving thoracic spine.     1. There is no evidence of pulmonary embolus or other significant abnormalities involving the pulmonary arterial vasculature to the level of the segmental arterial branches. 2. Chronic changes are present as described above. Electronically signed by: Rigoberto Brandon Date:    06/20/2025 Time:    08:27    X-Ray Chest AP Portable  Result Date: 6/20/2025  EXAMINATION: STUDY: XR CHEST AP PORTABLE CLINICAL HISTORY AND TECHNIQUE: Chest pain COMPARISON: 02/24/2025 FINDINGS: The cardiac and mediastinal contours appear unremarkable.  The pulmonary vasculature is congested with minimally increased interstitial lung markings.  Parenchymal scarring is noted within the right lower lung field as seen previously.I see no lobar or segmental infiltrates.No significant pleural effusions are noted.Mild degenerative changes are noted involving the thoracic spine.     1. The pulmonary vasculature is congested with minimally increased interstitial lung markings. 2. Chronic changes are present as described above.  See above comments. Electronically signed by: Rigoberto Brandon Date:    06/20/2025 Time:    07:00

## 2025-06-22 NOTE — PLAN OF CARE
06/22/25 1218   Final Note   Assessment Type Final Discharge Note   Anticipated Discharge Disposition [facility]   Post-Acute Status   Post-Acute Authorization Placement   Post-Acute Placement Status Set-up Complete/Auth obtained  (Return to [facility])

## 2025-06-23 ENCOUNTER — HOSPITAL ENCOUNTER (INPATIENT)
Facility: HOSPITAL | Age: 70
LOS: 2 days | Discharge: HOSPICE | DRG: 282 | End: 2025-06-25
Attending: STUDENT IN AN ORGANIZED HEALTH CARE EDUCATION/TRAINING PROGRAM | Admitting: INTERNAL MEDICINE
Payer: MEDICARE

## 2025-06-23 DIAGNOSIS — R07.9 CHEST PAIN, UNSPECIFIED TYPE: Primary | ICD-10-CM

## 2025-06-23 DIAGNOSIS — R79.89 ELEVATED TROPONIN I LEVEL: ICD-10-CM

## 2025-06-23 DIAGNOSIS — R07.9 CHEST PAIN: ICD-10-CM

## 2025-06-23 LAB
ALBUMIN SERPL-MCNC: 3.8 G/DL (ref 3.4–4.8)
ALBUMIN SERPL-MCNC: 4.1 G/DL (ref 3.4–4.8)
ALBUMIN/GLOB SERPL: 1.1 RATIO (ref 1.1–2)
ALBUMIN/GLOB SERPL: 1.2 RATIO (ref 1.1–2)
ALP SERPL-CCNC: 77 UNIT/L (ref 40–150)
ALP SERPL-CCNC: 85 UNIT/L (ref 40–150)
ALT SERPL-CCNC: 17 UNIT/L (ref 0–55)
ALT SERPL-CCNC: 19 UNIT/L (ref 0–55)
ANION GAP SERPL CALC-SCNC: 8 MEQ/L
ANION GAP SERPL CALC-SCNC: 8 MEQ/L
AST SERPL-CCNC: 26 UNIT/L (ref 11–45)
AST SERPL-CCNC: 29 UNIT/L (ref 11–45)
BASOPHILS # BLD AUTO: 0.09 X10(3)/MCL
BASOPHILS # BLD AUTO: 0.1 X10(3)/MCL
BASOPHILS NFR BLD AUTO: 1.1 %
BASOPHILS NFR BLD AUTO: 1.4 %
BILIRUB SERPL-MCNC: 0.8 MG/DL
BILIRUB SERPL-MCNC: 0.9 MG/DL
BNP BLD-MCNC: 136.6 PG/ML
BUN SERPL-MCNC: 23.1 MG/DL (ref 8.4–25.7)
BUN SERPL-MCNC: 23.9 MG/DL (ref 8.4–25.7)
CALCIUM SERPL-MCNC: 8.8 MG/DL (ref 8.8–10)
CALCIUM SERPL-MCNC: 9.3 MG/DL (ref 8.8–10)
CHLORIDE SERPL-SCNC: 104 MMOL/L (ref 98–107)
CHLORIDE SERPL-SCNC: 105 MMOL/L (ref 98–107)
CO2 SERPL-SCNC: 25 MMOL/L (ref 23–31)
CO2 SERPL-SCNC: 25 MMOL/L (ref 23–31)
CREAT SERPL-MCNC: 1.48 MG/DL (ref 0.72–1.25)
CREAT SERPL-MCNC: 1.66 MG/DL (ref 0.72–1.25)
CREAT/UREA NIT SERPL: 14
CREAT/UREA NIT SERPL: 16
EOSINOPHIL # BLD AUTO: 0.2 X10(3)/MCL (ref 0–0.9)
EOSINOPHIL # BLD AUTO: 0.22 X10(3)/MCL (ref 0–0.9)
EOSINOPHIL NFR BLD AUTO: 2.2 %
EOSINOPHIL NFR BLD AUTO: 3.3 %
ERYTHROCYTE [DISTWIDTH] IN BLOOD BY AUTOMATED COUNT: 12.7 % (ref 11.5–17)
ERYTHROCYTE [DISTWIDTH] IN BLOOD BY AUTOMATED COUNT: 12.9 % (ref 11.5–17)
GFR SERPLBLD CREATININE-BSD FMLA CKD-EPI: 45 ML/MIN/1.73/M2
GFR SERPLBLD CREATININE-BSD FMLA CKD-EPI: 51 ML/MIN/1.73/M2
GLOBULIN SER-MCNC: 3.3 GM/DL (ref 2.4–3.5)
GLOBULIN SER-MCNC: 3.4 GM/DL (ref 2.4–3.5)
GLUCOSE SERPL-MCNC: 116 MG/DL (ref 82–115)
GLUCOSE SERPL-MCNC: 121 MG/DL (ref 82–115)
HCT VFR BLD AUTO: 39.2 % (ref 42–52)
HCT VFR BLD AUTO: 39.2 % (ref 42–52)
HGB BLD-MCNC: 13 G/DL (ref 14–18)
HGB BLD-MCNC: 13.6 G/DL (ref 14–18)
IMM GRANULOCYTES # BLD AUTO: 0.01 X10(3)/MCL (ref 0–0.04)
IMM GRANULOCYTES # BLD AUTO: 0.03 X10(3)/MCL (ref 0–0.04)
IMM GRANULOCYTES NFR BLD AUTO: 0.2 %
IMM GRANULOCYTES NFR BLD AUTO: 0.3 %
LYMPHOCYTES # BLD AUTO: 1.13 X10(3)/MCL (ref 0.6–4.6)
LYMPHOCYTES # BLD AUTO: 1.32 X10(3)/MCL (ref 0.6–4.6)
LYMPHOCYTES NFR BLD AUTO: 14.6 %
LYMPHOCYTES NFR BLD AUTO: 17 %
MCH RBC QN AUTO: 30.3 PG (ref 27–31)
MCH RBC QN AUTO: 31.2 PG (ref 27–31)
MCHC RBC AUTO-ENTMCNC: 33.2 G/DL (ref 33–36)
MCHC RBC AUTO-ENTMCNC: 34.7 G/DL (ref 33–36)
MCV RBC AUTO: 89.9 FL (ref 80–94)
MCV RBC AUTO: 91.4 FL (ref 80–94)
MONOCYTES # BLD AUTO: 0.82 X10(3)/MCL (ref 0.1–1.3)
MONOCYTES # BLD AUTO: 1.01 X10(3)/MCL (ref 0.1–1.3)
MONOCYTES NFR BLD AUTO: 11.2 %
MONOCYTES NFR BLD AUTO: 12.3 %
NEUTROPHILS # BLD AUTO: 4.37 X10(3)/MCL (ref 2.1–9.2)
NEUTROPHILS # BLD AUTO: 6.38 X10(3)/MCL (ref 2.1–9.2)
NEUTROPHILS NFR BLD AUTO: 65.8 %
NEUTROPHILS NFR BLD AUTO: 70.6 %
NRBC BLD AUTO-RTO: 0 %
NRBC BLD AUTO-RTO: 0 %
OHS QRS DURATION: 112 MS
OHS QTC CALCULATION: 447 MS
PLATELET # BLD AUTO: 229 X10(3)/MCL (ref 130–400)
PLATELET # BLD AUTO: 234 X10(3)/MCL (ref 130–400)
PMV BLD AUTO: 9 FL (ref 7.4–10.4)
PMV BLD AUTO: 9.1 FL (ref 7.4–10.4)
POTASSIUM SERPL-SCNC: 3.5 MMOL/L (ref 3.5–5.1)
POTASSIUM SERPL-SCNC: 3.5 MMOL/L (ref 3.5–5.1)
PROT SERPL-MCNC: 7.2 GM/DL (ref 5.8–7.6)
PROT SERPL-MCNC: 7.4 GM/DL (ref 5.8–7.6)
RBC # BLD AUTO: 4.29 X10(6)/MCL (ref 4.7–6.1)
RBC # BLD AUTO: 4.36 X10(6)/MCL (ref 4.7–6.1)
SODIUM SERPL-SCNC: 137 MMOL/L (ref 136–145)
SODIUM SERPL-SCNC: 138 MMOL/L (ref 136–145)
TROPONIN I SERPL-MCNC: 0.08 NG/ML (ref 0–0.04)
TROPONIN I SERPL-MCNC: 0.09 NG/ML (ref 0–0.04)
TROPONIN I SERPL-MCNC: 0.1 NG/ML (ref 0–0.04)
TROPONIN I SERPL-MCNC: 0.1 NG/ML (ref 0–0.04)
WBC # BLD AUTO: 6.64 X10(3)/MCL (ref 4.5–11.5)
WBC # BLD AUTO: 9.04 X10(3)/MCL (ref 4.5–11.5)

## 2025-06-23 PROCEDURE — 94761 N-INVAS EAR/PLS OXIMETRY MLT: CPT

## 2025-06-23 PROCEDURE — 99900031 HC PATIENT EDUCATION (STAT)

## 2025-06-23 PROCEDURE — 83880 ASSAY OF NATRIURETIC PEPTIDE: CPT | Performed by: STUDENT IN AN ORGANIZED HEALTH CARE EDUCATION/TRAINING PROGRAM

## 2025-06-23 PROCEDURE — 25000242 PHARM REV CODE 250 ALT 637 W/ HCPCS: Performed by: INTERNAL MEDICINE

## 2025-06-23 PROCEDURE — 99285 EMERGENCY DEPT VISIT HI MDM: CPT | Mod: 25

## 2025-06-23 PROCEDURE — 80053 COMPREHEN METABOLIC PANEL: CPT | Mod: 91 | Performed by: INTERNAL MEDICINE

## 2025-06-23 PROCEDURE — 93005 ELECTROCARDIOGRAM TRACING: CPT

## 2025-06-23 PROCEDURE — 21400001 HC TELEMETRY ROOM

## 2025-06-23 PROCEDURE — 84484 ASSAY OF TROPONIN QUANT: CPT | Mod: 91 | Performed by: STUDENT IN AN ORGANIZED HEALTH CARE EDUCATION/TRAINING PROGRAM

## 2025-06-23 PROCEDURE — 94640 AIRWAY INHALATION TREATMENT: CPT

## 2025-06-23 PROCEDURE — 85025 COMPLETE CBC W/AUTO DIFF WBC: CPT | Mod: 91 | Performed by: INTERNAL MEDICINE

## 2025-06-23 PROCEDURE — 25000003 PHARM REV CODE 250: Performed by: INTERNAL MEDICINE

## 2025-06-23 PROCEDURE — 93010 ELECTROCARDIOGRAM REPORT: CPT | Mod: ,,, | Performed by: INTERNAL MEDICINE

## 2025-06-23 PROCEDURE — 84484 ASSAY OF TROPONIN QUANT: CPT | Mod: 91 | Performed by: INTERNAL MEDICINE

## 2025-06-23 PROCEDURE — 85025 COMPLETE CBC W/AUTO DIFF WBC: CPT | Performed by: STUDENT IN AN ORGANIZED HEALTH CARE EDUCATION/TRAINING PROGRAM

## 2025-06-23 PROCEDURE — 80053 COMPREHEN METABOLIC PANEL: CPT | Mod: 59 | Performed by: STUDENT IN AN ORGANIZED HEALTH CARE EDUCATION/TRAINING PROGRAM

## 2025-06-23 PROCEDURE — 99900035 HC TECH TIME PER 15 MIN (STAT)

## 2025-06-23 PROCEDURE — 94760 N-INVAS EAR/PLS OXIMETRY 1: CPT

## 2025-06-23 RX ORDER — ASPIRIN 81 MG/1
81 TABLET ORAL DAILY
Status: DISCONTINUED | OUTPATIENT
Start: 2025-06-23 | End: 2025-06-25 | Stop reason: HOSPADM

## 2025-06-23 RX ORDER — ATORVASTATIN CALCIUM 10 MG/1
20 TABLET, FILM COATED ORAL NIGHTLY
Status: DISCONTINUED | OUTPATIENT
Start: 2025-06-23 | End: 2025-06-25 | Stop reason: HOSPADM

## 2025-06-23 RX ORDER — TALC
6 POWDER (GRAM) TOPICAL NIGHTLY PRN
Status: DISCONTINUED | OUTPATIENT
Start: 2025-06-23 | End: 2025-06-25 | Stop reason: HOSPADM

## 2025-06-23 RX ORDER — CLOPIDOGREL BISULFATE 75 MG/1
75 TABLET ORAL DAILY
Status: DISCONTINUED | OUTPATIENT
Start: 2025-06-23 | End: 2025-06-25 | Stop reason: HOSPADM

## 2025-06-23 RX ORDER — HYDRALAZINE HYDROCHLORIDE 20 MG/ML
10 INJECTION INTRAMUSCULAR; INTRAVENOUS EVERY 4 HOURS PRN
Status: DISCONTINUED | OUTPATIENT
Start: 2025-06-23 | End: 2025-06-25 | Stop reason: HOSPADM

## 2025-06-23 RX ORDER — SODIUM CHLORIDE 0.9 % (FLUSH) 0.9 %
10 SYRINGE (ML) INJECTION
Status: DISCONTINUED | OUTPATIENT
Start: 2025-06-23 | End: 2025-06-25 | Stop reason: HOSPADM

## 2025-06-23 RX ORDER — IPRATROPIUM BROMIDE AND ALBUTEROL SULFATE 2.5; .5 MG/3ML; MG/3ML
3 SOLUTION RESPIRATORY (INHALATION) EVERY 6 HOURS PRN
Status: DISCONTINUED | OUTPATIENT
Start: 2025-06-23 | End: 2025-06-25 | Stop reason: HOSPADM

## 2025-06-23 RX ORDER — LABETALOL HYDROCHLORIDE 5 MG/ML
20 INJECTION, SOLUTION INTRAVENOUS EVERY 4 HOURS PRN
Status: DISCONTINUED | OUTPATIENT
Start: 2025-06-23 | End: 2025-06-25 | Stop reason: HOSPADM

## 2025-06-23 RX ORDER — ISOSORBIDE MONONITRATE 30 MG/1
30 TABLET, EXTENDED RELEASE ORAL DAILY
Status: DISCONTINUED | OUTPATIENT
Start: 2025-06-23 | End: 2025-06-25 | Stop reason: HOSPADM

## 2025-06-23 RX ORDER — AMLODIPINE BESYLATE 5 MG/1
5 TABLET ORAL DAILY
Status: DISCONTINUED | OUTPATIENT
Start: 2025-06-23 | End: 2025-06-25 | Stop reason: HOSPADM

## 2025-06-23 RX ORDER — EZETIMIBE 10 MG/1
10 TABLET ORAL NIGHTLY
Status: DISCONTINUED | OUTPATIENT
Start: 2025-06-23 | End: 2025-06-25 | Stop reason: HOSPADM

## 2025-06-23 RX ADMIN — Medication 81 MG: at 04:06

## 2025-06-23 RX ADMIN — IPRATROPIUM BROMIDE AND ALBUTEROL SULFATE 3 ML: .5; 3 SOLUTION RESPIRATORY (INHALATION) at 08:06

## 2025-06-23 RX ADMIN — Medication 6 MG: at 09:06

## 2025-06-23 RX ADMIN — ISOSORBIDE MONONITRATE 30 MG: 30 TABLET, EXTENDED RELEASE ORAL at 11:06

## 2025-06-23 RX ADMIN — AMLODIPINE BESYLATE 5 MG: 5 TABLET ORAL at 11:06

## 2025-06-23 RX ADMIN — IPRATROPIUM BROMIDE AND ALBUTEROL SULFATE 3 ML: .5; 3 SOLUTION RESPIRATORY (INHALATION) at 09:06

## 2025-06-23 RX ADMIN — EZETIMIBE 10 MG: 10 TABLET ORAL at 09:06

## 2025-06-23 RX ADMIN — IPRATROPIUM BROMIDE AND ALBUTEROL SULFATE 3 ML: .5; 3 SOLUTION RESPIRATORY (INHALATION) at 01:06

## 2025-06-23 RX ADMIN — CLOPIDOGREL 75 MG: 75 TABLET ORAL at 11:06

## 2025-06-23 RX ADMIN — ATORVASTATIN CALCIUM 20 MG: 10 TABLET, FILM COATED ORAL at 09:06

## 2025-06-23 NOTE — ED PROVIDER NOTES
Encounter Date: 6/23/2025    SCRIBE #1 NOTE: I, Nando Peraza, am scribing for, and in the presence of,  Clovis Sparks MD. I have scribed the following portions of the note - Other sections scribed: HPI,ROS,PE.       History     Chief Complaint   Patient presents with    Chest Pain     Recent admit with NSTEMI, d/c 6/22. Back with c/o chest pain     71 y/o male with PMHx of anoxic brain injury, CAD s.p. stents, MI, HTN, HLD, and left BKA presents to ED c/o chest pain onset ~20:00 on 6/22. Pt reports being discharged from the hospital on 6/22, after being admitted for an NSTEMI, and declining a heart cath. He reports a few hours after being discharged, he started having a stabbing chest pain, in the center of his chest that does not radiate. He reports associated symptoms of shortness of breath and nausea. He denies any vomiting, leg swelling, diaphoresis, fever, or chills.     The history is provided by the patient and medical records.     Review of patient's allergies indicates:  No Known Allergies  Past Medical History:   Diagnosis Date    Anoxic brain injury     Arthritis     CAD (coronary artery disease)     Cardiac arrest     Heart attack     HLD (hyperlipidemia)     Hx of left BKA     Hypertension     Mitral valve regurgitation     Muscle spasms of both lower extremities     Muscle spasms of both lower extremities     PEG (percutaneous endoscopic gastrostomy) adjustment/replacement/removal     Status post placement of stent in right coronary artery      Past Surgical History:   Procedure Laterality Date    LEG AMPUTATION Left      No family history on file.  Social History[1]  Review of Systems   Constitutional:  Negative for chills, diaphoresis and fever.   Respiratory:  Positive for shortness of breath.    Cardiovascular:  Positive for chest pain (mid). Negative for leg swelling.   Gastrointestinal:  Positive for nausea. Negative for diarrhea and vomiting.       Physical Exam     Initial Vitals [06/23/25  0046]   BP Pulse Resp Temp SpO2   (!) 148/70 82 18 97.6 °F (36.4 °C) 97 %      MAP       --         Physical Exam    Constitutional: He appears well-developed and well-nourished. He is not diaphoretic. No distress.   HENT:   Head: Normocephalic and atraumatic.   Right Ear: External ear normal.   Left Ear: External ear normal.   Nose: Nose normal.   Eyes: EOM are normal. Pupils are equal, round, and reactive to light. Right eye exhibits no discharge. Left eye exhibits no discharge.   Cardiovascular:  Normal rate, regular rhythm and normal heart sounds.     Exam reveals no gallop and no friction rub.       No murmur heard.  Pulmonary/Chest: Effort normal and breath sounds normal. No respiratory distress. He has no wheezes. He has no rhonchi. He has no rales. He exhibits no tenderness.   Abdominal: Abdomen is soft. Bowel sounds are normal. He exhibits no distension and no mass. There is no abdominal tenderness. There is no rebound and no guarding.   Musculoskeletal:         General: No edema.      Comments: Left BKA      Neurological: He is alert and oriented to person, place, and time. A cranial nerve deficit (chronic right facial droop) is present. No sensory deficit. GCS score is 15. GCS eye subscore is 4. GCS verbal subscore is 5. GCS motor subscore is 6.   Sluggish speech.      Skin: Skin is warm and dry. Capillary refill takes less than 2 seconds.         ED Course   Procedures  Labs Reviewed   COMPREHENSIVE METABOLIC PANEL - Abnormal       Result Value    Sodium 137      Potassium 3.5      Chloride 104      CO2 25      Glucose 116 (*)     Blood Urea Nitrogen 23.9      Creatinine 1.48 (*)     Calcium 9.3      Protein Total 7.4      Albumin 4.1      Globulin 3.3      Albumin/Globulin Ratio 1.2      Bilirubin Total 0.8      ALP 85      ALT 19      AST 29      eGFR 51      Anion Gap 8.0      BUN/Creatinine Ratio 16     TROPONIN I - Abnormal    Troponin-I 0.076 (*)    B-TYPE NATRIURETIC PEPTIDE - Abnormal     Natriuretic Peptide 136.6 (*)    CBC WITH DIFFERENTIAL - Abnormal    WBC 9.04      RBC 4.36 (*)     Hgb 13.6 (*)     Hct 39.2 (*)     MCV 89.9      MCH 31.2 (*)     MCHC 34.7      RDW 12.7      Platelet 234      MPV 9.1      Neut % 70.6      Lymph % 14.6      Mono % 11.2      Eos % 2.2      Basophil % 1.1      Imm Grans % 0.3      Neut # 6.38      Lymph # 1.32      Mono # 1.01      Eos # 0.20      Baso # 0.10      Imm Gran # 0.03      NRBC% 0.0     TROPONIN I - Abnormal    Troponin-I 0.102 (*)    COMPREHENSIVE METABOLIC PANEL - Abnormal    Sodium 138      Potassium 3.5      Chloride 105      CO2 25      Glucose 121 (*)     Blood Urea Nitrogen 23.1      Creatinine 1.66 (*)     Calcium 8.8      Protein Total 7.2      Albumin 3.8      Globulin 3.4      Albumin/Globulin Ratio 1.1      Bilirubin Total 0.9      ALP 77      ALT 17      AST 26      eGFR 45      Anion Gap 8.0      BUN/Creatinine Ratio 14     TROPONIN I - Abnormal    Troponin-I 0.099 (*)    CBC WITH DIFFERENTIAL - Abnormal    WBC 6.64      RBC 4.29 (*)     Hgb 13.0 (*)     Hct 39.2 (*)     MCV 91.4      MCH 30.3      MCHC 33.2      RDW 12.9      Platelet 229      MPV 9.0      Neut % 65.8      Lymph % 17.0      Mono % 12.3      Eos % 3.3      Basophil % 1.4      Imm Grans % 0.2      Neut # 4.37      Lymph # 1.13      Mono # 0.82      Eos # 0.22      Baso # 0.09      Imm Gran # 0.01      NRBC% 0.0     CBC W/ AUTO DIFFERENTIAL    Narrative:     The following orders were created for panel order CBC auto differential.  Procedure                               Abnormality         Status                     ---------                               -----------         ------                     CBC with Differential[[id number]]       Abnormal            Final result                 Please view results for these tests on the individual orders.   CBC W/ AUTO DIFFERENTIAL    Narrative:     The following orders were created for panel order CBC auto differential.  Procedure                                Abnormality         Status                     ---------                               -----------         ------                     CBC with Differential[[id number]]       Abnormal            Final result                 Please view results for these tests on the individual orders.          Imaging Results              X-Ray Chest 1 View (In process)                      Medications   sodium chloride 0.9% flush 10 mL (has no administration in time range)   melatonin tablet 6 mg (has no administration in time range)             Scribe Attestation:   Scribe #1: I performed the above scribed service and the documentation accurately describes the services I performed. I attest to the accuracy of the note.    Attending Attestation:           Physician Attestation for Scribe:  Physician Attestation Statement for Scribe #1: I, Clovis Sparks MD, reviewed documentation, as scribed by Nando Peraza in my presence, and it is both accurate and complete.          Medical Decision Making  Differential diagnosis includes but is not limited to: Chest pain emergent diagnoses: ACS, PE, dissection, cardiac tamponade, tension pneumothorax.  Chest pain non-emergent diagnoses: Musculoskeletal, trauma, pleurisy, pneumonia, pleural effusion, GERD, other GI, neurologic, psychiatric and other non emergent diagnoses considered.      Patient presents from some chest discomfort.  Evidently he was discharge very recently for similar that has offered a heart catheterization but declined at that time.  Chest pain returns that has now come back in the emergency department from the nursing home.  He would like to be admitted so he may have his heart catheterization.  I have discussed with both hospitalist, Cardiology they agree.  Will admit for further evaluation management possible heart catheterization in the a.m. rate to be set up for 1 at any rate that has an outpatient.  Patient is comfortable with the plan.   Care to be transferred.      Amount and/or Complexity of Data Reviewed  External Data Reviewed: notes.     Details: See ED course  Labs: ordered. Decision-making details documented in ED Course.  Radiology: ordered and independent interpretation performed. Decision-making details documented in ED Course.  ECG/medicine tests: ordered and independent interpretation performed. Decision-making details documented in ED Course.    Risk  OTC drugs.  Prescription drug management.            ED Course as of 06/23/25 0756   Mon Jun 23, 2025   0053 Chart review reveals patient was discharged yesterday after being admitted with NSTEMI.  Cardiology recommended left heart catheterization with the patient has declined in favor of medical management. [MM]   0102 EKG done at 12:53 a.m. shows sinus rhythm rate of 76 .  Normal axis.  No ST-elevation or depression.  High voltage QRS complexes.  When compared to prior EKG from June 20th, no obvious changes. [MM]   0153 Troponin I(!): 0.076  Troponin [MM]   0153 BNP(!): 136.6 [MM]   0211 Paged [facility]  [LH]   0224 Discussed with Dusty on-call for [facility] recommends against heparin or Lovenox being has a he was recently heparinized.  Asked that consult Cardiology admit for further evaluation. [MM]   0236 Paged hospitalist  [LH]      ED Course User Index  [LH] Justen Villegas  [MM] Clovis Sparks MD                           Clinical Impression:  Final diagnoses:  [R07.9] Chest pain  [R07.9] Chest pain, unspecified type (Primary)  [R79.89] Elevated troponin I level          ED Disposition Condition    Observation                       [1]   Social History  Tobacco Use    Smoking status: Every Day     Current packs/day: 1.00     Average packs/day: 1 pack/day for 53.5 years (53.5 ttl pk-yrs)     Types: Cigarettes     Start date: 1972     Passive exposure: Current    Smokeless tobacco: Never   Substance Use Topics    Alcohol use: Never    Drug use: Never        Clovis Sparks MD  06/23/25  2946

## 2025-06-23 NOTE — H&P
Hospital Medicine  History & Physical Examination       Patient: Leno Thompson Jr.  MRN: [id number]  STATUS: OP- Observation   DOS: 6/23/2025   PCP: Nancy Gutierrez FNP-C      CC: chest pain       HISTORY OF PRESENT ILLNESS       70 y.o. male with a history that includes CAD s/p prior PCI/stent, was admitted to our services on 6/21, after presenting to outlying facility with chest pressure. EKG did not reveal any evidence of ST segment elevation, but troponin was mildly elevated. Patient transferred to [facility] for cardiology evaluation.  Cardiology recommended C, but patient declined, opting for medical optimization instead.  Chest pain had resolved and troponin remained flat.  Given his refusal patient was discharge back to nursing home to follow up with his primary cardiologist to discuss ongoing management.   Patient now returns with recurrent chest pain starting several hours after discharge.  Repeat enzymes slightly worse, were 0.07 on discharge, if trended up to 0.1.  EKG still without ST deviation.  Patient again being admitted to hospital medicine services with consulted Cardiology.      REVIEW OF SYSTEMS     Except as documented, all other systems reviewed and negative       PAST MEDICAL HISTORY     CAD  HTN  HLD  Cardiac arrest  Anoxic brain injury      PAST SURGICAL HISTORY     Traumatic L BKA  Coronary stent  PEG      FAMILY HISTORY     Reviewed, negative in relation to patient's current condition.      SOCIAL HISTORY     Current smoker, 1PPD x 50 years.  Denies alcohol or drug abuse  Screening for Social Drivers of health:  Patient Screened for food insecurity, housing instability, transportation needs, utility difficulties, and interpersonal safety. Case management to address any needs.       ALLERGIES     NKDA      HOME MEDICATIONS      acetaminophen (TYLENOL) 650 mg, 2 times daily    albuterol-ipratropium (DUO-NEB) 2.5 mg-0.5 mg/3 mL neb soln 3 mLs, Every 4 hours PRN    amLODIPine (NORVASC) 5 mg,  Oral, Daily    atorvastatin (LIPITOR) 20 mg, Nightly    baclofen (LIORESAL) 20 mg, 3 times daily    carboxymethylcellulose sodium (artificial tears, CMC,) 1% drops 2 drops, 4 times daily    clonazePAM (KLONOPIN) 0.5 mg, Every 12 hours PRN    clopidogreL (PLAVIX) 75 mg, Daily    ezetimibe (ZETIA) 10 mg, Nightly    furosemide (LASIX) 20 mg, Daily    isosorbide mononitrate (IMDUR) 30 mg, Oral, Daily    ranolazine (RANEXA) 500 mg, Oral, 2 times daily    senna-docusate (SENNA WITH DOCUSATE SODIUM) 8.6-50mg tab 1 tablet, Daily    vitamin D3-folic acid 125 mcg (5,000 unit)-1 mg Tab 1 tablet, Daily       PHYSICAL EXAM     VITALS: T 97.6 °F (36.4 °C)   BP (!) 160/73   P 69   RR 16   O2 (!) 94 %    GENERAL: Awake and in NAD  HEENT: NC/AT, EOMI, PERRL.  NECK: Supple,  No JVD  LUNGS: CTA B/L  CVS: RRR, S1S2 positive  GI/: Soft, NT/ND, bowel sounds positive.  EXTREMITIES: Peripheral pulses 2+, L BKA, no peripheral edema  DERM: Warm, dry.  No rashes or lesions noted.  NEURO: AAOx3, no focal neurologic deficit  PSYCH: Cooperative, appropriate mood and affect       DIAGNOSTICS     Recent Labs     06/23/25  0105 06/23/25  0542   WBC 9.04 6.64   RBC 4.36* 4.29*   HGB 13.6* 13.0*   HCT 39.2* 39.2*   MCV 89.9 91.4   MCH 31.2* 30.3   MCHC 34.7 33.2   RDW 12.7 12.9    229     Recent Labs     06/21/25  1146   INR 1.1   APTT 30.5        Recent Labs     06/21/25  0712 06/22/25  0323 06/23/25  0105 06/23/25  0542      < > 137 138   K 3.9   < > 3.5 3.5   CO2 23   < > 25 25   BUN 22.1   < > 23.9 23.1   CREATININE 1.71*   < > 1.48* 1.66*   CALCIUM 9.2   < > 9.3 8.8   MG 2.20  --   --   --    ALBUMIN 4.1   < > 4.1 3.8   GLOBULIN 3.6*   < > 3.3 3.4   ALKPHOS 94   < > 85 77   ALT 15   < > 19 17   AST 21   < > 29 26   BILITOT 1.1   < > 0.8 0.9    < > = values in this interval not displayed.     Recent Labs     06/23/25  0105 06/23/25  0542 06/23/25  1011   .6*  --   --    TROPONINI 0.076* 0.102*  0.099* 0.091*          X-Ray  Chest 1 View  Narrative: EXAMINATION:  XR CHEST 1 VIEW    CLINICAL HISTORY:  Chest pain, unspecified    TECHNIQUE:  AP chest    COMPARISON:  Chest x-ray dated 06/20/2025    FINDINGS:  The heart is normal in size.  There is no focal airspace consolidation.  There is no pleural effusion or visible pneumothorax.  Calcified pleural plaque is noted on the right.  Impression: No acute abnormality of the chest.    Electronically signed by: Heather Us  Date:    06/23/2025  Time:    09:01        ASSESSMENT     NSTEMI, possibly type I  h/o CAD s/p prior PCI/stents  h/o cardiac arrest, anoxic brain injury  Essential HTN    PLAN     Continue DAPT and statin   Cardiology on board, patient seems agreeable to [facility] at this time  Otherwise continue current management and monitoring pending further management      Prophylaxis:  SC Lovenox  Code Status: Full      Preet Bello MD  Hospital Medicine            If the patient has been admitted under observation status, it is at my discretion and under our care with hospital medicine services. [TWA]     Critical Care Time: 55 minutes spent in direct hands on care, review of labs, imaging and medical record, and discussion of diagnosis, treatment, prognosis with patient/family.  Patient remains at high-risk for clinical decompensation  Critical Care diagnosis: NSTEMI

## 2025-06-23 NOTE — Clinical Note
The catheter was inserted into the and was inserted over the wire into the left ventricle. Hemodynamics were performed.  and Pullback was recorded.  EDP 11

## 2025-06-23 NOTE — Clinical Note
The catheter was removed from the and was repositioned into the aorta. Hemodynamics were performed.  An angiography was performed of the bilateral iliac arteries. The angiography was performed via power injection.

## 2025-06-23 NOTE — PLAN OF CARE
Pt is single, 3 children, no living will or POA. 3 children- Ward Villegas son [phone] and Debra Arriola [phone] identified as supports. Resident of [facility].    06/23/25 1013   Discharge Assessment   Assessment Type Discharge Planning Assessment   Confirmed/corrected address, phone number and insurance Yes   Confirmed Demographics Correct on Facesheet   Source of Information patient   Communicated LELIA with patient/caregiver Date not available/Unable to determine   People in Home facility resident   Facility Arrived From: [facility]   Do you expect to return to your current living situation? Yes   Do you have help at home or someone to help you manage your care at home? Yes   Who are your caregiver(s) and their phone number(s)? facility resident. Ward Villegas son [phone]/ daughter Debra Arriola [phone]   Prior to hospitilization cognitive status: Unable to Assess   Current cognitive status: Alert/Oriented   Walking or Climbing Stairs Difficulty yes   Walking or Climbing Stairs ambulation difficulty, requires equipment   Mobility Management wc, rw, prosethesis   Dressing/Bathing Difficulty yes   Dressing/Bathing bathing difficulty, assistance 1 person;dressing difficulty, assistance 1 person;bathing difficulty, requires equipment   Dressing/Bathing Management built in bath bench, staff assists   Home Accessibility wheelchair accessible   Home Layout Able to live on 1st floor   Equipment Currently Used at Home bath bench;walker, rolling;wheelchair;nebulizer;prosthesis;hospital bed   Readmission within 30 days? Yes   Patient currently being followed by outpatient case management? No   Do you currently have service(s) that help you manage your care at home? No   Do you take prescription medications? Yes   Do you have prescription coverage? Yes  (Medicare)   Do you have any problems affording any of your prescribed medications? No   Is the patient taking medications as prescribed? yes   Who is  going to help you get home at discharge? NH   How do you get to doctors appointments? agency   Are you on dialysis? No   Do you take coumadin? No   Discharge Plan A Other  (TBD)   DME Needed Upon Discharge  other (see comments)  (TBD)   Discharge Plan discussed with: Patient   Transition of Care Barriers None   Physical Activity   On average, how many days per week do you engage in moderate to strenuous exercise (like a brisk walk)? 0 days   On average, how many minutes do you engage in exercise at this level? 0 min   Financial Resource Strain   How hard is it for you to pay for the very basics like food, housing, medical care, and heating? Not very   Housing Stability   In the last 12 months, was there a time when you were not able to pay the mortgage or rent on time? N   At any time in the past 12 months, were you homeless or living in a shelter (including now)? N   Transportation Needs   In the past 12 months, has lack of transportation kept you from medical appointments or from getting medications? no   In the past 12 months, has lack of transportation kept you from meetings, work, or from getting things needed for daily living? No   Food Insecurity   Within the past 12 months, you worried that your food would run out before you got the money to buy more. Never true   Within the past 12 months, the food you bought just didn't last and you didn't have money to get more. Never true   Stress   Do you feel stress - tense, restless, nervous, or anxious, or unable to sleep at night because your mind is troubled all the time - these days? Not at all   Social Isolation   How often do you feel lonely or isolated from those around you?  Sometimes   Alcohol Use   Q1: How often do you have a drink containing alcohol? Never   Q2: How many drinks containing alcohol do you have on a typical day when you are drinking? None   Q3: How often do you have six or more drinks on one occasion? Never   Utilities   In the past 12 months  has the electric, gas, oil, or water company threatened to shut off services in your home? No   Health Literacy   How often do you need to have someone help you when you read instructions, pamphlets, or other written material from your doctor or pharmacy? Rarely

## 2025-06-23 NOTE — CONSULTS
Inpatient consult to Cardiology  Consult performed by: Michelle Shea NP  Consult ordered by: Clovis Sparks MD  Reason for consult: NSTEMI      [facility]    Cardiology  Consult Note    Patient Name: Leno Thompson Jr.  MRN: [id number]  Admission Date: 6/23/2025  Hospital Length of Stay: 0 days  Code Status: Full Code   Attending Provider: Wally Brizuela MD   Consulting Provider: Michelle Shea NP  Primary Care Physician: Nancy Gutierrez FNP-C  Principal Problem:Chest pain    Patient information was obtained from patient, past medical records, and ER records.     Subjective:     Chief Complaint/Reason for Consult: Consulted for NSTEMI    HPI: 70-year-old male, known to Dr. Langston, who presented to the ED today with c/o chest pain that began approximately 2000 yesterday evening. Patient was recently admitted with NSTEMI and discharged on 6.22.25, during which he declined a heart cath. He reports stabbing, substernal non-radiating chest pain, with associated SOB and nausea. EKG done at 12:53 a.m. shows sinus rhythm rate of 76 , normal axis, no ST-elevation or depression. When compared to prior EKG from June 20th, no obvious changes. Troponin 0.076, .6. CIS is consulted for NSTEMI.     PMH: cardiac arrest in 2018 w/ CAD s/p PCI w/ stent to RCA, anoxic brain injury, valvular heart disease/mod-severe MR, HLD, HTN, PAD, PVD with claudication, arthritis, muscle spasms  PSH: traumatic left BKA, PEG, PCI, femur fracture repair, ankle sx  Family History: father-MI s/p CABG x1; mother-CHF  Social History: current everyday smoker    Previous Cardiac Diagnostics:   Echo 6.21.25  Left Ventricle: The left ventricle is moderately dilated. Mildly increased wall thickness. There is low normal systolic function with a visually estimated ejection fraction of 50 - 55%. Grade I diastolic dysfunction.  Right Ventricle: The right ventricle is normal in size Systolic  function is normal. TAPSE is 2.2 cm.  Left Atrium: The left atrium is mildly dilated  Aortic Valve: There is moderate aortic valve sclerosis. There is mild aortic regurgitation.  Mitral Valve: Mildly thickened leaflets. There is mild mitral annular calcification. There is mild regurgitation.  IVC/SVC: Intermediate venous pressure at 8 mmHg    Nuclear stress test 2025  Mainly inferior and inferolateral scar with mild ischemia. Echocardiogram reveals EF 45% with inferolateral hypokinesis/akinesis with moderate MR     Past Medical History:   Diagnosis Date    Anoxic brain injury     Arthritis     CAD (coronary artery disease)     Cardiac arrest     Heart attack     HLD (hyperlipidemia)     Hx of left BKA     Hypertension     Mitral valve regurgitation     Muscle spasms of both lower extremities     Muscle spasms of both lower extremities     PEG (percutaneous endoscopic gastrostomy) adjustment/replacement/removal     Status post placement of stent in right coronary artery      Past Surgical History:   Procedure Laterality Date    LEG AMPUTATION Left      Review of patient's allergies indicates:  No Known Allergies  Current Facility-Administered Medications on File Prior to Encounter   Medication    [DISCONTINUED] acetaminophen tablet 650 mg    [DISCONTINUED] albuterol-ipratropium 2.5 mg-0.5 mg/3 mL nebulizer solution 3 mL    [DISCONTINUED] aluminum-magnesium hydroxide-simethicone 200-200-20 mg/5 mL suspension 30 mL    [DISCONTINUED] amLODIPine tablet 5 mg    [DISCONTINUED] aspirin EC tablet 81 mg    [DISCONTINUED] atorvastatin tablet 20 mg    [DISCONTINUED] bisacodyL suppository 10 mg    [DISCONTINUED] clonazePAM tablet 0.5 mg    [DISCONTINUED] clopidogreL tablet 75 mg    [DISCONTINUED] dextrose 50% injection 12.5 g    [DISCONTINUED] dextrose 50% injection 25 g    [DISCONTINUED] ezetimibe tablet 10 mg    [DISCONTINUED] glucagon (human recombinant) injection 1 mg    [DISCONTINUED] glucose chewable tablet 16 g     [DISCONTINUED] glucose chewable tablet 24 g    [DISCONTINUED] heparin 25,000 units in dextrose 5% (100 units/ml) IV bolus from bag LOW INTENSITY nomogram - LAF    [DISCONTINUED] heparin 25,000 units in dextrose 5% (100 units/ml) IV bolus from bag LOW INTENSITY nomogram - LAF    [DISCONTINUED] heparin 25,000 units in dextrose 5% 250 mL (100 units/mL) infusion LOW INTENSITY nomogram - LAF    [DISCONTINUED] hydrALAZINE injection 10 mg    [DISCONTINUED] isosorbide mononitrate 24 hr tablet 30 mg    [DISCONTINUED] melatonin tablet 6 mg    [DISCONTINUED] mupirocin 2 % ointment    [DISCONTINUED] polyethylene glycol packet 17 g    [DISCONTINUED] sodium chloride 0.9% flush 10 mL     Current Outpatient Medications on File Prior to Encounter   Medication Sig    acetaminophen (TYLENOL) 650 MG TbSR Take 650 mg by mouth 2 (two) times a day.    albuterol-ipratropium (DUO-NEB) 2.5 mg-0.5 mg/3 mL nebulizer solution Take 3 mLs by nebulization every 4 (four) hours as needed for Wheezing or Shortness of Breath.    amLODIPine (NORVASC) 5 MG tablet Take 1 tablet (5 mg total) by mouth once daily.    aspirin (ECOTRIN) 81 MG EC tablet Take 1 tablet (81 mg total) by mouth once daily.    atorvastatin (LIPITOR) 20 MG tablet Take 20 mg by mouth every evening.    baclofen (LIORESAL) 5 mg Tab tablet Take 20 mg by mouth 3 (three) times daily.    carboxymethylcellulose sodium (ARTIFICIAL TEARS, CMC,) 1 % Drop Apply 2 drops to eye 4 (four) times daily.    clonazePAM (KLONOPIN) 0.5 MG tablet Take 0.5 mg by mouth every 12 (twelve) hours as needed (Muscle jerk).    clopidogreL (PLAVIX) 75 mg tablet Take 75 mg by mouth once daily.    ezetimibe (ZETIA) 10 mg tablet Take 10 mg by mouth every evening.    isosorbide mononitrate (IMDUR) 30 MG 24 hr tablet Take 1 tablet (30 mg total) by mouth once daily.    ranolazine (RANEXA) 500 MG Tb12 Take 1 tablet (500 mg total) by mouth 2 (two) times daily.    senna-docusate 8.6-50 mg (SENNA WITH DOCUSATE SODIUM) 8.6-50  mg per tablet Take 1 tablet by mouth once daily.    vitamin D3-folic acid 125 mcg (5,000 unit)-1 mg Tab Take 1 tablet by mouth Daily.     Family History    None       Tobacco Use    Smoking status: Every Day     Current packs/day: 1.00     Average packs/day: 1 pack/day for 53.5 years (53.5 ttl pk-yrs)     Types: Cigarettes     Start date: 1972     Passive exposure: Current    Smokeless tobacco: Never   Substance and Sexual Activity    Alcohol use: Never    Drug use: Never    Sexual activity: Not Currently     Review of Systems   Constitutional:  Negative for chills and fever.   Respiratory:  Negative for cough and shortness of breath.    Cardiovascular:  Negative for chest pain, palpitations and leg swelling.   Gastrointestinal:  Positive for nausea. Negative for vomiting.     Objective:     Vital Signs (Most Recent):  Temp: 97.6 °F (36.4 °C) (06/23/25 0046)  Pulse: 80 (06/23/25 0417)  Resp: 19 (06/23/25 0417)  BP: (!) 163/81 (06/23/25 0417)  SpO2: 97 % (06/23/25 0046) Vital Signs (24h Range):  Temp:  [97.6 °F (36.4 °C)] 97.6 °F (36.4 °C)  Pulse:  [73-90] 80  Resp:  [16-19] 19  SpO2:  [95 %-97 %] 97 %  BP: (129-174)/(50-81) 163/81   Weight: 72.6 kg (160 lb)  Body mass index is 24.33 kg/m².  SpO2: 97 %       Intake/Output Summary (Last 24 hours) at 6/23/2025 0810  Last data filed at 6/23/2025 0242  Gross per 24 hour   Intake --   Output 200 ml   Net -200 ml     Lines/Drains/Airways       Peripheral Intravenous Line  Duration             Peripheral IV Single Lumen 06/23/25 0754 20 G Posterior;Right Forearm <1 day                  Significant Labs:   Chemistries:   Recent Labs   Lab 06/20/25  0641 06/20/25  1539 06/21/25  0712 06/21/25  1146 06/22/25  0323 06/23/25  0105 06/23/25  0542     --  138  --  136 137 138   K 4.0  --  3.9  --  3.5 3.5 3.5     --  104  --  104 104 105   CO2 28  --  23  --  23 25 25   BUN 19  --  22.1  --  22.8 23.9 23.1   CREATININE 1.68*  --  1.71*  --  1.68* 1.48* 1.66*   CALCIUM  9.2  --  9.2  --  9.1 9.3 8.8   PROT 7.8  --  7.7*  --  7.5 7.4 7.2   BILITOT 0.8  --  1.1  --  1.1 0.8 0.9   ALKPHOS 81  --  94  --  84 85 77   ALT 22  --  15  --  19 19 17   AST 22  --  21  --  29 29 26   MG  --   --  2.20  --   --   --   --    TROPONINI 0.040* 0.046* 0.099* 0.077*  --  0.076* 0.102*  0.099*        CBC/Anemia Labs: Coags:    Recent Labs   Lab 06/22/25  0323 06/23/25  0105 06/23/25  0542   WBC 9.37 9.04 6.64   HGB 14.1 13.6* 13.0*   HCT 41.0* 39.2* 39.2*    234 229   MCV 88.6 89.9 91.4   RDW 12.7 12.7 12.9    Recent Labs   Lab 06/21/25  1146   INR 1.1   APTT 30.5          EKG:     Telemetry:  SR    Physical Exam  HENT:      Head: Atraumatic.   Cardiovascular:      Rate and Rhythm: Normal rate and regular rhythm.   Pulmonary:      Effort: Pulmonary effort is normal.      Breath sounds: Normal breath sounds.   Musculoskeletal:      Left Lower Extremity: Left leg is amputated below knee.   Skin:     General: Skin is warm and dry.   Neurological:      Mental Status: He is alert. Mental status is at baseline.      Cranial Nerves: Cranial nerve deficit present.      Comments: H/o anoxic brain injury, speech slow to respond        Home Medications:   Medications Ordered Prior to Encounter[1]  Current Schedule Inpatient Medications:    Continuous Infusions:    Assessment:     NSTEMI, unspecified, likely Type 1    -Troponin 0.076  CAD s/p PCI in 2018  LV dysfunction     - EF of 50 - 55%. Grade I diastolic dysfunction per 6.21.25 TTE  VHD/MR  Anoxic brain injury  HLD  HTN  Traumatic left BKA  PAD  PVD w/ claudication   Arthritis   Muscle spasms    Plan:     Continue to trend cardiac enzymes   Recently heparinized during prior admission (d/c'd on 6.22.25)  Will plan for LHC tomorrow. R/B/A discussed with patient and family member and he wishes to proceed with procedure. Consents signed and placed on chart.   NPO after midnight  Continue Ranexa   Continue ASA, Plavix  Continue Norvasc, Imdur, statin,  zetia  AM labs and EKG: CBC, CMP, and Mg    Scribed in the presence of Dr. Burrell     Thank you for your consult.     Michelle Shea NP  Cardiology  [facility]         [1]   Current Facility-Administered Medications on File Prior to Encounter   Medication Dose Route Frequency Provider Last Rate Last Admin    [DISCONTINUED] acetaminophen tablet 650 mg  650 mg Oral Q4H PRN Carola Mtz FNP        [DISCONTINUED] albuterol-ipratropium 2.5 mg-0.5 mg/3 mL nebulizer solution 3 mL  3 mL Nebulization Q4H Bacilio Ochoa MD   3 mL at 06/22/25 1242    [DISCONTINUED] aluminum-magnesium hydroxide-simethicone 200-200-20 mg/5 mL suspension 30 mL  30 mL Oral QID PRN Carola Mtz FNP        [DISCONTINUED] amLODIPine tablet 5 mg  5 mg Oral Daily Preet Bello MD   5 mg at 06/22/25 0859    [DISCONTINUED] aspirin EC tablet 81 mg  81 mg Oral Daily Anatoly Cortes FNP   81 mg at 06/22/25 0859    [DISCONTINUED] atorvastatin tablet 20 mg  20 mg Oral QHS Preet Bello MD   20 mg at 06/21/25 2343    [DISCONTINUED] bisacodyL suppository 10 mg  10 mg Rectal Daily PRN Carola Mtz FNP        [DISCONTINUED] clonazePAM tablet 0.5 mg  0.5 mg Oral Q12H PRN Preet Bello MD        [DISCONTINUED] clopidogreL tablet 75 mg  75 mg Oral Daily Anatoly Cortes FNP   75 mg at 06/22/25 0859    [DISCONTINUED] dextrose 50% injection 12.5 g  12.5 g Intravenous PRN Carola Mtz FNP        [DISCONTINUED] dextrose 50% injection 25 g  25 g Intravenous PRN Carola Mtz FNP        [DISCONTINUED] ezetimibe tablet 10 mg  10 mg Oral QHS Preet Bello MD   10 mg at 06/21/25 2343    [DISCONTINUED] glucagon (human recombinant) injection 1 mg  1 mg Intramuscular PRN Carola Mtz FNP        [DISCONTINUED] glucose chewable tablet 16 g  16 g Oral PRN Carola Mtz FNP        [DISCONTINUED] glucose chewable tablet 24 g  24 g Oral PRN Carola Mtz FNP        [DISCONTINUED] heparin 25,000 units  in dextrose 5% (100 units/ml) IV bolus from bag LOW INTENSITY nomogram - LAF  51.8 Units/kg Intravenous PRN Sebastian, Laterica L, FNP   4,000 Units at 06/21/25 2130    [DISCONTINUED] heparin 25,000 units in dextrose 5% (100 units/ml) IV bolus from bag LOW INTENSITY nomogram - LAF  30 Units/kg Intravenous PRN Sebastian, Laterica L, FNP        [DISCONTINUED] heparin 25,000 units in dextrose 5% 250 mL (100 units/mL) infusion LOW INTENSITY nomogram - LAF  0-40 Units/kg/hr Intravenous Continuous Sebastian, Laterica L, FNP 11.6 mL/hr at 06/22/25 0155 15 Units/kg/hr at 06/22/25 0155    [DISCONTINUED] hydrALAZINE injection 10 mg  10 mg Intravenous Q4H PRN Carola Mtz FNP   10 mg at 06/22/25 0143    [DISCONTINUED] isosorbide mononitrate 24 hr tablet 30 mg  30 mg Oral Daily Sebastian, Laterica L, FNP        [DISCONTINUED] melatonin tablet 6 mg  6 mg Oral Nightly PRN Carola Mtz FNP        [DISCONTINUED] mupirocin 2 % ointment   Nasal BID Bacilio Ochoa MD   Given at 06/22/25 0859    [DISCONTINUED] polyethylene glycol packet 17 g  17 g Oral BID PRN Carola Mtz FNP        [DISCONTINUED] sodium chloride 0.9% flush 10 mL  10 mL Intravenous PRN Carola Mtz FNP         Current Outpatient Medications on File Prior to Encounter   Medication Sig Dispense Refill    acetaminophen (TYLENOL) 650 MG TbSR Take 650 mg by mouth 2 (two) times a day.      albuterol-ipratropium (DUO-NEB) 2.5 mg-0.5 mg/3 mL nebulizer solution Take 3 mLs by nebulization every 4 (four) hours as needed for Wheezing or Shortness of Breath.      amLODIPine (NORVASC) 5 MG tablet Take 1 tablet (5 mg total) by mouth once daily. 90 tablet 3    aspirin (ECOTRIN) 81 MG EC tablet Take 1 tablet (81 mg total) by mouth once daily.      atorvastatin (LIPITOR) 20 MG tablet Take 20 mg by mouth every evening.      baclofen (LIORESAL) 5 mg Tab tablet Take 20 mg by mouth 3 (three) times daily.      carboxymethylcellulose sodium (ARTIFICIAL TEARS, CMC,) 1 % Drop Apply 2  drops to eye 4 (four) times daily.      clonazePAM (KLONOPIN) 0.5 MG tablet Take 0.5 mg by mouth every 12 (twelve) hours as needed (Muscle jerk).      clopidogreL (PLAVIX) 75 mg tablet Take 75 mg by mouth once daily.      ezetimibe (ZETIA) 10 mg tablet Take 10 mg by mouth every evening.      isosorbide mononitrate (IMDUR) 30 MG 24 hr tablet Take 1 tablet (30 mg total) by mouth once daily. 30 tablet 2    ranolazine (RANEXA) 500 MG Tb12 Take 1 tablet (500 mg total) by mouth 2 (two) times daily. 60 tablet 11    senna-docusate 8.6-50 mg (SENNA WITH DOCUSATE SODIUM) 8.6-50 mg per tablet Take 1 tablet by mouth once daily.      vitamin D3-folic acid 125 mcg (5,000 unit)-1 mg Tab Take 1 tablet by mouth Daily.

## 2025-06-24 LAB
ALBUMIN SERPL-MCNC: 4 G/DL (ref 3.4–4.8)
ALBUMIN/GLOB SERPL: 1.2 RATIO (ref 1.1–2)
ALP SERPL-CCNC: 82 UNIT/L (ref 40–150)
ALT SERPL-CCNC: 21 UNIT/L (ref 0–55)
ANION GAP SERPL CALC-SCNC: 8 MEQ/L
AST SERPL-CCNC: 23 UNIT/L (ref 11–45)
BASOPHILS # BLD AUTO: 0.11 X10(3)/MCL
BASOPHILS NFR BLD AUTO: 1.7 %
BILIRUB SERPL-MCNC: 0.8 MG/DL
BUN SERPL-MCNC: 25.9 MG/DL (ref 8.4–25.7)
CALCIUM SERPL-MCNC: 8.8 MG/DL (ref 8.8–10)
CHLORIDE SERPL-SCNC: 108 MMOL/L (ref 98–107)
CO2 SERPL-SCNC: 23 MMOL/L (ref 23–31)
CREAT SERPL-MCNC: 1.78 MG/DL (ref 0.72–1.25)
CREAT/UREA NIT SERPL: 15
EOSINOPHIL # BLD AUTO: 0.14 X10(3)/MCL (ref 0–0.9)
EOSINOPHIL NFR BLD AUTO: 2.1 %
ERYTHROCYTE [DISTWIDTH] IN BLOOD BY AUTOMATED COUNT: 13 % (ref 11.5–17)
GFR SERPLBLD CREATININE-BSD FMLA CKD-EPI: 41 ML/MIN/1.73/M2
GLOBULIN SER-MCNC: 3.4 GM/DL (ref 2.4–3.5)
GLUCOSE SERPL-MCNC: 117 MG/DL (ref 82–115)
HCT VFR BLD AUTO: 38.9 % (ref 42–52)
HGB BLD-MCNC: 13.1 G/DL (ref 14–18)
IMM GRANULOCYTES # BLD AUTO: 0.02 X10(3)/MCL (ref 0–0.04)
IMM GRANULOCYTES NFR BLD AUTO: 0.3 %
LYMPHOCYTES # BLD AUTO: 1.11 X10(3)/MCL (ref 0.6–4.6)
LYMPHOCYTES NFR BLD AUTO: 16.9 %
MAGNESIUM SERPL-MCNC: 2.4 MG/DL (ref 1.6–2.6)
MCH RBC QN AUTO: 30.6 PG (ref 27–31)
MCHC RBC AUTO-ENTMCNC: 33.7 G/DL (ref 33–36)
MCV RBC AUTO: 90.9 FL (ref 80–94)
MONOCYTES # BLD AUTO: 0.68 X10(3)/MCL (ref 0.1–1.3)
MONOCYTES NFR BLD AUTO: 10.4 %
NEUTROPHILS # BLD AUTO: 4.51 X10(3)/MCL (ref 2.1–9.2)
NEUTROPHILS NFR BLD AUTO: 68.6 %
NRBC BLD AUTO-RTO: 0 %
PLATELET # BLD AUTO: 236 X10(3)/MCL (ref 130–400)
PMV BLD AUTO: 9 FL (ref 7.4–10.4)
POTASSIUM SERPL-SCNC: 4 MMOL/L (ref 3.5–5.1)
PROT SERPL-MCNC: 7.4 GM/DL (ref 5.8–7.6)
RBC # BLD AUTO: 4.28 X10(6)/MCL (ref 4.7–6.1)
SODIUM SERPL-SCNC: 139 MMOL/L (ref 136–145)
WBC # BLD AUTO: 6.57 X10(3)/MCL (ref 4.5–11.5)

## 2025-06-24 PROCEDURE — 25000242 PHARM REV CODE 250 ALT 637 W/ HCPCS: Performed by: INTERNAL MEDICINE

## 2025-06-24 PROCEDURE — 99152 MOD SED SAME PHYS/QHP 5/>YRS: CPT | Performed by: INTERNAL MEDICINE

## 2025-06-24 PROCEDURE — 4A023N7 MEASUREMENT OF CARDIAC SAMPLING AND PRESSURE, LEFT HEART, PERCUTANEOUS APPROACH: ICD-10-PCS | Performed by: INTERNAL MEDICINE

## 2025-06-24 PROCEDURE — 80053 COMPREHEN METABOLIC PANEL: CPT | Performed by: STUDENT IN AN ORGANIZED HEALTH CARE EDUCATION/TRAINING PROGRAM

## 2025-06-24 PROCEDURE — 94640 AIRWAY INHALATION TREATMENT: CPT

## 2025-06-24 PROCEDURE — 75630 X-RAY AORTA LEG ARTERIES: CPT | Mod: XU | Performed by: INTERNAL MEDICINE

## 2025-06-24 PROCEDURE — 63600175 PHARM REV CODE 636 W HCPCS: Performed by: INTERNAL MEDICINE

## 2025-06-24 PROCEDURE — 25000003 PHARM REV CODE 250: Performed by: INTERNAL MEDICINE

## 2025-06-24 PROCEDURE — 99900031 HC PATIENT EDUCATION (STAT)

## 2025-06-24 PROCEDURE — C1769 GUIDE WIRE: HCPCS | Performed by: INTERNAL MEDICINE

## 2025-06-24 PROCEDURE — B2111ZZ FLUOROSCOPY OF MULTIPLE CORONARY ARTERIES USING LOW OSMOLAR CONTRAST: ICD-10-PCS | Performed by: INTERNAL MEDICINE

## 2025-06-24 PROCEDURE — 36415 COLL VENOUS BLD VENIPUNCTURE: CPT | Performed by: STUDENT IN AN ORGANIZED HEALTH CARE EDUCATION/TRAINING PROGRAM

## 2025-06-24 PROCEDURE — 85025 COMPLETE CBC W/AUTO DIFF WBC: CPT | Performed by: STUDENT IN AN ORGANIZED HEALTH CARE EDUCATION/TRAINING PROGRAM

## 2025-06-24 PROCEDURE — C1887 CATHETER, GUIDING: HCPCS | Performed by: INTERNAL MEDICINE

## 2025-06-24 PROCEDURE — 93458 L HRT ARTERY/VENTRICLE ANGIO: CPT | Performed by: INTERNAL MEDICINE

## 2025-06-24 PROCEDURE — 21400001 HC TELEMETRY ROOM

## 2025-06-24 PROCEDURE — 94760 N-INVAS EAR/PLS OXIMETRY 1: CPT

## 2025-06-24 PROCEDURE — C1894 INTRO/SHEATH, NON-LASER: HCPCS | Performed by: INTERNAL MEDICINE

## 2025-06-24 PROCEDURE — 83735 ASSAY OF MAGNESIUM: CPT | Performed by: STUDENT IN AN ORGANIZED HEALTH CARE EDUCATION/TRAINING PROGRAM

## 2025-06-24 PROCEDURE — 99153 MOD SED SAME PHYS/QHP EA: CPT | Performed by: INTERNAL MEDICINE

## 2025-06-24 PROCEDURE — 25500020 PHARM REV CODE 255: Performed by: INTERNAL MEDICINE

## 2025-06-24 PROCEDURE — 27100171 HC OXYGEN HIGH FLOW UP TO 24 HOURS

## 2025-06-24 RX ORDER — ACETAMINOPHEN 325 MG/1
650 TABLET ORAL EVERY 4 HOURS PRN
Status: DISCONTINUED | OUTPATIENT
Start: 2025-06-24 | End: 2025-06-25 | Stop reason: HOSPADM

## 2025-06-24 RX ORDER — HYDROCODONE BITARTRATE AND ACETAMINOPHEN 5; 325 MG/1; MG/1
1 TABLET ORAL EVERY 4 HOURS PRN
Refills: 0 | Status: DISCONTINUED | OUTPATIENT
Start: 2025-06-24 | End: 2025-06-25 | Stop reason: HOSPADM

## 2025-06-24 RX ORDER — FENTANYL CITRATE 50 UG/ML
INJECTION, SOLUTION INTRAMUSCULAR; INTRAVENOUS
Status: DISCONTINUED | OUTPATIENT
Start: 2025-06-24 | End: 2025-06-24 | Stop reason: HOSPADM

## 2025-06-24 RX ORDER — ONDANSETRON HYDROCHLORIDE 2 MG/ML
4 INJECTION, SOLUTION INTRAVENOUS EVERY 6 HOURS PRN
Status: DISCONTINUED | OUTPATIENT
Start: 2025-06-24 | End: 2025-06-25 | Stop reason: HOSPADM

## 2025-06-24 RX ORDER — SODIUM CHLORIDE 9 MG/ML
INJECTION, SOLUTION INTRAVENOUS CONTINUOUS
Status: ACTIVE | OUTPATIENT
Start: 2025-06-24 | End: 2025-06-24

## 2025-06-24 RX ORDER — LIDOCAINE HYDROCHLORIDE 10 MG/ML
INJECTION, SOLUTION INFILTRATION; PERINEURAL
Status: DISCONTINUED | OUTPATIENT
Start: 2025-06-24 | End: 2025-06-24 | Stop reason: HOSPADM

## 2025-06-24 RX ORDER — DIPHENHYDRAMINE HYDROCHLORIDE 50 MG/ML
INJECTION, SOLUTION INTRAMUSCULAR; INTRAVENOUS
Status: DISCONTINUED | OUTPATIENT
Start: 2025-06-24 | End: 2025-06-24 | Stop reason: HOSPADM

## 2025-06-24 RX ORDER — MORPHINE SULFATE 4 MG/ML
2 INJECTION, SOLUTION INTRAMUSCULAR; INTRAVENOUS EVERY 4 HOURS PRN
Refills: 0 | Status: DISCONTINUED | OUTPATIENT
Start: 2025-06-24 | End: 2025-06-25 | Stop reason: HOSPADM

## 2025-06-24 RX ORDER — ENOXAPARIN SODIUM 100 MG/ML
40 INJECTION SUBCUTANEOUS EVERY 24 HOURS
Status: DISCONTINUED | OUTPATIENT
Start: 2025-06-24 | End: 2025-06-25 | Stop reason: HOSPADM

## 2025-06-24 RX ORDER — SODIUM CHLORIDE 0.9 % (FLUSH) 0.9 %
10 SYRINGE (ML) INJECTION
Status: DISCONTINUED | OUTPATIENT
Start: 2025-06-24 | End: 2025-06-25 | Stop reason: HOSPADM

## 2025-06-24 RX ORDER — ONDANSETRON HYDROCHLORIDE 2 MG/ML
INJECTION, SOLUTION INTRAVENOUS
Status: DISCONTINUED | OUTPATIENT
Start: 2025-06-24 | End: 2025-06-24 | Stop reason: HOSPADM

## 2025-06-24 RX ORDER — IOPAMIDOL 755 MG/ML
INJECTION, SOLUTION INTRAVASCULAR
Status: DISCONTINUED | OUTPATIENT
Start: 2025-06-24 | End: 2025-06-24 | Stop reason: HOSPADM

## 2025-06-24 RX ORDER — MIDAZOLAM HYDROCHLORIDE 1 MG/ML
INJECTION INTRAMUSCULAR; INTRAVENOUS
Status: DISCONTINUED | OUTPATIENT
Start: 2025-06-24 | End: 2025-06-24 | Stop reason: HOSPADM

## 2025-06-24 RX ADMIN — Medication 81 MG: at 09:06

## 2025-06-24 RX ADMIN — SODIUM CHLORIDE: 9 INJECTION, SOLUTION INTRAVENOUS at 02:06

## 2025-06-24 RX ADMIN — ISOSORBIDE MONONITRATE 30 MG: 30 TABLET, EXTENDED RELEASE ORAL at 09:06

## 2025-06-24 RX ADMIN — ATORVASTATIN CALCIUM 20 MG: 10 TABLET, FILM COATED ORAL at 09:06

## 2025-06-24 RX ADMIN — ENOXAPARIN SODIUM 40 MG: 40 INJECTION SUBCUTANEOUS at 09:06

## 2025-06-24 RX ADMIN — CLOPIDOGREL 75 MG: 75 TABLET ORAL at 09:06

## 2025-06-24 RX ADMIN — AMLODIPINE BESYLATE 5 MG: 5 TABLET ORAL at 09:06

## 2025-06-24 RX ADMIN — EZETIMIBE 10 MG: 10 TABLET ORAL at 09:06

## 2025-06-24 RX ADMIN — IPRATROPIUM BROMIDE AND ALBUTEROL SULFATE 3 ML: .5; 3 SOLUTION RESPIRATORY (INHALATION) at 05:06

## 2025-06-24 NOTE — PROGRESS NOTES
[facility]    Cardiology  Progress Note    Patient Name: Leno Thompson Jr.  MRN: [id number]  Admission Date: 6/23/2025  Hospital Length of Stay: 1 days  Code Status: Full Code   Attending Physician: Preet Bello MD   Primary Care Physician: Nancy Gutierrez, TANIKAP-TOD  Expected Discharge Date:   Principal Problem:Chest pain    Subjective:     Brief HPI/Hospital Course:   70-year-old male, known to Dr. Langston, who presented to the ED today with c/o chest pain that began approximately 2000 yesterday evening. Patient was recently admitted with NSTEMI and discharged on 6.22.25, during which he declined a heart cath. He reports stabbing, substernal non-radiating chest pain, with associated SOB and nausea. EKG done at 12:53 a.m. shows sinus rhythm rate of 76 , normal axis, no ST-elevation or depression. When compared to prior EKG from June 20th, no obvious changes. Troponin 0.076, .6. CIS is consulted for NSTEMI.     Hospital course:   6.24.25: NAD, AFVSS. NPO since midnight for LHC planned for today. Denies CP, SOB, palps at time of assessment.      PMH: cardiac arrest in 2018 w/ CAD s/p PCI w/ stent to RCA, anoxic brain injury, valvular heart disease/mod-severe MR, HLD, HTN, PAD, PVD with claudication, arthritis, muscle spasms  PSH: traumatic left BKA, PEG, PCI, femur fracture repair, ankle sx  Family History: father-MI s/p CABG x1; mother-CHF  Social History: current everyday smoker     Previous Cardiac Diagnostics:   Echo 6.21.25  Left Ventricle: The left ventricle is moderately dilated. Mildly increased wall thickness. There is low normal systolic function with a visually estimated ejection fraction of 50 - 55%. Grade I diastolic dysfunction.  Right Ventricle: The right ventricle is normal in size Systolic function is normal. TAPSE is 2.2 cm.  Left Atrium: The left atrium is mildly dilated  Aortic Valve: There is moderate aortic valve sclerosis. There is mild aortic  regurgitation.  Mitral Valve: Mildly thickened leaflets. There is mild mitral annular calcification. There is mild regurgitation.  IVC/SVC: Intermediate venous pressure at 8 mmHg     Nuclear stress test 2025  Mainly inferior and inferolateral scar with mild ischemia. Echocardiogram reveals EF 45% with inferolateral hypokinesis/akinesis with moderate MR     [facility] 11.15.21  SUMMARY/OUTCOMES : Left heart catheterization revealed patent left   coronary system, 100% occluded in-stent restenosis of the right coronary   artery with moderate LV systolic dysfunction ejection fraction 40 to 45%.    Patient tolerated the procedure well no complications were noted.  Will   discuss with the patient and family regarding the need for mitral clip.     [facility] 6.20.18  Coronary findings:   Coronary dominance: Right   Left Main: No disease   Left Anterior Descending: Medium caliber, medium diagonal branch. Mild   luminal irregularities.   Left Circumflex: Nondominant, occluded after take off of high OM   branch. Second om vessel supplied by left to left collaterals. Ostial Lcx   has a 80-90 stenosis.   Right: Dominant, 60-70% ostial stenosis, mid RCA has a previously   placed stent, mid to distal RCA has a tubular 90% stenosis. The PLB is   occluded. PDA has mild to moderate luminal irregularities.   Left heart catheterization:   1. LVEF 65%   2. LVEDP 23mmHg   3. No MR or AS   Conclusions:    of LCx   70% ostial RCA stenosis   90% mid to distal RCA stenosis   Acute occlusion of right PLB   Successful PCI to RCA and PLB with BMS   Normal LVEF     Review of Systems   Constitutional: Negative for chills and fever.   Cardiovascular:  Negative for chest pain, irregular heartbeat, leg swelling and palpitations.   Respiratory:  Negative for cough and shortness of breath.      Objective:     Vital Signs (Most Recent):  Temp: 97.6 °F (36.4 °C) (06/24/25 0730)  Pulse: 71 (06/24/25 0730)  Resp: 18 (06/24/25 0541)  BP: (!) 148/75 (06/24/25  0730)  SpO2: 96 % (06/24/25 0730) Vital Signs (24h Range):  Temp:  [97.6 °F (36.4 °C)-98.2 °F (36.8 °C)] 97.6 °F (36.4 °C)  Pulse:  [69-92] 71  Resp:  [18] 18  SpO2:  [94 %-97 %] 96 %  BP: (116-160)/(59-76) 148/75   Weight: 77.2 kg (170 lb 3.1 oz)  Body mass index is 25.88 kg/m².  SpO2: 96 %     No intake or output data in the 24 hours ending 06/24/25 0910  Lines/Drains/Airways       Peripheral Intravenous Line  Duration             Peripheral IV Single Lumen 06/23/25 0754 20 G Posterior;Right Forearm 1 day                    Significant Labs:   Chemistries:   Recent Labs   Lab 06/20/25  0641 06/20/25  1539 06/21/25  0712 06/21/25  1146 06/22/25  0323 06/23/25  0105 06/23/25  0542 06/23/25  1011     --  138  --  136 137 138  --    K 4.0  --  3.9  --  3.5 3.5 3.5  --      --  104  --  104 104 105  --    CO2 28  --  23  --  23 25 25  --    BUN 19  --  22.1  --  22.8 23.9 23.1  --    CREATININE 1.68*  --  1.71*  --  1.68* 1.48* 1.66*  --    CALCIUM 9.2  --  9.2  --  9.1 9.3 8.8  --    PROT 7.8  --  7.7*  --  7.5 7.4 7.2  --    BILITOT 0.8  --  1.1  --  1.1 0.8 0.9  --    ALKPHOS 81  --  94  --  84 85 77  --    ALT 22  --  15  --  19 19 17  --    AST 22  --  21  --  29 29 26  --    MG  --   --  2.20  --   --   --   --   --    TROPONINI 0.040*   < > 0.099* 0.077*  --  0.076* 0.102*  0.099* 0.091*    < > = values in this interval not displayed.        CBC/Anemia Labs: Coags:    Recent Labs   Lab 06/22/25  0323 06/23/25  0105 06/23/25  0542   WBC 9.37 9.04 6.64   HGB 14.1 13.6* 13.0*   HCT 41.0* 39.2* 39.2*    234 229   MCV 88.6 89.9 91.4   RDW 12.7 12.7 12.9    Recent Labs   Lab 06/21/25  1146   INR 1.1   APTT 30.5        Telemetry:  SR    Physical Exam  Constitutional:       General: He is not in acute distress.  HENT:      Head: Atraumatic.   Cardiovascular:      Rate and Rhythm: Normal rate and regular rhythm.      Pulses: Normal pulses.      Heart sounds: Normal heart sounds.   Pulmonary:       Effort: Pulmonary effort is normal.      Breath sounds: Normal breath sounds.   Abdominal:      Palpations: Abdomen is soft.   Musculoskeletal:      Left Lower Extremity: Left leg is amputated below knee.   Skin:     General: Skin is warm and dry.   Neurological:      Mental Status: He is alert. Mental status is at baseline.       Current Schedule Inpatient Medications:   amLODIPine  5 mg Oral Daily    aspirin  81 mg Oral Daily    atorvastatin  20 mg Oral QHS    clopidogreL  75 mg Oral Daily    ezetimibe  10 mg Oral QHS    isosorbide mononitrate  30 mg Oral Daily       Assessment:     NSTEMI, unspecified, likely Type 1 d/t known obstructive CAD    -Troponin peak 0.102  Obstructive CAD s/p PCI to RCA with BMS in 2018  LV dysfunction     - EF of 50 - 55%. Grade I diastolic dysfunction per 6.21.25 TTE  VHD/MR  Anoxic brain injury  HLD  HTN  Traumatic left BKA  PAD  PVD w/ claudication   Arthritis   Muscle spasms    Plan:     Recently heparinized during prior admission (d/c'd on 6.22.25)  Will plan for LHC today. R/B/A discussed with patient and family member and he wishes to proceed with procedure. Consents signed and placed on chart.   NPO since midnight  Continue ASA, Plavix  Continue Norvasc, statin, zetia  AM labs and EKG: CBC, CMP, and Mg  Will continue to follow    Scribed in the presence of Dr. Indra Shea, NP  Cardiology  [facility]

## 2025-06-25 VITALS
DIASTOLIC BLOOD PRESSURE: 70 MMHG | SYSTOLIC BLOOD PRESSURE: 152 MMHG | HEART RATE: 83 BPM | TEMPERATURE: 97 F | WEIGHT: 170.19 LBS | OXYGEN SATURATION: 93 % | RESPIRATION RATE: 23 BRPM | HEIGHT: 68 IN | BODY MASS INDEX: 25.79 KG/M2

## 2025-06-25 PROBLEM — Z51.5 COMFORT MEASURES ONLY STATUS: Status: ACTIVE | Noted: 2025-06-25

## 2025-06-25 PROCEDURE — 94640 AIRWAY INHALATION TREATMENT: CPT

## 2025-06-25 PROCEDURE — 25000242 PHARM REV CODE 250 ALT 637 W/ HCPCS: Performed by: INTERNAL MEDICINE

## 2025-06-25 PROCEDURE — 99900031 HC PATIENT EDUCATION (STAT)

## 2025-06-25 PROCEDURE — 94760 N-INVAS EAR/PLS OXIMETRY 1: CPT

## 2025-06-25 PROCEDURE — 27000221 HC OXYGEN, UP TO 24 HOURS

## 2025-06-25 PROCEDURE — 99900035 HC TECH TIME PER 15 MIN (STAT)

## 2025-06-25 PROCEDURE — 25000003 PHARM REV CODE 250: Performed by: INTERNAL MEDICINE

## 2025-06-25 RX ORDER — HYDROCODONE BITARTRATE AND ACETAMINOPHEN 5; 325 MG/1; MG/1
1 TABLET ORAL EVERY 6 HOURS PRN
Qty: 20 TABLET | Refills: 0 | Status: SHIPPED | OUTPATIENT
Start: 2025-06-25 | End: 2025-06-25

## 2025-06-25 RX ORDER — HYDROCODONE BITARTRATE AND ACETAMINOPHEN 5; 325 MG/1; MG/1
1 TABLET ORAL EVERY 6 HOURS PRN
Qty: 20 TABLET | Refills: 0 | Status: SHIPPED | OUTPATIENT
Start: 2025-06-25

## 2025-06-25 RX ADMIN — IPRATROPIUM BROMIDE AND ALBUTEROL SULFATE 3 ML: .5; 3 SOLUTION RESPIRATORY (INHALATION) at 12:06

## 2025-06-25 RX ADMIN — IPRATROPIUM BROMIDE AND ALBUTEROL SULFATE 3 ML: .5; 3 SOLUTION RESPIRATORY (INHALATION) at 11:06

## 2025-06-25 RX ADMIN — CLOPIDOGREL 75 MG: 75 TABLET ORAL at 08:06

## 2025-06-25 RX ADMIN — Medication 81 MG: at 08:06

## 2025-06-25 RX ADMIN — AMLODIPINE BESYLATE 5 MG: 5 TABLET ORAL at 08:06

## 2025-06-25 RX ADMIN — ISOSORBIDE MONONITRATE 30 MG: 30 TABLET, EXTENDED RELEASE ORAL at 08:06

## 2025-06-25 NOTE — PROGRESS NOTES
Hospital Medicine  Progress Note    Patient Name: Leno Thompson Jr.  MRN: [id number]  Status: IP- Inpatient   Admission Date: 6/23/2025  Length of Stay: 1  Date of Service: 06/24/2025       CC: hospital follow-up for NSTEMI       SUBJECTIVE     70 y.o. male with a history that includes CAD s/p prior PCI/stent, was admitted to our services on 6/21, after presenting to outlying facility with chest pressure. EKG did not reveal any evidence of ST segment elevation, but troponin was mildly elevated. Patient transferred to [facility] for cardiology evaluation.  Cardiology recommended LHC, but patient declined, opting for medical optimization instead.  Chest pain had resolved and troponin remained flat.  Given his refusal patient was discharge back to nursing home to follow up with his primary cardiologist to discuss ongoing management.   Patient now returns with recurrent chest pain starting several hours after discharge.  Repeat enzymes slightly worse, were 0.07 on discharge, if trended up to 0.1.  EKG still without ST deviation.  Patient again being admitted to hospital medicine services with consulted Cardiology.    Today: Patient seen and examined at bedside, and chart reviewed.  Patient underwent LHC today noting MVCAD, PAD and infrarenal AAA.        MEDICATIONS   Scheduled   amLODIPine  5 mg Oral Daily    aspirin  81 mg Oral Daily    atorvastatin  20 mg Oral QHS    clopidogreL  75 mg Oral Daily    ezetimibe  10 mg Oral QHS    isosorbide mononitrate  30 mg Oral Daily     Continuous Infusions  None      PHYSICAL EXAM   VITALS: T 98 °F (36.7 °C)   /65   P 71   RR 18   O2 (!) 94 %    GENERAL: Awake and in NAD  LUNGS: CTA anteriorly  CVS: Normal rate  GI/: Soft, NT, bowel sounds positive.  EXTREMITIES: No LE edema  NEURO: AAOx3  PSYCH: Cooperative      LABS   CBC  Recent Labs     06/23/25  0542 06/24/25  1058   WBC 6.64 6.57   RBC 4.29* 4.28*   HGB 13.0* 13.1*   HCT 39.2* 38.9*   MCV 91.4 90.9   MCH 30.3 30.6   MCHC  33.2 33.7   RDW 12.9 13.0    236     CHEM  Recent Labs     06/23/25  0542 06/24/25  1058    139   K 3.5 4.0   CO2 25 23   BUN 23.1 25.9*   CREATININE 1.66* 1.78*   CALCIUM 8.8 8.8   MG  --  2.40   ALBUMIN 3.8 4.0   GLOBULIN 3.4 3.4   ALKPHOS 77 82   ALT 17 21   AST 26 23   BILITOT 0.9 0.8       DIAGNOSTICS   Cardiac catheterization    The estimated blood loss was none.    The procedure log was documented by No documenter listed and verified by   Jeffry Langford MD.    Date: 6/24/2025  Time: 1:40 PM    Preprocedure diagnosis-abnormal stress test  Postprocedure diagnosis-obstructive CAD  Estimated blood loss-5 cc  Tissue removal-none  Complications-none  Considerations: patient moved throughout procedure, unable to access arm   for radial approach.  Consider anesthesia if complex PCI is entertained.    Procedures performed:  1. Ultrasound-guided right groin access  2. Coronary angiography  3. Left ventricular hemodynamics, distal aortography  4. Manual pressure for access site hemostasis    Findings:  1. Left main-Patent  2. Lad-diffuse 20-30% stenosis, Diagonal with 80% mid stenosis  3. LCX-Proximal  after OM1, left to left collaterals  4. RCA-in stent ostial  with Left to right collaterals from S1, S2, and   apical LAD  5. LVEDP-11  6. Infrarenal aortic aneurysm, 80% ostial right renal, 50% left renal, 70%   LCI, 80% right external iliac    Procedure detail:  Ultrasound-guided right groin access obtained.  Sheath inserted.  Femoral   angiography performed.  Left main angiography performed with JL4.  RCA   angiography performed with JR4 catheter.   Pigtail was then used for left   ventricular hemodynamics and distal aortography.   Catheters were removed   and sheath was left In place.    Plan:  1. Consider CABG evaluation and vascular surgery evaluation for CAD and   AAA        ASSESSMENT   NSTEMI, type I, MVCAD  h/o CAD s/p prior PCI/stents  h/o cardiac arrest, anoxic brain injury  Essential  HTN  PAD  Infrarenal AAA    PLAN   Cardiology recommending CV surgery evaluation for CABG  Continue DAPT/statin  Otherwise continue current management and monitoring in the interim        Prophylaxis: LINDA Bello MD  [facility] Medicine

## 2025-06-25 NOTE — PROGRESS NOTES
[facility]    Cardiology  Progress Note    Patient Name: Leno Thompson Jr.  MRN: [id number]  Admission Date: 6/23/2025  Hospital Length of Stay: 2 days  Code Status: Full Code   Attending Physician: Preet Bello MD   Primary Care Physician: Nancy Gutierrez, TANIKAP-TOD  Expected Discharge Date:   Principal Problem:Chest pain    Subjective:     Brief HPI/Hospital Course:   70-year-old male, known to Dr. Langston, who presented to the ED today with c/o chest pain that began approximately 2000 yesterday evening. Patient was recently admitted with NSTEMI and discharged on 6.22.25, during which he declined a heart cath. He reports stabbing, substernal non-radiating chest pain, with associated SOB and nausea. EKG done at 12:53 a.m. shows sinus rhythm rate of 76 , normal axis, no ST-elevation or depression. When compared to prior EKG from June 20th, no obvious changes. Troponin 0.076, .6. [facility] is consulted for NSTEMI.     Hospital course:   6.24.25: NAD, AFVSS. NPO since midnight for LHC planned for today. Denies CP, SOB, palps at time of assessment.   6.25.25: NAD, AFVSS. No acute events reported overnight. R Groin Soft/Flat, Non-Tender, No Sign of Bleed/Infection. +2 RLE pedal pulse. Discussed findings of [facility] with patient, including CABG evaluation by CT surgery. Patient did state that he is not interested in undergoing surgical intervention at this time.      PMH: cardiac arrest in 2018 w/ CAD s/p PCI w/ stent to RCA, anoxic brain injury, valvular heart disease/mod-severe MR, HLD, HTN, PAD, PVD with claudication, arthritis, muscle spasms  PSH: traumatic left BKA, PEG, PCI, femur fracture repair, ankle sx  Family History: father-MI s/p CABG x1; mother-CHF  Social History: current everyday smoker     Previous Cardiac Diagnostics:   C 6.24.25  Findings:  1. Left main-Patent  2. Lad-diffuse 20-30% stenosis, Diagonal with 80% mid stenosis  3. LCX-Proximal  after OM1, left to  left collaterals  4. RCA-in stent ostial  with Left to right collaterals from S1, S2, and apical LAD  5. LVEDP-11  6. Infrarenal aortic aneurysm, 80% ostial right renal, 50% left renal, 70% LCI, 80% right external iliac    Echo 6.21.25  Left Ventricle: The left ventricle is moderately dilated. Mildly increased wall thickness. There is low normal systolic function with a visually estimated ejection fraction of 50 - 55%. Grade I diastolic dysfunction.  Right Ventricle: The right ventricle is normal in size Systolic function is normal. TAPSE is 2.2 cm.  Left Atrium: The left atrium is mildly dilated  Aortic Valve: There is moderate aortic valve sclerosis. There is mild aortic regurgitation.  Mitral Valve: Mildly thickened leaflets. There is mild mitral annular calcification. There is mild regurgitation.  IVC/SVC: Intermediate venous pressure at 8 mmHg     Nuclear stress test 2025  Mainly inferior and inferolateral scar with mild ischemia. Echocardiogram reveals EF 45% with inferolateral hypokinesis/akinesis with moderate MR     [facility] 11.15.21  SUMMARY/OUTCOMES : Left heart catheterization revealed patent left   coronary system, 100% occluded in-stent restenosis of the right coronary   artery with moderate LV systolic dysfunction ejection fraction 40 to 45%.    Patient tolerated the procedure well no complications were noted.  Will   discuss with the patient and family regarding the need for mitral clip.     [facility] 6.20.18  Coronary findings:   Coronary dominance: Right   Left Main: No disease   Left Anterior Descending: Medium caliber, medium diagonal branch. Mild   luminal irregularities.   Left Circumflex: Nondominant, occluded after take off of high OM   branch. Second om vessel supplied by left to left collaterals. Ostial Lcx   has a 80-90 stenosis.   Right: Dominant, 60-70% ostial stenosis, mid RCA has a previously   placed stent, mid to distal RCA has a tubular 90% stenosis. The PLB is   occluded. PDA has mild to  moderate luminal irregularities.   Left heart catheterization:   1. LVEF 65%   2. LVEDP 23mmHg   3. No MR or AS   Conclusions:    of LCx   70% ostial RCA stenosis   90% mid to distal RCA stenosis   Acute occlusion of right PLB   Successful PCI to RCA and PLB with BMS   Normal LVEF     Review of Systems   Constitutional: Negative for chills and fever.   Cardiovascular:  Negative for chest pain, irregular heartbeat, leg swelling and palpitations.   Respiratory:  Negative for cough and shortness of breath.      Objective:     Vital Signs (Most Recent):  Temp: 98.3 °F (36.8 °C) (06/25/25 0743)  Pulse: 65 (06/25/25 0743)  Resp: 20 (06/25/25 0743)  BP: 131/63 (06/25/25 0743)  SpO2: 95 % (06/25/25 0743) Vital Signs (24h Range):  Temp:  [97.5 °F (36.4 °C)-98.5 °F (36.9 °C)] 98.3 °F (36.8 °C)  Pulse:  [65-76] 65  Resp:  [18-20] 20  SpO2:  [93 %-97 %] 95 %  BP: (122-161)/(58-73) 131/63   Weight: 77.2 kg (170 lb 3.1 oz)  Body mass index is 25.88 kg/m².  SpO2: 95 %       Intake/Output Summary (Last 24 hours) at 6/25/2025 0817  Last data filed at 6/25/2025 0500  Gross per 24 hour   Intake --   Output 225 ml   Net -225 ml     Lines/Drains/Airways       Peripheral Intravenous Line  Duration             Peripheral IV Single Lumen 06/23/25 0754 20 G Posterior;Right Forearm 2 days                    Significant Labs:   Chemistries:   Recent Labs   Lab 06/21/25  0712 06/21/25  1146 06/22/25  0323 06/23/25  0105 06/23/25  0542 06/23/25  1011 06/24/25  1058     --  136 137 138  --  139   K 3.9  --  3.5 3.5 3.5  --  4.0     --  104 104 105  --  108*   CO2 23  --  23 25 25  --  23   BUN 22.1  --  22.8 23.9 23.1  --  25.9*   CREATININE 1.71*  --  1.68* 1.48* 1.66*  --  1.78*   CALCIUM 9.2  --  9.1 9.3 8.8  --  8.8   PROT 7.7*  --  7.5 7.4 7.2  --  7.4   BILITOT 1.1  --  1.1 0.8 0.9  --  0.8   ALKPHOS 94  --  84 85 77  --  82   ALT 15  --  19 19 17  --  21   AST 21  --  29 29 26  --  23   MG 2.20  --   --   --   --   --  2.40    TROPONINI 0.099* 0.077*  --  0.076* 0.102*  0.099* 0.091*  --         CBC/Anemia Labs: Coags:    Recent Labs   Lab 06/23/25  0105 06/23/25  0542 06/24/25  1058   WBC 9.04 6.64 6.57   HGB 13.6* 13.0* 13.1*   HCT 39.2* 39.2* 38.9*    229 236   MCV 89.9 91.4 90.9   RDW 12.7 12.9 13.0    Recent Labs   Lab 06/21/25  1146   INR 1.1   APTT 30.5        Telemetry:  SR    Physical Exam  Constitutional:       General: He is not in acute distress.  HENT:      Head: Atraumatic.   Cardiovascular:      Rate and Rhythm: Normal rate and regular rhythm.      Pulses: Normal pulses.      Heart sounds: Normal heart sounds.   Pulmonary:      Effort: Pulmonary effort is normal.      Breath sounds: Normal breath sounds.   Abdominal:      Palpations: Abdomen is soft.   Musculoskeletal:      Left Lower Extremity: Left leg is amputated below knee.   Skin:     General: Skin is warm and dry.      Comments: R Groin Soft/Flat, Non-Tender, No Sign of Bleed/Infection. +2 Palpable Pedal Pulse RLE, Lt BKA     Neurological:      Mental Status: He is alert. Mental status is at baseline.       Current Schedule Inpatient Medications:   amLODIPine  5 mg Oral Daily    aspirin  81 mg Oral Daily    atorvastatin  20 mg Oral QHS    clopidogreL  75 mg Oral Daily    enoxaparin  40 mg Subcutaneous Daily    ezetimibe  10 mg Oral QHS    isosorbide mononitrate  30 mg Oral Daily       Assessment:     NSTEMI, Type 1 d/t known obstructive CAD    -Troponin peak 0.102  Obstructive CAD     -s/p [facility] 6.24.25 (see above report)    -s/p PCI to RCA with BMS in 2018  LV dysfunction     - EF of 50 - 55%. Grade I diastolic dysfunction per 6.21.25 TTE  VHD/MR  Infrarenal aortic aneurysm    -[facility] 6.24.25: 80% ostial right renal, 50% left renal, 70% LCI, 80% right external iliac  Anoxic brain injury  HLD  HTN  Traumatic left BKA  PAD  PVD w/ claudication   Arthritis   Muscle spasms    Plan:     CT surgery consulted for CABG evaluation/CAD s/p [facility]   Patient declining any  further invasive interventions at this time, opting for hospice care.   Will sign off. Thank you for allowing us to participate in the care of this patient. Please call us with any questions.    Scribed in the presence of Dr. Indra Shea NP  Cardiology  [facility]

## 2025-06-25 NOTE — PLAN OF CARE
06/25/25 1305   Final Note   Assessment Type Final Discharge Note   Anticipated Discharge Disposition [facility] Fac  (FOC: The patient will be discharged from ([facility]) to [facility] ([facility]) with Hospice care/services through: [facility]. Clinical notes/updates and AVS & D/C Summary werre uploaded and sent via ([email or web address]) for review.)   Hospital Resources/Appts/Education Provided Appointments scheduled and added to AVS   Post-Acute Status   Post-Acute Authorization Placement   Post-Acute Placement Status Set-up Complete/Auth obtained   Discharge Delays None known at this time     The patient will be discharged from ([facility]) to [facility] with (Hospice) care/services through: [facility] upon return to the NH today. The patient and his Son: Ward Villegas: [phone] were made aware of the above discharge plans and both are in agreement. Transportation will be arranged by facility van.

## 2025-06-25 NOTE — CONSULTS
Inpatient consult to Cardiothoracic Surgery  Consult performed by: Emmy Christian FNP  Consult ordered by: Preet Bello MD  Reason for consult: MVCAD and AAA      [facility]    Cardiothoracic Surgery  Consult Note     Patient Name: Leno Thompson Jr.  MRN: [id number]  Admission Date: 6/23/2025  Code Status: Full Code   Attending Provider: Preet Bello MD   Primary Care Physician: Nancy Gutierrez FNP-C    Patient information was obtained from patient, past medical records, and primary team.     Subjective:     Chief Complaint/Reason for Consult:  MVCAD/ Infrarenal AA     HPI: 70-year-old male, known to Dr. Langston, who presented to the ER on 6.23.25 with c/o chest pain that began approximately 2000 the evening prior. Patient was recently admitted with NSTEMI and discharged on 6.22.25, during which he declined a heart cath. He reports stabbing, substernal non-radiating chest pain, with associated SOB and nausea. EKG done at 12:53 a.m. shows sinus rhythm rate of 76 , normal axis, no ST-elevation or depression. When compared to prior EKG from June 20th, no obvious changes. Troponin 0.076, .6. CIS is consulted for NSTEMI.  On 6.24.25 he underwent LHC which revealed MVCAD (see below) along with Infrarenal Aortic Aneurysm.  CTS was consulted for CABG eval.     PMH: cardiac arrest in 2018 w/ CAD s/p PCI w/ stent to RCA, anoxic brain injury, valvular heart disease/mild MR/Mild AR, HLD, HTN, PAD, PVD with claudication, arthritis, muscle spasms  PSH: traumatic left BKA, PEG, PCI, femur fracture repair, ankle sx  Family History: father-MI s/p CABG x1; mother-CHF  Social History: current everyday smoker      Previous Cardiac Diagnostics:   LHC (6.24.25)  Findings:  1. Left main-Patent  2. Lad-diffuse 20-30% stenosis, Diagonal with 80% mid stenosis  3. LCX-Proximal  after OM1, left to left collaterals  4. RCA-in stent ostial  with Left to right collaterals from S1,  S2, and apical LAD  5. LVEDP-11  6. Infrarenal aortic aneurysm, 80% ostial right renal, 50% left renal, 70% LCI, 80% right external iliac    TTE (6.21.25)    Left Ventricle: The left ventricle is moderately dilated. Mildly increased wall thickness. There is low normal systolic function with a visually estimated ejection fraction of 50 - 55%. Grade I diastolic dysfunction.    Right Ventricle: The right ventricle is normal in size Systolic function is normal. TAPSE is 2.2 cm.    Left Atrium: The left atrium is mildly dilated    Aortic Valve: There is moderate aortic valve sclerosis. There is mild aortic regurgitation.    Mitral Valve: Mildly thickened leaflets. There is mild mitral annular calcification. There is mild regurgitation.    IVC/SVC: Intermediate venous pressure at 8 mmHg.    Past Medical History:   Diagnosis Date    Anoxic brain injury     Arthritis     CAD (coronary artery disease)     Cardiac arrest     Heart attack     HLD (hyperlipidemia)     Hx of left BKA     Hypertension     Mitral valve regurgitation     Muscle spasms of both lower extremities     Muscle spasms of both lower extremities     PEG (percutaneous endoscopic gastrostomy) adjustment/replacement/removal     Status post placement of stent in right coronary artery      Past Surgical History:   Procedure Laterality Date    LEG AMPUTATION Left      Family History    None       Tobacco Use    Smoking status: Every Day     Current packs/day: 1.00     Average packs/day: 1 pack/day for 53.5 years (53.5 ttl pk-yrs)     Types: Cigarettes     Start date: 1972     Passive exposure: Current    Smokeless tobacco: Never   Substance and Sexual Activity    Alcohol use: Never    Drug use: Never    Sexual activity: Not Currently     Review of patient's allergies indicates:  No Known Allergies  Review of Systems   Constitutional: Positive for malaise/fatigue. Negative for fever.   Cardiovascular:  Positive for dyspnea on exertion. Negative for chest pain, leg  swelling and palpitations.   Respiratory:  Positive for shortness of breath.    All other systems reviewed and are negative.    Objective:     Vital Signs (Most Recent):  Temp: 98 °F (36.7 °C) (06/25/25 0404)  Pulse: 69 (06/25/25 0404)  Resp: 18 (06/25/25 0049)  BP: (!) 123/58 (06/25/25 0404)  SpO2: 95 % (06/25/25 0404) Vital Signs (24h Range):  Temp:  [97.5 °F (36.4 °C)-98.5 °F (36.9 °C)] 98 °F (36.7 °C)  Pulse:  [68-76] 69  Resp:  [18] 18  SpO2:  [93 %-97 %] 95 %  BP: (122-161)/(58-73) 123/58   Weight: 77.2 kg (170 lb 3.1 oz)  Body mass index is 25.88 kg/m².  SpO2: 95 %       Intake/Output Summary (Last 24 hours) at 6/25/2025 0732  Last data filed at 6/25/2025 0500  Gross per 24 hour   Intake --   Output 225 ml   Net -225 ml     Lines/Drains/Airways       Peripheral Intravenous Line  Duration             Peripheral IV Single Lumen 06/23/25 0754 20 G Posterior;Right Forearm 1 day                  Physical Exam  Vitals reviewed.   Constitutional:       Appearance: Normal appearance.   Cardiovascular:      Rate and Rhythm: Normal rate and regular rhythm.      Pulses: Normal pulses.   Pulmonary:      Effort: Pulmonary effort is normal.      Breath sounds: Wheezing present.   Musculoskeletal:      Comments: Left BKA   Skin:     General: Skin is warm.   Neurological:      Mental Status: He is alert and oriented to person, place, and time. Mental status is at baseline.       Significant Labs: Reviewed  Recent Results (from the past 72 hours)   PTT Heparin Monitoring    Collection Time: 06/22/25 10:24 AM   Result Value Ref Range    PTT Heparin Monitor 53.5 (H) 23.2 - 33.7 seconds   EKG 12-lead    Collection Time: 06/23/25 12:53 AM   Result Value Ref Range    QRS Duration 112 ms    [facility] QTC Calculation 447 ms   Comprehensive metabolic panel    Collection Time: 06/23/25  1:05 AM   Result Value Ref Range    Sodium 137 136 - 145 mmol/L    Potassium 3.5 3.5 - 5.1 mmol/L    Chloride 104 98 - 107 mmol/L    CO2 25 23 - 31 mmol/L     Glucose 116 (H) 82 - 115 mg/dL    Blood Urea Nitrogen 23.9 8.4 - 25.7 mg/dL    Creatinine 1.48 (H) 0.72 - 1.25 mg/dL    Calcium 9.3 8.8 - 10.0 mg/dL    Protein Total 7.4 5.8 - 7.6 gm/dL    Albumin 4.1 3.4 - 4.8 g/dL    Globulin 3.3 2.4 - 3.5 gm/dL    Albumin/Globulin Ratio 1.2 1.1 - 2.0 ratio    Bilirubin Total 0.8 <=1.5 mg/dL    ALP 85 40 - 150 unit/L    ALT 19 0 - 55 unit/L    AST 29 11 - 45 unit/L    eGFR 51 mL/min/1.73/m2    Anion Gap 8.0 mEq/L    BUN/Creatinine Ratio 16    Troponin I #1    Collection Time: 06/23/25  1:05 AM   Result Value Ref Range    Troponin-I 0.076 (H) 0.000 - 0.045 ng/mL   B-Type natriuretic peptide (BNP)    Collection Time: 06/23/25  1:05 AM   Result Value Ref Range    Natriuretic Peptide 136.6 (H) <=100.0 pg/mL   CBC with Differential    Collection Time: 06/23/25  1:05 AM   Result Value Ref Range    WBC 9.04 4.50 - 11.50 x10(3)/mcL    RBC 4.36 (L) 4.70 - 6.10 x10(6)/mcL    Hgb 13.6 (L) 14.0 - 18.0 g/dL    Hct 39.2 (L) 42.0 - 52.0 %    MCV 89.9 80.0 - 94.0 fL    MCH 31.2 (H) 27.0 - 31.0 pg    MCHC 34.7 33.0 - 36.0 g/dL    RDW 12.7 11.5 - 17.0 %    Platelet 234 130 - 400 x10(3)/mcL    MPV 9.1 7.4 - 10.4 fL    Neut % 70.6 %    Lymph % 14.6 %    Mono % 11.2 %    Eos % 2.2 %    Basophil % 1.1 %    Imm Grans % 0.3 %    Neut # 6.38 2.1 - 9.2 x10(3)/mcL    Lymph # 1.32 0.6 - 4.6 x10(3)/mcL    Mono # 1.01 0.1 - 1.3 x10(3)/mcL    Eos # 0.20 0 - 0.9 x10(3)/mcL    Baso # 0.10 <=0.2 x10(3)/mcL    Imm Gran # 0.03 0.00 - 0.04 x10(3)/mcL    NRBC% 0.0 %   Troponin I #2    Collection Time: 06/23/25  5:42 AM   Result Value Ref Range    Troponin-I 0.102 (H) 0.000 - 0.045 ng/mL   Comprehensive metabolic panel    Collection Time: 06/23/25  5:42 AM   Result Value Ref Range    Sodium 138 136 - 145 mmol/L    Potassium 3.5 3.5 - 5.1 mmol/L    Chloride 105 98 - 107 mmol/L    CO2 25 23 - 31 mmol/L    Glucose 121 (H) 82 - 115 mg/dL    Blood Urea Nitrogen 23.1 8.4 - 25.7 mg/dL    Creatinine 1.66 (H) 0.72 - 1.25  mg/dL    Calcium 8.8 8.8 - 10.0 mg/dL    Protein Total 7.2 5.8 - 7.6 gm/dL    Albumin 3.8 3.4 - 4.8 g/dL    Globulin 3.4 2.4 - 3.5 gm/dL    Albumin/Globulin Ratio 1.1 1.1 - 2.0 ratio    Bilirubin Total 0.9 <=1.5 mg/dL    ALP 77 40 - 150 unit/L    ALT 17 0 - 55 unit/L    AST 26 11 - 45 unit/L    eGFR 45 mL/min/1.73/m2    Anion Gap 8.0 mEq/L    BUN/Creatinine Ratio 14    Troponin I    Collection Time: 06/23/25  5:42 AM   Result Value Ref Range    Troponin-I 0.099 (H) 0.000 - 0.045 ng/mL   CBC with Differential    Collection Time: 06/23/25  5:42 AM   Result Value Ref Range    WBC 6.64 4.50 - 11.50 x10(3)/mcL    RBC 4.29 (L) 4.70 - 6.10 x10(6)/mcL    Hgb 13.0 (L) 14.0 - 18.0 g/dL    Hct 39.2 (L) 42.0 - 52.0 %    MCV 91.4 80.0 - 94.0 fL    MCH 30.3 27.0 - 31.0 pg    MCHC 33.2 33.0 - 36.0 g/dL    RDW 12.9 11.5 - 17.0 %    Platelet 229 130 - 400 x10(3)/mcL    MPV 9.0 7.4 - 10.4 fL    Neut % 65.8 %    Lymph % 17.0 %    Mono % 12.3 %    Eos % 3.3 %    Basophil % 1.4 %    Imm Grans % 0.2 %    Neut # 4.37 2.1 - 9.2 x10(3)/mcL    Lymph # 1.13 0.6 - 4.6 x10(3)/mcL    Mono # 0.82 0.1 - 1.3 x10(3)/mcL    Eos # 0.22 0 - 0.9 x10(3)/mcL    Baso # 0.09 <=0.2 x10(3)/mcL    Imm Gran # 0.01 0.00 - 0.04 x10(3)/mcL    NRBC% 0.0 %   Troponin I    Collection Time: 06/23/25 10:11 AM   Result Value Ref Range    Troponin-I 0.091 (H) 0.000 - 0.045 ng/mL   Comprehensive Metabolic Panel    Collection Time: 06/24/25 10:58 AM   Result Value Ref Range    Sodium 139 136 - 145 mmol/L    Potassium 4.0 3.5 - 5.1 mmol/L    Chloride 108 (H) 98 - 107 mmol/L    CO2 23 23 - 31 mmol/L    Glucose 117 (H) 82 - 115 mg/dL    Blood Urea Nitrogen 25.9 (H) 8.4 - 25.7 mg/dL    Creatinine 1.78 (H) 0.72 - 1.25 mg/dL    Calcium 8.8 8.8 - 10.0 mg/dL    Protein Total 7.4 5.8 - 7.6 gm/dL    Albumin 4.0 3.4 - 4.8 g/dL    Globulin 3.4 2.4 - 3.5 gm/dL    Albumin/Globulin Ratio 1.2 1.1 - 2.0 ratio    Bilirubin Total 0.8 <=1.5 mg/dL    ALP 82 40 - 150 unit/L    ALT 21 0 - 55  unit/L    AST 23 11 - 45 unit/L    eGFR 41 mL/min/1.73/m2    Anion Gap 8.0 mEq/L    BUN/Creatinine Ratio 15    Magnesium    Collection Time: 06/24/25 10:58 AM   Result Value Ref Range    Magnesium Level 2.40 1.60 - 2.60 mg/dL   CBC with Differential    Collection Time: 06/24/25 10:58 AM   Result Value Ref Range    WBC 6.57 4.50 - 11.50 x10(3)/mcL    RBC 4.28 (L) 4.70 - 6.10 x10(6)/mcL    Hgb 13.1 (L) 14.0 - 18.0 g/dL    Hct 38.9 (L) 42.0 - 52.0 %    MCV 90.9 80.0 - 94.0 fL    MCH 30.6 27.0 - 31.0 pg    MCHC 33.7 33.0 - 36.0 g/dL    RDW 13.0 11.5 - 17.0 %    Platelet 236 130 - 400 x10(3)/mcL    MPV 9.0 7.4 - 10.4 fL    Neut % 68.6 %    Lymph % 16.9 %    Mono % 10.4 %    Eos % 2.1 %    Basophil % 1.7 %    Imm Grans % 0.3 %    Neut # 4.51 2.1 - 9.2 x10(3)/mcL    Lymph # 1.11 0.6 - 4.6 x10(3)/mcL    Mono # 0.68 0.1 - 1.3 x10(3)/mcL    Eos # 0.14 0 - 0.9 x10(3)/mcL    Baso # 0.11 <=0.2 x10(3)/mcL    Imm Gran # 0.02 0.00 - 0.04 x10(3)/mcL    NRBC% 0.0 %     Significant Imaging: Reviewed   Imaging Results              X-Ray Chest 1 View (Final result)  Result time 06/23/25 09:01:18      Final result by eHather Us MD (06/23/25 09:01:18)                   Impression:      No acute abnormality of the chest.      Electronically signed by: Heather Us  Date:    06/23/2025  Time:    09:01               Narrative:    EXAMINATION:  XR CHEST 1 VIEW    CLINICAL HISTORY:  Chest pain, unspecified    TECHNIQUE:  AP chest    COMPARISON:  Chest x-ray dated 06/20/2025    FINDINGS:  The heart is normal in size.  There is no focal airspace consolidation.  There is no pleural effusion or visible pneumothorax.  Calcified pleural plaque is noted on the right.                                          Assessment:   MVCAD  NSTEMI Type I    -Troponin peak 0.102  Obstructive CAD s/p PCI to RCA with BMS in 2018        -Cardiac Arrest with subsequent Anoxic Brain Injury  LV dysfunction     - EF of 50 - 55%. Grade I diastolic  dysfunction per 6.21.25 TTE  VHD/MR  Anoxic brain injury  HLD  HTN  Current Everyday Smoker  Traumatic left BKA     -Mostly chair/WC dependent; reports he ambulates once a day or so with walker approx 200 ft.  PAD  PVD w/ claudication       Plan:   Last Plavix Dose 6/25/25 @ 0914 AM  CABG Evaluation in Progress  Final Plan/Recs to follow once Eval complete by Dr. Pillai          Thank you for your consult.     Emmy Christian, OLGA  Cardiothoracic Surgery   [facility]     The patient was seen and examined.  Severe circumflex and right coronary artery disease.  History of stroke with the anoxic brain injury.  History of BKA.  He is surely a very marginal candidate for coronary artery bypass grafting.  He also does not want to have heart surgery.  At this point recommend conservative versus possible interventional treatment.  Thank you for the consult

## 2025-06-29 ENCOUNTER — HOSPITAL ENCOUNTER (EMERGENCY)
Facility: HOSPITAL | Age: 70
Discharge: NURSING, REHAB OR LONG-TERM CARE | End: 2025-06-29
Attending: EMERGENCY MEDICINE
Payer: MEDICARE

## 2025-06-29 VITALS
WEIGHT: 175 LBS | TEMPERATURE: 98 F | DIASTOLIC BLOOD PRESSURE: 63 MMHG | RESPIRATION RATE: 19 BRPM | HEART RATE: 65 BPM | SYSTOLIC BLOOD PRESSURE: 139 MMHG | BODY MASS INDEX: 26.52 KG/M2 | OXYGEN SATURATION: 96 % | HEIGHT: 68 IN

## 2025-06-29 DIAGNOSIS — I25.10 CORONARY ARTERY DISEASE, UNSPECIFIED VESSEL OR LESION TYPE, UNSPECIFIED WHETHER ANGINA PRESENT, UNSPECIFIED WHETHER NATIVE OR TRANSPLANTED HEART: ICD-10-CM

## 2025-06-29 DIAGNOSIS — R79.89 ELEVATED TROPONIN I LEVEL: ICD-10-CM

## 2025-06-29 DIAGNOSIS — F17.200 NEEDS SMOKING CESSATION EDUCATION: ICD-10-CM

## 2025-06-29 DIAGNOSIS — R07.9 CHEST PAIN: Primary | ICD-10-CM

## 2025-06-29 LAB
ALBUMIN SERPL-MCNC: 4.2 G/DL (ref 3.4–5)
ALBUMIN/GLOB SERPL: 1.4 RATIO
ALP SERPL-CCNC: 68 UNIT/L (ref 50–144)
ALT SERPL-CCNC: 21 UNIT/L (ref 1–45)
ANION GAP SERPL CALC-SCNC: 6 MEQ/L (ref 2–13)
AST SERPL-CCNC: 27 UNIT/L (ref 17–59)
BASOPHILS # BLD AUTO: 0.13 X10(3)/MCL (ref 0.01–0.08)
BASOPHILS NFR BLD AUTO: 1.5 % (ref 0.1–1.2)
BILIRUB SERPL-MCNC: 0.6 MG/DL (ref 0–1)
BNP BLD-MCNC: 685 PG/ML (ref 0–124.9)
BUN SERPL-MCNC: 11 MG/DL (ref 7–20)
CALCIUM SERPL-MCNC: 9 MG/DL (ref 8.4–10.2)
CHLORIDE SERPL-SCNC: 106 MMOL/L (ref 98–110)
CO2 SERPL-SCNC: 26 MMOL/L (ref 21–32)
CREAT SERPL-MCNC: 1.59 MG/DL (ref 0.66–1.25)
CREAT/UREA NIT SERPL: 7 (ref 12–20)
EOSINOPHIL # BLD AUTO: 0.33 X10(3)/MCL (ref 0.04–0.54)
EOSINOPHIL NFR BLD AUTO: 3.8 % (ref 0.7–7)
ERYTHROCYTE [DISTWIDTH] IN BLOOD BY AUTOMATED COUNT: 12.8 %
GFR SERPLBLD CREATININE-BSD FMLA CKD-EPI: 47 ML/MIN/1.73/M2
GLOBULIN SER-MCNC: 3.1 GM/DL (ref 2–3.9)
GLUCOSE SERPL-MCNC: 98 MG/DL (ref 70–115)
HCT VFR BLD AUTO: 36.3 % (ref 36–52)
HGB BLD-MCNC: 12.8 G/DL (ref 13–18)
IMM GRANULOCYTES # BLD AUTO: 0.03 X10(3)/MCL (ref 0–0.03)
IMM GRANULOCYTES NFR BLD AUTO: 0.3 % (ref 0–0.5)
LYMPHOCYTES # BLD AUTO: 1.49 X10(3)/MCL (ref 1.32–3.57)
LYMPHOCYTES NFR BLD AUTO: 17.2 % (ref 20–55)
MCH RBC QN AUTO: 31.1 PG (ref 27–34)
MCHC RBC AUTO-ENTMCNC: 35.3 G/DL (ref 31–37)
MCV RBC AUTO: 88.3 FL (ref 79–99)
MONOCYTES # BLD AUTO: 0.89 X10(3)/MCL (ref 0.3–0.82)
MONOCYTES NFR BLD AUTO: 10.3 % (ref 4.7–12.5)
NEUTROPHILS # BLD AUTO: 5.77 X10(3)/MCL (ref 1.78–5.38)
NEUTROPHILS NFR BLD AUTO: 66.9 % (ref 37–73)
NRBC BLD AUTO-RTO: 0 %
PLATELET # BLD AUTO: 319 X10(3)/MCL (ref 140–371)
PMV BLD AUTO: 9.1 FL (ref 9.4–12.4)
POTASSIUM SERPL-SCNC: 3.2 MMOL/L (ref 3.5–5.1)
PROT SERPL-MCNC: 7.3 GM/DL (ref 6.3–8.2)
RBC # BLD AUTO: 4.11 X10(6)/MCL (ref 4–6)
SODIUM SERPL-SCNC: 138 MMOL/L (ref 136–145)
TROPONIN I SERPL-MCNC: 0.04 NG/ML (ref 0–0.03)
WBC # BLD AUTO: 8.64 X10(3)/MCL (ref 4–11.5)

## 2025-06-29 PROCEDURE — 93005 ELECTROCARDIOGRAM TRACING: CPT

## 2025-06-29 PROCEDURE — 85025 COMPLETE CBC W/AUTO DIFF WBC: CPT | Performed by: EMERGENCY MEDICINE

## 2025-06-29 PROCEDURE — 80053 COMPREHEN METABOLIC PANEL: CPT | Performed by: EMERGENCY MEDICINE

## 2025-06-29 PROCEDURE — 99285 EMERGENCY DEPT VISIT HI MDM: CPT | Mod: 25

## 2025-06-29 PROCEDURE — 83880 ASSAY OF NATRIURETIC PEPTIDE: CPT | Performed by: EMERGENCY MEDICINE

## 2025-06-29 PROCEDURE — 25000003 PHARM REV CODE 250: Performed by: EMERGENCY MEDICINE

## 2025-06-29 PROCEDURE — 93010 ELECTROCARDIOGRAM REPORT: CPT | Mod: ,,, | Performed by: INTERNAL MEDICINE

## 2025-06-29 PROCEDURE — 84484 ASSAY OF TROPONIN QUANT: CPT | Performed by: EMERGENCY MEDICINE

## 2025-06-29 RX ORDER — ASPIRIN 325 MG
325 TABLET ORAL
Status: COMPLETED | OUTPATIENT
Start: 2025-06-29 | End: 2025-06-29

## 2025-06-29 RX ADMIN — ASPIRIN 325 MG ORAL TABLET 325 MG: 325 PILL ORAL at 11:06

## 2025-06-29 NOTE — ED PROVIDER NOTES
Encounter Date: 6/29/2025       History     Chief Complaint   Patient presents with    Chest Pain     Pt BIBA from [facility] with c/o left/midsternal chest pain last night. Took 3 nitro with relief. No active chest pain.Pt admitted last week for elevated troponins and waiting to see cardiology. On Plavix.VSS, NAD.     This is a 70-year-old male with past medical history of coronary artery disease, hyperlipidemia, history of left BKA, hypertension, abdominal aortic aneurysm, who presents emergency department with chest pain.  It appears that patient was just at [facility] and had an angiogram and CABG and abdominal aortic aneurysm repair was recommended.  Patient was discharged to [facility] several days ago and returns today again with chest pain.  Described as a aching in his chest, nonradiating, no associated shortness of breath with diaphoresis.  He says that currently he has no pain he rates his pain as 0/10, but he took 3 nitro prior to arrival.  He has not taken aspirin today.        Review of patient's allergies indicates:  No Known Allergies  Past Medical History:   Diagnosis Date    Anoxic brain injury     Arthritis     CAD (coronary artery disease)     Cardiac arrest     Heart attack     HLD (hyperlipidemia)     Hx of left BKA     Hypertension     Mitral valve regurgitation     Muscle spasms of both lower extremities     Muscle spasms of both lower extremities     PEG (percutaneous endoscopic gastrostomy) adjustment/replacement/removal     Status post placement of stent in right coronary artery      Past Surgical History:   Procedure Laterality Date    ANGIOGRAM, CORONARY, WITH LEFT HEART CATHETERIZATION N/A 6/24/2025    Procedure: Angiogram, Coronary, with Left Heart Cath;  Surgeon: Jeffry Langford MD;  [facility];  Service: Cardiology;  Laterality: N/A;    LEG AMPUTATION Left      No family history on file.  Social History[1]  Review of Systems   Constitutional:   Negative for fever.   HENT:  Negative for sore throat.    Respiratory:  Negative for shortness of breath.    Cardiovascular:  Positive for chest pain.   Gastrointestinal:  Negative for abdominal pain, nausea and vomiting.   Genitourinary:  Negative for dysuria.   Musculoskeletal:  Negative for back pain.   Skin:  Negative for rash.   Neurological:  Negative for weakness.   Hematological:  Does not bruise/bleed easily.   All other systems reviewed and are negative.      Physical Exam     Initial Vitals [06/29/25 1117]   BP Pulse Resp Temp SpO2   (!) 148/66 72 20 97.4 °F (36.3 °C) 98 %      MAP       --         Physical Exam    Nursing note and vitals reviewed.  Constitutional: He appears well-developed and well-nourished. He is not diaphoretic. No distress.   HENT:   Head: Normocephalic and atraumatic. Mouth/Throat: Oropharynx is clear and moist. No oropharyngeal exudate.   Eyes: Conjunctivae and EOM are normal. Pupils are equal, round, and reactive to light.   Neck: Neck supple. No tracheal deviation present.   Cardiovascular:  Normal rate, regular rhythm, normal heart sounds and intact distal pulses.           No murmur heard.  Pulmonary/Chest: Breath sounds normal. No stridor. No respiratory distress. He has no wheezes. He has no rhonchi. He has no rales.   Abdominal: Abdomen is soft. Bowel sounds are normal. He exhibits no distension. There is no abdominal tenderness. There is no rebound and no guarding.   Musculoskeletal:         General: No tenderness or edema. Normal range of motion.      Cervical back: Neck supple.      Comments: Left leg has below-the-knee amputation.  Stump is normal appearance.     Neurological: He is alert and oriented to person, place, and time. He has normal strength. No cranial nerve deficit or sensory deficit.   Skin: Skin is warm and dry. Capillary refill takes less than 2 seconds. No rash noted. No erythema. No pallor.   Psychiatric: He has a normal mood and affect. His behavior is  normal. Thought content normal.         ED Course   Procedures  Labs Reviewed   COMPREHENSIVE METABOLIC PANEL - Abnormal       Result Value    Sodium 138      Potassium 3.2 (*)     Chloride 106      CO2 26      Glucose 98      Blood Urea Nitrogen 11      Creatinine 1.59 (*)     Calcium 9.0      Protein Total 7.3      Albumin 4.2      Globulin 3.1      Albumin/Globulin Ratio 1.4      Bilirubin Total 0.6      ALP 68      ALT 21      AST 27      eGFR 47      Anion Gap 6.0      BUN/Creatinine Ratio 7 (*)    TROPONIN I - Abnormal    Troponin-I 0.035 (*)    NT-PRO NATRIURETIC PEPTIDE - Abnormal    ProBNP 685.0 (*)    CBC WITH DIFFERENTIAL - Abnormal    WBC 8.64      RBC 4.11      Hgb 12.8 (*)     Hct 36.3      MCV 88.3      MCH 31.1      MCHC 35.3      RDW 12.8      Platelet 319      MPV 9.1 (*)     Neut % 66.9      Lymph % 17.2 (*)     Mono % 10.3      Eos % 3.8      Basophil % 1.5 (*)     Lymph # 1.49      Neut # 5.77 (*)     Mono # 0.89 (*)     Eos # 0.33      Baso # 0.13 (*)     Imm Gran # 0.03      Imm Grans % 0.3      NRBC% 0.0     CBC W/ AUTO DIFFERENTIAL    Narrative:     The following orders were created for panel order CBC auto differential.  Procedure                               Abnormality         Status                     ---------                               -----------         ------                     CBC with Differential[[id number]]       Abnormal            Final result                 Please view results for these tests on the individual orders.   TROPONIN I          Imaging Results              X-Ray Chest AP Portable (Final result)  Result time 06/29/25 12:20:19      Final result by Mitul Acosta MD (06/29/25 12:20:19)                   Impression:      1. No evidence of lobar type consolidation or acute cardiac decompensation noted.      Electronically signed by: Mitul Acosta MD  Date:    06/29/2025  Time:    12:20               Narrative:    EXAMINATION:  XR CHEST AP PORTABLE    CLINICAL  HISTORY:  Chest pain    TECHNIQUE:  1 view of the chest.    COMPARISON:  06/23/2025    FINDINGS:  The lungs demonstrate no evidence of lobar type consolidation, visible pneumothorax, or significant pleural fluid.  Calcified pleural plaque identified right lower lung.  Vascular calcifications are present.  The cardiac silhouette is within normal limits for size.   No acute displaced fracture is seen.                                       Medications   aspirin tablet 325 mg (325 mg Oral Given 6/29/25 1157)     Medical Decision Making  Differential includes but not limited to chest wall pain, acute coronary syndrome, PE, pericarditis, myocarditis, pericardial tamponade, aortic dissection, pneumonia, pneumothorax, Boerhaave syndrome, anemia, electrolyte abnormalities, herpes zoster, pancreatitis, anxiety, chest wall strain, costochondritis    Emergent evaluation of a 70-year-old male presents emergency department with chest pain as mentioned above.  It had resolved upon arrival to the ER.  He was given aspirin.  He had a angiogram 5 days ago which showed significant coronary artery disease and bypass is recommended.  Given the fact he is still having chest pain and presented to the ER for this I recommend he be transferred back to the facility that could performed a bypass surgery.  He declined and stated that he wanted to go home to eat lunch back at the nursing home.  He says that his workup is in progress as an outpatient and he does not think he needs to go back to [facility] to have his bypass sooner rather than later despite his persistent chest pain and ER visit for his chest pain today.  He will sign out against medical advice.  He has a GCS of 15    All relevant clinical information was discussed with the patient. There is no criteria for involuntary commitment. Patient has a GCS of 15 and seems to have capacitance to make their own decisions.  Mr. Thompson is aware of suspected diagnosis chest pain, rule  out acute coronary syndrome. The patient verbalizes, acknowledges and understands reasons for recommending admission. He verbalizes, acknowledges and understands the risk of death, permanent mental and neurological impairment, loss of limb,  loss of current lifestyle and worsening of current condition and chooses to leave against medical advice.  Mr. Thompson is welcome to return to this hospital at any time for any concerns or worsening of current condition. He was given discharge instructions and follow up was recommended.         Amount and/or Complexity of Data Reviewed  External Data Reviewed: labs and notes.  Labs: ordered. Decision-making details documented in ED Course.  Radiology: ordered. Decision-making details documented in ED Course.  ECG/medicine tests: ordered and independent interpretation performed. Decision-making details documented in ED Course.    Risk  OTC drugs.  Prescription drug management.  Decision regarding hospitalization.               ED Course as of 06/29/25 1408   Sun Jun 29, 2025   1120 EKG 11:16 a.m. normal sinus rhythm rate of 66, left ventricular hypertrophy.  No ST elevation or depression.  No STEMI.  EKG interpreted independently by me. [JR]      ED Course User Index  [JR] Armando Wilson DO                           Clinical Impression:  Final diagnoses:  [R07.9] Chest pain (Primary)  [I25.10] Coronary artery disease, unspecified vessel or lesion type, unspecified whether angina present, unspecified whether native or transplanted heart  [R79.89] Elevated troponin I level          ED Disposition Condition    AMA                       [1]   Social History  Tobacco Use    Smoking status: Every Day     Current packs/day: 1.00     Average packs/day: 1 pack/day for 53.5 years (53.5 ttl pk-yrs)     Types: Cigarettes     Start date: 1972     Passive exposure: Current    Smokeless tobacco: Never   Vaping Use    Vaping status: Never Used   Substance Use Topics    Alcohol use: Never     Drug use: Never        Armando Wilson,   06/29/25 6430

## 2025-06-30 LAB
OHS QRS DURATION: 112 MS
OHS QTC CALCULATION: 442 MS

## 2025-07-03 ENCOUNTER — PATIENT OUTREACH (OUTPATIENT)
Facility: HOSPITAL | Age: 70
End: 2025-07-03
Payer: MEDICARE
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
## 2025-07-31 ENCOUNTER — PATIENT OUTREACH (OUTPATIENT)
Facility: HOSPITAL | Age: 70
End: 2025-07-31
Payer: MEDICARE

## (undated) DEVICE — Device

## (undated) DEVICE — TUBING HP AIRLSS ROT ADPT 30IN

## (undated) DEVICE — KIT HAND CONTROL HIGH PRESSUR

## (undated) DEVICE — GUIDEWIRE INQWIRE SE 3MM JTIP

## (undated) DEVICE — PAD DEFIB CADENCE ADULT R2

## (undated) DEVICE — KIT SYR REUSABLE

## (undated) DEVICE — CATH OPTITORQUE RADIAL 5FR

## (undated) DEVICE — SET ANGIO ACIST CVI ANGIOTOUCH

## (undated) DEVICE — CANNULA ADULT NASAL 7FT

## (undated) DEVICE — CATH IMPULSE 5FR PIGTAIL 125CM

## (undated) DEVICE — SHEATH INTRODUCER 5FR 10CM

## (undated) DEVICE — KIT GLIDESHEATH SLEND 6FR 10CM

## (undated) DEVICE — KIT MANIFOLD LOW PRESS TUBING

## (undated) DEVICE — SET MICROPUNCTUREECHO STIFF

## (undated) DEVICE — CATH IMPULSE FL4 5FR 100CM

## (undated) DEVICE — SYS WASTE FLD DISPOSAL 1400ML

## (undated) DEVICE — CATH IMPULSE 5F 100CM FR4